# Patient Record
Sex: FEMALE | Race: WHITE | Employment: OTHER | URBAN - METROPOLITAN AREA
[De-identification: names, ages, dates, MRNs, and addresses within clinical notes are randomized per-mention and may not be internally consistent; named-entity substitution may affect disease eponyms.]

---

## 2019-05-03 LAB
AVERAGE GLUCOSE: 143
CHOLESTEROL, TOTAL: 201 MG/DL
CHOLESTEROL/HDL RATIO: 3.7
CREATININE: 0.9 MG/DL
HBA1C MFR BLD: 6.6 %
HDLC SERPL-MCNC: 54 MG/DL (ref 35–70)
LDL CHOLESTEROL CALCULATED: 98 MG/DL (ref 0–160)
POTASSIUM (K+): 4.5
TRIGL SERPL-MCNC: 243 MG/DL
VLDLC SERPL CALC-MCNC: 49 MG/DL

## 2020-04-10 VITALS — DIASTOLIC BLOOD PRESSURE: 80 MMHG | SYSTOLIC BLOOD PRESSURE: 130 MMHG | HEART RATE: 68 BPM | RESPIRATION RATE: 14 BRPM

## 2020-04-10 RX ORDER — AMLODIPINE BESYLATE AND BENAZEPRIL HYDROCHLORIDE 5; 20 MG/1; MG/1
1 CAPSULE ORAL DAILY
COMMUNITY
End: 2020-12-03 | Stop reason: SDUPTHER

## 2020-11-10 ENCOUNTER — TELEPHONE (OUTPATIENT)
Dept: FAMILY MEDICINE CLINIC | Age: 65
End: 2020-11-10

## 2020-12-03 ENCOUNTER — TELEPHONE (OUTPATIENT)
Dept: FAMILY MEDICINE CLINIC | Age: 65
End: 2020-12-03

## 2020-12-03 NOTE — TELEPHONE ENCOUNTER
Patient is requesting a refill of:  Medication: lotrel  Dose: 5/20  Frequency: 1 daily   Ph[facility]: [facility]    Patient is requesting a refill of:  Medication: tenormin  Dose: 50 mg  Frequency: 1 daily  [facility]    Patient is requesting a refill of:  Medication: nexium  Dose: 40mg  Frequency: 1 daily  Pharmacy: [facility]

## 2020-12-04 ENCOUNTER — TELEPHONE (OUTPATIENT)
Dept: FAMILY MEDICINE CLINIC | Age: 65
End: 2020-12-04

## 2020-12-04 RX ORDER — AMLODIPINE BESYLATE AND BENAZEPRIL HYDROCHLORIDE 5; 20 MG/1; MG/1
1 CAPSULE ORAL DAILY
Qty: 90 CAPSULE | Refills: 0 | Status: SHIPPED
Start: 2020-12-04 | End: 2020-12-22 | Stop reason: SDUPTHER

## 2020-12-04 RX ORDER — LISINOPRIL 10 MG/1
10 TABLET ORAL DAILY
Qty: 90 TABLET | Refills: 0 | Status: CANCELLED | OUTPATIENT
Start: 2020-12-04

## 2020-12-04 RX ORDER — ATENOLOL 50 MG/1
50 TABLET ORAL DAILY
Qty: 90 TABLET | Refills: 0 | Status: SHIPPED
Start: 2020-12-04 | End: 2020-12-22 | Stop reason: SDUPTHER

## 2020-12-04 RX ORDER — VENLAFAXINE 75 MG/1
75 TABLET ORAL DAILY
Qty: 90 TABLET | Refills: 0 | Status: CANCELLED | OUTPATIENT
Start: 2020-12-04

## 2020-12-04 RX ORDER — METFORMIN HYDROCHLORIDE 500 MG/1
500 TABLET, FILM COATED, EXTENDED RELEASE ORAL 2 TIMES DAILY WITH MEALS
Qty: 180 TABLET | Refills: 0 | Status: SHIPPED
Start: 2020-12-04 | End: 2021-02-22

## 2020-12-04 RX ORDER — POTASSIUM CHLORIDE 750 MG/1
10 CAPSULE, EXTENDED RELEASE ORAL DAILY
Qty: 180 CAPSULE | Refills: 0 | Status: SHIPPED
Start: 2020-12-04 | End: 2021-03-16 | Stop reason: SDUPTHER

## 2020-12-04 RX ORDER — ESOMEPRAZOLE MAGNESIUM 40 MG/1
40 FOR SUSPENSION ORAL DAILY
Qty: 90 PACKET | Refills: 0 | Status: SHIPPED
Start: 2020-12-04 | End: 2021-04-27

## 2020-12-04 NOTE — TELEPHONE ENCOUNTER
Patient is requesting a refill of:  Medication: lotrel  Dose: 5/20  Frequency: 1 daily  Ph[facility]: [facility]    Patient is requesting a refill of:  Medication: lisinopril  Dose: 10 mg  Frequency: 1 daily  Pharmacy: [facility] [address]    Patient is requesting a refill of:  Medication: metformin ER  Dose: 500mg  Frequency: 1 BID  Pharmacy: [facility] [address]    Patient is requesting a refill of:  Medication: micro K   Dose: 10 meq  Frequency: 1 daily  Pharmacy:  [facility]    Patient is requesting a refill of:  Medication: effexor   Dose: 75 mg  Frequency: 1 daily  Pharmacy: [facility] [address]      Last seen 11/16/2020  Next appt 1/12/2021

## 2020-12-15 RX ORDER — VENLAFAXINE HYDROCHLORIDE 150 MG/1
150 CAPSULE, EXTENDED RELEASE ORAL DAILY
Qty: 90 CAPSULE | Refills: 1
Start: 2020-12-15 | End: 2021-03-16 | Stop reason: SDUPTHER

## 2020-12-15 NOTE — TELEPHONE ENCOUNTER
Patient is requesting a refill of:  Medication: venlafaxine [facility])   Dose: 150 mg  Frequency: daily  Ph[facility] [address]            Last seen 11/16/2020  Next appt 1/12/2021

## 2020-12-17 NOTE — TELEPHONE ENCOUNTER
Patient called to inform pharmacy did not get RX. Noted RX was not sent electronically. I called [facility] and gave verbal order to Leyla Iqbal.

## 2020-12-22 RX ORDER — AMLODIPINE BESYLATE AND BENAZEPRIL HYDROCHLORIDE 5; 20 MG/1; MG/1
1 CAPSULE ORAL DAILY
Qty: 90 CAPSULE | Refills: 1 | Status: SHIPPED
Start: 2020-12-22 | End: 2021-03-16 | Stop reason: SDUPTHER

## 2020-12-22 RX ORDER — ATENOLOL 50 MG/1
50 TABLET ORAL DAILY
Qty: 90 TABLET | Refills: 1 | Status: SHIPPED
Start: 2020-12-22 | End: 2021-03-16 | Stop reason: SDUPTHER

## 2020-12-22 RX ORDER — ATORVASTATIN CALCIUM 10 MG/1
10 TABLET, FILM COATED ORAL DAILY
Qty: 90 TABLET | Refills: 1 | Status: SHIPPED
Start: 2020-12-22 | End: 2021-03-16 | Stop reason: SDUPTHER

## 2021-02-22 RX ORDER — METFORMIN HYDROCHLORIDE 500 MG/1
TABLET, EXTENDED RELEASE ORAL
Qty: 180 TABLET | Refills: 1 | Status: SHIPPED
Start: 2021-02-22 | End: 2021-03-16 | Stop reason: SDUPTHER

## 2021-03-16 ENCOUNTER — OFFICE VISIT (OUTPATIENT)
Dept: FAMILY MEDICINE CLINIC | Age: 66
End: 2021-03-16
Payer: COMMERCIAL

## 2021-03-16 VITALS
HEIGHT: 66 IN | HEART RATE: 65 BPM | BODY MASS INDEX: 31.5 KG/M2 | SYSTOLIC BLOOD PRESSURE: 136 MMHG | WEIGHT: 196 LBS | OXYGEN SATURATION: 98 % | TEMPERATURE: 96.3 F | DIASTOLIC BLOOD PRESSURE: 70 MMHG

## 2021-03-16 DIAGNOSIS — I10 HYPERTENSION, UNSPECIFIED TYPE: ICD-10-CM

## 2021-03-16 DIAGNOSIS — K21.9 GASTROESOPHAGEAL REFLUX DISEASE WITHOUT ESOPHAGITIS: ICD-10-CM

## 2021-03-16 DIAGNOSIS — E11.9 TYPE 2 DIABETES MELLITUS WITHOUT COMPLICATION, WITHOUT LONG-TERM CURRENT USE OF INSULIN (HCC): ICD-10-CM

## 2021-03-16 DIAGNOSIS — E03.9 HYPOTHYROIDISM, UNSPECIFIED TYPE: ICD-10-CM

## 2021-03-16 DIAGNOSIS — R47.1 ACQUIRED DYSARTHRIA: ICD-10-CM

## 2021-03-16 DIAGNOSIS — G45.9 TIA (TRANSIENT ISCHEMIC ATTACK): Primary | ICD-10-CM

## 2021-03-16 DIAGNOSIS — E78.5 HYPERLIPIDEMIA, UNSPECIFIED HYPERLIPIDEMIA TYPE: ICD-10-CM

## 2021-03-16 DIAGNOSIS — D51.8 OTHER VITAMIN B12 DEFICIENCY ANEMIA: ICD-10-CM

## 2021-03-16 PROCEDURE — 99396 PREV VISIT EST AGE 40-64: CPT | Performed by: INTERNAL MEDICINE

## 2021-03-16 PROCEDURE — 93000 ELECTROCARDIOGRAM COMPLETE: CPT | Performed by: INTERNAL MEDICINE

## 2021-03-16 RX ORDER — POTASSIUM CHLORIDE 750 MG/1
10 CAPSULE, EXTENDED RELEASE ORAL DAILY
Qty: 180 CAPSULE | Refills: 0 | Status: SHIPPED
Start: 2021-03-16 | End: 2021-08-23 | Stop reason: SDUPTHER

## 2021-03-16 RX ORDER — AMLODIPINE BESYLATE AND BENAZEPRIL HYDROCHLORIDE 5; 20 MG/1; MG/1
1 CAPSULE ORAL DAILY
Qty: 90 CAPSULE | Refills: 1 | Status: SHIPPED
Start: 2021-03-16 | End: 2021-04-20

## 2021-03-16 RX ORDER — VENLAFAXINE HYDROCHLORIDE 150 MG/1
150 CAPSULE, EXTENDED RELEASE ORAL DAILY
Qty: 90 CAPSULE | Refills: 1 | Status: SHIPPED
Start: 2021-03-16 | End: 2021-08-20 | Stop reason: SDUPTHER

## 2021-03-16 RX ORDER — ATENOLOL 50 MG/1
50 TABLET ORAL DAILY
Qty: 90 TABLET | Refills: 1 | Status: SHIPPED
Start: 2021-03-16 | End: 2021-08-20 | Stop reason: SDUPTHER

## 2021-03-16 RX ORDER — METFORMIN HYDROCHLORIDE 500 MG/1
TABLET, EXTENDED RELEASE ORAL
Qty: 180 TABLET | Refills: 1 | Status: SHIPPED
Start: 2021-03-16 | End: 2021-05-19

## 2021-03-16 RX ORDER — LISINOPRIL 10 MG/1
10 TABLET ORAL DAILY
Qty: 90 TABLET | Refills: 1 | Status: CANCELLED | OUTPATIENT
Start: 2021-03-16

## 2021-03-16 RX ORDER — ATORVASTATIN CALCIUM 10 MG/1
10 TABLET, FILM COATED ORAL DAILY
Qty: 90 TABLET | Refills: 1 | Status: SHIPPED
Start: 2021-03-16 | End: 2021-05-19

## 2021-03-16 SDOH — ECONOMIC STABILITY: INCOME INSECURITY: HOW HARD IS IT FOR YOU TO PAY FOR THE VERY BASICS LIKE FOOD, HOUSING, MEDICAL CARE, AND HEATING?: NOT ASKED

## 2021-03-16 SDOH — ECONOMIC STABILITY: FOOD INSECURITY: WITHIN THE PAST 12 MONTHS, YOU WORRIED THAT YOUR FOOD WOULD RUN OUT BEFORE YOU GOT MONEY TO BUY MORE.: NEVER TRUE

## 2021-03-16 SDOH — ECONOMIC STABILITY: TRANSPORTATION INSECURITY
IN THE PAST 12 MONTHS, HAS LACK OF TRANSPORTATION KEPT YOU FROM MEETINGS, WORK, OR FROM GETTING THINGS NEEDED FOR DAILY LIVING?: NO

## 2021-03-16 ASSESSMENT — PATIENT HEALTH QUESTIONNAIRE - PHQ9
1. LITTLE INTEREST OR PLEASURE IN DOING THINGS: 0
SUM OF ALL RESPONSES TO PHQ QUESTIONS 1-9: 0
SUM OF ALL RESPONSES TO PHQ QUESTIONS 1-9: 0

## 2021-03-16 NOTE — PROGRESS NOTES
Subjective:     Chief Complaint   Patient presents with    Speech Problem   She presents to the office today she has not been seen for 1 year and a half  The past 2 months having problem with the speech  Her family and friends have suggested that sometimes he cannot understand what she says  It is not getting worse  She denies any tingling numbness of the face  Denies any dysphagia  Denies any sore throat  Denies any heartburns  No weakness upper or lower extremities  No loss of balance  No history of TIA CVA      Past medical history hypertension, hyperlipidemia, diabetes type 2, GE reflux, hypokalemia, diabetes type 2, CT head and MRI brain - February 2010    She has not been a smoker does not drink alcohol    Father had CABG at age 61 heavy smoker  One brother has MS  Sister has rheumatoid arthritis      Past Medical History:   Diagnosis Date    Abnormal mammogram of left breast 05/2019    Anxiety     Depression     Diabetes mellitus ([facility]     Erosive esophagitis     Esophageal stricture     GERD (gastroesophageal reflux disease)     Gestational diabetes     Hyperlipidemia     Hypertension     Hypokalemia     Osteoarthritis     knees    Postmenopausal     Type 2 diabetes mellitus without complication ([facility] [address]         Social History     Socioeconomic History    Marital status:      Spouse name: Not on file    Number of children: Not on file    Years of education: Not on file    Highest education level: Not on file   Occupational History    Not on file   Social Needs    Financial resource strain: Not on file    Food insecurity     Worry: Never true     Inability: Never true    Transportation needs     Medical: No     Non-medical: No   Tobacco Use    Smoking status: Never Smoker    Smokeless tobacco: Never Used   Substance and Sexual Activity    Alcohol use: No    Drug use: No    Sexual activity: Not on file   Lifestyle    Physical activity     Days per week: Not on file     Minutes per session: Not on file    Stress: Not on file   Relationships    Social connections     Talks on phone: Not on file     Gets together: Not on file     Attends Religion service: Not on file     Active member of club or organization: Not on file     Attends meetings of clubs or organizations: Not on file     Relationship status: Not on file    Intimate partner violence     Fear of current or ex partner: Not on file     Emotionally abused: Not on file     Physically abused: Not on file     Forced sexual activity: Not on file   Other Topics Concern    Not on file   Social History Narrative    Not on file        Past Surgical History:   Procedure Laterality Date    BREAST BIOPSY Left 05/2019    Dr. Vin Reid      x4    CHOLECYSTECTOMY      COLONOSCOPY  11/2011    HYSTERECTOMY      JOINT REPLACEMENT Right     knee        Family History   Problem Relation Age of Onset    Diabetes Mother         complication     Rheum Arthritis Sister     Mult Sclerosis Brother     Diabetes Brother         No Known Allergies     ROS  No acute distress  Cardiac: Denies any chest pain or palpitation  Seen by cardiologist Dr. Yehuda Wright in 2018  Stress test was negative  Respiratory: Denies any cough or shortness of breath  GI: No abdominal pain.  Denies any nausea vomiting or diarrhea   Had colonoscopy by Dr. Ashley Saleh May 2019 repeat in 2024 unless any symptoms as per patient  : Denies any dysuria frequency or hematuria  Neuro: No headache or dizziness  Endocrine: No diabetes  Skin: normal  No recent weight gain or weight loss  Denies any change in vision    For mammograms supposed to follow-up with Dr. Myra Dunbar seen by him in 2019    Objective:    /70   Pulse 65   Temp 96.3 °F (35.7 °C)   Ht 5' 6\" (1.676 m)   Wt 196 lb (88.9 kg)   SpO2 98%   BMI 31.64 kg/m²     Constitutional: Alert awake and oriented  Eyes: Pupils equal bilaterally. Extraocular muscles intact  Neck: no JVD adenopathy no bruit  Face was normal no facial droop  Tongue in the midline  Oral cavity normal  Heart:  RRR, no murmurs, gallops, or rubs. Lungs:    no wheeze, rales or rhonchi  Abd: bowel sounds present, nontender, nondistended, no masses  Extrem:  No clubbing, cyanosis, or edema  Neuro: AAOx3,No Focal deficit  Gait normal  Reflexes normal  No cerebellar signs  Psychological: History of depression doing well no suicidal ideation      Current Outpatient Medications   Medication Sig Dispense Refill    venlafaxine (EFFEXOR XR) 150 MG extended release capsule Take 1 capsule by mouth daily 90 capsule 1    potassium chloride (MICRO-K) 10 MEQ extended release capsule Take 1 capsule by mouth daily 180 capsule 0    metFORMIN (GLUCOPHAGE-XR) 500 MG extended release tablet TAKE 1 TABLET BY MOUTH TWICE A DAY WITH MEALS 180 tablet 1    atorvastatin (LIPITOR) 10 MG tablet Take 1 tablet by mouth daily 90 tablet 1    atenolol (TENORMIN) 50 MG tablet Take 1 tablet by mouth daily 90 tablet 1    amLODIPine-benazepril (LOTREL) 5-20 MG per capsule Take 1 capsule by mouth daily 90 capsule 1    esomeprazole Magnesium (NEXIUM) 40 MG PACK Take 1 packet by mouth daily 90 packet 0     No current facility-administered medications for this visit.          Last 3 BMP  Lab Results   Component Value Date/Time     08/22/2016 11:00 AM     08/17/2016 10:40 AM     08/14/2016 11:20 AM    K 4.5 05/03/2019    K 4.3 08/22/2016 11:00 AM    K 4.2 08/17/2016 10:40 AM    K 4.6 08/14/2016 11:20 AM     08/22/2016 11:00 AM    CL 99 08/17/2016 10:40 AM     08/14/2016 11:20 AM    CO2 22 08/22/2016 11:00 AM    CO2 24 08/17/2016 10:40 AM    CO2 26 08/14/2016 11:20 AM    BUN 10 08/22/2016 11:00 AM    BUN 13 08/17/2016 10:40 AM    BUN 11 08/14/2016 11:20 AM    CREATININE 0.9 05/03/2019    CREATININE 0.8 08/22/2016 11:00 AM    CREATININE 0.8 08/17/2016 10:40 AM CREATININE 0.8 08/14/2016 11:20 AM    GLUCOSE 118 (H) 08/22/2016 11:00 AM    GLUCOSE 124 (H) 08/17/2016 10:40 AM    GLUCOSE 113 (H) 08/14/2016 11:20 AM    CALCIUM 9.7 08/22/2016 11:00 AM    CALCIUM 9.5 08/17/2016 10:40 AM    CALCIUM 9.5 08/14/2016 11:20 AM       Last 3 CMP:    Lab Results   Component Value Date/Time     08/22/2016 11:00 AM     08/17/2016 10:40 AM     08/14/2016 11:20 AM    K 4.5 05/03/2019    K 4.3 08/22/2016 11:00 AM    K 4.2 08/17/2016 10:40 AM    K 4.6 08/14/2016 11:20 AM     08/22/2016 11:00 AM    CL 99 08/17/2016 10:40 AM     08/14/2016 11:20 AM    CO2 22 08/22/2016 11:00 AM    CO2 24 08/17/2016 10:40 AM    CO2 26 08/14/2016 11:20 AM    BUN 10 08/22/2016 11:00 AM    BUN 13 08/17/2016 10:40 AM    BUN 11 08/14/2016 11:20 AM    CREATININE 0.9 05/03/2019    CREATININE 0.8 08/22/2016 11:00 AM    CREATININE 0.8 08/17/2016 10:40 AM    CREATININE 0.8 08/14/2016 11:20 AM    GLUCOSE 118 (H) 08/22/2016 11:00 AM    GLUCOSE 124 (H) 08/17/2016 10:40 AM    GLUCOSE 113 (H) 08/14/2016 11:20 AM    CALCIUM 9.7 08/22/2016 11:00 AM    CALCIUM 9.5 08/17/2016 10:40 AM    CALCIUM 9.5 08/14/2016 11:20 AM    PROT 6.8 08/22/2016 11:00 AM    PROT 6.6 08/17/2016 10:40 AM    PROT 6.4 08/14/2016 11:20 AM    LABALBU 4.1 08/22/2016 11:00 AM    LABALBU 4.0 08/17/2016 10:40 AM    LABALBU 4.0 08/14/2016 11:20 AM    BILITOT 0.4 08/22/2016 11:00 AM    BILITOT 0.3 08/17/2016 10:40 AM    BILITOT 0.6 08/14/2016 11:20 AM    ALKPHOS 95 08/22/2016 11:00 AM    ALKPHOS 107 (H) 08/17/2016 10:40 AM    ALKPHOS 115 (H) 08/14/2016 11:20 AM    AST 15 08/22/2016 11:00 AM    AST 20 08/17/2016 10:40 AM    AST 12 08/14/2016 11:20 AM    ALT 11 08/22/2016 11:00 AM    ALT 17 08/17/2016 10:40 AM    ALT 11 08/14/2016 11:20 AM        CBC:   Lab Results   Component Value Date/Time    WBC 6.9 08/22/2016 11:00 AM    RBC 3.85 08/22/2016 11:00 AM    HGB 11.4 (L) 08/22/2016 11:00 AM    HCT 35.0 08/22/2016 11:00 AM    MCV 90.8 08/22/2016 11:00 AM    MCH 29.5 08/22/2016 11:00 AM    MCHC 32.5 08/22/2016 11:00 AM    RDW 13.5 08/22/2016 11:00 AM     08/22/2016 11:00 AM    MPV 8.1 08/22/2016 11:00 AM       A1C:  Lab Results   Component Value Date/Time    LABA1C 6.6 05/03/2019       Lipid panel:  Lab Results   Component Value Date    CHOL 201 05/03/2019    CHOL 184 03/16/2016    TRIG 243 05/03/2019    TRIG 237 03/16/2016    HDL 54 05/03/2019    HDL 48 03/16/2016        Lab Results   Component Value Date/Time    PROT 6.8 08/22/2016 11:00 AM    PROT 6.6 08/17/2016 10:40 AM    PROT 6.4 08/14/2016 11:20 AM    INR 1.0 08/12/2016 05:39 PM       No results found for: MG      Assessment. Estrella Bennettsakshi was seen today for speech problem. Diagnoses and all orders for this visit:    TIA (transient ischemic attack)  -     MRI BRAIN W WO CONTRAST; Future  -     US CAROTID ARTERY BILATERAL; Future  -     CBC Auto Differential; Future  -     VITAMIN B12 & FOLATE; Future    Hypertension, unspecified type  -     EKG 12 lead; Future  -     EKG 12 lead    Type 2 diabetes mellitus without complication, without long-term current use of insulin (HCC)  -     Hemoglobin A1C; Future    Hyperlipidemia, unspecified hyperlipidemia type  -     Comprehensive Metabolic Panel; Future  -     Lipid Panel; Future  -     TSH without Reflex; Future    Hypothyroidism, unspecified type  -     T4, FREE; Future    Other vitamin B12 deficiency anemia  -     VITAMIN B12 & FOLATE; Future    Acquired dysarthria  -     Ambulatory referral to ENT    Gastroesophageal reflux disease without esophagitis    Other orders  -     venlafaxine (EFFEXOR XR) 150 MG extended release capsule; Take 1 capsule by mouth daily  -     potassium chloride (MICRO-K) 10 MEQ extended release capsule; Take 1 capsule by mouth daily  -     metFORMIN (GLUCOPHAGE-XR) 500 MG extended release tablet; TAKE 1 TABLET BY MOUTH TWICE A DAY WITH MEALS  -     atorvastatin (LIPITOR) 10 MG tablet;  Take 1 tablet by mouth daily -     atenolol (TENORMIN) 50 MG tablet; Take 1 tablet by mouth daily  -     amLODIPine-benazepril (LOTREL) 5-20 MG per capsule; Take 1 capsule by mouth daily       Patient Active Problem List   Diagnosis    Right foot infection    Diabetic ulcer of left foot associated with type 1 diabetes mellitus ([address]    Type 2 diabetes mellitus without complication, without long-term current use of insulin ([address]    Essential hypertension    Gastroesophageal reflux disease without esophagitis    Mixed hyperlipidemia    Subacute osteomyelitis of foot ([address]       Plan: Having problem with the speech it comes and goes sometimes it is clear sometimes people can understand need to rule out intracranial pathology especially TIA with risk factors hypertension hyperlipidemia diabetes  MRI brain with contrast  Carotid ultrasound    Refer to ENT for evaluation of the throat  I was able to understand her clearly today      Hypertension is controlled continue amlodipine and benazepril and atenolol    EKG shows normal sinus rhythm no acute changes    Hyperlipidemia check lipid profile      Check complete blood work CBC, CMP, lipid profile thyroid B12    Continue Nexium as needed for GE reflux she denies any dysphagia      History of DVT after PICC line resolved    No recurrence      Get mammogram and follow with the surgeon  Follow back here in 4 weeks  Neurology evaluation if all the work-up negative        Return in about 4 weeks (around 4/13/2021).        Marleen Calles MD  5:53 PM  3/16/2021     [place]

## 2021-04-20 ENCOUNTER — OFFICE VISIT (OUTPATIENT)
Dept: FAMILY MEDICINE CLINIC | Age: 66
End: 2021-04-20
Payer: COMMERCIAL

## 2021-04-20 ENCOUNTER — TELEPHONE (OUTPATIENT)
Dept: FAMILY MEDICINE CLINIC | Age: 66
End: 2021-04-20

## 2021-04-20 VITALS
BODY MASS INDEX: 31.31 KG/M2 | WEIGHT: 194 LBS | DIASTOLIC BLOOD PRESSURE: 80 MMHG | HEART RATE: 70 BPM | OXYGEN SATURATION: 98 % | SYSTOLIC BLOOD PRESSURE: 150 MMHG

## 2021-04-20 DIAGNOSIS — G45.9 TIA (TRANSIENT ISCHEMIC ATTACK): ICD-10-CM

## 2021-04-20 DIAGNOSIS — I61.8 OTHER RIGHT-SIDED NONTRAUMATIC INTRACEREBRAL HEMORRHAGE (HCC): ICD-10-CM

## 2021-04-20 DIAGNOSIS — R73.9 HYPERGLYCEMIA: ICD-10-CM

## 2021-04-20 DIAGNOSIS — E78.5 HYPERLIPIDEMIA, UNSPECIFIED HYPERLIPIDEMIA TYPE: ICD-10-CM

## 2021-04-20 DIAGNOSIS — I10 ESSENTIAL HYPERTENSION: Primary | ICD-10-CM

## 2021-04-20 PROCEDURE — 99214 OFFICE O/P EST MOD 30 MIN: CPT | Performed by: INTERNAL MEDICINE

## 2021-04-20 RX ORDER — AMLODIPINE BESYLATE 5 MG/1
5 TABLET ORAL DAILY
Qty: 30 TABLET | Refills: 3 | Status: SHIPPED
Start: 2021-04-20 | End: 2021-08-20 | Stop reason: SDUPTHER

## 2021-04-20 RX ORDER — LISINOPRIL 30 MG/1
30 TABLET ORAL DAILY
Qty: 30 TABLET | Refills: 3 | Status: SHIPPED
Start: 2021-04-20 | End: 2021-08-20 | Stop reason: SDUPTHER

## 2021-04-20 NOTE — PROGRESS NOTES
session: Not on file    Stress: Not on file   Relationships    Social connections     Talks on phone: Not on file     Gets together: Not on file     Attends Hinduism service: Not on file     Active member of club or organization: Not on file     Attends meetings of clubs or organizations: Not on file     Relationship status: Not on file    Intimate partner violence     Fear of current or ex partner: Not on file     Emotionally abused: Not on file     Physically abused: Not on file     Forced sexual activity: Not on file   Other Topics Concern    Not on file   Social History Narrative    Not on file        Past Surgical History:   Procedure Laterality Date    BREAST BIOPSY Left 05/2019    Dr. Virgilio Lozada Manifold      x4    CHOLECYSTECTOMY      COLONOSCOPY  11/2011    HYSTERECTOMY      JOINT REPLACEMENT Right     knee        Family History   Problem Relation Age of Onset    Diabetes Mother         complication     Rheum Arthritis Sister     Mult Sclerosis Brother     Diabetes Brother         No Known Allergies     ROS  No acute distress  Cardiac: Denies any chest pain or palpitation no arrhythmia no syncope no lightheadedness  Respiratory: Denies any cough or shortness of breath  No shortness of breath  GI: No abdominal pain. Denies any nausea vomiting or diarrhea  : Denies any dysuria frequency or hematuria  Neuro: No headache or dizziness  Denies any loss of balance gait was normal  Endocrine: No diabetes  Skin: normal  No recent weight gain or weight loss  Denies any change in vision    Objective:    BP (!) 150/80   Pulse 70   Wt 194 lb (88 kg)   SpO2 98%   BMI 31.31 kg/m²     Constitutional: Alert awake and oriented  Eyes: Pupils equal bilaterally. Extraocular muscles intact  Neck: no JVD adenopathy no bruit  Heart:  RRR, no murmurs, gallops, or rubs.   Lungs:    no wheeze, rales or rhonchi  Abd: bowel sounds present, nontender, nondistended, no masses  Extrem:  No clubbing, cyanosis, or edema  Neuro: AAOx3,No Focal deficit  Speech has trouble speaking intermittently  Psychological: no depression or anxiety       Current Outpatient Medications   Medication Sig Dispense Refill    amLODIPine (NORVASC) 5 MG tablet Take 1 tablet by mouth daily One in morning 30 tablet 3    lisinopril (PRINIVIL;ZESTRIL) 30 MG tablet Take 1 tablet by mouth daily One at night 30 tablet 3    venlafaxine (EFFEXOR XR) 150 MG extended release capsule Take 1 capsule by mouth daily 90 capsule 1    potassium chloride (MICRO-K) 10 MEQ extended release capsule Take 1 capsule by mouth daily 180 capsule 0    metFORMIN (GLUCOPHAGE-XR) 500 MG extended release tablet TAKE 1 TABLET BY MOUTH TWICE A DAY WITH MEALS 180 tablet 1    atorvastatin (LIPITOR) 10 MG tablet Take 1 tablet by mouth daily 90 tablet 1    atenolol (TENORMIN) 50 MG tablet Take 1 tablet by mouth daily 90 tablet 1    esomeprazole Magnesium (NEXIUM) 40 MG PACK Take 1 packet by mouth daily 90 packet 0     No current facility-administered medications for this visit.          Last 3 BMP  Lab Results   Component Value Date/Time     04/12/2021 01:25 PM     08/22/2016 11:00 AM     08/17/2016 10:40 AM    K 4.5 04/12/2021 01:25 PM    K 4.5 05/03/2019    K 4.3 08/22/2016 11:00 AM    K 4.2 08/17/2016 10:40 AM     04/12/2021 01:25 PM     08/22/2016 11:00 AM    CL 99 08/17/2016 10:40 AM    CO2 24 04/12/2021 01:25 PM    CO2 22 08/22/2016 11:00 AM    CO2 24 08/17/2016 10:40 AM    BUN 13 04/12/2021 01:25 PM    BUN 10 08/22/2016 11:00 AM    BUN 13 08/17/2016 10:40 AM    CREATININE 0.8 04/12/2021 01:25 PM    CREATININE 0.9 05/03/2019    CREATININE 0.8 08/22/2016 11:00 AM    CREATININE 0.8 08/17/2016 10:40 AM    GLUCOSE 183 (H) 04/12/2021 01:25 PM    GLUCOSE 118 (H) 08/22/2016 11:00 AM    GLUCOSE 124 (H) 08/17/2016 10:40 AM    CALCIUM 9.7 04/12/2021 01:25 PM CALCIUM 9.7 08/22/2016 11:00 AM    CALCIUM 9.5 08/17/2016 10:40 AM       Last 3 CMP:    Lab Results   Component Value Date/Time     04/12/2021 01:25 PM     08/22/2016 11:00 AM     08/17/2016 10:40 AM    K 4.5 04/12/2021 01:25 PM    K 4.5 05/03/2019    K 4.3 08/22/2016 11:00 AM    K 4.2 08/17/2016 10:40 AM     04/12/2021 01:25 PM     08/22/2016 11:00 AM    CL 99 08/17/2016 10:40 AM    CO2 24 04/12/2021 01:25 PM    CO2 22 08/22/2016 11:00 AM    CO2 24 08/17/2016 10:40 AM    BUN 13 04/12/2021 01:25 PM    BUN 10 08/22/2016 11:00 AM    BUN 13 08/17/2016 10:40 AM    CREATININE 0.8 04/12/2021 01:25 PM    CREATININE 0.9 05/03/2019    CREATININE 0.8 08/22/2016 11:00 AM    CREATININE 0.8 08/17/2016 10:40 AM    GLUCOSE 183 (H) 04/12/2021 01:25 PM    GLUCOSE 118 (H) 08/22/2016 11:00 AM    GLUCOSE 124 (H) 08/17/2016 10:40 AM    CALCIUM 9.7 04/12/2021 01:25 PM    CALCIUM 9.7 08/22/2016 11:00 AM    CALCIUM 9.5 08/17/2016 10:40 AM    PROT 6.8 04/12/2021 01:25 PM    PROT 6.8 08/22/2016 11:00 AM    PROT 6.6 08/17/2016 10:40 AM    LABALBU 4.2 04/12/2021 01:25 PM    LABALBU 4.1 08/22/2016 11:00 AM    LABALBU 4.0 08/17/2016 10:40 AM    BILITOT 0.2 04/12/2021 01:25 PM    BILITOT 0.4 08/22/2016 11:00 AM    BILITOT 0.3 08/17/2016 10:40 AM    ALKPHOS 97 04/12/2021 01:25 PM    ALKPHOS 95 08/22/2016 11:00 AM    ALKPHOS 107 (H) 08/17/2016 10:40 AM    AST 19 04/12/2021 01:25 PM    AST 15 08/22/2016 11:00 AM    AST 20 08/17/2016 10:40 AM    ALT 14 04/12/2021 01:25 PM    ALT 11 08/22/2016 11:00 AM    ALT 17 08/17/2016 10:40 AM        CBC:   Lab Results   Component Value Date/Time    WBC 6.9 08/22/2016 11:00 AM    RBC 3.85 08/22/2016 11:00 AM    HGB 11.4 (L) 08/22/2016 11:00 AM    HCT 35.0 08/22/2016 11:00 AM    MCV 90.8 08/22/2016 11:00 AM    MCH 29.5 08/22/2016 11:00 AM    MCHC 32.5 08/22/2016 11:00 AM    RDW 13.5 08/22/2016 11:00 AM     08/22/2016 11:00 AM    MPV 8.1 08/22/2016 11:00 AM       A1C:  Lab

## 2021-04-21 ENCOUNTER — TELEPHONE (OUTPATIENT)
Dept: FAMILY MEDICINE CLINIC | Age: 66
End: 2021-04-21

## 2021-04-21 NOTE — TELEPHONE ENCOUNTER
Left a message for the patient labs reviewed cholesterol 324, A1c elevated 8.5  Increase Lipitor to 40 mg, add Janumet 50/1000 daily, left a message for the patient to call back

## 2021-04-22 NOTE — TELEPHONE ENCOUNTER
Patient called back she said you can send the Rx for janumet to [facility] [address] she did understand to increase the lipitor to 40 mg daily.     Last seen 4/20/2021  Next appt 5/19/2021

## 2021-04-23 ENCOUNTER — TELEPHONE (OUTPATIENT)
Dept: FAMILY MEDICINE CLINIC | Age: 66
End: 2021-04-23

## 2021-04-23 RX ORDER — SITAGLIPTIN AND METFORMIN HYDROCHLORIDE 1000; 50 MG/1; MG/1
1 TABLET, FILM COATED ORAL DAILY
Qty: 30 TABLET | Refills: 3 | Status: SHIPPED
Start: 2021-04-23 | End: 2021-08-11 | Stop reason: SDUPTHER

## 2021-04-23 RX ORDER — ATORVASTATIN CALCIUM 40 MG/1
40 TABLET, FILM COATED ORAL DAILY
Qty: 30 TABLET | Refills: 3 | Status: SHIPPED
Start: 2021-04-23 | End: 2021-08-20 | Stop reason: SDUPTHER

## 2021-04-23 RX ORDER — ATORVASTATIN CALCIUM 40 MG/1
40 TABLET, FILM COATED ORAL DAILY
Qty: 30 TABLET | Refills: 3 | Status: CANCELLED | OUTPATIENT
Start: 2021-04-23

## 2021-04-23 RX ORDER — SITAGLIPTIN AND METFORMIN HYDROCHLORIDE 1000; 50 MG/1; MG/1
1 TABLET, FILM COATED, EXTENDED RELEASE ORAL DAILY
Qty: 30 TABLET | Refills: 2 | Status: CANCELLED | OUTPATIENT
Start: 2021-04-23

## 2021-04-23 NOTE — TELEPHONE ENCOUNTER
I spoke to patient her A1c is high  Her cholesterol 324  Patient did have a TIA  Start on Lipitor 40 mg  Discontinue Metformin  Start on Janumet 51,000 daily  Discontinue Metformin, discontinue Lipitor 10 mg Improving  A1c is down to 5 7

## 2021-04-27 RX ORDER — ESOMEPRAZOLE MAGNESIUM 40 MG/1
40 CAPSULE, DELAYED RELEASE ORAL
Qty: 90 CAPSULE | Refills: 1 | Status: SHIPPED
Start: 2021-04-27 | End: 2021-08-20 | Stop reason: SDUPTHER

## 2021-05-05 ENCOUNTER — TELEPHONE (OUTPATIENT)
Dept: SPEECH THERAPY | Age: 66
End: 2021-05-05

## 2021-05-05 NOTE — TELEPHONE ENCOUNTER
Spoke with patient;  Speech evaluation scheduled for Friday 05/14/2021 at 11 am.  Jono Colon.  Raul Rosen MA/CCC-SLP  [id number]

## 2021-05-12 ENCOUNTER — OFFICE VISIT (OUTPATIENT)
Dept: NEUROLOGY | Age: 66
End: 2021-05-12
Payer: COMMERCIAL

## 2021-05-12 VITALS
SYSTOLIC BLOOD PRESSURE: 150 MMHG | BODY MASS INDEX: 30.45 KG/M2 | HEIGHT: 67 IN | DIASTOLIC BLOOD PRESSURE: 70 MMHG | WEIGHT: 194 LBS

## 2021-05-12 DIAGNOSIS — R47.1 DYSARTHRIA: ICD-10-CM

## 2021-05-12 DIAGNOSIS — I61.9 HEMORRHAGIC STROKE (HCC): Primary | ICD-10-CM

## 2021-05-12 PROCEDURE — 99203 OFFICE O/P NEW LOW 30 MIN: CPT | Performed by: PSYCHIATRY & NEUROLOGY

## 2021-05-12 ASSESSMENT — ENCOUNTER SYMPTOMS
BACK PAIN: 1
GASTROINTESTINAL NEGATIVE: 1
RESPIRATORY NEGATIVE: 1
EYES NEGATIVE: 1

## 2021-05-14 ENCOUNTER — EVALUATION (OUTPATIENT)
Dept: SPEECH THERAPY | Age: 66
End: 2021-05-14
Payer: COMMERCIAL

## 2021-05-14 DIAGNOSIS — R47.1 DYSARTHRIA: Primary | ICD-10-CM

## 2021-05-14 PROCEDURE — 92523 SPEECH SOUND LANG COMPREHEN: CPT | Performed by: SPEECH-LANGUAGE PATHOLOGIST

## 2021-05-18 NOTE — PROGRESS NOTES
SPEECH/LANGUAGE PATHOLOGY  SPEECH/LANGUAGE/COGNITIVE EVALUATION      PATIENT NAME:  Pepe Levy        :  1955   63years old         EVALUATION DATE:  2021  REFERRED BY:  Dr. 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 She was told that since it does not interfere with speech or swallowing that she should not have a frenotomy. It does limit her lingual mobility. Parameters of Speech Production  Respiration:  Adequate for speech production  Articulation:  Distortion  Resonance:  Within functional limits  Quality:   Within functional limits  Pitch: Within functional limits  Intensity: Within functional limits  Fluency:  Intact  Prosody Intact    RECEPTIVE LANGUAGE    Comprehension of Yes/No Questions:    Within functional limits    Process  Simple Verbal Commands:   Within functional limits  Process Intermediate Verbal Commands:   Within functional limits  Process Complex Verbal Commands:     Within functional limits    Comprehension of Conversation:      Within functional limits      EXPRESSIVE LANGUAGE     Serials: Functional    Imitation:  Words   Functional   Sentences Functional    Naming:  (Modality used:  Verbal)  Confrontation Naming  Functional  Functional Description  Functional  Response Naming: Functional    Conversation:      Conversation was within functional limits    COGNITION     Attention/Orientation  Attention: Sustained attention Orientation:  Oriented to Person, Place, Date, Reason for hospitalization    Memory   Immediate Recall: Repeated 3/3    Delayed Recall:   Recalled 3/3    Long Term Recall:   Recalled Address, Birthdate, Age and Family    Organization/Problem Solving/Reasoning   Verbal Sequencing:   Functional        Verbal Problem solving:   Functional          CLINICAL OBSERVATIONS NOTED DURING THE EVALUATION  Within functional limits                  The Speech Language Pathologist (SLP) completed education with the patient regarding results of evaluation. Explained that Speech Pathology intervention is warranted  at this time   Prognosis for improvements is good     This plan will be re-evaluated and revised as warranted. Patient stated goals: Agreed with above,   Treatment goals discussed with Patient   The Patient understand(s) the diagnosis, prognosis and plan of care       CPT code:    55030  eval speech sound lang comprehension      The admitting diagnosis and active problem list, as listed below have been reviewed prior to initiation of this evaluation. ACTIVE PROBLEM LIST:   Patient Active Problem List   Diagnosis    Right foot infection    Diabetic ulcer of left foot associated with type 1 diabetes mellitus ([facility]    Type 2 diabetes mellitus without complication, without long-term current use of insulin ([facility]    Essential hypertension    Gastroesophageal reflux disease without esophagitis    Mixed hyperlipidemia    Subacute osteomyelitis of foot ([facility]    Dysarthria    Hemorrhagic stroke ([facility]         Manohar Sutherland. Jacqueline Villegas MA/[facility]-SLP  [id number]    I CERTIFY THAT THIS EVALUATION AND PLAN OF CARE FOR SPEECH THERAPY SERVICES ARE APPROPRIATE AND MEDICALLY NECESSARY.     DURATION:  FROM:___________05/14/2021____________THRU__________08/13/2021______________              ________________________________________________________________________  Zahra Hutchinson DATE

## 2021-05-19 ENCOUNTER — OFFICE VISIT (OUTPATIENT)
Dept: FAMILY MEDICINE CLINIC | Age: 66
End: 2021-05-19
Payer: COMMERCIAL

## 2021-05-19 VITALS
HEART RATE: 68 BPM | DIASTOLIC BLOOD PRESSURE: 80 MMHG | SYSTOLIC BLOOD PRESSURE: 126 MMHG | WEIGHT: 189 LBS | OXYGEN SATURATION: 98 % | BODY MASS INDEX: 29.6 KG/M2

## 2021-05-19 DIAGNOSIS — F32.A DEPRESSION, UNSPECIFIED DEPRESSION TYPE: ICD-10-CM

## 2021-05-19 DIAGNOSIS — E11.9 TYPE 2 DIABETES MELLITUS WITHOUT COMPLICATION, WITHOUT LONG-TERM CURRENT USE OF INSULIN (HCC): ICD-10-CM

## 2021-05-19 DIAGNOSIS — I61.9 HEMORRHAGIC STROKE (HCC): Primary | Chronic | ICD-10-CM

## 2021-05-19 DIAGNOSIS — I10 ESSENTIAL HYPERTENSION: ICD-10-CM

## 2021-05-19 DIAGNOSIS — Z12.31 ENCOUNTER FOR SCREENING MAMMOGRAM FOR MALIGNANT NEOPLASM OF BREAST: ICD-10-CM

## 2021-05-19 DIAGNOSIS — E78.5 HYPERLIPIDEMIA, UNSPECIFIED HYPERLIPIDEMIA TYPE: ICD-10-CM

## 2021-05-19 PROCEDURE — 99213 OFFICE O/P EST LOW 20 MIN: CPT | Performed by: INTERNAL MEDICINE

## 2021-05-19 PROCEDURE — 3052F HG A1C>EQUAL 8.0%<EQUAL 9.0%: CPT | Performed by: INTERNAL MEDICINE

## 2021-05-21 ENCOUNTER — TELEPHONE (OUTPATIENT)
Dept: SPEECH THERAPY | Age: 66
End: 2021-05-21

## 2021-05-21 NOTE — TELEPHONE ENCOUNTER
Patient was a no show for her speech therapy appointment this date. Jono Colon.  Raul Rosen MA/MARISEL-SLP  [id number]

## 2021-06-02 ENCOUNTER — TREATMENT (OUTPATIENT)
Dept: SPEECH THERAPY | Age: 66
End: 2021-06-02
Payer: COMMERCIAL

## 2021-06-02 DIAGNOSIS — R47.1 DYSARTHRIA: Primary | ICD-10-CM

## 2021-06-02 PROCEDURE — 92507 TX SP LANG VOICE COMM INDIV: CPT | Performed by: SPEECH-LANGUAGE PATHOLOGIST

## 2021-06-07 NOTE — PROGRESS NOTES
Patient seen for 30 minute in person session this date. Patient reports doing well. We reviewed all of the oral motor exercises and she is completing them with fair+/good performance with min cues. We worked on over articulation/slowed rate strategies with reading long sentences out loud. She completed the task with good intelligibility but needed min/mod cues for implementation of the 2 strategies. Homework activities encouraged. Will continue. Staci Dangelo.  Catracho Conte MA/CCC-SLP  [id number]    [facility] [id number]

## 2021-07-20 ENCOUNTER — TELEPHONE (OUTPATIENT)
Dept: FAMILY MEDICINE CLINIC | Age: 66
End: 2021-07-20

## 2021-07-20 NOTE — TELEPHONE ENCOUNTER
----- Message from Jenni Mccloud sent at 7/20/2021  1:29 PM EDT -----  Subject: Appointment Request    Reason for Call: Routine Existing Condition Follow Up    QUESTIONS  Type of Appointment? Established Patient  Reason for appointment request? Available appointments did not meet   patient need  Additional Information for Provider? pt need two mo fu she would also like   to schedule her  Herson Gonzalez at the same time for his cough if   possible please call them back  ---------------------------------------------------------------------------  --------------  [address]  What is the best way for the office to contact you? OK to leave message on   voicemail  Preferred Call Back Phone Number? [phone]  ---------------------------------------------------------------------------  --------------  SCRIPT ANSWERS  Relationship to Patient? Self  Have your symptoms changed? No  Appointment reason? Well Care/Follow Ups  Select a Well Care/Follow Ups appointment reason? Adult Existing Condition   Follow Up [Diabetes, CHF, COPD, Hypertension/Blood Pressure Check]  (Is the patient requesting to be seen urgently for their symptoms?)? No  Is this follow up request related to routine Diabetes Management? No  Are you having any new concerns about your existing condition? No  Have you been diagnosed with, awaiting test results for, or told that you   are suspected of having COVID-19 (Coronavirus)? (If patient has tested   negative or was tested as a requirement for work, school, or travel and   not based on symptoms, answer no)? No  Do you currently have flu-like symptoms including fever or chills, cough,   shortness of breath, difficulty breathing, or new loss of taste or smell? No  Have you had close contact with someone with COVID-19 in the last 14 days? No  (Service Expert - click yes below to proceed with [employer] As Usual   Scheduling)?  Yes

## 2021-08-11 NOTE — TELEPHONE ENCOUNTER
Patient called for refill, stating she has mail order pharmacy now.     Last seen 5/19/2021  Next appt 10/6/2021  [facility]

## 2021-08-12 RX ORDER — SITAGLIPTIN AND METFORMIN HYDROCHLORIDE 1000; 50 MG/1; MG/1
1 TABLET, FILM COATED ORAL DAILY
Qty: 90 TABLET | Refills: 1 | Status: SHIPPED
Start: 2021-08-12 | End: 2022-01-10 | Stop reason: SDUPTHER

## 2021-08-23 RX ORDER — LISINOPRIL 30 MG/1
30 TABLET ORAL DAILY
Qty: 90 TABLET | Refills: 0 | Status: SHIPPED
Start: 2021-08-23 | End: 2021-11-03

## 2021-08-23 RX ORDER — ATORVASTATIN CALCIUM 40 MG/1
40 TABLET, FILM COATED ORAL DAILY
Qty: 90 TABLET | Refills: 0 | Status: SHIPPED
Start: 2021-08-23 | End: 2021-11-03

## 2021-08-23 RX ORDER — POTASSIUM CHLORIDE 750 MG/1
10 CAPSULE, EXTENDED RELEASE ORAL DAILY
Qty: 180 CAPSULE | Refills: 0 | Status: SHIPPED
Start: 2021-08-23 | End: 2022-01-10 | Stop reason: SDUPTHER

## 2021-08-23 RX ORDER — AMLODIPINE BESYLATE 5 MG/1
5 TABLET ORAL DAILY
Qty: 90 TABLET | Refills: 0 | Status: SHIPPED
Start: 2021-08-23 | End: 2021-11-03

## 2021-08-23 RX ORDER — ESOMEPRAZOLE MAGNESIUM 40 MG/1
40 CAPSULE, DELAYED RELEASE ORAL
Qty: 90 CAPSULE | Refills: 0 | Status: SHIPPED
Start: 2021-08-23 | End: 2021-11-23

## 2021-08-23 RX ORDER — VENLAFAXINE HYDROCHLORIDE 150 MG/1
150 CAPSULE, EXTENDED RELEASE ORAL DAILY
Qty: 90 CAPSULE | Refills: 0 | Status: SHIPPED
Start: 2021-08-23 | End: 2021-11-23

## 2021-08-23 RX ORDER — ATENOLOL 50 MG/1
50 TABLET ORAL DAILY
Qty: 90 TABLET | Refills: 0 | Status: SHIPPED
Start: 2021-08-23 | End: 2021-11-23

## 2021-10-06 ENCOUNTER — OFFICE VISIT (OUTPATIENT)
Dept: FAMILY MEDICINE CLINIC | Age: 66
End: 2021-10-06
Payer: COMMERCIAL

## 2021-10-06 VITALS
HEART RATE: 62 BPM | WEIGHT: 182 LBS | TEMPERATURE: 95.5 F | SYSTOLIC BLOOD PRESSURE: 128 MMHG | OXYGEN SATURATION: 99 % | BODY MASS INDEX: 28.51 KG/M2 | DIASTOLIC BLOOD PRESSURE: 72 MMHG

## 2021-10-06 DIAGNOSIS — R05.9 COUGH: ICD-10-CM

## 2021-10-06 DIAGNOSIS — Z12.31 ENCOUNTER FOR SCREENING MAMMOGRAM FOR MALIGNANT NEOPLASM OF BREAST: ICD-10-CM

## 2021-10-06 DIAGNOSIS — I10 ESSENTIAL HYPERTENSION: ICD-10-CM

## 2021-10-06 DIAGNOSIS — I63.9 CEREBROVASCULAR ACCIDENT (CVA), UNSPECIFIED MECHANISM (HCC): ICD-10-CM

## 2021-10-06 DIAGNOSIS — F32.A DEPRESSION, UNSPECIFIED DEPRESSION TYPE: ICD-10-CM

## 2021-10-06 DIAGNOSIS — E11.9 TYPE 2 DIABETES MELLITUS WITHOUT COMPLICATION, WITHOUT LONG-TERM CURRENT USE OF INSULIN (HCC): ICD-10-CM

## 2021-10-06 DIAGNOSIS — E78.5 HYPERLIPIDEMIA, UNSPECIFIED HYPERLIPIDEMIA TYPE: ICD-10-CM

## 2021-10-06 DIAGNOSIS — I61.9 HEMORRHAGIC STROKE (HCC): Primary | ICD-10-CM

## 2021-10-06 PROCEDURE — 99214 OFFICE O/P EST MOD 30 MIN: CPT | Performed by: INTERNAL MEDICINE

## 2021-10-06 NOTE — PROGRESS NOTES
Subjective:     Chief Complaint   Patient presents with    3 Month Follow-Up   Patient here for follow-up of diabetes,  Hypertension, and hyperlipidemia    Follow-up on hemorrhagic stroke,    She did not do the mammogram,    She did finish the speech therapy and doing better, she is talking better,    She was seen by cardiology and neurology,    Denies any weakness upper or lower extremity  She started taking her cluster medication,    Past Medical History:   Diagnosis Date    Abnormal mammogram of left breast 05/2019    Anxiety     Depression     Diabetes mellitus ([facility]     Dysarthria 5/12/2021    Erosive esophagitis     Esophageal stricture     GERD (gastroesophageal reflux disease)     Gestational diabetes     Hemorrhagic stroke ([facility] 5/12/2021    Hyperlipidemia     Hypertension     Hypokalemia     Osteoarthritis     knees    Postmenopausal     Type 2 diabetes mellitus without complication ([facility]         Social History     Socioeconomic History    Marital status:      Spouse name: Not on file    Number of children: Not on file    Years of education: Not on file    Highest education level: Not on file   Occupational History    Not on file   Tobacco Use    Smoking status: Never Smoker    Smokeless tobacco: Never Used   Substance and Sexual Activity    Alcohol use: No    Drug use: No    Sexual activity: Not on file   Other Topics Concern    Not on file   Social History Narrative    Not on file     Social Determinants of Health     Financial Resource Strain:     Difficulty of Paying Living Expenses:    Food Insecurity: No Food Insecurity    Worried About Running Out of Food in the Last Year: Never true    Willow of Food in the Last Year: Never true   Transportation Needs: No Transportation Needs    Lack of Transportation (Medical): No    Lack of Transportation (Non-Medical):  No   Physical Activity:     Days of Exercise per Week:     Minutes of Exercise per Session: Stress:     Feeling of Stress :    Social Connections:     Frequency of Communication with Friends and Family:     Frequency of Social Gatherings with Friends and Family:     Attends Shinto Services:     Active Member of Clubs or Organizations:     Attends Club or Organization Meetings:     Marital Status:    Intimate Partner Violence:     Fear of Current or Ex-Partner:     Emotionally Abused:     Physically Abused:     Sexually Abused:         Past Surgical History:   Procedure Laterality Date    BREAST BIOPSY Left 05/2019    Dr. Jorge Carty      x4    CHOLECYSTECTOMY      COLONOSCOPY  11/2011    HYSTERECTOMY      JOINT REPLACEMENT Right     knee        Family History   Problem Relation Age of Onset    Diabetes Mother         complication     Coronary Art Dis Father     Rheum Arthritis Sister     Mult Sclerosis Brother     Diabetes Brother         No Known Allergies     ROS  No acute distress  Cardiac: Denies any chest pain or palpitation  Seen by Dr. Karson Yoo in April 2021 had echocardiogram which was negative  Respiratory: Denies any cough or shortness of breath  GI: No abdominal pain. Denies any nausea vomiting or diarrhea  Colonoscopy May 2019 with Dr. Dilan Sierra repeat in 24 as per patient  : Denies any dysuria frequency or hematuria  Neuro: No headache or dizziness  Small hemorrhagic stroke on the right external capsule region improving with the speech therapy, better blood pressure control and cholesterol controlled  Endocrine: History of diabetes  Skin: normal  No recent weight gain or weight loss  Denies any change in vision    Objective:    /72   Pulse 62   Temp 95.5 °F (35.3 °C)   Wt 182 lb (82.6 kg)   SpO2 99%   BMI 28.51 kg/m²     Constitutional: Alert awake and oriented  Eyes: Pupils equal bilaterally.  Extraocular muscles intact  Neck: no JVD adenopathy no bruit  Heart:  RRR, no murmurs, gallops, or rubs. Lungs:    no wheeze, rales or rhonchi  Abd: bowel sounds present, nontender, nondistended, no masses  Extrem:  No clubbing, cyanosis, or edema  Neuro: AAOx3,No Focal deficit  Speech is much improved  No focal weakness  Psychological: Depression controlled with Effexor denies any suicidal ideation      Current Outpatient Medications   Medication Sig Dispense Refill    amLODIPine (NORVASC) 5 MG tablet Take 1 tablet by mouth daily One in morning 90 tablet 0    atenolol (TENORMIN) 50 MG tablet Take 1 tablet by mouth daily 90 tablet 0    atorvastatin (LIPITOR) 40 MG tablet Take 1 tablet by mouth daily 90 tablet 0    esomeprazole (NEXIUM) 40 MG delayed release capsule Take 1 capsule by mouth every morning (before breakfast) 90 capsule 0    lisinopril (PRINIVIL;ZESTRIL) 30 MG tablet Take 1 tablet by mouth daily One at night 90 tablet 0    potassium chloride (MICRO-K) 10 MEQ extended release capsule Take 1 capsule by mouth daily 180 capsule 0    venlafaxine (EFFEXOR XR) 150 MG extended release capsule Take 1 capsule by mouth daily 90 capsule 0    sitaGLIPtan-metFORMIN (JANUMET)  MG per tablet Take 1 tablet by mouth daily 90 tablet 1     No current facility-administered medications for this visit.         Last 3 BMP  Lab Results   Component Value Date/Time     07/20/2021 02:43 PM     04/12/2021 01:25 PM     08/22/2016 11:00 AM    K 4.3 07/20/2021 02:43 PM    K 4.5 04/12/2021 01:25 PM    K 4.5 05/03/2019 12:00 AM    K 4.3 08/22/2016 11:00 AM     07/20/2021 02:43 PM     04/12/2021 01:25 PM     08/22/2016 11:00 AM    CO2 26 07/20/2021 02:43 PM    CO2 24 04/12/2021 01:25 PM    CO2 22 08/22/2016 11:00 AM    BUN 17 07/20/2021 02:43 PM    BUN 13 04/12/2021 01:25 PM    BUN 10 08/22/2016 11:00 AM    CREATININE 0.8 07/20/2021 02:43 PM    CREATININE 0.8 04/12/2021 01:25 PM    CREATININE 0.9 05/03/2019 12:00 AM    CREATININE 0.8 08/22/2016 11:00 AM    GLUCOSE 165 (H) 07/20/2021 02:43 PM    GLUCOSE 183 (H) 04/12/2021 01:25 PM    GLUCOSE 118 (H) 08/22/2016 11:00 AM    CALCIUM 9.5 07/20/2021 02:43 PM    CALCIUM 9.7 04/12/2021 01:25 PM    CALCIUM 9.7 08/22/2016 11:00 AM       Last 3 CMP:    Lab Results   Component Value Date/Time     07/20/2021 02:43 PM     04/12/2021 01:25 PM     08/22/2016 11:00 AM    K 4.3 07/20/2021 02:43 PM    K 4.5 04/12/2021 01:25 PM    K 4.5 05/03/2019 12:00 AM    K 4.3 08/22/2016 11:00 AM     07/20/2021 02:43 PM     04/12/2021 01:25 PM     08/22/2016 11:00 AM    CO2 26 07/20/2021 02:43 PM    CO2 24 04/12/2021 01:25 PM    CO2 22 08/22/2016 11:00 AM    BUN 17 07/20/2021 02:43 PM    BUN 13 04/12/2021 01:25 PM    BUN 10 08/22/2016 11:00 AM    CREATININE 0.8 07/20/2021 02:43 PM    CREATININE 0.8 04/12/2021 01:25 PM    CREATININE 0.9 05/03/2019 12:00 AM    CREATININE 0.8 08/22/2016 11:00 AM    GLUCOSE 165 (H) 07/20/2021 02:43 PM    GLUCOSE 183 (H) 04/12/2021 01:25 PM    GLUCOSE 118 (H) 08/22/2016 11:00 AM    CALCIUM 9.5 07/20/2021 02:43 PM    CALCIUM 9.7 04/12/2021 01:25 PM    CALCIUM 9.7 08/22/2016 11:00 AM    PROT 7.0 07/20/2021 02:43 PM    PROT 6.8 04/12/2021 01:25 PM    PROT 6.8 08/22/2016 11:00 AM    LABALBU 4.3 07/20/2021 02:43 PM    LABALBU 4.2 04/12/2021 01:25 PM    LABALBU 4.1 08/22/2016 11:00 AM    BILITOT 0.6 07/20/2021 02:43 PM    BILITOT 0.2 04/12/2021 01:25 PM    BILITOT 0.4 08/22/2016 11:00 AM    ALKPHOS 99 07/20/2021 02:43 PM    ALKPHOS 97 04/12/2021 01:25 PM    ALKPHOS 95 08/22/2016 11:00 AM    AST 17 07/20/2021 02:43 PM    AST 19 04/12/2021 01:25 PM    AST 15 08/22/2016 11:00 AM    ALT 13 07/20/2021 02:43 PM    ALT 14 04/12/2021 01:25 PM    ALT 11 08/22/2016 11:00 AM        CBC:   Lab Results   Component Value Date/Time    WBC 8.2 04/20/2021 02:24 PM    RBC 4.51 04/20/2021 02:24 PM    HGB 13.6 04/20/2021 02:24 PM    HCT 42.2 04/20/2021 02:24 PM    MCV 93.6 04/20/2021 02:24 PM    MCH 30.2 04/20/2021 02:24 PM    MCHC 32.2 04/20/2021 02:24 PM    RDW 12.4 04/20/2021 02:24 PM     (H) 04/20/2021 02:24 PM    MPV 10.5 04/20/2021 02:24 PM       A1C:  Lab Results   Component Value Date/Time    LABA1C 6.9 (H) 07/20/2021 02:43 PM       Lipid panel:  Lab Results   Component Value Date    CHOL 185 07/20/2021    CHOL 324 04/20/2021    CHOL 201 05/03/2019    TRIG 342 07/20/2021    TRIG 529 04/20/2021    TRIG 243 05/03/2019    HDL 39 07/20/2021    HDL 41 04/20/2021    HDL 54 05/03/2019        Lab Results   Component Value Date/Time    PROT 7.0 07/20/2021 02:43 PM    PROT 6.8 04/12/2021 01:25 PM    PROT 6.8 08/22/2016 11:00 AM    INR 1.0 08/12/2016 05:39 PM       No results found for: MG      Assessment. Adrian Talbert was seen today for 3 month follow-up. Diagnoses and all orders for this visit:    Hemorrhagic stroke ([address]  -     CBC WITH AUTO DIFFERENTIAL; Future    Encounter for screening mammogram for malignant neoplasm of breast  -     GIA DIGITAL SCREEN RIGHT PER PROTOCOL; Future    Cough  -     XR CHEST (2 VW); Future    Cerebrovascular accident (CVA), unspecified mechanism ([address]  -     XR CHEST (2 VW); Future    Essential hypertension    Hyperlipidemia, unspecified hyperlipidemia type  -     COMPREHENSIVE METABOLIC PANEL; Future  -     LIPID PANEL;  Future    Type 2 diabetes mellitus without complication, without long-term current use of insulin (HCC)  -     HEMOGLOBIN A1C; Future    Depression, unspecified depression type       Patient Active Problem List   Diagnosis    Right foot infection    Diabetic ulcer of left foot associated with type 1 diabetes mellitus ([address]    Type 2 diabetes mellitus without complication, without long-term current use of insulin ([address]    Essential hypertension    Gastroesophageal reflux disease without esophagitis    Mixed hyperlipidemia    Subacute osteomyelitis of foot ([address]    Dysarthria    Hemorrhagic stroke ([address]       Plan: Hemorrhagic stroke right internal capsule doing much better she finished the speech therapy she is speaking better, she can talk in a long sentence    Hypertension much better controlled continue lisinopril 30 mg, amlodipine 5 mg, atenolol 50 mg keep systolic less than 887    Hyperlipidemia continue Lipitor 40 mg  Cholesterol 185, LDL 78    Diabetes type 2 Janumet 50/1000 hemoglobin A1c 6.9 improved from 8.1    Depression much better controlled  Continue Effexor    Has occasional cough do a chest x-ray prescription given    Menopausal did not do the mammogram another prescription for mammogram given    CBC, CMP, lipid, A1c before next visit    Advised to follow-up with Dr. Welsh Pen      Return in about 3 months (around 1/6/2022).        Christi Gunn MD  3:56 PM  10/6/2021     [place]

## 2021-10-26 ENCOUNTER — HOSPITAL ENCOUNTER (OUTPATIENT)
Dept: MAMMOGRAPHY | Age: 66
Discharge: HOME | End: 2021-10-28
Payer: COMMERCIAL

## 2021-10-26 DIAGNOSIS — Z12.31 ENCOUNTER FOR SCREENING MAMMOGRAM FOR MALIGNANT NEOPLASM OF BREAST: ICD-10-CM

## 2021-10-26 PROCEDURE — 77063 BREAST TOMOSYNTHESIS BI: CPT

## 2021-11-03 RX ORDER — ATORVASTATIN CALCIUM 40 MG/1
TABLET, FILM COATED ORAL
Qty: 90 TABLET | Refills: 1 | Status: SHIPPED
Start: 2021-11-03 | End: 2022-01-10 | Stop reason: SDUPTHER

## 2021-11-03 RX ORDER — LISINOPRIL 30 MG/1
TABLET ORAL
Qty: 90 TABLET | Refills: 1 | Status: SHIPPED
Start: 2021-11-03 | End: 2022-01-10 | Stop reason: SDUPTHER

## 2021-11-03 RX ORDER — AMLODIPINE BESYLATE 5 MG/1
TABLET ORAL
Qty: 90 TABLET | Refills: 1 | Status: SHIPPED
Start: 2021-11-03 | End: 2022-01-10 | Stop reason: SDUPTHER

## 2021-11-23 RX ORDER — VENLAFAXINE HYDROCHLORIDE 150 MG/1
CAPSULE, EXTENDED RELEASE ORAL
Qty: 90 CAPSULE | Refills: 1 | Status: SHIPPED
Start: 2021-11-23 | End: 2022-01-10 | Stop reason: SDUPTHER

## 2021-11-23 RX ORDER — ATENOLOL 50 MG/1
TABLET ORAL
Qty: 90 TABLET | Refills: 1 | Status: SHIPPED
Start: 2021-11-23 | End: 2022-01-10 | Stop reason: SDUPTHER

## 2021-11-23 RX ORDER — ESOMEPRAZOLE MAGNESIUM 40 MG/1
CAPSULE, DELAYED RELEASE ORAL
Qty: 90 CAPSULE | Refills: 1 | Status: SHIPPED
Start: 2021-11-23 | End: 2022-01-10 | Stop reason: SDUPTHER

## 2022-01-09 LAB — HBA1C MFR BLD: 7.1 %

## 2022-01-10 ENCOUNTER — OFFICE VISIT (OUTPATIENT)
Dept: FAMILY MEDICINE CLINIC | Age: 67
End: 2022-01-10
Payer: COMMERCIAL

## 2022-01-10 VITALS
DIASTOLIC BLOOD PRESSURE: 78 MMHG | SYSTOLIC BLOOD PRESSURE: 120 MMHG | HEART RATE: 69 BPM | OXYGEN SATURATION: 93 % | TEMPERATURE: 97.3 F | BODY MASS INDEX: 27.41 KG/M2 | WEIGHT: 175 LBS

## 2022-01-10 DIAGNOSIS — E78.5 HYPERLIPIDEMIA, UNSPECIFIED HYPERLIPIDEMIA TYPE: ICD-10-CM

## 2022-01-10 DIAGNOSIS — I61.9 HEMORRHAGIC STROKE (HCC): Chronic | ICD-10-CM

## 2022-01-10 DIAGNOSIS — K21.9 GASTROESOPHAGEAL REFLUX DISEASE WITHOUT ESOPHAGITIS: ICD-10-CM

## 2022-01-10 DIAGNOSIS — E11.9 TYPE 2 DIABETES MELLITUS WITHOUT COMPLICATION, WITHOUT LONG-TERM CURRENT USE OF INSULIN (HCC): Primary | ICD-10-CM

## 2022-01-10 DIAGNOSIS — I10 ESSENTIAL HYPERTENSION: ICD-10-CM

## 2022-01-10 DIAGNOSIS — R47.1 DYSARTHRIA: ICD-10-CM

## 2022-01-10 PROCEDURE — 83036 HEMOGLOBIN GLYCOSYLATED A1C: CPT | Performed by: INTERNAL MEDICINE

## 2022-01-10 PROCEDURE — 99213 OFFICE O/P EST LOW 20 MIN: CPT | Performed by: INTERNAL MEDICINE

## 2022-01-10 RX ORDER — ATORVASTATIN CALCIUM 40 MG/1
TABLET, FILM COATED ORAL
Qty: 90 TABLET | Refills: 1 | Status: SHIPPED
Start: 2022-01-10 | End: 2022-01-10

## 2022-01-10 RX ORDER — ATORVASTATIN CALCIUM 40 MG/1
TABLET, FILM COATED ORAL
Qty: 90 TABLET | Refills: 1 | Status: SHIPPED | OUTPATIENT
Start: 2022-01-10 | End: 2022-06-13 | Stop reason: SDUPTHER

## 2022-01-10 RX ORDER — ESOMEPRAZOLE MAGNESIUM 40 MG/1
CAPSULE, DELAYED RELEASE ORAL
Qty: 90 CAPSULE | Refills: 1 | Status: SHIPPED | OUTPATIENT
Start: 2022-01-10 | End: 2022-07-06 | Stop reason: SDUPTHER

## 2022-01-10 RX ORDER — ATENOLOL 50 MG/1
TABLET ORAL
Qty: 90 TABLET | Refills: 1 | Status: SHIPPED | OUTPATIENT
Start: 2022-01-10 | End: 2022-07-06 | Stop reason: SDUPTHER

## 2022-01-10 RX ORDER — LISINOPRIL 30 MG/1
TABLET ORAL
Qty: 90 TABLET | Refills: 1 | Status: SHIPPED | OUTPATIENT
Start: 2022-01-10 | End: 2022-07-06 | Stop reason: SDUPTHER

## 2022-01-10 RX ORDER — POTASSIUM CHLORIDE 750 MG/1
10 CAPSULE, EXTENDED RELEASE ORAL DAILY
Qty: 90 CAPSULE | Refills: 1 | Status: SHIPPED
Start: 2022-01-10 | End: 2022-01-10

## 2022-01-10 RX ORDER — LISINOPRIL 30 MG/1
TABLET ORAL
Qty: 90 TABLET | Refills: 1 | Status: SHIPPED
Start: 2022-01-10 | End: 2022-01-10

## 2022-01-10 RX ORDER — AMLODIPINE BESYLATE 5 MG/1
TABLET ORAL
Qty: 90 TABLET | Refills: 1 | Status: SHIPPED | OUTPATIENT
Start: 2022-01-10 | End: 2022-06-13 | Stop reason: SDUPTHER

## 2022-01-10 RX ORDER — SITAGLIPTIN AND METFORMIN HYDROCHLORIDE 1000; 50 MG/1; MG/1
1 TABLET, FILM COATED ORAL DAILY
Qty: 90 TABLET | Refills: 1 | Status: SHIPPED
Start: 2022-01-10 | End: 2022-01-10

## 2022-01-10 RX ORDER — POTASSIUM CHLORIDE 750 MG/1
10 CAPSULE, EXTENDED RELEASE ORAL DAILY
Qty: 90 CAPSULE | Refills: 1 | Status: ON HOLD | OUTPATIENT
Start: 2022-01-10 | End: 2022-05-30 | Stop reason: SDUPTHER

## 2022-01-10 RX ORDER — ATENOLOL 50 MG/1
TABLET ORAL
Qty: 90 TABLET | Refills: 1 | Status: SHIPPED
Start: 2022-01-10 | End: 2022-01-10

## 2022-01-10 RX ORDER — SITAGLIPTIN AND METFORMIN HYDROCHLORIDE 1000; 50 MG/1; MG/1
1 TABLET, FILM COATED ORAL DAILY
Qty: 90 TABLET | Refills: 1 | Status: SHIPPED | OUTPATIENT
Start: 2022-01-10 | End: 2022-07-28 | Stop reason: SDUPTHER

## 2022-01-10 RX ORDER — ESOMEPRAZOLE MAGNESIUM 40 MG/1
CAPSULE, DELAYED RELEASE ORAL
Qty: 90 CAPSULE | Refills: 1 | Status: SHIPPED
Start: 2022-01-10 | End: 2022-01-10

## 2022-01-10 RX ORDER — AMLODIPINE BESYLATE 5 MG/1
TABLET ORAL
Qty: 90 TABLET | Refills: 1 | Status: SHIPPED
Start: 2022-01-10 | End: 2022-01-10

## 2022-01-10 RX ORDER — VENLAFAXINE HYDROCHLORIDE 150 MG/1
CAPSULE, EXTENDED RELEASE ORAL
Qty: 90 CAPSULE | Refills: 1 | Status: SHIPPED | OUTPATIENT
Start: 2022-01-10 | End: 2022-07-06

## 2022-01-10 RX ORDER — VENLAFAXINE HYDROCHLORIDE 150 MG/1
CAPSULE, EXTENDED RELEASE ORAL
Qty: 90 CAPSULE | Refills: 1 | Status: SHIPPED
Start: 2022-01-10 | End: 2022-01-10

## 2022-01-10 NOTE — PROGRESS NOTES
Subjective:     Chief Complaint   Patient presents with    3 Month Follow-Up   Patient here follow-up on the diabetes, hypertension and a stroke    Overall doing very well, she did not do the blood work,    She finished speech therapy, still has some dysarthria but much improved    Denies any chest palpitation    Past Medical History:   Diagnosis Date    Abnormal mammogram of left breast 05/2019    Anxiety     Depression     Diabetes mellitus ([facility] [address]     Dysarthria 5/12/2021    Erosive esophagitis     Esophageal stricture     GERD (gastroesophageal reflux disease)     Gestational diabetes     Hemorrhagic stroke ([facility] 5/12/2021    Hyperlipidemia     Hypertension     Hypokalemia     Osteoarthritis     knees    Postmenopausal     Type 2 diabetes mellitus without complication ([facility]         Social History     Socioeconomic History    Marital status:      Spouse name: Not on file    Number of children: Not on file    Years of education: Not on file    Highest education level: Not on file   Occupational History    Not on file   Tobacco Use    Smoking status: Never Smoker    Smokeless tobacco: Never Used   Substance and Sexual Activity    Alcohol use: No    Drug use: No    Sexual activity: Not on file   Other Topics Concern    Not on file   Social History Narrative    Not on file     Social Determinants of Health     Financial Resource Strain:     Difficulty of Paying Living Expenses: Not on file   Food Insecurity: No Food Insecurity    Worried About Running Out of Food in the Last Year: Never true    Willow of Food in the Last Year: Never true   Transportation Needs: No Transportation Needs    Lack of Transportation (Medical): No    Lack of Transportation (Non-Medical):  No   Physical Activity:     Days of Exercise per Week: Not on file    Minutes of Exercise per Session: Not on file   Stress:     Feeling of Stress : Not on file   Social Connections:     Frequency of Communication with Friends and Family: Not on file    Frequency of Social Gatherings with Friends and Family: Not on file    Attends Jew Services: Not on file    Active Member of Clubs or Organizations: Not on file    Attends Club or Organization Meetings: Not on file    Marital Status: Not on file   Intimate Partner Violence:     Fear of Current or Ex-Partner: Not on file    Emotionally Abused: Not on file    Physically Abused: Not on file    Sexually Abused: Not on file   Housing Stability:     Unable to Pay for Housing in the Last Year: Not on file    Number of [place] in the Last Year: Not on file    Unstable Housing in the Last Year: Not on file        Past Surgical History:   Procedure Laterality Date    BREAST BIOPSY Left 05/2019    Dr. Virgilio Lozada Manifold      x4    CHOLECYSTECTOMY      COLONOSCOPY  11/2011    HYSTERECTOMY      JOINT REPLACEMENT Right     knee        Family History   Problem Relation Age of Onset    Diabetes Mother         complication     Coronary Art Dis Father     Rheum Arthritis Sister     Mult Sclerosis Brother     Diabetes Brother         No Known Allergies     ROS  No acute distress  Cardiac: Denies any chest pain or palpitation  Seen by Dr. Russell Walker in April 2021 had echocardiogram which was negative  Respiratory: Denies any cough or shortness of breath  GI: No abdominal pain.  Denies any nausea vomiting or diarrhea  Colonoscopy May 2019 with Dr. Gibson Streeter repeat in 24 as per patient  : Denies any dysuria frequency or hematuria  Neuro: No headache or dizziness  Small hemorrhagic stroke on the right external capsule region improving with the speech therapy, better blood pressure control and cholesterol controlled  Seen by neurologist  Endocrine: History of diabetes  Skin: normal  No recent weight gain or weight loss  Denies any change in vision    Objective:    /78 Pulse 69   Temp 97.3 °F (36.3 °C)   Wt 175 lb (79.4 kg)   SpO2 93%   BMI 27.41 kg/m²     Constitutional: Alert awake and oriented  Eyes: Pupils equal bilaterally. Extraocular muscles intact  Neck: no JVD adenopathy no bruit  Heart:  RRR, no murmurs, gallops, or rubs. Lungs:    no wheeze, rales or rhonchi  Abd: bowel sounds present, nontender, nondistended, no masses  Extrem:  No clubbing, cyanosis, or edema  Neuro: AAOx3,No Focal deficit  Dysarthria persist  Psychological: no depression or anxiety       Current Outpatient Medications   Medication Sig Dispense Refill    amLODIPine (NORVASC) 5 MG tablet TAKE 1 TABLET DAILY IN THE MORNING 90 tablet 1    atenolol (TENORMIN) 50 MG tablet TAKE 1 TABLET DAILY 90 tablet 1    atorvastatin (LIPITOR) 40 MG tablet TAKE 1 TABLET DAILY 90 tablet 1    esomeprazole (NEXIUM) 40 MG delayed release capsule TAKE 1 CAPSULE EVERY MORNING BEFORE BREAKFAST 90 capsule 1    lisinopril (PRINIVIL;ZESTRIL) 30 MG tablet TAKE 1 TABLET DAILY AT NIGHT 90 tablet 1    potassium chloride (MICRO-K) 10 MEQ extended release capsule Take 1 capsule by mouth daily 90 capsule 1    sitaGLIPtan-metFORMIN (JANUMET)  MG per tablet Take 1 tablet by mouth daily 90 tablet 1    venlafaxine (EFFEXOR XR) 150 MG extended release capsule TAKE 1 CAPSULE DAILY 90 capsule 1     No current facility-administered medications for this visit.         Last 3 BMP  Lab Results   Component Value Date/Time     07/20/2021 02:43 PM     04/12/2021 01:25 PM     08/22/2016 11:00 AM    K 4.3 07/20/2021 02:43 PM    K 4.5 04/12/2021 01:25 PM    K 4.5 05/03/2019 12:00 AM    K 4.3 08/22/2016 11:00 AM     07/20/2021 02:43 PM     04/12/2021 01:25 PM     08/22/2016 11:00 AM    CO2 26 07/20/2021 02:43 PM    CO2 24 04/12/2021 01:25 PM    CO2 22 08/22/2016 11:00 AM    BUN 17 07/20/2021 02:43 PM    BUN 13 04/12/2021 01:25 PM    BUN 10 08/22/2016 11:00 AM    CREATININE 0.8 07/20/2021 02:43 PM CREATININE 0.8 04/12/2021 01:25 PM    CREATININE 0.9 05/03/2019 12:00 AM    CREATININE 0.8 08/22/2016 11:00 AM    GLUCOSE 165 (H) 07/20/2021 02:43 PM    GLUCOSE 183 (H) 04/12/2021 01:25 PM    GLUCOSE 118 (H) 08/22/2016 11:00 AM    CALCIUM 9.5 07/20/2021 02:43 PM    CALCIUM 9.7 04/12/2021 01:25 PM    CALCIUM 9.7 08/22/2016 11:00 AM       Last 3 CMP:    Lab Results   Component Value Date/Time     07/20/2021 02:43 PM     04/12/2021 01:25 PM     08/22/2016 11:00 AM    K 4.3 07/20/2021 02:43 PM    K 4.5 04/12/2021 01:25 PM    K 4.5 05/03/2019 12:00 AM    K 4.3 08/22/2016 11:00 AM     07/20/2021 02:43 PM     04/12/2021 01:25 PM     08/22/2016 11:00 AM    CO2 26 07/20/2021 02:43 PM    CO2 24 04/12/2021 01:25 PM    CO2 22 08/22/2016 11:00 AM    BUN 17 07/20/2021 02:43 PM    BUN 13 04/12/2021 01:25 PM    BUN 10 08/22/2016 11:00 AM    CREATININE 0.8 07/20/2021 02:43 PM    CREATININE 0.8 04/12/2021 01:25 PM    CREATININE 0.9 05/03/2019 12:00 AM    CREATININE 0.8 08/22/2016 11:00 AM    GLUCOSE 165 (H) 07/20/2021 02:43 PM    GLUCOSE 183 (H) 04/12/2021 01:25 PM    GLUCOSE 118 (H) 08/22/2016 11:00 AM    CALCIUM 9.5 07/20/2021 02:43 PM    CALCIUM 9.7 04/12/2021 01:25 PM    CALCIUM 9.7 08/22/2016 11:00 AM    PROT 7.0 07/20/2021 02:43 PM    PROT 6.8 04/12/2021 01:25 PM    PROT 6.8 08/22/2016 11:00 AM    LABALBU 4.3 07/20/2021 02:43 PM    LABALBU 4.2 04/12/2021 01:25 PM    LABALBU 4.1 08/22/2016 11:00 AM    BILITOT 0.6 07/20/2021 02:43 PM    BILITOT 0.2 04/12/2021 01:25 PM    BILITOT 0.4 08/22/2016 11:00 AM    ALKPHOS 99 07/20/2021 02:43 PM    ALKPHOS 97 04/12/2021 01:25 PM    ALKPHOS 95 08/22/2016 11:00 AM    AST 17 07/20/2021 02:43 PM    AST 19 04/12/2021 01:25 PM    AST 15 08/22/2016 11:00 AM    ALT 13 07/20/2021 02:43 PM    ALT 14 04/12/2021 01:25 PM    ALT 11 08/22/2016 11:00 AM        CBC:   Lab Results   Component Value Date/Time    WBC 8.2 04/20/2021 02:24 PM    RBC 4.51 04/20/2021 02:24 PM HGB 13.6 04/20/2021 02:24 PM    HCT 42.2 04/20/2021 02:24 PM    MCV 93.6 04/20/2021 02:24 PM    MCH 30.2 04/20/2021 02:24 PM    MCHC 32.2 04/20/2021 02:24 PM    RDW 12.4 04/20/2021 02:24 PM     (H) 04/20/2021 02:24 PM    MPV 10.5 04/20/2021 02:24 PM       A1C:  Lab Results   Component Value Date/Time    LABA1C 6.9 (H) 07/20/2021 02:43 PM       Lipid panel:  Lab Results   Component Value Date    CHOL 185 07/20/2021    CHOL 324 04/20/2021    CHOL 201 05/03/2019    TRIG 342 07/20/2021    TRIG 529 04/20/2021    TRIG 243 05/03/2019    HDL 39 07/20/2021    HDL 41 04/20/2021    HDL 54 05/03/2019        Lab Results   Component Value Date/Time    PROT 7.0 07/20/2021 02:43 PM    PROT 6.8 04/12/2021 01:25 PM    PROT 6.8 08/22/2016 11:00 AM    INR 1.0 08/12/2016 05:39 PM       No results found for: MG      Assessment. Zurdodanielle Meraz was seen today for 3 month follow-up. Diagnoses and all orders for this visit:    Type 2 diabetes mellitus without complication, without long-term current use of insulin ([facility])  -     POCT glycosylated hemoglobin (Hb A1C)    Essential hypertension    Gastroesophageal reflux disease without esophagitis    Hemorrhagic stroke ([facility])    Dysarthria    Hyperlipidemia, unspecified hyperlipidemia type    Other orders  -     Discontinue: amLODIPine (NORVASC) 5 MG tablet; TAKE 1 TABLET DAILY IN THE MORNING  -     Discontinue: atenolol (TENORMIN) 50 MG tablet; TAKE 1 TABLET DAILY  -     Discontinue: atorvastatin (LIPITOR) 40 MG tablet; TAKE 1 TABLET DAILY  -     Discontinue: esomeprazole (NEXIUM) 40 MG delayed release capsule; TAKE 1 CAPSULE EVERY MORNING BEFORE BREAKFAST  -     Discontinue: lisinopril (PRINIVIL;ZESTRIL) 30 MG tablet; TAKE 1 TABLET DAILY AT NIGHT  -     Discontinue: potassium chloride (MICRO-K) 10 MEQ extended release capsule; Take 1 capsule by mouth daily  -     Discontinue: sitaGLIPtan-metFORMIN (JANUMET)  MG per tablet;  Take 1 tablet by mouth daily  -     Discontinue: venlafaxine (EFFEXOR XR) 150 MG extended release capsule; TAKE 1 CAPSULE DAILY  -     amLODIPine (NORVASC) 5 MG tablet; TAKE 1 TABLET DAILY IN THE MORNING  -     atenolol (TENORMIN) 50 MG tablet; TAKE 1 TABLET DAILY  -     atorvastatin (LIPITOR) 40 MG tablet; TAKE 1 TABLET DAILY  -     esomeprazole (NEXIUM) 40 MG delayed release capsule; TAKE 1 CAPSULE EVERY MORNING BEFORE BREAKFAST  -     lisinopril (PRINIVIL;ZESTRIL) 30 MG tablet; TAKE 1 TABLET DAILY AT NIGHT  -     potassium chloride (MICRO-K) 10 MEQ extended release capsule; Take 1 capsule by mouth daily  -     sitaGLIPtan-metFORMIN (JANUMET)  MG per tablet; Take 1 tablet by mouth daily  -     venlafaxine (EFFEXOR XR) 150 MG extended release capsule; TAKE 1 CAPSULE DAILY       Patient Active Problem List   Diagnosis    Right foot infection    Diabetic ulcer of left foot associated with type 1 diabetes mellitus ([facility] [address]    Type 2 diabetes mellitus without complication, without long-term current use of insulin ([facility] [address]    Essential hypertension    Gastroesophageal reflux disease without esophagitis    Mixed hyperlipidemia    Subacute osteomyelitis of foot ([facility] [address] [facility]    Dysarthria    [facility]       Plan: A1c checked today for diabetes type 2     it was 7.1 per  She is watching diet, trying to lose weight, continue Janumet    Hypertension under good control when she is taking medication lisinopril 30 mg, atenolol 50 mg, amlodipine 5 mg    GE reflux disease Nexium 40 mg daily as needed      Hemorrhagic stroke stable      Dysarthria continue speech exercises      Hyperlipidemia continue Lipitor 40 mg keep LDL less than 70    Check CBC, CMP, lipid, A1c before  Next visit      I will be retiring she will see another physician next visit    No follow-ups on file.        Sid Christine MD  3:51 PM  1/10/2022     [place]

## 2022-03-03 ENCOUNTER — HOSPITAL ENCOUNTER (EMERGENCY)
Age: 67
Discharge: HOME | End: 2022-03-03
Payer: COMMERCIAL

## 2022-03-03 ENCOUNTER — APPOINTMENT (OUTPATIENT)
Dept: CT IMAGING | Age: 67
End: 2022-03-03
Payer: COMMERCIAL

## 2022-03-03 VITALS
SYSTOLIC BLOOD PRESSURE: 142 MMHG | RESPIRATION RATE: 18 BRPM | BODY MASS INDEX: 26.68 KG/M2 | OXYGEN SATURATION: 97 % | DIASTOLIC BLOOD PRESSURE: 83 MMHG | WEIGHT: 170 LBS | HEART RATE: 71 BPM | TEMPERATURE: 98.6 F | HEIGHT: 67 IN

## 2022-03-03 DIAGNOSIS — S09.90XA INJURY OF HEAD, INITIAL ENCOUNTER: Primary | ICD-10-CM

## 2022-03-03 DIAGNOSIS — S01.01XA LACERATION OF SCALP, INITIAL ENCOUNTER: ICD-10-CM

## 2022-03-03 PROCEDURE — 6360000002 HC RX W HCPCS: Performed by: PHYSICIAN ASSISTANT

## 2022-03-03 PROCEDURE — 90471 IMMUNIZATION ADMIN: CPT | Performed by: PHYSICIAN ASSISTANT

## 2022-03-03 PROCEDURE — 99211 OFF/OP EST MAY X REQ PHY/QHP: CPT

## 2022-03-03 PROCEDURE — 90715 TDAP VACCINE 7 YRS/> IM: CPT | Performed by: PHYSICIAN ASSISTANT

## 2022-03-03 PROCEDURE — 70450 CT HEAD/BRAIN W/O DYE: CPT

## 2022-03-03 PROCEDURE — G0463 HOSPITAL OUTPT CLINIC VISIT: HCPCS

## 2022-03-03 PROCEDURE — 72125 CT NECK SPINE W/O DYE: CPT

## 2022-03-03 RX ORDER — CEPHALEXIN 500 MG/1
500 CAPSULE ORAL 2 TIMES DAILY
Qty: 14 CAPSULE | Refills: 0 | Status: SHIPPED | OUTPATIENT
Start: 2022-03-03 | End: 2022-03-10

## 2022-03-03 RX ADMIN — TETANUS TOXOID, REDUCED DIPHTHERIA TOXOID AND ACELLULAR PERTUSSIS VACCINE, ADSORBED 0.5 ML: 5; 2.5; 8; 8; 2.5 SUSPENSION INTRAMUSCULAR at 16:02

## 2022-03-03 ASSESSMENT — PAIN DESCRIPTION - ORIENTATION: ORIENTATION: POSTERIOR

## 2022-03-03 ASSESSMENT — PAIN DESCRIPTION - FREQUENCY: FREQUENCY: CONTINUOUS

## 2022-03-03 ASSESSMENT — PAIN DESCRIPTION - PROGRESSION: CLINICAL_PROGRESSION: GRADUALLY IMPROVING

## 2022-03-03 ASSESSMENT — PAIN DESCRIPTION - LOCATION: LOCATION: HEAD

## 2022-03-03 ASSESSMENT — PAIN DESCRIPTION - PAIN TYPE: TYPE: ACUTE PAIN

## 2022-03-03 ASSESSMENT — PAIN DESCRIPTION - ONSET: ONSET: SUDDEN

## 2022-03-03 ASSESSMENT — PAIN - FUNCTIONAL ASSESSMENT: PAIN_FUNCTIONAL_ASSESSMENT: 0-10

## 2022-03-03 ASSESSMENT — PAIN DESCRIPTION - DESCRIPTORS: DESCRIPTORS: SORE

## 2022-03-03 ASSESSMENT — PAIN SCALES - GENERAL: PAINLEVEL_OUTOF10: 5

## 2022-03-03 NOTE — ED PROVIDER NOTES
[id number] [facility]  Department of Emergency Medicine  UC Encounter Note  3/3/22   4:17 PM EST      NAME: Ana Maria Farris  :  1955  MRN:  [id number]    Chief Complaint: Fall (States she went to step over a gate and she fell back and hit the back of her head on an end table. Has scalp laceration. Denies LOC.)      This is a 81-year-old female that presents to urgent care with her . The patient states around 3:00 AM on 2022 she lost her balance and hit her head on an end table. She has a laceration. She states no loss of consciousness. No lightheadedness or dizziness. She does state some pain radiates to the neck. She denies any nausea or vomiting. No chest pain or shortness of breath. No abdominal pain. She denies any limb injury weakness that is new. She does state a history of stroke about a year and a half ago which did leave some of her right side week. She does state it affected her speech. Her  does state that the patient does try to walk over a dog gate and he thinks she lost her balance walking over a dog gate. He does not see any new neurological deficits at this time. Patient unsure of her last tetanus shot. I first contact patient she appears to be in no acute distress. Review of Systems  Pertinent positives and negatives are stated within HPI, all other systems reviewed and are negative. Physical Exam  Vitals and nursing note reviewed. Constitutional:       Appearance: She is well-developed. HENT:      Head: Normocephalic. Laceration present. No raccoon eyes, right periorbital erythema or left periorbital erythema. Jaw: There is normal jaw occlusion. Comments: Approximate 3 cm linear laceration to the left occipital scalp. This wound does not appear to be gaping at all at this time. It appears to be healing and there is no active bleeding. No redness.   There is mild bruising at the site and swelling and tenderness. Right Ear: Hearing, tympanic membrane, ear canal and external ear normal. No mastoid tenderness. No hemotympanum. Left Ear: Hearing, tympanic membrane, ear canal and external ear normal. No mastoid tenderness. No hemotympanum. Nose: Nose normal.      Right Nostril: No epistaxis. Left Nostril: No epistaxis. Mouth/Throat:      Lips: Pink. Mouth: Mucous membranes are moist. No angioedema. Pharynx: Uvula midline. Eyes:      General: Lids are normal.      Conjunctiva/sclera: Conjunctivae normal.      Pupils: Pupils are equal, round, and reactive to light. Cardiovascular:      Rate and Rhythm: Normal rate and regular rhythm. Heart sounds: Normal heart sounds. No murmur heard. Pulmonary:      Effort: Pulmonary effort is normal.      Breath sounds: Normal breath sounds. Abdominal:      General: Bowel sounds are normal.      Palpations: Abdomen is soft. Abdomen is not rigid. Tenderness: There is no abdominal tenderness. There is no guarding or rebound. Musculoskeletal:      Cervical back: Normal range of motion and neck supple. No edema or rigidity. Muscular tenderness (bilaterally) present. No pain with movement or spinous process tenderness. Normal range of motion. Skin:     General: Skin is warm and dry. Findings: No abrasion or rash. Neurological:      Mental Status: She is alert and oriented to person, place, and time. GCS: GCS eye subscore is 4. GCS verbal subscore is 5. GCS motor subscore is 6. Cranial Nerves: No cranial nerve deficit. Sensory: No sensory deficit. Coordination: Coordination normal.      Gait: Gait normal.      Comments: Patient does have some slow speech but this is not new. I do not see any new focal neurological deficits. She does have some chronic right-sided weakness.          Procedures    MDM  Number of Diagnoses or Management Options  Injury of head, initial encounter  Laceration of scalp, initial ------------------------- NURSING NOTES AND VITALS REVIEWED ---------------------------   The nursing notes within the ED encounter and vital signs as below have been reviewed. BP (!) 142/83   Pulse 71   Temp 98.6 °F (37 °C) (Infrared)   Resp 18   Ht 5' 7\" (1.702 m)   Wt 170 lb (77.1 kg)   SpO2 97%   BMI 26.63 kg/m²   Oxygen Saturation Interpretation: Normal      ------------------------------------------ PROGRESS NOTES ------------------------------------------   I have spoken with the patient and  and discussed todays results, in addition to providing specific details for the plan of care and counseling regarding the diagnosis and prognosis. Their questions are answered at this time and they are agreeable with the plan.      --------------------------------- ADDITIONAL PROVIDER NOTES ---------------------------------     This patient is stable for discharge. I have shared the specific conditions for return, as well as the importance of follow-up. * NOTE: This report was transcribed using voice recognition software. Every effort was made to ensure accuracy; however, inadvertent computerized transcription errors may be present.    --------------------------------- IMPRESSION AND DISPOSITION ---------------------------------    IMPRESSION  1. Injury of head, initial encounter    2.  Laceration of scalp, initial encounter        DISPOSITION  Disposition: Discharge to home  Patient condition is good       Lindsey Cárdenas PA-C  03/03/22 0946

## 2022-03-03 NOTE — ED NOTES
CT Scans completed. Ana CARPENTER spoke with patient via telephone. Discharge instructions given and copy faxed to patient. Rx1 sent to [facility]. Patient discharged to home from hospital x-ray dept.      Jorge Wiggins LPN  14/29/73 0078

## 2022-03-03 NOTE — ED NOTES
Sent to [facility] for CT Scans via private car with family member. Instruction sheet given. Expressed understanding.      RIRI Frias  35/28/53 [id number]

## 2022-03-09 DIAGNOSIS — E78.5 HYPERLIPIDEMIA, UNSPECIFIED HYPERLIPIDEMIA TYPE: ICD-10-CM

## 2022-03-09 DIAGNOSIS — I61.9 HEMORRHAGIC STROKE (HCC): ICD-10-CM

## 2022-03-09 LAB
BASOPHILS ABSOLUTE: 0.04 E9/L (ref 0–0.2)
BASOPHILS RELATIVE PERCENT: 0.4 % (ref 0–2)
EOSINOPHILS ABSOLUTE: 1.55 E9/L (ref 0.05–0.5)
EOSINOPHILS RELATIVE PERCENT: 16.4 % (ref 0–6)
HCT VFR BLD CALC: 38.1 % (ref 34–48)
HEMOGLOBIN: 12.3 G/DL (ref 11.5–15.5)
IMMATURE GRANULOCYTES #: 0.02 E9/L
IMMATURE GRANULOCYTES %: 0.2 % (ref 0–5)
LYMPHOCYTES ABSOLUTE: 2.22 E9/L (ref 1.5–4)
LYMPHOCYTES RELATIVE PERCENT: 23.5 % (ref 20–42)
MCH RBC QN AUTO: 30.5 PG (ref 26–35)
MCHC RBC AUTO-ENTMCNC: 32.3 % (ref 32–34.5)
MCV RBC AUTO: 94.5 FL (ref 80–99.9)
MONOCYTES ABSOLUTE: 0.45 E9/L (ref 0.1–0.95)
MONOCYTES RELATIVE PERCENT: 4.8 % (ref 2–12)
NEUTROPHILS ABSOLUTE: 5.17 E9/L (ref 1.8–7.3)
NEUTROPHILS RELATIVE PERCENT: 54.7 % (ref 43–80)
PDW BLD-RTO: 12.2 FL (ref 11.5–15)
PLATELET # BLD: 456 E9/L (ref 130–450)
PMV BLD AUTO: 10.6 FL (ref 7–12)
RBC # BLD: 4.03 E12/L (ref 3.5–5.5)
WBC # BLD: 9.5 E9/L (ref 4.5–11.5)

## 2022-03-10 LAB
ALBUMIN SERPL-MCNC: 3.8 G/DL (ref 3.5–5.2)
ALP BLD-CCNC: 115 U/L (ref 35–104)
ALT SERPL-CCNC: 18 U/L (ref 0–32)
ANION GAP SERPL CALCULATED.3IONS-SCNC: 15 MMOL/L (ref 7–16)
AST SERPL-CCNC: 17 U/L (ref 0–31)
BILIRUB SERPL-MCNC: 0.5 MG/DL (ref 0–1.2)
BUN BLDV-MCNC: 19 MG/DL (ref 6–23)
CALCIUM SERPL-MCNC: 9.4 MG/DL (ref 8.6–10.2)
CHLORIDE BLD-SCNC: 100 MMOL/L (ref 98–107)
CHOLESTEROL, TOTAL: 120 MG/DL (ref 0–199)
CO2: 23 MMOL/L (ref 22–29)
CREAT SERPL-MCNC: 0.8 MG/DL (ref 0.5–1)
GFR AFRICAN AMERICAN: >60
GFR NON-AFRICAN AMERICAN: >60 ML/MIN/1.73
GLUCOSE BLD-MCNC: 201 MG/DL (ref 74–99)
HBA1C MFR BLD: 7.2 % (ref 4–5.6)
HDLC SERPL-MCNC: 38 MG/DL
LDL CHOLESTEROL CALCULATED: 50 MG/DL (ref 0–99)
POTASSIUM SERPL-SCNC: 4.3 MMOL/L (ref 3.5–5)
SODIUM BLD-SCNC: 138 MMOL/L (ref 132–146)
TOTAL PROTEIN: 6.6 G/DL (ref 6.4–8.3)
TRIGL SERPL-MCNC: 160 MG/DL (ref 0–149)
VLDLC SERPL CALC-MCNC: 32 MG/DL

## 2022-05-26 ENCOUNTER — APPOINTMENT (OUTPATIENT)
Dept: CT IMAGING | Age: 67
DRG: 100 | End: 2022-05-26
Payer: COMMERCIAL

## 2022-05-26 ENCOUNTER — HOSPITAL ENCOUNTER (INPATIENT)
Age: 67
LOS: 4 days | Discharge: HOME | DRG: 100 | End: 2022-05-30
Attending: STUDENT IN AN ORGANIZED HEALTH CARE EDUCATION/TRAINING PROGRAM | Admitting: FAMILY MEDICINE
Payer: COMMERCIAL

## 2022-05-26 ENCOUNTER — APPOINTMENT (OUTPATIENT)
Dept: GENERAL RADIOLOGY | Age: 67
DRG: 100 | End: 2022-05-26
Payer: COMMERCIAL

## 2022-05-26 DIAGNOSIS — E87.20 LACTIC ACIDOSIS: ICD-10-CM

## 2022-05-26 DIAGNOSIS — R19.7 DIARRHEA, UNSPECIFIED TYPE: ICD-10-CM

## 2022-05-26 DIAGNOSIS — R41.82 ALTERED MENTAL STATUS, UNSPECIFIED ALTERED MENTAL STATUS TYPE: Primary | ICD-10-CM

## 2022-05-26 LAB
ACETAMINOPHEN LEVEL: <5 MCG/ML (ref 10–30)
ALBUMIN SERPL-MCNC: 4.9 G/DL (ref 3.5–5.2)
ALP BLD-CCNC: 280 U/L (ref 35–104)
ALT SERPL-CCNC: 30 U/L (ref 0–32)
AMMONIA: 105 UMOL/L (ref 11–51)
AMMONIA: 49 UMOL/L (ref 11–51)
AMPHETAMINE SCREEN, URINE: NOT DETECTED
ANION GAP SERPL CALCULATED.3IONS-SCNC: 19 MMOL/L (ref 7–16)
ANION GAP SERPL CALCULATED.3IONS-SCNC: 22 MMOL/L (ref 7–16)
APTT: 27.4 SEC (ref 24.5–35.1)
AST SERPL-CCNC: 33 U/L (ref 0–31)
BACTERIA: NORMAL /HPF
BARBITURATE SCREEN URINE: NOT DETECTED
BASOPHILS ABSOLUTE: 0.07 E9/L (ref 0–0.2)
BASOPHILS RELATIVE PERCENT: 0.6 % (ref 0–2)
BENZODIAZEPINE SCREEN, URINE: NOT DETECTED
BETA-HYDROXYBUTYRATE: 0.31 MMOL/L (ref 0.02–0.27)
BILIRUB SERPL-MCNC: 0.6 MG/DL (ref 0–1.2)
BILIRUBIN URINE: NEGATIVE
BLOOD, URINE: NEGATIVE
BUN BLDV-MCNC: 20 MG/DL (ref 6–23)
BUN BLDV-MCNC: 22 MG/DL (ref 6–23)
CALCIUM SERPL-MCNC: 10.4 MG/DL (ref 8.6–10.2)
CALCIUM SERPL-MCNC: 9.3 MG/DL (ref 8.6–10.2)
CANNABINOID SCREEN URINE: NOT DETECTED
CHLORIDE BLD-SCNC: 103 MMOL/L (ref 98–107)
CHLORIDE BLD-SCNC: 106 MMOL/L (ref 98–107)
CLARITY: CLEAR
CO2: 16 MMOL/L (ref 22–29)
CO2: 16 MMOL/L (ref 22–29)
COCAINE METABOLITE SCREEN URINE: NOT DETECTED
COLOR: YELLOW
CREAT SERPL-MCNC: 0.9 MG/DL (ref 0.5–1)
CREAT SERPL-MCNC: 1 MG/DL (ref 0.5–1)
EOSINOPHILS ABSOLUTE: 0.17 E9/L (ref 0.05–0.5)
EOSINOPHILS RELATIVE PERCENT: 1.5 % (ref 0–6)
ETHANOL: <10 MG/DL (ref 0–0.08)
FENTANYL SCREEN, URINE: NOT DETECTED
GFR AFRICAN AMERICAN: >60
GFR AFRICAN AMERICAN: >60
GFR NON-AFRICAN AMERICAN: 56 ML/MIN/1.73
GFR NON-AFRICAN AMERICAN: >60 ML/MIN/1.73
GLUCOSE BLD-MCNC: 199 MG/DL (ref 74–99)
GLUCOSE BLD-MCNC: 289 MG/DL (ref 74–99)
GLUCOSE URINE: 100 MG/DL
HCT VFR BLD CALC: 49.3 % (ref 34–48)
HEMOGLOBIN: 16.6 G/DL (ref 11.5–15.5)
IMMATURE GRANULOCYTES #: 0.03 E9/L
IMMATURE GRANULOCYTES %: 0.3 % (ref 0–5)
INR BLD: 1
KETONES, URINE: NEGATIVE MG/DL
LACTIC ACID, SEPSIS: 5 MMOL/L (ref 0.5–1.9)
LACTIC ACID: 4.6 MMOL/L (ref 0.5–2.2)
LACTIC ACID: 5.3 MMOL/L (ref 0.5–2.2)
LEUKOCYTE ESTERASE, URINE: NEGATIVE
LIPASE: 55 U/L (ref 13–60)
LYMPHOCYTES ABSOLUTE: 3.36 E9/L (ref 1.5–4)
LYMPHOCYTES RELATIVE PERCENT: 30.4 % (ref 20–42)
Lab: NORMAL
MCH RBC QN AUTO: 30.4 PG (ref 26–35)
MCHC RBC AUTO-ENTMCNC: 33.7 % (ref 32–34.5)
MCV RBC AUTO: 90.3 FL (ref 80–99.9)
METER GLUCOSE: 251 MG/DL (ref 74–99)
METHADONE SCREEN, URINE: NOT DETECTED
MONOCYTES ABSOLUTE: 0.58 E9/L (ref 0.1–0.95)
MONOCYTES RELATIVE PERCENT: 5.2 % (ref 2–12)
NEUTROPHILS ABSOLUTE: 6.85 E9/L (ref 1.8–7.3)
NEUTROPHILS RELATIVE PERCENT: 62 % (ref 43–80)
NITRITE, URINE: NEGATIVE
OPIATE SCREEN URINE: NOT DETECTED
OXYCODONE URINE: NOT DETECTED
PDW BLD-RTO: 12.5 FL (ref 11.5–15)
PH UA: 6 (ref 5–9)
PH VENOUS: 7.26 (ref 7.35–7.45)
PHENCYCLIDINE SCREEN URINE: NOT DETECTED
PLATELET # BLD: 544 E9/L (ref 130–450)
PMV BLD AUTO: 10.6 FL (ref 7–12)
POTASSIUM REFLEX MAGNESIUM: 4.2 MMOL/L (ref 3.5–5)
POTASSIUM SERPL-SCNC: 4.4 MMOL/L (ref 3.5–5)
PROLACTIN: 58.63 NG/ML
PROTEIN UA: ABNORMAL MG/DL
PROTHROMBIN TIME: 10.9 SEC (ref 9.3–12.4)
RBC # BLD: 5.46 E12/L (ref 3.5–5.5)
RBC UA: NORMAL /HPF (ref 0–2)
SALICYLATE, SERUM: <0.3 MG/DL (ref 0–30)
SARS-COV-2, NAAT: NOT DETECTED
SODIUM BLD-SCNC: 141 MMOL/L (ref 132–146)
SODIUM BLD-SCNC: 141 MMOL/L (ref 132–146)
SPECIFIC GRAVITY UA: <=1.005 (ref 1–1.03)
T4 FREE: 1.19 NG/DL (ref 0.93–1.7)
TOTAL PROTEIN: 8.1 G/DL (ref 6.4–8.3)
TRICYCLIC ANTIDEPRESSANTS SCREEN SERUM: NEGATIVE NG/ML
TROPONIN, HIGH SENSITIVITY: 14 NG/L (ref 0–9)
TROPONIN, HIGH SENSITIVITY: 16 NG/L (ref 0–9)
TROPONIN, HIGH SENSITIVITY: 28 NG/L (ref 0–9)
TSH SERPL DL<=0.05 MIU/L-ACNC: 6.75 UIU/ML (ref 0.27–4.2)
UROBILINOGEN, URINE: 1 E.U./DL
WBC # BLD: 11.1 E9/L (ref 4.5–11.5)
WBC UA: NORMAL /HPF (ref 0–5)

## 2022-05-26 PROCEDURE — 6360000004 HC RX CONTRAST MEDICATION: Performed by: RADIOLOGY

## 2022-05-26 PROCEDURE — 71045 X-RAY EXAM CHEST 1 VIEW: CPT

## 2022-05-26 PROCEDURE — 87088 URINE BACTERIA CULTURE: CPT

## 2022-05-26 PROCEDURE — 70450 CT HEAD/BRAIN W/O DYE: CPT

## 2022-05-26 PROCEDURE — 80053 COMPREHEN METABOLIC PANEL: CPT

## 2022-05-26 PROCEDURE — 0042T CT BRAIN PERFUSION: CPT | Performed by: RADIOLOGY

## 2022-05-26 PROCEDURE — 96366 THER/PROPH/DIAG IV INF ADDON: CPT

## 2022-05-26 PROCEDURE — 80179 DRUG ASSAY SALICYLATE: CPT

## 2022-05-26 PROCEDURE — 93005 ELECTROCARDIOGRAM TRACING: CPT | Performed by: STUDENT IN AN ORGANIZED HEALTH CARE EDUCATION/TRAINING PROGRAM

## 2022-05-26 PROCEDURE — 85610 PROTHROMBIN TIME: CPT

## 2022-05-26 PROCEDURE — 87040 BLOOD CULTURE FOR BACTERIA: CPT

## 2022-05-26 PROCEDURE — 36415 COLL VENOUS BLD VENIPUNCTURE: CPT

## 2022-05-26 PROCEDURE — 0042T CT BRAIN PERFUSION: CPT

## 2022-05-26 PROCEDURE — 99449 NTRPROF PH1/NTRNET/EHR 31/>: CPT | Performed by: PSYCHIATRY & NEUROLOGY

## 2022-05-26 PROCEDURE — 80307 DRUG TEST PRSMV CHEM ANLYZR: CPT

## 2022-05-26 PROCEDURE — 83690 ASSAY OF LIPASE: CPT

## 2022-05-26 PROCEDURE — 70496 CT ANGIOGRAPHY HEAD: CPT | Performed by: RADIOLOGY

## 2022-05-26 PROCEDURE — 84146 ASSAY OF PROLACTIN: CPT

## 2022-05-26 PROCEDURE — 6360000002 HC RX W HCPCS: Performed by: STUDENT IN AN ORGANIZED HEALTH CARE EDUCATION/TRAINING PROGRAM

## 2022-05-26 PROCEDURE — 82077 ASSAY SPEC XCP UR&BREATH IA: CPT

## 2022-05-26 PROCEDURE — 81001 URINALYSIS AUTO W/SCOPE: CPT

## 2022-05-26 PROCEDURE — 96365 THER/PROPH/DIAG IV INF INIT: CPT

## 2022-05-26 PROCEDURE — 82010 KETONE BODYS QUAN: CPT

## 2022-05-26 PROCEDURE — 84443 ASSAY THYROID STIM HORMONE: CPT

## 2022-05-26 PROCEDURE — 2580000003 HC RX 258: Performed by: STUDENT IN AN ORGANIZED HEALTH CARE EDUCATION/TRAINING PROGRAM

## 2022-05-26 PROCEDURE — 70496 CT ANGIOGRAPHY HEAD: CPT

## 2022-05-26 PROCEDURE — 70498 CT ANGIOGRAPHY NECK: CPT | Performed by: RADIOLOGY

## 2022-05-26 PROCEDURE — 70498 CT ANGIOGRAPHY NECK: CPT

## 2022-05-26 PROCEDURE — 84484 ASSAY OF TROPONIN QUANT: CPT

## 2022-05-26 PROCEDURE — 80143 DRUG ASSAY ACETAMINOPHEN: CPT

## 2022-05-26 PROCEDURE — 2060000000 HC ICU INTERMEDIATE R&B

## 2022-05-26 PROCEDURE — 82140 ASSAY OF AMMONIA: CPT

## 2022-05-26 PROCEDURE — 84439 ASSAY OF FREE THYROXINE: CPT

## 2022-05-26 PROCEDURE — 85730 THROMBOPLASTIN TIME PARTIAL: CPT

## 2022-05-26 PROCEDURE — 99285 EMERGENCY DEPT VISIT HI MDM: CPT

## 2022-05-26 PROCEDURE — 83605 ASSAY OF LACTIC ACID: CPT

## 2022-05-26 PROCEDURE — 82800 BLOOD PH: CPT

## 2022-05-26 PROCEDURE — 96361 HYDRATE IV INFUSION ADD-ON: CPT

## 2022-05-26 PROCEDURE — 85025 COMPLETE CBC W/AUTO DIFF WBC: CPT

## 2022-05-26 PROCEDURE — 80048 BASIC METABOLIC PNL TOTAL CA: CPT

## 2022-05-26 PROCEDURE — 87635 SARS-COV-2 COVID-19 AMP PRB: CPT

## 2022-05-26 PROCEDURE — 74176 CT ABD & PELVIS W/O CONTRAST: CPT

## 2022-05-26 RX ORDER — 0.9 % SODIUM CHLORIDE 0.9 %
1000 INTRAVENOUS SOLUTION INTRAVENOUS ONCE
Status: COMPLETED | OUTPATIENT
Start: 2022-05-26 | End: 2022-05-26

## 2022-05-26 RX ORDER — SODIUM CHLORIDE 9 MG/ML
INJECTION, SOLUTION INTRAVENOUS PRN
Status: DISCONTINUED | OUTPATIENT
Start: 2022-05-26 | End: 2022-05-27

## 2022-05-26 RX ORDER — SODIUM CHLORIDE 0.9 % (FLUSH) 0.9 %
5-40 SYRINGE (ML) INJECTION EVERY 12 HOURS SCHEDULED
Status: DISCONTINUED | OUTPATIENT
Start: 2022-05-26 | End: 2022-05-27

## 2022-05-26 RX ORDER — SODIUM CHLORIDE 0.9 % (FLUSH) 0.9 %
5-40 SYRINGE (ML) INJECTION PRN
Status: DISCONTINUED | OUTPATIENT
Start: 2022-05-26 | End: 2022-05-27

## 2022-05-26 RX ORDER — LACTULOSE 10 G/15ML
20 SOLUTION ORAL ONCE
Status: DISCONTINUED | OUTPATIENT
Start: 2022-05-26 | End: 2022-05-30 | Stop reason: HOSPADM

## 2022-05-26 RX ORDER — LEVETIRACETAM 10 MG/ML
1000 INJECTION INTRAVASCULAR ONCE
Status: COMPLETED | OUTPATIENT
Start: 2022-05-26 | End: 2022-05-26

## 2022-05-26 RX ADMIN — PIPERACILLIN AND TAZOBACTAM 4500 MG: 4; .5 INJECTION, POWDER, LYOPHILIZED, FOR SOLUTION INTRAVENOUS at 20:55

## 2022-05-26 RX ADMIN — SODIUM CHLORIDE 1000 ML: 9 INJECTION, SOLUTION INTRAVENOUS at 19:01

## 2022-05-26 RX ADMIN — LEVETIRACETAM 1000 MG: 10 INJECTION INTRAVASCULAR at 15:48

## 2022-05-26 RX ADMIN — SODIUM CHLORIDE 1000 ML: 9 INJECTION, SOLUTION INTRAVENOUS at 14:15

## 2022-05-26 RX ADMIN — SODIUM CHLORIDE 1000 ML: 9 INJECTION, SOLUTION INTRAVENOUS at 15:48

## 2022-05-26 RX ADMIN — IOPAMIDOL 100 ML: 755 INJECTION, SOLUTION INTRAVENOUS at 14:17

## 2022-05-26 NOTE — ED NOTES
Time Neurologist page:Dr Devyn Solis @ [phone]      Time Stroke Alert called:1326  :    Time Neurologist called back:1328    X-Ray/CT notified:     Fiona Kendrick  05/26/22 1132

## 2022-05-26 NOTE — ED NOTES
Patient having multiple episodes of diarrhea/incontinence of diarrhea. Patient cleaned x 4. Linen changed.       Trino Stubbs RN  05/26/22 [id number]

## 2022-05-26 NOTE — ED NOTES
Patient alert and oriented x 3 at this time.  Family at bedside     [facility] MorrellExcela [facility]  05/26/22 [id number]

## 2022-05-26 NOTE — ED PROVIDER NOTES
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 hx of intracranial hemorrhage .      - Chief complaint    Ms. Annamarie Eckert is a 60 yo female who presents to the ED due to altered mental status. Patient was presenting to work when she became unresponsive and was transferred to the emergency department. She is extremely diaphoretic upon arrival and minimally responsive to questioning or commands. She does have a history of intracranial hemorrhage. Her blood glucose on arrival was 251. She has a history of dysarthria which is severe upon presentation and unsure if it is worse than her baseline. A stroke alert was called due to patient's somnolence and severe dysarthria with minimal responsiveness. Patient symptoms have been constant since onset. Her last known well was roughly 30 minutes prior to presentation. She appeared to have lost control of her bowels on route to the emergency department. Full review of systems was unable to be obtained given patient's change in mental status. Review of Systems   Unable to perform ROS: Mental status change        Physical Exam  Constitutional:       General: She is in acute distress. Appearance: Normal appearance. She is normal weight. She is ill-appearing and diaphoretic. Comments: Somnolent and minimally following commands and answering questions   HENT:      Head: Normocephalic and atraumatic. Right Ear: External ear normal.      Left Ear: External ear normal.      Mouth/Throat:      Mouth: Mucous membranes are moist.      Pharynx: Oropharynx is clear.    Eyes:      Extraocular Movements: Extraocular movements intact. Conjunctiva/sclera: Conjunctivae normal.   Cardiovascular:      Rate and Rhythm: Normal rate and regular rhythm. Pulses: Normal pulses. Heart sounds: Normal heart sounds. Pulmonary:      Effort: Pulmonary effort is normal. No respiratory distress. Breath sounds: Normal breath sounds. No wheezing. Abdominal:      General: Abdomen is flat. Bowel sounds are normal.      Palpations: Abdomen is soft. Tenderness: There is no abdominal tenderness. There is no guarding or rebound. Musculoskeletal:         General: No swelling, tenderness or deformity. Cervical back: Normal range of motion and neck supple. No rigidity or tenderness. Skin:     General: Skin is warm. Neurological:      General: No focal deficit present. Mental Status: She is oriented to person, place, and time. Mental status is at baseline. Cranial Nerves: No cranial nerve deficit. Sensory: No sensory deficit. Motor: No weakness. NIH Stroke Scale/Score at time of initial evaluation:  1A: Level of Consciousness 2 - not alert, requires repeated stimulation to attend, or is obtunded and requires strong or painful stimulation to make movements (not stereotyped)    1B: Ask Month and Age 0 - answers both questions correctly   1C:  Tell Patient To Open and Close Eyes, then Hand  Squeeze 1 - performs one task correctly   2: Test Horizontal Extraocular Movements 0 - normal   3: Test Visual Fields 0 - no visual loss   4: Test Facial Palsy 0 - normal symmetric movement   5A: Test Left Arm Motor Drift 0 - no drift, limb holds 90 (or 45) degrees for full 10 seconds   5B: Test Right Arm Motor Drift 0 - no drift, limb holds 90 (or 45) degrees for full 10 seconds   6A: Test Left Leg Motor Drift 0 - no drift; leg holds 30 degree position for full 5 seconds   6B: Test Right Leg Motor Drift 0 - no drift; leg holds 30 degree position for full 5 seconds   7: Test Limb Ataxia (FNF/Heel-Shin) 0 - absent   8: Test Sensation 0 - normal; no sensory loss   9: Test Language/Aphasia 0 - no aphasia, normal   10: Test Dysarthria 2 - severe; patient speech is so slurred as to be unintelligible in the absence of or our of proportion to any dysphagia, or is mute/anarthric    11: Test Extinction/Inattention 0 - no abnormality   Total 5       Procedures       MDM  Number of Diagnoses or Management Options  Altered mental status, unspecified altered mental status type  Lactic acidosis  Diagnosis management comments: This is a 60 yo female who presents to the ED due to altered mental status. Patient initially presented unresponsive and was given 1 L normal saline bolus initially. Stroke alert was called Dr. Fabian Never reviewed her images which did not reveal any signs of significant ischemic penumbra or large vessel occlusion. He did state that there is a significant percentage of post hemorrhagic strokes who was going to have seizures and suggested the patient be loaded with Keppra and admitted for MRI and further testing. Patient was given 1 g of Keppra at this time. Additional lab work was obtained which revealed a hemoglobin of 16.6 and her CBC. CMP revealed anion gap of 22 with a decreased bicarb of 16 and elevated alk phos of 280 and AST of 33. Urinalysis negative for any urinary tract infection. Patient's coags were normal.  Urine and serum drug screen were negative. TSH was mildly elevated to 6.75 with a normal T4. Prolactin was also elevated at 58.63 which could be indicative of seizure-like activity. Initial lactic was 5.0. She was given another liter of normal saline and repeat lactate was found to increase of 5.3. Beta hydroxybutyrate was mildly elevated at 0.31. Venous pH was mildly decreased at 7.26. Initial ammonia was 105 with a repeat of 49. Patient was ordered a dose of lactulose but has been having diarrhea in the department.   Troponin was 28 and repeat was 16 with a delta of 12. Third lactic acid was decreasing and is now 4.6. She tested negative for COVID. CT abdomen pelvis revealed diffusely dilated fluid-filled small bowel loops and colon with inflammatory changes concerning for enterocolitis as well as possible malignancy or colon cancer in the long segment of the descending and sigmoid colon due to mural thickening. She also has a questionable high density fluid/hemorrhagic ascites in the pelvis with distended fluid-filled stomach possibly due to ileus. Chest x-ray revealed no acute process. General surgery was consulted and stated there were no acute interventions at this time. She was started on Zosyn to cover for possible intraabdominal infection. Patient will be admitted for further work-up of her possible seizure-like activity by Dr. Lizzie Gallardo at this time. Amount and/or Complexity of Data Reviewed  Clinical lab tests: reviewed  Tests in the radiology section of CPT®: reviewed  Tests in the medicine section of CPT®: reviewed         ED Course as of 05/26/22 2218   Thu May 26, 2022   0799 Discussed case with Dr. Ely Lemus who states her stroke protocol imaging was negative and patient may have possibly had a seizure given her history of hemorrhagic stroke. She will be loaded with keppra and admitted for MRI.  [JS]      ED Course User Index  [JS] Jay Brooke, DO       --------------------------------------------- PAST HISTORY ---------------------------------------------  Past Medical History:  has a past medical history of Abnormal mammogram of left breast, Anxiety, Cerebral artery occlusion with cerebral infarction ([facility] [address] Depression, Diabetes mellitus ([address] [address] Dysarthria, Erosive esophagitis, Esophageal stricture, GERD (gastroesophageal reflux disease), Gestational diabetes, Hemorrhagic stroke ([facility] [address] Hyperlipidemia, Hypertension, Hypokalemia, Osteoarthritis, Postmenopausal, and Type 2 diabetes mellitus without complication ([facility] [address]     Past Surgical History:  has a past surgical history that includes Cholecystectomy;  section; Hysterectomy; Breast biopsy (Left, 2019); joint replacement (Right); bronchoscopy (); Colonoscopy (2011); and Carpal tunnel release (Right). Social History:  reports that she has never smoked. She has never used smokeless tobacco. She reports that she does not drink alcohol and does not use drugs. Family History: family history includes Coronary Art Dis in her father; Diabetes in her brother and mother; Mult Sclerosis in her brother; Rheum Arthritis in her sister. The patients home medications have been reviewed. Allergies: Patient has no known allergies.     -------------------------------------------------- RESULTS -------------------------------------------------    LABS:  Results for orders placed or performed during the hospital encounter of 22   COVID-19, Rapid    Specimen: Nasopharyngeal Swab   Result Value Ref Range    SARS-CoV-2, NAAT Not Detected Not Detected   CBC auto differential   Result Value Ref Range    WBC 11.1 4.5 - 11.5 E9/L    RBC 5.46 3.50 - 5.50 E12/L    Hemoglobin 16.6 (H) 11.5 - 15.5 g/dL    Hematocrit 49.3 (H) 34.0 - 48.0 %    MCV 90.3 80.0 - 99.9 fL    MCH 30.4 26.0 - 35.0 pg    MCHC 33.7 32.0 - 34.5 %    RDW 12.5 11.5 - 15.0 fL    Platelets 858 (H) 435 - 450 E9/L    MPV 10.6 7.0 - 12.0 fL    Neutrophils % 62.0 43.0 - 80.0 %    Immature Granulocytes % 0.3 0.0 - 5.0 %    Lymphocytes % 30.4 20.0 - 42.0 %    Monocytes % 5.2 2.0 - 12.0 %    Eosinophils % 1.5 0.0 - 6.0 %    Basophils % 0.6 0.0 - 2.0 %    Neutrophils Absolute 6.85 1.80 - 7.30 E9/L    Immature Granulocytes # 0.03 E9/L    Lymphocytes Absolute 3.36 1.50 - 4.00 E9/L    Monocytes Absolute 0.58 0.10 - 0.95 E9/L    Eosinophils Absolute 0.17 0.05 - 0.50 E9/L    Basophils Absolute 0.07 0.00 - 0.20 E9/L   Comprehensive Metabolic Panel w/ Reflex to MG   Result Value Ref Range    Sodium 141 132 - 146 mmol/L    Potassium reflex Magnesium 4.2 3.5 - 5.0 mmol/L    Chloride 103 98 - 107 mmol/L    CO2 16 (L) 22 - 29 mmol/L    Anion Gap 22 (H) 7 - 16 mmol/L    Glucose 289 (H) 74 - 99 mg/dL    BUN 20 6 - 23 mg/dL    CREATININE 1.0 0.5 - 1.0 mg/dL    GFR Non-African American 56 >=60 mL/min/1.73    GFR African American >60     Calcium 10.4 (H) 8.6 - 10.2 mg/dL    Total Protein 8.1 6.4 - 8.3 g/dL    Albumin 4.9 3.5 - 5.2 g/dL    Total Bilirubin 0.6 0.0 - 1.2 mg/dL    Alkaline Phosphatase 280 (H) 35 - 104 U/L    ALT 30 0 - 32 U/L    AST 33 (H) 0 - 31 U/L   Urinalysis   Result Value Ref Range    Color, UA Yellow Straw/Yellow    Clarity, UA Clear Clear    Glucose, Ur 100 (A) Negative mg/dL    Bilirubin Urine Negative Negative    Ketones, Urine Negative Negative mg/dL    Specific Gravity, UA <=1.005 1.005 - 1.030    Blood, Urine Negative Negative    pH, UA 6.0 5.0 - 9.0    Protein, UA TRACE Negative mg/dL    Urobilinogen, Urine 1.0 <2.0 E.U./dL    Nitrite, Urine Negative Negative    Leukocyte Esterase, Urine Negative Negative   Lactate, Sepsis   Result Value Ref Range    Lactic Acid, Sepsis 5.0 (HH) 0.5 - 1.9 mmol/L   APTT   Result Value Ref Range    aPTT 27.4 24.5 - 35.1 sec   Protime-INR   Result Value Ref Range    Protime 10.9 9.3 - 12.4 sec    INR 1.0    Lipase   Result Value Ref Range    Lipase 55 13 - 60 U/L   Troponin   Result Value Ref Range    Troponin, High Sensitivity 28 (H) 0 - 9 ng/L   URINE DRUG SCREEN   Result Value Ref Range    Amphetamine Screen, Urine NOT DETECTED Negative <1000 ng/mL    Barbiturate Screen, Ur NOT DETECTED Negative < 200 ng/mL    Benzodiazepine Screen, Urine NOT DETECTED Negative < 200 ng/mL    Cannabinoid Scrn, Ur NOT DETECTED Negative < 50ng/mL    Cocaine Metabolite Screen, Urine NOT DETECTED Negative < 300 ng/mL    Opiate Scrn, Ur NOT DETECTED Negative < 300ng/mL    PCP Screen, Urine NOT DETECTED Negative < 25 ng/mL    Methadone Screen, Urine NOT DETECTED Negative <300 ng/mL    Oxycodone Urine NOT DETECTED Negative <100 ng/mL FENTANYL SCREEN, URINE NOT DETECTED Negative <1 ng/mL    Drug Screen Comment: see below    Serum Drug Screen   Result Value Ref Range    Ethanol Lvl <10 mg/dL    Acetaminophen Level <5.0 (L) 10.0 - 69.0 mcg/mL    Salicylate, Serum <4.6 0.0 - 30.0 mg/dL    TCA Scrn NEGATIVE Cutoff:300 ng/mL   Ammonia   Result Value Ref Range    Ammonia 105.0 (H) 11.0 - 51.0 umol/L   TSH   Result Value Ref Range    TSH 6.750 (H) 0.270 - 4.200 uIU/mL   Prolactin   Result Value Ref Range    Prolactin 58.63 ng/mL   Microscopic Urinalysis   Result Value Ref Range    WBC, UA NONE 0 - 5 /HPF    RBC, UA NONE 0 - 2 /HPF    Bacteria, UA NONE SEEN None Seen /HPF   Troponin   Result Value Ref Range    Troponin, High Sensitivity 14 (H) 0 - 9 ng/L   Ammonia   Result Value Ref Range    Ammonia 49.0 11.0 - 51.0 umol/L   Troponin   Result Value Ref Range    Troponin, High Sensitivity 16 (H) 0 - 9 ng/L   T4, Free   Result Value Ref Range    T4 Free 1.19 0.93 - 1.70 ng/dL   Lactic Acid   Result Value Ref Range    Lactic Acid 5.3 (HH) 0.5 - 2.2 mmol/L   Beta-Hydroxybutyrate   Result Value Ref Range    Beta-Hydroxybutyrate 0.31 (H) 0.02 - 0.27 mmol/L   PH, VENOUS   Result Value Ref Range    pH, Jose Alberto 7.26 (L) 7.35 - 8.70   Basic Metabolic Panel   Result Value Ref Range    Sodium 141 132 - 146 mmol/L    Potassium 4.4 3.5 - 5.0 mmol/L    Chloride 106 98 - 107 mmol/L    CO2 16 (L) 22 - 29 mmol/L    Anion Gap 19 (H) 7 - 16 mmol/L    Glucose 199 (H) 74 - 99 mg/dL    BUN 22 6 - 23 mg/dL    CREATININE 0.9 0.5 - 1.0 mg/dL    GFR Non-African American >60 >=60 mL/min/1.73    GFR African American >60     Calcium 9.3 8.6 - 10.2 mg/dL   Lactic Acid   Result Value Ref Range    Lactic Acid 4.6 (HH) 0.5 - 2.2 mmol/L   POCT Glucose   Result Value Ref Range    Meter Glucose 251 (H) 74 - 99 mg/dL   EKG 12 lead   Result Value Ref Range    Ventricular Rate 53 BPM    Atrial Rate 53 BPM    P-R Interval 164 ms    QRS Duration 94 ms    Q-T Interval 494 ms    QTc Calculation (Marie) 463 ms    P Axis 40 degrees    R Axis 26 degrees    T Axis 44 degrees       RADIOLOGY:  CT ABDOMEN PELVIS WO CONTRAST Additional Contrast? None   Final Result   Diffusely dilated fluid-filled small bowel loops and colon with inflammatory   changes between the jejunal loops in the left upper quadrant and surrounding   the sigmoid colon concerning for inflammatory process like enterocolitis. Considering the long segment of mural thickening of the descending and   sigmoid colon, underlying malignancy/colon cancer is considered. Consider   colonoscopy when feasible. Distended fluid-filled stomach which may be due to ileus or low-grade bowel   obstruction due to edema. High density fluid/hemorrhagic ascites in the pelvis. Atelectasis/ground-glass densities in lung bases. Clinical assessment is   recommended to evaluate for pneumonia. Nonspecific cystic lesion in the tail of the pancreas which is indeterminate. Surveillance preferably by MRI is recommended      RECOMMENDATIONS:   Unavailable         CTA HEAD W CONTRAST   Final Result   1. Estimated stenosis of the proximal right and left internal carotid   artery by NASCET criteria is not hemodynamically significant   2. Mild atherosclerotic disease . 3. No large vessel occlusion identified            This study was analyzed by the [employer]. ai algorithm. CTA NECK W CONTRAST   Final Result   1. Estimated stenosis of the proximal right and left internal carotid   artery by NASCET criteria is not hemodynamically significant   2. Mild atherosclerotic disease . 3. No large vessel occlusion identified            This study was analyzed by the [employer]. ai algorithm. CT BRAIN PERFUSION   Final Result      No significant ischemic penumbra identified      This study was analyzed by the [employer]. ai algorithm.             XR CHEST PORTABLE   Final Result   No acute process         CT Head WO Contrast   Final Result   No acute intracranial abnormality. EKG:  This EKG is signed and interpreted by me. Rate: 53  Rhythm: Sinus  Interpretation: sinus bradycardia  Comparison: was normal and stable as compared to patient's most recent EKG      ------------------------- NURSING NOTES AND VITALS REVIEWED ---------------------------  Date / Time Roomed:  5/26/2022  1:17 PM  ED Bed Assignment:  09/09    The nursing notes within the ED encounter and vital signs as below have been reviewed. Patient Vitals for the past 24 hrs:   BP Temp Pulse Resp SpO2   05/26/22 2130 (!) 153/90 -- 64 21 93 %   05/26/22 1434 -- 98 °F (36.7 °C) 52 -- --   05/26/22 1430 (!) 109/52 -- 53 20 94 %   05/26/22 1400 83/60 -- 57 18 --   05/26/22 1335 -- -- 54 15 95 %       Oxygen Saturation Interpretation: Normal    ------------------------------------------ PROGRESS NOTES ------------------------------------------  Re-evaluation(s):  Time: 2100  Patients symptoms show no change  Repeat physical examination is not changed    Counseling:  I have spoken with the patient and discussed todays results, in addition to providing specific details for the plan of care and counseling regarding the diagnosis and prognosis. Their questions are answered at this time and they are agreeable with the plan of admission.    --------------------------------- ADDITIONAL PROVIDER NOTES ---------------------------------  Consultations:  Time: 2115. Spoke with Dr. Zenaida Mullins. Discussed case. They will admit the patient. This patient's ED course included: a personal history and physicial examination, re-evaluation prior to disposition, multiple bedside re-evaluations, IV medications, cardiac monitoring, continuous pulse oximetry and complex medical decision making and emergency management    This patient has remained hemodynamically stable during their ED course. Diagnosis:  1. Altered mental status, unspecified altered mental status type    2. Lactic acidosis    3.  Diarrhea, unspecified type        Disposition:  Patient's disposition: Admit to telemetry  Patient's condition is stable. Patient was seen and evaluated by myself and my attending Rao Kelly MD. Assessment and Plan discussed with attending provider, please see attestation for final plan of care.      DO Ilan Ross DO  Resident  05/26/22 [id number]

## 2022-05-26 NOTE — VIRTUAL HEALTH
Consults  Patient Location:  [address] [place] [address] Emergency Department    Provider Location ([facility]/State): [facility]    This virtual visit was ED telephone- telestroke consult, for inpatient care please consult Neurology on call            I was contacted by the ED team  to review an acute code stroke  and review of cerebral vasculature imaging study to investigate if there is an large vessel occlusion. The patient has a NIHSS of unknown at this time patient is completely unresponsive however is moving all 4 extremities there is concern of a posterior circulation stroke. Has a history of intracranial hemorrhage last year per chart review Head CT is negative for ICH . The last known well time was reported as within an hour prior to presentation    The cerebral vascular imaging demonstrates no LVO. The cerebral perfusion imaging demonstrates no significant mismatch     VIZ LVO was utilized to assist with triage      Assessment:  1. Suspected stroke mimic with history of intracranial hemorrhage and global unresponsiveness postictal state and seizure should be on the differential.  Other metabolic etiologies should also be on the differential    Plan:  1. Recommend aspirin and Keppra. Patient is not a tPA candidate due to history of intracranial hemorrhage  2. No large vessel occlusion noted. No endovascular stroke therapy needed. 3. Neurology and Hospitalist to follow closely.     Discussed with ED team     TIME SPENT IN MEDICAL DECISION MAKING / REVIEW OF CASE AND IMAGING / DISCUSSION OF CASE WITH PHYSICIANS INVOLVED IN  THE ACUTE CARE= 40 min

## 2022-05-27 ENCOUNTER — APPOINTMENT (OUTPATIENT)
Dept: CT IMAGING | Age: 67
DRG: 100 | End: 2022-05-27
Payer: COMMERCIAL

## 2022-05-27 LAB
ALBUMIN SERPL-MCNC: 3.3 G/DL (ref 3.5–5.2)
ALP BLD-CCNC: 166 U/L (ref 35–104)
ALT SERPL-CCNC: 63 U/L (ref 0–32)
AMMONIA: 25 UMOL/L (ref 11–51)
ANION GAP SERPL CALCULATED.3IONS-SCNC: 12 MMOL/L (ref 7–16)
AST SERPL-CCNC: 64 U/L (ref 0–31)
BASOPHILS ABSOLUTE: 0.05 E9/L (ref 0–0.2)
BASOPHILS RELATIVE PERCENT: 0.3 % (ref 0–2)
BILIRUB SERPL-MCNC: 0.4 MG/DL (ref 0–1.2)
BUN BLDV-MCNC: 21 MG/DL (ref 6–23)
CALCIUM SERPL-MCNC: 8.4 MG/DL (ref 8.6–10.2)
CHLORIDE BLD-SCNC: 112 MMOL/L (ref 98–107)
CO2: 20 MMOL/L (ref 22–29)
CREAT SERPL-MCNC: 0.8 MG/DL (ref 0.5–1)
EKG ATRIAL RATE: 53 BPM
EKG ATRIAL RATE: 53 BPM
EKG P AXIS: 16 DEGREES
EKG P AXIS: 40 DEGREES
EKG P-R INTERVAL: 136 MS
EKG P-R INTERVAL: 164 MS
EKG Q-T INTERVAL: 464 MS
EKG Q-T INTERVAL: 494 MS
EKG QRS DURATION: 108 MS
EKG QRS DURATION: 94 MS
EKG QTC CALCULATION (BAZETT): 435 MS
EKG QTC CALCULATION (BAZETT): 463 MS
EKG R AXIS: 18 DEGREES
EKG R AXIS: 26 DEGREES
EKG T AXIS: 29 DEGREES
EKG T AXIS: 44 DEGREES
EKG VENTRICULAR RATE: 53 BPM
EKG VENTRICULAR RATE: 53 BPM
EOSINOPHILS ABSOLUTE: 0.03 E9/L (ref 0.05–0.5)
EOSINOPHILS RELATIVE PERCENT: 0.2 % (ref 0–6)
GFR AFRICAN AMERICAN: >60
GFR NON-AFRICAN AMERICAN: >60 ML/MIN/1.73
GLUCOSE BLD-MCNC: 112 MG/DL (ref 74–99)
HBA1C MFR BLD: 6.3 % (ref 4–5.6)
HCT VFR BLD CALC: 37.6 % (ref 34–48)
HEMOGLOBIN: 12.5 G/DL (ref 11.5–15.5)
IMMATURE GRANULOCYTES #: 0.13 E9/L
IMMATURE GRANULOCYTES %: 0.7 % (ref 0–5)
LACTIC ACID: 2.5 MMOL/L (ref 0.5–2.2)
LACTIC ACID: 4 MMOL/L (ref 0.5–2.2)
LYMPHOCYTES ABSOLUTE: 2.83 E9/L (ref 1.5–4)
LYMPHOCYTES RELATIVE PERCENT: 15.7 % (ref 20–42)
MCH RBC QN AUTO: 30.9 PG (ref 26–35)
MCHC RBC AUTO-ENTMCNC: 33.2 % (ref 32–34.5)
MCV RBC AUTO: 92.8 FL (ref 80–99.9)
METER GLUCOSE: 108 MG/DL (ref 74–99)
METER GLUCOSE: 109 MG/DL (ref 74–99)
METER GLUCOSE: 111 MG/DL (ref 74–99)
METER GLUCOSE: 150 MG/DL (ref 74–99)
METER GLUCOSE: 234 MG/DL (ref 74–99)
MONOCYTES ABSOLUTE: 1.1 E9/L (ref 0.1–0.95)
MONOCYTES RELATIVE PERCENT: 6.1 % (ref 2–12)
NEUTROPHILS ABSOLUTE: 13.84 E9/L (ref 1.8–7.3)
NEUTROPHILS RELATIVE PERCENT: 77 % (ref 43–80)
PDW BLD-RTO: 12.7 FL (ref 11.5–15)
PLATELET # BLD: 379 E9/L (ref 130–450)
PMV BLD AUTO: 10.5 FL (ref 7–12)
POTASSIUM REFLEX MAGNESIUM: 3.9 MMOL/L (ref 3.5–5)
RBC # BLD: 4.05 E12/L (ref 3.5–5.5)
SODIUM BLD-SCNC: 144 MMOL/L (ref 132–146)
TOTAL PROTEIN: 5.4 G/DL (ref 6.4–8.3)
WBC # BLD: 18 E9/L (ref 4.5–11.5)

## 2022-05-27 PROCEDURE — 2060000000 HC ICU INTERMEDIATE R&B

## 2022-05-27 PROCEDURE — 6360000002 HC RX W HCPCS: Performed by: STUDENT IN AN ORGANIZED HEALTH CARE EDUCATION/TRAINING PROGRAM

## 2022-05-27 PROCEDURE — 82962 GLUCOSE BLOOD TEST: CPT

## 2022-05-27 PROCEDURE — 6370000000 HC RX 637 (ALT 250 FOR IP): Performed by: FAMILY MEDICINE

## 2022-05-27 PROCEDURE — 6360000002 HC RX W HCPCS: Performed by: INTERNAL MEDICINE

## 2022-05-27 PROCEDURE — 6360000004 HC RX CONTRAST MEDICATION: Performed by: RADIOLOGY

## 2022-05-27 PROCEDURE — 85025 COMPLETE CBC W/AUTO DIFF WBC: CPT

## 2022-05-27 PROCEDURE — 6370000000 HC RX 637 (ALT 250 FOR IP): Performed by: INTERNAL MEDICINE

## 2022-05-27 PROCEDURE — 99223 1ST HOSP IP/OBS HIGH 75: CPT | Performed by: SURGERY

## 2022-05-27 PROCEDURE — 74177 CT ABD & PELVIS W/CONTRAST: CPT

## 2022-05-27 PROCEDURE — 2580000003 HC RX 258: Performed by: FAMILY MEDICINE

## 2022-05-27 PROCEDURE — 6360000002 HC RX W HCPCS: Performed by: FAMILY MEDICINE

## 2022-05-27 PROCEDURE — 83036 HEMOGLOBIN GLYCOSYLATED A1C: CPT

## 2022-05-27 PROCEDURE — 83605 ASSAY OF LACTIC ACID: CPT

## 2022-05-27 PROCEDURE — 2580000003 HC RX 258: Performed by: STUDENT IN AN ORGANIZED HEALTH CARE EDUCATION/TRAINING PROGRAM

## 2022-05-27 PROCEDURE — 82140 ASSAY OF AMMONIA: CPT

## 2022-05-27 PROCEDURE — 2580000003 HC RX 258: Performed by: INTERNAL MEDICINE

## 2022-05-27 PROCEDURE — 36415 COLL VENOUS BLD VENIPUNCTURE: CPT

## 2022-05-27 PROCEDURE — 80053 COMPREHEN METABOLIC PANEL: CPT

## 2022-05-27 RX ORDER — AMLODIPINE BESYLATE 5 MG/1
5 TABLET ORAL DAILY
Status: DISCONTINUED | OUTPATIENT
Start: 2022-05-27 | End: 2022-05-30 | Stop reason: HOSPADM

## 2022-05-27 RX ORDER — ALOGLIPTIN 25 MG/1
25 TABLET, FILM COATED ORAL DAILY
Status: DISCONTINUED | OUTPATIENT
Start: 2022-05-27 | End: 2022-05-27 | Stop reason: CLARIF

## 2022-05-27 RX ORDER — ACETAMINOPHEN 325 MG/1
650 TABLET ORAL EVERY 6 HOURS PRN
Status: DISCONTINUED | OUTPATIENT
Start: 2022-05-27 | End: 2022-05-30 | Stop reason: HOSPADM

## 2022-05-27 RX ORDER — DEXTROSE MONOHYDRATE 50 MG/ML
100 INJECTION, SOLUTION INTRAVENOUS PRN
Status: DISCONTINUED | OUTPATIENT
Start: 2022-05-27 | End: 2022-05-30 | Stop reason: HOSPADM

## 2022-05-27 RX ORDER — SODIUM CHLORIDE 9 MG/ML
INJECTION, SOLUTION INTRAVENOUS CONTINUOUS
Status: DISCONTINUED | OUTPATIENT
Start: 2022-05-27 | End: 2022-05-29

## 2022-05-27 RX ORDER — SODIUM CHLORIDE 0.9 % (FLUSH) 0.9 %
10 SYRINGE (ML) INJECTION EVERY 12 HOURS SCHEDULED
Status: DISCONTINUED | OUTPATIENT
Start: 2022-05-27 | End: 2022-05-30 | Stop reason: HOSPADM

## 2022-05-27 RX ORDER — INSULIN LISPRO 100 [IU]/ML
0-12 INJECTION, SOLUTION INTRAVENOUS; SUBCUTANEOUS
Status: DISCONTINUED | OUTPATIENT
Start: 2022-05-27 | End: 2022-05-30 | Stop reason: HOSPADM

## 2022-05-27 RX ORDER — VENLAFAXINE HYDROCHLORIDE 150 MG/1
150 CAPSULE, EXTENDED RELEASE ORAL DAILY
Status: DISCONTINUED | OUTPATIENT
Start: 2022-05-27 | End: 2022-05-30 | Stop reason: HOSPADM

## 2022-05-27 RX ORDER — LEVETIRACETAM 5 MG/ML
500 INJECTION INTRAVASCULAR EVERY 12 HOURS
Status: DISCONTINUED | OUTPATIENT
Start: 2022-05-27 | End: 2022-05-30

## 2022-05-27 RX ORDER — DEXTROSE MONOHYDRATE 100 MG/ML
250 INJECTION, SOLUTION INTRAVENOUS PRN
Status: DISCONTINUED | OUTPATIENT
Start: 2022-05-27 | End: 2022-05-30 | Stop reason: HOSPADM

## 2022-05-27 RX ORDER — INSULIN LISPRO 100 [IU]/ML
0-6 INJECTION, SOLUTION INTRAVENOUS; SUBCUTANEOUS NIGHTLY
Status: DISCONTINUED | OUTPATIENT
Start: 2022-05-27 | End: 2022-05-30 | Stop reason: HOSPADM

## 2022-05-27 RX ORDER — ONDANSETRON 2 MG/ML
4 INJECTION INTRAMUSCULAR; INTRAVENOUS EVERY 6 HOURS PRN
Status: DISCONTINUED | OUTPATIENT
Start: 2022-05-27 | End: 2022-05-30 | Stop reason: HOSPADM

## 2022-05-27 RX ORDER — ALOGLIPTIN 25 MG/1
25 TABLET, FILM COATED ORAL DAILY
Status: DISCONTINUED | OUTPATIENT
Start: 2022-05-27 | End: 2022-05-30 | Stop reason: HOSPADM

## 2022-05-27 RX ORDER — ATORVASTATIN CALCIUM 40 MG/1
40 TABLET, FILM COATED ORAL DAILY
Status: DISCONTINUED | OUTPATIENT
Start: 2022-05-27 | End: 2022-05-30 | Stop reason: HOSPADM

## 2022-05-27 RX ORDER — METRONIDAZOLE 500 MG/1
500 TABLET ORAL EVERY 8 HOURS SCHEDULED
Status: DISCONTINUED | OUTPATIENT
Start: 2022-05-27 | End: 2022-05-30 | Stop reason: HOSPADM

## 2022-05-27 RX ORDER — ATENOLOL 50 MG/1
50 TABLET ORAL DAILY
Status: DISCONTINUED | OUTPATIENT
Start: 2022-05-27 | End: 2022-05-30 | Stop reason: HOSPADM

## 2022-05-27 RX ORDER — ACETAMINOPHEN 650 MG/1
650 SUPPOSITORY RECTAL EVERY 6 HOURS PRN
Status: DISCONTINUED | OUTPATIENT
Start: 2022-05-27 | End: 2022-05-30 | Stop reason: HOSPADM

## 2022-05-27 RX ORDER — POLYETHYLENE GLYCOL 3350 17 G/17G
17 POWDER, FOR SOLUTION ORAL DAILY PRN
Status: DISCONTINUED | OUTPATIENT
Start: 2022-05-27 | End: 2022-05-30 | Stop reason: HOSPADM

## 2022-05-27 RX ORDER — PROMETHAZINE HYDROCHLORIDE 12.5 MG/1
12.5 TABLET ORAL EVERY 6 HOURS PRN
Status: DISCONTINUED | OUTPATIENT
Start: 2022-05-27 | End: 2022-05-30 | Stop reason: HOSPADM

## 2022-05-27 RX ORDER — DEXTROSE MONOHYDRATE 100 MG/ML
125 INJECTION, SOLUTION INTRAVENOUS PRN
Status: DISCONTINUED | OUTPATIENT
Start: 2022-05-27 | End: 2022-05-30 | Stop reason: HOSPADM

## 2022-05-27 RX ORDER — SODIUM CHLORIDE 0.9 % (FLUSH) 0.9 %
10 SYRINGE (ML) INJECTION
Status: ACTIVE | OUTPATIENT
Start: 2022-05-27 | End: 2022-05-27

## 2022-05-27 RX ORDER — SODIUM CHLORIDE 9 MG/ML
INJECTION, SOLUTION INTRAVENOUS PRN
Status: DISCONTINUED | OUTPATIENT
Start: 2022-05-27 | End: 2022-05-30 | Stop reason: HOSPADM

## 2022-05-27 RX ORDER — LISINOPRIL 10 MG/1
30 TABLET ORAL DAILY
Status: DISCONTINUED | OUTPATIENT
Start: 2022-05-27 | End: 2022-05-30 | Stop reason: HOSPADM

## 2022-05-27 RX ORDER — PANTOPRAZOLE SODIUM 40 MG/1
40 TABLET, DELAYED RELEASE ORAL
Status: DISCONTINUED | OUTPATIENT
Start: 2022-05-27 | End: 2022-05-30 | Stop reason: HOSPADM

## 2022-05-27 RX ORDER — SODIUM CHLORIDE 0.9 % (FLUSH) 0.9 %
10 SYRINGE (ML) INJECTION PRN
Status: DISCONTINUED | OUTPATIENT
Start: 2022-05-27 | End: 2022-05-30 | Stop reason: HOSPADM

## 2022-05-27 RX ADMIN — SODIUM CHLORIDE: 9 INJECTION, SOLUTION INTRAVENOUS at 02:25

## 2022-05-27 RX ADMIN — PIPERACILLIN AND TAZOBACTAM 3375 MG: 3; .375 INJECTION, POWDER, LYOPHILIZED, FOR SOLUTION INTRAVENOUS at 13:01

## 2022-05-27 RX ADMIN — ATENOLOL 50 MG: 50 TABLET ORAL at 11:50

## 2022-05-27 RX ADMIN — ATORVASTATIN CALCIUM 40 MG: 40 TABLET, FILM COATED ORAL at 10:47

## 2022-05-27 RX ADMIN — LISINOPRIL 30 MG: 10 TABLET ORAL at 10:50

## 2022-05-27 RX ADMIN — PIPERACILLIN AND TAZOBACTAM 3375 MG: 3; .375 INJECTION, POWDER, LYOPHILIZED, FOR SOLUTION INTRAVENOUS at 05:34

## 2022-05-27 RX ADMIN — METRONIDAZOLE 500 MG: 500 TABLET ORAL at 14:03

## 2022-05-27 RX ADMIN — INSULIN LISPRO 2 UNITS: 100 INJECTION, SOLUTION INTRAVENOUS; SUBCUTANEOUS at 16:49

## 2022-05-27 RX ADMIN — AMLODIPINE BESYLATE 5 MG: 5 TABLET ORAL at 10:48

## 2022-05-27 RX ADMIN — Medication 10 ML: at 08:05

## 2022-05-27 RX ADMIN — METRONIDAZOLE 500 MG: 500 TABLET ORAL at 21:54

## 2022-05-27 RX ADMIN — INSULIN LISPRO 2 UNITS: 100 INJECTION, SOLUTION INTRAVENOUS; SUBCUTANEOUS at 02:27

## 2022-05-27 RX ADMIN — SODIUM CHLORIDE, PRESERVATIVE FREE 10 ML: 5 INJECTION INTRAVENOUS at 11:52

## 2022-05-27 RX ADMIN — WATER 1000 MG: 1 INJECTION INTRAMUSCULAR; INTRAVENOUS; SUBCUTANEOUS at 14:03

## 2022-05-27 RX ADMIN — VENLAFAXINE HYDROCHLORIDE 150 MG: 150 CAPSULE, EXTENDED RELEASE ORAL at 11:51

## 2022-05-27 RX ADMIN — IOPAMIDOL 90 ML: 755 INJECTION, SOLUTION INTRAVENOUS at 08:05

## 2022-05-27 RX ADMIN — LEVETIRACETAM 500 MG: 5 INJECTION INTRAVENOUS at 15:21

## 2022-05-27 RX ADMIN — LEVETIRACETAM 500 MG: 5 INJECTION INTRAVENOUS at 04:21

## 2022-05-27 RX ADMIN — ALOGLIPTIN 25 MG: 25 TABLET, FILM COATED ORAL at 13:00

## 2022-05-27 RX ADMIN — SODIUM CHLORIDE, PRESERVATIVE FREE 10 ML: 5 INJECTION INTRAVENOUS at 21:54

## 2022-05-27 ASSESSMENT — PAIN DESCRIPTION - LOCATION: LOCATION: ABDOMEN

## 2022-05-27 ASSESSMENT — PAIN DESCRIPTION - ORIENTATION: ORIENTATION: LOWER

## 2022-05-27 ASSESSMENT — PAIN SCALES - GENERAL
PAINLEVEL_OUTOF10: 4
PAINLEVEL_OUTOF10: 0

## 2022-05-27 NOTE — CARE COORDINATION
Met with pt and spouse Ed at bedside to discuss discharge / transition of care plan. Pt reports from home with Ed; independent of all ADL; denies DME or needs; verified pharmacy; PCP retired and Dr Js Hernandez is taking over practice however, couldn't get in until July; discharge plan is to return home with no needs; Ed to provide transportation once medically stable.

## 2022-05-27 NOTE — CONSULTS
Department of Internal Medicine  Infectious Diseases   Consult Note      Reason for Consult: Leukocytosis     Requesting Physician:  Dr Geeta Enciso:                The patient is a 61 y.o. female with hx of stroke, depression, DM presented to the  ER lightheadedness and dizziness at work (  ) -she was unresponsive at arrival . She denied nausea,vomiting, chest pain , bowel or bladder incontinence. She denied fever or chills . Being evaluated for seizure ( prolactin level ) 58.63)   She reported diarrhea ( stopped now ) and lower abdomen pain   WBC elevated to 18 K ,lactic acid 4.6  CT scan of abdomen and pelvis - right middle lobe opacity and diffuse wall thickening of sigmoid colon and rectum .   Started on Zosyn     Past Medical History:      Past Medical History:   Diagnosis Date    Abnormal mammogram of left breast 2019    Anxiety     Cerebral artery occlusion with cerebral infarction ([address]     Depression     Diabetes mellitus ([address]     Dysarthria 2021    Erosive esophagitis     Esophageal stricture     GERD (gastroesophageal reflux disease)     Gestational diabetes     Hemorrhagic stroke ([address] 2021    Hyperlipidemia     Hypertension     Hypokalemia     Osteoarthritis     knees    Postmenopausal     Type 2 diabetes mellitus without complication ([address]          Past Surgical History:      Past Surgical History:   Procedure Laterality Date    BREAST BIOPSY Left 2019    Dr. Janell Worthy Right      SECTION      x4    CHOLECYSTECTOMY      COLONOSCOPY  2011    HYSTERECTOMY      JOINT REPLACEMENT Right     knee     '  Current Medications:      Current Facility-Administered Medications   Medication Dose Route Frequency Provider Last Rate Last Admin    amLODIPine (NORVASC) tablet 5 mg  5 mg Oral Daily Berdie Class, DO   5 mg at 22 1048    atenolol (TENORMIN) tablet 50 mg  50 mg Oral Daily Ponce Mings, DO   50 mg at 05/27/22 1150    atorvastatin (LIPITOR) tablet 40 mg  40 mg Oral Daily Ponce Mings, DO   40 mg at 05/27/22 1047    pantoprazole (PROTONIX) tablet 40 mg  40 mg Oral QAM AC Ponce Mings, DO        lisinopril (PRINIVIL;ZESTRIL) tablet 30 mg  30 mg Oral Daily Ponce Mings, DO   30 mg at 05/27/22 1050    venlafaxine (EFFEXOR XR) extended release capsule 150 mg  150 mg Oral Daily Ponce Mings, DO   150 mg at 05/27/22 1151    sodium chloride flush 0.9 % injection 10 mL  10 mL IntraVENous 2 times per day Ponce Mings, DO   10 mL at 05/27/22 1152    sodium chloride flush 0.9 % injection 10 mL  10 mL IntraVENous PRN Ponce Mings, DO   10 mL at 05/27/22 0805    0.9 % sodium chloride infusion   IntraVENous PRN Ponce Mings, DO        promethazine (PHENERGAN) tablet 12.5 mg  12.5 mg Oral Q6H PRN Ponce Mings, DO        Or    ondansetron [facility]) injection 4 mg  4 mg IntraVENous Q6H PRN Ponce Mings, DO        polyethylene glycol (GLYCOLAX) packet 17 g  17 g Oral Daily PRN Ponce Mings, DO        acetaminophen (TYLENOL) tablet 650 mg  650 mg Oral Q6H PRN Ponce Mings, DO   650 mg at 05/27/22 1300    Or    acetaminophen (TYLENOL) suppository 650 mg  650 mg Rectal Q6H PRN Ponce Mings, DO        levETIRAcetam (KEPPRA) 500 mg/100 mL IVPB  500 mg IntraVENous Q12H Ponce Mings, DO   Stopped at 05/27/22 0446    0.9 % sodium chloride infusion   IntraVENous Continuous Ponce Mings, DO 75 mL/hr at 05/27/22 0225 New Bag at 05/27/22 0225    glucose chewable tablet 16 g  4 tablet Oral PRN Ponce Mings, DO        dextrose 10 % infusion  125 mL IntraVENous PRN Ponce Mings, DO        Or    dextrose 10 % infusion  250 mL IntraVENous PRN Ponce Mings, DO        glucagon (rDNA) injection 1 mg  1 mg IntraMUSCular PRN Ponce Mings, DO        dextrose 5 % solution  100 mL/hr IntraVENous PRN Ponce Mings, DO        insulin lispro (HUMALOG) injection vial 0-12 Units  0-12 Units SubCUTAneous TID WC Tyra Sharp DO        insulin lispro (HUMALOG) injection vial 0-6 Units  0-6 Units SubCUTAneous Nightly Tyra Sharp DO   2 Units at 05/27/22 2039    alogliptin (NESINA) tablet 25 mg  25 mg Oral Daily Tyra Sharp DO   25 mg at 05/27/22 1300    And    metFORMIN (GLUCOPHAGE) tablet 1,000 mg  1,000 mg Oral Daily with breakfast Tyra Sharp DO        piperacillin-tazobactam (ZOSYN) 3,375 mg in dextrose 5 % 100 mL IVPB extended infusion (mini-bag)  3,375 mg IntraVENous Q8H Adair Kuo MD 25 mL/hr at 05/27/22 1301 3,375 mg at 05/27/22 1301    sodium chloride flush 0.9 % injection 10 mL  10 mL IntraVENous Once PRN Roger Verdugo DO        lactulose (CHRONULAC) 10 GM/15ML solution 20 g  20 g Oral Once Tyra Sharp DO           Allergies:  Patient has no known allergies. Social History:      Social History     Socioeconomic History    Marital status:      Spouse name: Not on file    Number of children: Not on file    Years of education: Not on file    Highest education level: Not on file   Occupational History    Not on file   Tobacco Use    Smoking status: Never Smoker    Smokeless tobacco: Never Used   Substance and Sexual Activity    Alcohol use: No    Drug use: No    Sexual activity: Not on file   Other Topics Concern    Not on file   Social History Narrative    Not on file     Social Determinants of Health     Financial Resource Strain:     Difficulty of Paying Living Expenses: Not on file   Food Insecurity:     Worried About Running Out of Food in the Last Year: Not on file    Willow of Food in the Last Year: Not on file   Transportation Needs:     Lack of Transportation (Medical): Not on file    Lack of Transportation (Non-Medical):  Not on file   Physical Activity:     Days of Exercise per Week: Not on file    Minutes of Exercise per Session: Not on file   Stress:     Feeling of Stress : Not on file   Social Connections:     Frequency of Communication with Friends and Family: Not on file    Frequency of Social Gatherings with Friends and Family: Not on file    Attends Zoroastrianism Services: Not on file    Active Member of Clubs or Organizations: Not on file    Attends Club or Organization Meetings: Not on file    Marital Status: Not on file   Intimate Partner Violence:     Fear of Current or Ex-Partner: Not on file    Emotionally Abused: Not on file    Physically Abused: Not on file    Sexually Abused: Not on file   Housing Stability:     Unable to Pay for Housing in the Last Year: Not on file    Number of [place] in the Last Year: Not on file    Unstable Housing in the Last Year: Not on file         Family History:     Family History   Problem Relation Age of Onset    Diabetes Mother         complication     Coronary Art Dis Father     Rheum Arthritis Sister     Mult Sclerosis Brother     Diabetes Brother        REVIEW OF SYSTEMS:    CONSTITUTIONAL:  Denies fever, chill or rigors, nausea or vomiting. HEENT: denies blurring of vision or double vision, denies hearing problem  RESPIRATORY: denies cough, shortness of breath, sputum expectoration, chest pain. CARDIOVASCULAR:  Denies palpitation  GASTROINTESTINAL: abdomen pain, diarrhea . GENITOURINARY:  Denies burning urination or frequency of urination  INTEGUMENT: denies wound , rash  HEMATOLOGIC/LYMPHATIC:  Denies lymph node swelling, gum bleeding or easy bruising. MUSCULOSKELETAL:  Denies leg pain , joint pain , joint swelling  NEUROLOGICAL:  Light headed, dizziness,        PHYSICAL EXAM:      Vitals:     BP (!) 144/76   Pulse 72   Temp 97.9 °F (36.6 °C)   Resp 18   Ht 5' 7\" (1.702 m)   Wt 164 lb 7 oz (74.6 kg)   SpO2 94%   BMI 25.75 kg/m²     General Appearance:    Awake, alert , no acute distress.   Slurred speech ( residual from previous stroke )    Head:    Normocephalic, atraumatic   Eyes:    No pallor, no icterus,   Ears:    No obvious deformity or drainage.    Nose:   No nasal drainage   Throat:   Mucosa moist, no oral thrush   Neck:   Supple, no lymphadenopathy   Lungs:     Clear to auscultation bilaterally, no wheeze    Heart:    Regular rate and rhythm, no murmur, rub or gallop   Abdomen:     Soft,tenderness left lower quadrant, bowel sounds present    Extremities:   No edema, no cyanosis    Pulses:   Dorsalis pedis palpable    Skin:   no rashes        CBC with Differential:      Lab Results   Component Value Date    WBC 18.0 05/27/2022    RBC 4.05 05/27/2022    HGB 12.5 05/27/2022    HCT 37.6 05/27/2022     05/27/2022    MCV 92.8 05/27/2022    MCH 30.9 05/27/2022    MCHC 33.2 05/27/2022    RDW 12.7 05/27/2022    LYMPHOPCT 15.7 05/27/2022    MONOPCT 6.1 05/27/2022    BASOPCT 0.3 05/27/2022    MONOSABS 1.10 05/27/2022    LYMPHSABS 2.83 05/27/2022    EOSABS 0.03 05/27/2022    BASOSABS 0.05 05/27/2022       CMP     Lab Results   Component Value Date     05/27/2022    K 3.9 05/27/2022     05/27/2022    CO2 20 05/27/2022    BUN 21 05/27/2022    CREATININE 0.8 05/27/2022    CREATININE 0.9 05/03/2019    GFRAA >60 05/27/2022    LABGLOM >60 05/27/2022    GLUCOSE 112 05/27/2022    PROT 5.4 05/27/2022    LABALBU 3.3 05/27/2022    CALCIUM 8.4 05/27/2022    BILITOT 0.4 05/27/2022    ALKPHOS 166 05/27/2022    AST 64 05/27/2022    ALT 63 05/27/2022         Hepatic Function Panel:    Lab Results   Component Value Date    ALKPHOS 166 05/27/2022    ALT 63 05/27/2022    AST 64 05/27/2022    PROT 5.4 05/27/2022    BILITOT 0.4 05/27/2022    LABALBU 3.3 05/27/2022       PT/INR:    Lab Results   Component Value Date    PROTIME 10.9 05/26/2022    INR 1.0 05/26/2022       TSH:    Lab Results   Component Value Date    TSH 6.750 05/26/2022       U/A:    Lab Results   Component Value Date    COLORU Yellow 05/26/2022    PHUR 6.0 05/26/2022    WBCUA NONE 05/26/2022    RBCUA NONE 05/26/2022    BACTERIA NONE SEEN 05/26/2022    CLARITYU Clear 05/26/2022    SPECGRAV <=1.005 05/26/2022    LEUKOCYTESUR Negative 05/26/2022    UROBILINOGEN 1.0 05/26/2022    BILIRUBINUR Negative 05/26/2022    BLOODU Negative 05/26/2022    GLUCOSEU 100 05/26/2022       ABG:  No results found for: PSR3URZ, BEART, R8JWGSPR, PHART, THGBART, FLY4VNZ, PO2ART, CCO4DHR    MICROBIOLOGY:    Blood culture - neg to date     Radiology :    CT scan of abdomen and pelvis -  Impression:     There is ground-glass opacity identified in the periphery of the right middle   lobe and right lower lobe in which infiltrate cannot be excluded.  Findings   may suggest atelectatic change. Abnormal diffuse wall thickening of the descending colon sigmoid colon and   rectum to suggest diffuse colitis. Bhavani Roberto is no perforation with abnormal   wall thickening and pericolonic stranding and fluid in the pericolic gutter. Fluid as well seen within the pelvis and surrounding the liver. IMPRESSION:     1. Leukocytosis   2. Colitis - ischemic vs infectious   3. Leukocytosis, lactic acidosis   4. Right mid lobe infiltrates ? Aspiration     RECOMMENDATIONS:      1. Zosyn to rocephin 1 gram IV q 24 hrs and flagyl 500 mg IV q 8 hrs   2. Surgery consult   3.  Stool for C diff toxin ( if loose stool ), occult blood ( Hb down to 12 from 16)     Thank you Dr Aristeo Thurman for the consult

## 2022-05-27 NOTE — H&P
Hospitalist History & Physical      PCP: No primary care provider on file. 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 hx of intracranial hemorrhage .  ). History Of Present Illness:    Ms. Sinda Aase, a 61y.o. year old female  who  has a past medical history of Abnormal mammogram of left breast, Anxiety, Cerebral artery occlusion with cerebral infarction ([address] Depression, Diabetes mellitus ([address] Dysarthria, Erosive esophagitis, Esophageal stricture, GERD (gastroesophageal reflux disease), Gestational diabetes, Hemorrhagic stroke ([address] Hyperlipidemia, Hypertension, Hypokalemia, Osteoarthritis, Postmenopausal, and Type 2 diabetes mellitus without complication ([address] Patient presented to the emergency department after losing consciousness. 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 Vital signs show the patient is bradycardic with a rate in the 50s. Otherwise vital signs are within normal limits and stable. Laboratory studies demonstrate anion gap 19, lactic acid 5.3, glucose 199, troponin 16, ammonia 105. Chest x-ray unremarkable. CT head, CTA head/neck/brain perfusion unremarkable. CT abdomen pelvis shows diffusely dilated fluid-filled small bowel loops and colon with inflammatory changes between the jejunal loops in the left upper quadrant and surrounding the sigmoid colon concerning for inflammatory process like enterocolitis. Distended fluid-filled stomach which may be due to ileus or low-grade bowel obstruction or due to edema. High density fluid/hemorrhagic ascites in the pelvis. Atelectasis/groundglass densities in the lung bases. Nonspecific cystic lesion in the tail the pancreas with indeterminate. Neurology was consulted. Advised starting on Keppra. General surgery consulted recommendations pending.       Past Medical History:   Diagnosis Date    Abnormal mammogram of left breast 05/2019    Anxiety     [facility]     Depression     Diabetes mellitus ([facility]     Dysarthria 5/12/2021    Erosive esophagitis     Esophageal stricture     GERD (gastroesophageal reflux disease)     Gestational diabetes     Hemorrhagic stroke ([facility] 5/12/2021    Hyperlipidemia     Hypertension     Hypokalemia     Osteoarthritis     knees    Postmenopausal     Type 2 diabetes mellitus without complication [facility])        Past Surgical History:   Procedure Laterality Date    BREAST BIOPSY Left 05/2019    Dr. Naveed Deras      x4    CHOLECYSTECTOMY      COLONOSCOPY  11/2011    HYSTERECTOMY      JOINT REPLACEMENT Right     knee       Prior to Admission medications    Medication Sig Start Date End Date Taking? Authorizing Provider   amLODIPine (NORVASC) 5 MG tablet TAKE 1 TABLET DAILY IN THE MORNING 1/10/22   Leana Ganser, MD   atenolol (TENORMIN) 50 MG tablet TAKE 1 TABLET DAILY 1/10/22   Leana Ganser, MD   atorvastatin (LIPITOR) 40 MG tablet TAKE 1 TABLET DAILY 1/10/22   Leana Ganser, MD   esomeprazole (NEXIUM) 40 MG delayed release capsule TAKE 1 CAPSULE EVERY MORNING BEFORE BREAKFAST 1/10/22   Leana Ganser, MD   lisinopril (PRINIVIL;ZESTRIL) 30 MG tablet TAKE 1 TABLET DAILY AT NIGHT 1/10/22   Leana Ganser, MD   potassium chloride (MICRO-K) 10 MEQ extended release capsule Take 1 capsule by mouth daily 1/10/22   Leana Ganser, MD   sitaGLIPtan-metFORMIN (JANUMET)  MG per tablet Take 1 tablet by mouth daily 1/10/22   Leana Ganser, MD   venlafaxine (EFFEXOR XR) 150 MG extended release capsule TAKE 1 CAPSULE DAILY 1/10/22   Leana Ganser, MD         Allergies:  Patient has no known allergies.     Social History:    TOBACCO:   reports that she has never smoked. She has never used smokeless tobacco.  ETOH:   reports no history of alcohol use. Family History:    Reviewed in detail and negative for DM, CAD, Cancer, CVA. Positive as follows\"      Problem Relation Age of Onset    Diabetes Mother         complication     Coronary Art Dis Father     Rheum Arthritis Sister     Mult Sclerosis Brother     Diabetes Brother        REVIEW OF SYSTEMS:   Pertinent positives as noted in the HPI. All other systems reviewed and negative. PHYSICAL EXAM:  BP (!) 109/52   Pulse 52   Temp 98 °F (36.7 °C)   Resp 20   SpO2 94%   General appearance: No apparent distress, appears stated age and cooperative. HEENT: Normal cephalic, atraumatic without obvious deformity. Pupils equal, round, and reactive to light. Extra ocular muscles intact. Conjunctivae/corneas clear. Neck: Supple, with full range of motion. No jugular venous distention. Trachea midline. Respiratory: Clear to auscultation bilaterally  Cardiovascular: Regular rate and rhythm  Abdomen: Soft, nontender, nondistended  Musculoskeletal: No clubbing, cyanosis, edema of bilateral lower extremities. Brisk capillary refill. Skin: Normal skin color. No rashes or lesions. Neurologic:  Neurovascularly intact without any focal sensory/motor deficits. Cranial nerves: II-XII intact, grossly non-focal.  Psychiatric: Alert and oriented, thought content appropriate, normal insight    Reviewed EKG and CXR personally      CBC:   Recent Labs     05/26/22  1334   WBC 11.1   RBC 5.46   HGB 16.6*   HCT 49.3*   MCV 90.3   RDW 12.5   *     BMP:   Recent Labs     05/26/22  1334 05/26/22  1736    141   K 4.2 4.4    106   CO2 16* 16*   BUN 20 22   CREATININE 1.0 0.9     LFT:  Recent Labs     05/26/22  1334   PROT 8.1   ALKPHOS 280*   ALT 30   AST 33*   BILITOT 0.6   LIPASE 55     CE:  No results for input(s): Kaushik Oiler in the last 72 hours.   PT/INR:   Recent Labs     05/26/22  1334   INR 1.0   APTT 27.4     BNP: No results for input(s): BNP in the last 72 hours. ESR:   Lab Results   Component Value Date    SEDRATE 9 08/22/2016     CRP:   Lab Results   Component Value Date    CRP 0.4 08/22/2016     D Dimer: No results found for: DDIMER   Folate and B12:   Lab Results   Component Value Date    OCCORZYK17 >2000 (H) 04/12/2021   ,   Lab Results   Component Value Date    FOLATE >20.0 04/12/2021     Lactic Acid:   Lab Results   Component Value Date    LACTA 5.3 ([facility]) 05/26/2022     Thyroid Studies:   Lab Results   Component Value Date    TSH 6.750 (H) 05/26/2022    X4MQPAD 99.51 03/16/2016       Oupatient labs:  Lab Results   Component Value Date    CHOL 120 03/09/2022    TRIG 160 (H) 03/09/2022    HDL 38 03/09/2022    LDLCALC 50 03/09/2022    TSH 6.750 (H) 05/26/2022    INR 1.0 05/26/2022    LABA1C 7.2 (H) 03/09/2022       Urinalysis:    Lab Results   Component Value Date    NITRU Negative 05/26/2022    WBCUA NONE 05/26/2022    BACTERIA NONE SEEN 05/26/2022    RBCUA NONE 05/26/2022    BLOODU Negative 05/26/2022    SPECGRAV <=1.005 05/26/2022    GLUCOSEU 100 05/26/2022       Imaging:  CT ABDOMEN PELVIS WO CONTRAST Additional Contrast? None    Result Date: 5/26/2022  EXAMINATION: CT OF THE ABDOMEN AND PELVIS WITHOUT CONTRAST 5/26/2022 6:21 pm TECHNIQUE: CT of the abdomen and pelvis was performed without the administration of intravenous contrast. Multiplanar reformatted images are provided for review. Automated exposure control, iterative reconstruction, and/or weight based adjustment of the mA/kV was utilized to reduce the radiation dose to as low as reasonably achievable. COMPARISON: None. HISTORY: ORDERING SYSTEM PROVIDED HISTORY: elevated ammonia, alk phos TECHNOLOGIST PROVIDED HISTORY: Reason for exam:->elevated ammonia, alk phos Additional Contrast?->None What reading provider will be dictating this exam?->CRC FINDINGS: The lung bases demonstrate atelectasis/ground-glass densities concerning for pneumonia. Liver is fatty infiltrated. Dilated fluid-filled stomach is identified. Gallbladder is absent. Spleen appears normal.   there is a 1.4 x 1.1 cm nonspecific indeterminate cystic lesion in the tail of the pancreas. The adrenals and the kidneys are normal.  There is dilated fluid-filled small bowel loops with jejunum measuring up to 2.2 cm with mural thickening and inflammatory changes surrounding the bowel loops in the left upper quadrant. Degenerative changes are identified in the lumbar spine. Pelvis. Bladder is collapsed. There is distended colon with the liquid stool. There is diffuse mural thickening of a long segment of descending and sigmoid colon with inflammatory changes especially on the sigmoid colon. There is a small amount of high-density fluid in the pelvis which could be hemorrhagic. The appendix is normal.     Diffusely dilated fluid-filled small bowel loops and colon with inflammatory changes between the jejunal loops in the left upper quadrant and surrounding the sigmoid colon concerning for inflammatory process like enterocolitis. Considering the long segment of mural thickening of the descending and sigmoid colon, underlying malignancy/colon cancer is considered. Consider colonoscopy when feasible. Distended fluid-filled stomach which may be due to ileus or low-grade bowel obstruction due to edema. High density fluid/hemorrhagic ascites in the pelvis. Atelectasis/ground-glass densities in lung bases. Clinical assessment is recommended to evaluate for pneumonia. Nonspecific cystic lesion in the tail of the pancreas which is indeterminate.  Surveillance preferably by MRI is recommended RECOMMENDATIONS: Unavailable     CT Head WO Contrast    Result Date: 5/26/2022  EXAMINATION: CT OF THE HEAD WITHOUT CONTRAST  5/26/2022 1:55 pm TECHNIQUE: CT of the head was performed without the administration of intravenous contrast. Automated exposure control, iterative reconstruction, and/or weight based adjustment of the mA/kV was utilized to reduce the radiation dose to as low as reasonably achievable. COMPARISON: None. HISTORY: ORDERING SYSTEM PROVIDED HISTORY: stroke alert TECHNOLOGIST PROVIDED HISTORY: Reason for exam:->stroke alert Has a \"code stroke\" or \"stroke alert\" been called? ->Yes Decision Support Exception - unselect if not a suspected or confirmed emergency medical condition->Emergency Medical Condition (MA) What reading provider will be dictating this exam?->CRC FINDINGS: BRAIN/VENTRICLES: There is no acute intracranial hemorrhage, mass effect or midline shift. No abnormal extra-axial fluid collection. The gray-white differentiation is maintained without evidence of an acute infarct. There is no evidence of hydrocephalus. ORBITS: The visualized portion of the orbits demonstrate no acute abnormality. SINUSES: The visualized paranasal sinuses and mastoid air cells demonstrate no acute abnormality. SOFT TISSUES/SKULL:  No acute abnormality of the visualized skull or soft tissues. No acute intracranial abnormality. XR CHEST PORTABLE    Result Date: 2022  EXAMINATION: ONE XRAY VIEW OF THE CHEST 2022 2:13 pm COMPARISON: 2021 HISTORY: ORDERING SYSTEM PROVIDED HISTORY: sepsis, r/o pneumonia TECHNOLOGIST PROVIDED HISTORY: Reason for exam:->sepsis, r/o pneumonia What reading provider will be dictating this exam?->CRC FINDINGS: Depth of inspiration is not ideal.  There are no obvious infiltrates or effusions.   Heart size is normal.     No acute process     CTA NECK W CONTRAST    Result Date: 2022  Patient MRN:  [id number] : 1955 Age: 61 years Gender: Female Order Date:  2022 EXAM: CTA NECK W CONTRAST, CTA HEAD W CONTRAST NUMBER OF IMAGES:  6:15 INDICATION:  stroke alert stroke alert Decision Support Exception - unselect if not a suspected or confirmed emergency medical condition->Emergency Medical Condition (MA) What reading provider will be dictating this exam?->[facility] COMPARISON: None Technique: Low-dose CT  acquisition technique included one of following options; 1 . Automated exposure control, 2. Adjustment of MA and or KV according to patient's size or 3. Use of iterative reconstruction. Contiguous spiral images were obtained in the axial plane, following the administration of intravenous contrast using CT angiographic protocol. Sagittal and coronal images were reconstructed from the axial plane acquisition. Additional MIP reconstructions were presented to aid in the interpretation of this study. Images were obtained from the skull base cranially. There is mild calcified plaque identified in the vessels compatible with atherosclerotic disease. The right carotid is unremarkable. The left carotid is unremarkable. The right vertebral artery is unremarkable The left vertebral artery is unremarkable The basilar artery is unremarkable The middle cerebral arteries are unremarkable The anterior cerebral arteries are unremarkable The posterior cerebral arteries are unremarkable     1. Estimated stenosis of the proximal right and left internal carotid artery by NASCET criteria is not hemodynamically significant 2. Mild atherosclerotic disease . 3. No large vessel occlusion identified This study was analyzed by the [employer] algorithm. CT BRAIN PERFUSION    Result Date: 2022  Patient MRN: [id number] : 1955 Age:  61 years Gender: Female Order Date:   2022 Exam: CT BRAIN PERFUSION Number of Images: 333 views Indication:   stroke alert stroke alert Comparison: None. Findings: Perfusion images demonstrate symmetric blood volume Blood flow images demonstrate symmetric blood flow There is no significant ischemic penumbra identified. There is no significant core infarct identified. No significant ischemic penumbra identified This study was analyzed by the [employer] algorithm.      CTA HEAD W CONTRAST    Result Date: 2022  Patient MRN:  [id number] : 1955 Age: 61 years Gender: Female Order Date:  5/26/2022 EXAM: CTA NECK W CONTRAST, CTA HEAD W CONTRAST NUMBER OF IMAGES:  6:15 INDICATION:  stroke alert stroke alert Decision Support Exception - unselect if not a suspected or confirmed emergency medical condition->Emergency Medical Condition (MA) What reading provider will be dictating this exam?->[facility] COMPARISON: None Technique: Low-dose CT  acquisition technique included one of following options; 1 . Automated exposure control, 2. Adjustment of MA and or KV according to patient's size or 3. Use of iterative reconstruction. Contiguous spiral images were obtained in the axial plane, following the administration of intravenous contrast using CT angiographic protocol. Sagittal and coronal images were reconstructed from the axial plane acquisition. Additional MIP reconstructions were presented to aid in the interpretation of this study. Images were obtained from the skull base cranially. There is mild calcified plaque identified in the vessels compatible with atherosclerotic disease. The right carotid is unremarkable. The left carotid is unremarkable. The right vertebral artery is unremarkable The left vertebral artery is unremarkable The basilar artery is unremarkable The middle cerebral arteries are unremarkable The anterior cerebral arteries are unremarkable The posterior cerebral arteries are unremarkable     1. Estimated stenosis of the proximal right and left internal carotid artery by NASCET criteria is not hemodynamically significant 2. Mild atherosclerotic disease . 3. No large vessel occlusion identified This study was analyzed by the [employer] algorithm.        ASSESSMENT:  -Altered mental status  -Seizure?  -Lactic acidosis  -Hyperammonemia  -Enterocolitis  -Diabetes mellitus  -Hyperlipidemia  -Hypertension      PLAN:  -Admit to medicine  -General surgery consulted  -Consult neurology  -Seizure precautions  -Keppra 500 mg IV twice daily  -Monitor ammonia levels  -Repeat lactic acid  -Renal dose correction insulin  -Check A1c        Diet: No diet orders on file  Code Status: Prior  Surrogate decision maker confirmed with patient:   Extended Emergency Contact Information  Primary Emergency Contact: Juan Sanchez  Address: [address] [place] [address] Phone: [phone]  Work Phone: [phone]  Mobile Phone: [phone]  Relation: Spouse  Secondary Emergency Contact: [address] [place] [address] Phone: [phone]  Relation: Brother/Sister    DVT Prophylaxis: []Lovenox []Heparin []PCD [] [address] []Encouraged ambulation  Disposition: []Med/Surg [] Intermediate [] ICU/CCU  Admit status: [] Observation [] Inpatient     +++++++++++++++++++++++++++++++++++++++++++++++++  Trent Clause, DO  +++++++++++++++++++++++++++++++++++++++++++++++++  NOTE: This report was transcribed using voice recognition software. Every effort was made to ensure accuracy; however, inadvertent computerized transcription errors may be present.

## 2022-05-27 NOTE — PROGRESS NOTES
[facility] messaged via perfect serve to notify that Dr Destiny Muñoz needs them to follow for colitis and anemia also.   Dr Alisha Montero taking messages

## 2022-05-27 NOTE — PROGRESS NOTES
General surgery consult for dx hemorrhagic ascites consulted  Dr. Carter Bowser. , consult was sent to covering  Dr. Pernell Crain.

## 2022-05-27 NOTE — PROGRESS NOTES
Hospitalist Progress Note      Synopsis: Patient presented to the emergency department after losing consciousness. 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 Vital signs show the patient is bradycardic with a rate in the 50s. Otherwise vital signs are within normal limits and stable. Laboratory studies demonstrate anion gap 19, lactic acid 5.3, glucose 199, troponin 16, ammonia 105. Chest x-ray unremarkable. CT head, CTA head/neck/brain perfusion unremarkable. CT abdomen pelvis shows diffusely dilated fluid-filled small bowel loops and colon with inflammatory changes between the jejunal loops in the left upper quadrant and surrounding the sigmoid colon concerning for inflammatory process like enterocolitis. Distended fluid-filled stomach which may be due to ileus or low-grade bowel obstruction or due to edema. High density fluid/hemorrhagic ascites in the pelvis. Atelectasis/groundglass densities in the lung bases. Nonspecific cystic lesion in the tail the pancreas with indeterminate. Neurology was consulted. Advised starting on Keppra. General surgery consulted recommendations pending. Hospital day 1     Subjective:  Stable overnight. No issues reported. Dysarthria noted, chronic in nature from previous CVA  C/O lower abdominal pain  Infectious disease following  General Surgery following  Patient seen and examined.  at bedside  Records reviewed. Temp (24hrs), Av.7 °F (36.5 °C), Min:97.4 °F (36.3 °C), Max:97.9 °F (36.6 °C)    DIET: ADULT DIET; Regular  CODE: Full Code    Intake/Output Summary (Last 24 hours) at 2022 1600  Last data filed at 2022 1447  Gross per 24 hour   Intake 120 ml   Output 750 ml   Net -630 ml       Review of Systems: All bolded are positive; please see HPI  General:  Fever, chills, diaphoresis, fatigue, malaise, night sweats, weight loss  Psychological:  Anxiety, disorientation, hallucinations.   ENT:  Epistaxis, headaches, vertigo, visual changes. Cardiovascular:  Chest pain, irregular heartbeats, palpitations, paroxysmal nocturnal dyspnea. Respiratory:  Shortness of breath, coughing, sputum production, hemoptysis, wheezing, orthopnea. Gastrointestinal:  Nausea, vomiting, diarrhea, heartburn, constipation, abdominal pain, hematemesis, hematochezia, melena, acholic stools  Genito-Urinary:  Dysuria, urgency, frequency, hematuria  Musculoskeletal:  Joint pain, joint stiffness, joint swelling, muscle pain  Neurology:  Headache, focal neurological deficits, weakness, numbness, paresthesia  Derm:  Rashes, ulcers, excoriations, bruising  Extremities:  Decreased ROM, peripheral edema, mottling    Objective:    BP (!) 144/76   Pulse 72   Temp 97.9 °F (36.6 °C)   Resp 18   Ht 5' 7\" (1.702 m)   Wt 164 lb 7 oz (74.6 kg)   SpO2 94%   BMI 25.75 kg/m²     General appearance: No apparent distress, appears stated age and cooperative. HEENT: Conjunctivae/corneas clear. Mucous membranes moist.  Neck: Supple. No JVD. Respiratory:  Clear to auscultation bilaterally. Normal respiratory effort. Cardiovascular:  RRR. S1, S2 without MRG. PV: Pulses palpable. No edema. Abdomen: Soft, non-tender, non-distended. +BS  Musculoskeletal: No obvious deformities. Skin: Normal skin color. No rashes or lesions. Good turgor. Neurologic:  Grossly non-focal. Awake, alert, following commands.  Dysarthria  Psychiatric: Alert and oriented, thought content appropriate, normal insight and judgement    Medications:  REVIEWED DAILY    Infusion Medications    sodium chloride      sodium chloride 75 mL/hr at 05/27/22 0225    dextrose      Or    dextrose      dextrose       Scheduled Medications    amLODIPine  5 mg Oral Daily    atenolol  50 mg Oral Daily    atorvastatin  40 mg Oral Daily    pantoprazole  40 mg Oral QAM AC    lisinopril  30 mg Oral Daily    venlafaxine  150 mg Oral Daily    sodium chloride flush  10 mL IntraVENous 2 times per day    levETIRAcetam 500 mg IntraVENous Q12H    insulin lispro  0-12 Units SubCUTAneous TID     insulin lispro  0-6 Units SubCUTAneous Nightly    alogliptin  25 mg Oral Daily    And    metFORMIN  1,000 mg Oral Daily with breakfast    cefTRIAXone (ROCEPHIN) IV  1,000 mg IntraVENous Q24H    metroNIDAZOLE  500 mg Oral 3 times per day    lactulose  20 g Oral Once     PRN Meds: sodium chloride flush, sodium chloride, promethazine **OR** ondansetron, polyethylene glycol, acetaminophen **OR** acetaminophen, glucose, dextrose **OR** dextrose, glucagon (rDNA), dextrose, sodium chloride flush    Labs:     Recent Labs     05/26/22  1334 05/27/22  0713   WBC 11.1 18.0*   HGB 16.6* 12.5   HCT 49.3* 37.6   * 379       Recent Labs     05/26/22  1334 05/26/22  1736 05/27/22  0713    141 144   K 4.2 4.4 3.9    106 112*   CO2 16* 16* 20*   BUN 20 22 21   CREATININE 1.0 0.9 0.8   CALCIUM 10.4* 9.3 8.4*       Recent Labs     05/26/22  1334 05/27/22  0713   PROT 8.1 5.4*   ALKPHOS 280* 166*   ALT 30 63*   AST 33* 64*   BILITOT 0.6 0.4   LIPASE 55  --        Recent Labs     05/26/22 1334   INR 1.0       No results for input(s): CKTOTAL, TROPONINI in the last 72 hours.     Chronic labs:    Lab Results   Component Value Date    CHOL 120 03/09/2022    TRIG 160 (H) 03/09/2022    HDL 38 03/09/2022    LDLCALC 50 03/09/2022    TSH 6.750 (H) 05/26/2022    INR 1.0 05/26/2022    LABA1C 6.3 (H) 05/27/2022       Radiology: REVIEWED DAILY    ASSESSMENT:  -Altered mental status  -Seizure?  -Lactic acidosis  -Hyperammonemia  -Enterocolitis  -Diabetes mellitus  -Hyperlipidemia  -Hypertension  -Colitis     PLAN:  -Admit to medicine  -General surgery consulted  -Consult neurology  -Consult infectious disease  -Consult General Surgery  -Seizure precautions  -Keppra 500 mg IV twice daily  -Monitor ammonia levels  -Repeat lactic acid  -Renal dose correction insulin  - Zosyn to rocephin 1g IV q24h flagyl 500mg iV q8h  - Stool for C diff toxin, Occult blood      DVT Prophylaxis [] Lovenox  []  Heparin [] DOAC [] PCDs [] Ambulation    GI Prophylaxis [] PPI  [] H2 Blocker   [] Carafate  [] Diet/Tube Feeds   Level of care [] Med/Surg  [] Intermediate  []  ICU   Diet ADULT DIET; Regular    Family contact []  N/A    [] At bedside  [] Phone call     Discharge Plan: Patient will be reevaluated and chart reviewed to determine appropriate time and date for discharge    +++++++++++++++++++++++++++++++++++++++++++++++++  VIPUL Ramey/ [place] [address]  +++++++++++++++++++++++++++++++++++++++++++++++++  NOTE: This report was transcribed using voice recognition software. Every effort was made to ensure accuracy; however, inadvertent computerized transcription errors may be present.

## 2022-05-27 NOTE — ED NOTES
Dr. Wilfrido Perkins notified of lactic acid of 4.6 via perfect serve.       Linwood Bragg RN  05/26/22 9525

## 2022-05-27 NOTE — PROGRESS NOTES
Attending Attestation   Patient seen and examined, agree with resident note except for changes made by me, for remaining HP/Consult details please see resident HP/Consult note. General Surgery Progress Note:    CC: abnormal CT    S: admitted with ? Stroke, PARKINSON neg thought possibly to be seizure activity, had CT abd pel with diffuse L colon thickening. She has some mild abdominal pain to palpation but denies n/v fevers chills or blood per rectum ,  Pt daughter has crohn's and she has a cousine with crohn's. No personal hx of IBD. Objective:  @BP (!) 144/76   Pulse 72   Temp 97.9 °F (36.6 °C)   Resp 18   Ht 5' 7\" (1.702 m)   Wt 164 lb 7 oz (74.6 kg)   SpO2 94%   BMI 25.75 kg/m² @    Physical -     Gen: NAD  HEENT PERRL conj pink   Resp: Breathing Comfortably   CV: Reg Rate   Abd: soft mild L sided tenderness   EXT nvi no edema,     Assessment/Plan: here with syncope ? Seizures/stoke, incidentally noted L colitis     - ok for diet   - agree with abx, can transition to Augmentin for 7 days total.    - possibility of IBD certainly there. Could be ischemic. .. lactate down trending. .. She had c scope a couple years ago with GI in [address] She should FU with them after d/c for continued evaluation and work up for IBD.    - d/c ok with me when medically stable. Please call as needed.            Keyshawn Beck MD FACS   See d/c summary     1:34 PM

## 2022-05-27 NOTE — ED NOTES
Report called to floor spoke with [facility].       Forest Guajardo RN  05/26/22 0092

## 2022-05-27 NOTE — CONSULTS
GENERAL SURGERY  CONSULT NOTE  5/27/2022    Physician Consulted: Dr. Martita Leach  Reason for Consult: Hemorrhagic ascites  Referring Physician: Dr. Parker Heart    HPI  Cas Kwok is a 61 y.o. female with PMH GERD, DM, stroke w/ residual dysarthria who presents for evaluation of loss of consciousness. She says earlier today she suddenly felt clammy and hot and then only remembers coming to the hospital. Initially, thought to have another stroke vs seizures. Brain imaging unremarkable for new stroke. Since arrival, she has had multiple episodes of diarrhea. Patient denies any recent sickness or antibiotic usage. She states she was feeling completely fine until today. Denies any significant abdominal pain, nausea, vomiting, or recent changes in bowel habits. No sick contacts or recent travel. She says there is no blood in her stools. Does not remember if she has had colonoscopy, does not appear so per chart review. CT A/P showing inflamed sigmoid with associated pelvic fluid. WBC within normal limits, elevated lactic but trending down.        Past Medical History:   Diagnosis Date    Abnormal mammogram of left breast 05/2019    Anxiety     Cerebral artery occlusion with cerebral infarction ([address]     Depression     Diabetes mellitus [facility] [address] [facility]     Dysarthria 5/12/2021    Erosive esophagitis     Esophageal stricture     GERD (gastroesophageal reflux disease)     Gestational diabetes     Hemorrhagic stroke ([facility] [address] 5/12/2021    Hyperlipidemia     Hypertension     Hypokalemia     Osteoarthritis     knees    Postmenopausal     Type 2 diabetes mellitus without complication [facility])        Past Surgical History:   Procedure Laterality Date    BREAST BIOPSY Left 05/2019    Dr. Ava Marti      x4    CHOLECYSTECTOMY      COLONOSCOPY  11/2011    HYSTERECTOMY      JOINT REPLACEMENT Right     knee       Medications Prior to Admission:    Prior to Admission medications    Medication Sig Start Date End Date Taking? Authorizing Provider   amLODIPine (NORVASC) 5 MG tablet TAKE 1 TABLET DAILY IN THE MORNING 1/10/22   Misael Meza MD   atenolol (TENORMIN) 50 MG tablet TAKE 1 TABLET DAILY 1/10/22   Misael Meza MD   atorvastatin (LIPITOR) 40 MG tablet TAKE 1 TABLET DAILY 1/10/22   Misael Meza MD   esomeprazole ([address]) 40 MG delayed release capsule TAKE 1 CAPSULE EVERY MORNING BEFORE BREAKFAST 1/10/22   Misael Meza MD   lisinopril (PRINIVIL;ZESTRIL) 30 MG tablet TAKE 1 TABLET DAILY AT NIGHT 1/10/22   Misael Meza MD   potassium chloride (MICRO-K) 10 MEQ extended release capsule Take 1 capsule by mouth daily 1/10/22   Misael Meza MD   sitaGLIPtan-metFORMIN (Curlene Lama)  MG per tablet Take 1 tablet by mouth daily 1/10/22   Misael Meza MD   venlafaxine (EFFEXOR XR) 150 MG extended release capsule TAKE 1 CAPSULE DAILY 1/10/22   Misael Meza MD       No Known Allergies    Family History   Problem Relation Age of Onset    Diabetes Mother         complication     Coronary Art Dis Father    Bui Rheum Arthritis Sister     Mult Sclerosis Brother     Diabetes Brother        Social History     Tobacco Use    Smoking status: Never Smoker    Smokeless tobacco: Never Used   Substance Use Topics    Alcohol use: No    Drug use: No       Review of Systems   General ROS: negative  Hematological and Lymphatic ROS: negative  Respiratory ROS: negative  Cardiovascular ROS: negative  Gastrointestinal ROS: positive for - diarrhea  Genito-Urinary ROS: negative  Musculoskeletal ROS: negative      PHYSICAL EXAM:    Vitals:    05/27/22 0042   BP: (!) 156/69   Pulse: 65   Resp: 18   Temp: 97.4 °F (36.3 °C)   SpO2:        General Appearance:  awake, alert, oriented, in no acute distress, residual dysarthria (baseline)  Skin:  Skin color, texture, turgor normal. No rashes or lesions.   Head/face: NCAT  Eyes:  No gross abnormalities. Lungs:  Normal respiratory effort on room air  Heart:  Regular rate and rhythm  Abdomen:  Soft, non-distended, minimally tender to palpation LLQ, no rebound or guarding  Extremities: Extremities warm to touch, pink, with no edema. Female Rectal: Rectal exam deferred    LABS:  CBC  Recent Labs     05/26/22  1334   WBC 11.1   HGB 16.6*   HCT 49.3*   *     BMP  Recent Labs     05/26/22  1736      K 4.4      CO2 16*   BUN 22   CREATININE 0.9   CALCIUM 9.3     Liver Function  Recent Labs     05/26/22  1334   LIPASE 55   BILITOT 0.6   AST 33*   ALT 30   ALKPHOS 280*   PROT 8.1   LABALBU 4.9     No results for input(s): LACTATE in the last 72 hours. Recent Labs     05/26/22  1334   INR 1.0       RADIOLOGY  CT ABDOMEN PELVIS WO CONTRAST Additional Contrast? None    Result Date: 5/26/2022  EXAMINATION: CT OF THE ABDOMEN AND PELVIS WITHOUT CONTRAST 5/26/2022 6:21 pm TECHNIQUE: CT of the abdomen and pelvis was performed without the administration of intravenous contrast. Multiplanar reformatted images are provided for review. Automated exposure control, iterative reconstruction, and/or weight based adjustment of the mA/kV was utilized to reduce the radiation dose to as low as reasonably achievable. COMPARISON: None. HISTORY: ORDERING SYSTEM PROVIDED HISTORY: elevated ammonia, alk phos TECHNOLOGIST PROVIDED HISTORY: Reason for exam:->elevated ammonia, alk phos Additional Contrast?->None What reading provider will be dictating this exam?->CRC FINDINGS: The lung bases demonstrate atelectasis/ground-glass densities concerning for pneumonia. Liver is fatty infiltrated. Dilated fluid-filled stomach is identified. Gallbladder is absent. Spleen appears normal.   there is a 1.4 x 1.1 cm nonspecific indeterminate cystic lesion in the tail of the pancreas.  The adrenals and the kidneys are normal.  There is dilated fluid-filled small bowel loops with jejunum measuring up to 2.2 cm with mural thickening and inflammatory changes surrounding the bowel loops in the left upper quadrant. Degenerative changes are identified in the lumbar spine. Pelvis. Bladder is collapsed. There is distended colon with the liquid stool. There is diffuse mural thickening of a long segment of descending and sigmoid colon with inflammatory changes especially on the sigmoid colon. There is a small amount of high-density fluid in the pelvis which could be hemorrhagic. The appendix is normal.     Diffusely dilated fluid-filled small bowel loops and colon with inflammatory changes between the jejunal loops in the left upper quadrant and surrounding the sigmoid colon concerning for inflammatory process like enterocolitis. Considering the long segment of mural thickening of the descending and sigmoid colon, underlying malignancy/colon cancer is considered. Consider colonoscopy when feasible. Distended fluid-filled stomach which may be due to ileus or low-grade bowel obstruction due to edema. High density fluid/hemorrhagic ascites in the pelvis. Atelectasis/ground-glass densities in lung bases. Clinical assessment is recommended to evaluate for pneumonia. Nonspecific cystic lesion in the tail of the pancreas which is indeterminate. Surveillance preferably by MRI is recommended RECOMMENDATIONS: Unavailable     CT Head WO Contrast    Result Date: 5/26/2022  EXAMINATION: CT OF THE HEAD WITHOUT CONTRAST  5/26/2022 1:55 pm TECHNIQUE: CT of the head was performed without the administration of intravenous contrast. Automated exposure control, iterative reconstruction, and/or weight based adjustment of the mA/kV was utilized to reduce the radiation dose to as low as reasonably achievable. COMPARISON: None. HISTORY: ORDERING SYSTEM PROVIDED HISTORY: stroke alert TECHNOLOGIST PROVIDED HISTORY: Reason for exam:->stroke alert Has a \"code stroke\" or \"stroke alert\" been called? ->Yes Decision Support Exception - unselect if not a suspected or confirmed emergency medical condition->Emergency Medical Condition (MA) What reading provider will be dictating this exam?->CRC FINDINGS: BRAIN/VENTRICLES: There is no acute intracranial hemorrhage, mass effect or midline shift. No abnormal extra-axial fluid collection. The gray-white differentiation is maintained without evidence of an acute infarct. There is no evidence of hydrocephalus. ORBITS: The visualized portion of the orbits demonstrate no acute abnormality. SINUSES: The visualized paranasal sinuses and mastoid air cells demonstrate no acute abnormality. SOFT TISSUES/SKULL:  No acute abnormality of the visualized skull or soft tissues. No acute intracranial abnormality. XR CHEST PORTABLE    Result Date: 2022  EXAMINATION: ONE XRAY VIEW OF THE CHEST 2022 2:13 pm COMPARISON: 2021 HISTORY: ORDERING SYSTEM PROVIDED HISTORY: sepsis, r/o pneumonia TECHNOLOGIST PROVIDED HISTORY: Reason for exam:->sepsis, r/o pneumonia What reading provider will be dictating this exam?->CRC FINDINGS: Depth of inspiration is not ideal.  There are no obvious infiltrates or effusions. Heart size is normal.     No acute process     CTA NECK W CONTRAST    Result Date: 2022  Patient MRN:  [id number] : 1955 Age: 61 years Gender: Female Order Date:  2022 EXAM: CTA NECK W CONTRAST, CTA HEAD W CONTRAST NUMBER OF IMAGES:  6:15 INDICATION:  stroke alert stroke alert Decision Support Exception - unselect if not a suspected or confirmed emergency medical condition->Emergency Medical Condition (MA) What reading provider will be dictating this exam?->[facility] COMPARISON: None Technique: Low-dose CT  acquisition technique included one of following options; 1 . Automated exposure control, 2. Adjustment of MA and or KV according to patient's size or 3. Use of iterative reconstruction.  Contiguous spiral images were obtained in the axial plane, following the administration of intravenous contrast using CT angiographic protocol. Sagittal and coronal images were reconstructed from the axial plane acquisition. Additional MIP reconstructions were presented to aid in the interpretation of this study. Images were obtained from the skull base cranially. There is mild calcified plaque identified in the vessels compatible with atherosclerotic disease. The right carotid is unremarkable. The left carotid is unremarkable. The right vertebral artery is unremarkable The left vertebral artery is unremarkable The basilar artery is unremarkable The middle cerebral arteries are unremarkable The anterior cerebral arteries are unremarkable The posterior cerebral arteries are unremarkable     1. Estimated stenosis of the proximal right and left internal carotid artery by NASCET criteria is not hemodynamically significant 2. Mild atherosclerotic disease . 3. No large vessel occlusion identified This study was analyzed by the [employer]. ai algorithm. CT BRAIN PERFUSION    Result Date: 2022  Patient MRN: [id number] : 1955 Age:  61 years Gender: Female Order Date:   2022 Exam: CT BRAIN PERFUSION Number of Images: 333 views Indication:   stroke alert stroke alert Comparison: None. Findings: Perfusion images demonstrate symmetric blood volume Blood flow images demonstrate symmetric blood flow There is no significant ischemic penumbra identified. There is no significant core infarct identified. No significant ischemic penumbra identified This study was analyzed by the [employer] ai algorithm.      CTA HEAD W CONTRAST    Result Date: 2022  Patient MRN:  [id number] : 1955 Age: 61 years Gender: Female Order Date:  2022 EXAM: CTA NECK W CONTRAST, CTA HEAD W CONTRAST NUMBER OF IMAGES:  6:15 INDICATION:  stroke alert stroke alert Decision Support Exception - unselect if not a suspected or confirmed emergency medical condition->Emergency Medical Condition (MA) What reading provider will be dictating this exam?->[facility] COMPARISON: None Technique: Low-dose CT  acquisition technique included one of following options; 1 . Automated exposure control, 2. Adjustment of MA and or KV according to patient's size or 3. Use of iterative reconstruction. Contiguous spiral images were obtained in the axial plane, following the administration of intravenous contrast using CT angiographic protocol. Sagittal and coronal images were reconstructed from the axial plane acquisition. Additional MIP reconstructions were presented to aid in the interpretation of this study. Images were obtained from the skull base cranially. There is mild calcified plaque identified in the vessels compatible with atherosclerotic disease. The right carotid is unremarkable. The left carotid is unremarkable. The right vertebral artery is unremarkable The left vertebral artery is unremarkable The basilar artery is unremarkable The middle cerebral arteries are unremarkable The anterior cerebral arteries are unremarkable The posterior cerebral arteries are unremarkable     1. Estimated stenosis of the proximal right and left internal carotid artery by NASCET criteria is not hemodynamically significant 2. Mild atherosclerotic disease . 3. No large vessel occlusion identified This study was analyzed by the [employer] ai algorithm. ASSESSMENT:  61 y.o. female who presents for loss of consciousness thought to have had seizure. CT A/P obtained showing significant inflammatory changes in the sigmoid colon with associated minimal free fluid. Considering patient has never had colonoscopy, may represent diverticulitis vs enterocolitis.      PLAN:  - diet as tolerated  - continue IV antibiotics  - prn pain control, prn anti-emetics   - monitor abdominal exam  - CT A/P w/ IV     Discussed with Dr. Sam Mg    Electronically signed by Miles Lagunas MD on 5/27/22 at 2:17 AM EDT

## 2022-05-27 NOTE — PROGRESS NOTES
Lactic acid level has lowered from 5.3 to 4.0 results reported to [address] [place] [address] Du Clifford Arabdaisy.

## 2022-05-28 LAB
ALBUMIN SERPL-MCNC: 3.1 G/DL (ref 3.5–5.2)
ALP BLD-CCNC: 138 U/L (ref 35–104)
ALT SERPL-CCNC: 38 U/L (ref 0–32)
AMMONIA: 51 UMOL/L (ref 11–51)
ANION GAP SERPL CALCULATED.3IONS-SCNC: 15 MMOL/L (ref 7–16)
AST SERPL-CCNC: 25 U/L (ref 0–31)
BASOPHILS ABSOLUTE: 0.05 E9/L (ref 0–0.2)
BASOPHILS RELATIVE PERCENT: 0.4 % (ref 0–2)
BILIRUB SERPL-MCNC: 0.6 MG/DL (ref 0–1.2)
BUN BLDV-MCNC: 10 MG/DL (ref 6–23)
CALCIUM SERPL-MCNC: 8.9 MG/DL (ref 8.6–10.2)
CHLORIDE BLD-SCNC: 106 MMOL/L (ref 98–107)
CO2: 21 MMOL/L (ref 22–29)
CREAT SERPL-MCNC: 0.7 MG/DL (ref 0.5–1)
EOSINOPHILS ABSOLUTE: 0.16 E9/L (ref 0.05–0.5)
EOSINOPHILS RELATIVE PERCENT: 1.3 % (ref 0–6)
GFR AFRICAN AMERICAN: >60
GFR NON-AFRICAN AMERICAN: >60 ML/MIN/1.73
GLUCOSE BLD-MCNC: 100 MG/DL (ref 74–99)
HCT VFR BLD CALC: 33.6 % (ref 34–48)
HEMOCCULT STL QL: ABNORMAL
HEMOGLOBIN: 11.1 G/DL (ref 11.5–15.5)
IMMATURE GRANULOCYTES #: 0.06 E9/L
IMMATURE GRANULOCYTES %: 0.5 % (ref 0–5)
LYMPHOCYTES ABSOLUTE: 2.33 E9/L (ref 1.5–4)
LYMPHOCYTES RELATIVE PERCENT: 18.3 % (ref 20–42)
MAGNESIUM: 1.5 MG/DL (ref 1.6–2.6)
MCH RBC QN AUTO: 29.9 PG (ref 26–35)
MCHC RBC AUTO-ENTMCNC: 33 % (ref 32–34.5)
MCV RBC AUTO: 90.6 FL (ref 80–99.9)
METER GLUCOSE: 102 MG/DL (ref 74–99)
METER GLUCOSE: 106 MG/DL (ref 74–99)
METER GLUCOSE: 150 MG/DL (ref 74–99)
METER GLUCOSE: 185 MG/DL (ref 74–99)
MONOCYTES ABSOLUTE: 0.76 E9/L (ref 0.1–0.95)
MONOCYTES RELATIVE PERCENT: 6 % (ref 2–12)
NEUTROPHILS ABSOLUTE: 9.39 E9/L (ref 1.8–7.3)
NEUTROPHILS RELATIVE PERCENT: 73.5 % (ref 43–80)
PDW BLD-RTO: 12.7 FL (ref 11.5–15)
PLATELET # BLD: 303 E9/L (ref 130–450)
PMV BLD AUTO: 10.3 FL (ref 7–12)
POTASSIUM REFLEX MAGNESIUM: 3.5 MMOL/L (ref 3.5–5)
RBC # BLD: 3.71 E12/L (ref 3.5–5.5)
SODIUM BLD-SCNC: 142 MMOL/L (ref 132–146)
TOTAL PROTEIN: 5.6 G/DL (ref 6.4–8.3)
URINE CULTURE, ROUTINE: NORMAL
WBC # BLD: 12.8 E9/L (ref 4.5–11.5)

## 2022-05-28 PROCEDURE — 2580000003 HC RX 258: Performed by: INTERNAL MEDICINE

## 2022-05-28 PROCEDURE — 6360000002 HC RX W HCPCS: Performed by: FAMILY MEDICINE

## 2022-05-28 PROCEDURE — 82962 GLUCOSE BLOOD TEST: CPT

## 2022-05-28 PROCEDURE — 6370000000 HC RX 637 (ALT 250 FOR IP): Performed by: FAMILY MEDICINE

## 2022-05-28 PROCEDURE — 6370000000 HC RX 637 (ALT 250 FOR IP): Performed by: INTERNAL MEDICINE

## 2022-05-28 PROCEDURE — 80053 COMPREHEN METABOLIC PANEL: CPT

## 2022-05-28 PROCEDURE — 2580000003 HC RX 258: Performed by: FAMILY MEDICINE

## 2022-05-28 PROCEDURE — 83735 ASSAY OF MAGNESIUM: CPT

## 2022-05-28 PROCEDURE — 36415 COLL VENOUS BLD VENIPUNCTURE: CPT

## 2022-05-28 PROCEDURE — 2060000000 HC ICU INTERMEDIATE R&B

## 2022-05-28 PROCEDURE — 99233 SBSQ HOSP IP/OBS HIGH 50: CPT | Performed by: PSYCHIATRY & NEUROLOGY

## 2022-05-28 PROCEDURE — 6360000002 HC RX W HCPCS: Performed by: INTERNAL MEDICINE

## 2022-05-28 PROCEDURE — 85025 COMPLETE CBC W/AUTO DIFF WBC: CPT

## 2022-05-28 PROCEDURE — 82140 ASSAY OF AMMONIA: CPT

## 2022-05-28 RX ORDER — MAGNESIUM SULFATE IN WATER 40 MG/ML
2000 INJECTION, SOLUTION INTRAVENOUS ONCE
Status: COMPLETED | OUTPATIENT
Start: 2022-05-28 | End: 2022-05-28

## 2022-05-28 RX ADMIN — ATENOLOL 50 MG: 50 TABLET ORAL at 08:17

## 2022-05-28 RX ADMIN — LISINOPRIL 30 MG: 10 TABLET ORAL at 08:17

## 2022-05-28 RX ADMIN — PANTOPRAZOLE SODIUM 40 MG: 40 TABLET, DELAYED RELEASE ORAL at 05:58

## 2022-05-28 RX ADMIN — INSULIN LISPRO 2 UNITS: 100 INJECTION, SOLUTION INTRAVENOUS; SUBCUTANEOUS at 11:45

## 2022-05-28 RX ADMIN — MAGNESIUM SULFATE HEPTAHYDRATE 2000 MG: 2 INJECTION, SOLUTION INTRAVENOUS at 10:35

## 2022-05-28 RX ADMIN — METRONIDAZOLE 500 MG: 500 TABLET ORAL at 13:17

## 2022-05-28 RX ADMIN — METRONIDAZOLE 500 MG: 500 TABLET ORAL at 21:57

## 2022-05-28 RX ADMIN — VENLAFAXINE HYDROCHLORIDE 150 MG: 150 CAPSULE, EXTENDED RELEASE ORAL at 08:17

## 2022-05-28 RX ADMIN — WATER 1000 MG: 1 INJECTION INTRAMUSCULAR; INTRAVENOUS; SUBCUTANEOUS at 13:17

## 2022-05-28 RX ADMIN — LEVETIRACETAM 500 MG: 5 INJECTION INTRAVENOUS at 04:04

## 2022-05-28 RX ADMIN — LEVETIRACETAM 500 MG: 5 INJECTION INTRAVENOUS at 16:09

## 2022-05-28 RX ADMIN — METRONIDAZOLE 500 MG: 500 TABLET ORAL at 05:58

## 2022-05-28 RX ADMIN — ALOGLIPTIN 25 MG: 25 TABLET, FILM COATED ORAL at 08:17

## 2022-05-28 RX ADMIN — AMLODIPINE BESYLATE 5 MG: 5 TABLET ORAL at 08:17

## 2022-05-28 RX ADMIN — ATORVASTATIN CALCIUM 40 MG: 40 TABLET, FILM COATED ORAL at 08:17

## 2022-05-28 RX ADMIN — INSULIN LISPRO 1 UNITS: 100 INJECTION, SOLUTION INTRAVENOUS; SUBCUTANEOUS at 21:57

## 2022-05-28 RX ADMIN — SODIUM CHLORIDE: 9 INJECTION, SOLUTION INTRAVENOUS at 08:20

## 2022-05-28 RX ADMIN — SODIUM CHLORIDE: 9 INJECTION, SOLUTION INTRAVENOUS at 22:03

## 2022-05-28 ASSESSMENT — PAIN SCALES - GENERAL: PAINLEVEL_OUTOF10: 0

## 2022-05-28 NOTE — PLAN OF CARE
Problem: Skin/Tissue Integrity  Goal: Absence of new skin breakdown  Description: 1. Monitor for areas of redness and/or skin breakdown  2. Assess vascular access sites hourly  3. Every 4-6 hours minimum:  Change oxygen saturation probe site  4. Every 4-6 hours:  If on nasal continuous positive airway pressure, respiratory therapy assess nares and determine need for appliance change or resting period. 5/28/2022 1001 by Margo Matias RN  Outcome: Progressing  5/27/2022 2333 by Christian Marquez RN  Outcome: Progressing     Problem: Safety - Adult  Goal: Free from fall injury  5/28/2022 1001 by Margo Matias RN  Outcome: Progressing  5/27/2022 2333 by Christian Marquez RN  Outcome: Progressing     Problem: Anxiety  Goal: Will report anxiety at manageable levels  Description: INTERVENTIONS:  1. Administer medication as ordered  2. Teach and rehearse alternative coping skills  3.  Provide emotional support with 1:1 interaction with staff  5/28/2022 1001 by Margo Matias RN  Outcome: Progressing  5/27/2022 2333 by Christian Marquez RN  Outcome: Progressing     Problem: Chronic Conditions and Co-morbidities  Goal: Patient's chronic conditions and co-morbidity symptoms are monitored and maintained or improved  5/28/2022 1001 by Margo Matias RN  Outcome: Progressing  5/27/2022 2333 by Christian Marquez RN  Outcome: Progressing     Problem: Pain  Goal: Verbalizes/displays adequate comfort level or baseline comfort level  5/28/2022 1001 by Margo Matias RN  Outcome: Progressing  5/27/2022 2333 by Christian Marquez RN  Outcome: Progressing

## 2022-05-28 NOTE — PLAN OF CARE
Problem: Discharge Planning  Goal: Discharge to home or other facility with appropriate resources  Outcome: Progressing     Problem: Skin/Tissue Integrity  Goal: Absence of new skin breakdown  Description: 1. Monitor for areas of redness and/or skin breakdown  2. Assess vascular access sites hourly  3. Every 4-6 hours minimum:  Change oxygen saturation probe site  4. Every 4-6 hours:  If on nasal continuous positive airway pressure, respiratory therapy assess nares and determine need for appliance change or resting period. Outcome: Progressing     Problem: Safety - Adult  Goal: Free from fall injury  Outcome: Progressing     Problem: Anxiety  Goal: Will report anxiety at manageable levels  Description: INTERVENTIONS:  1. Administer medication as ordered  2. Teach and rehearse alternative coping skills  3. Provide emotional support with 1:1 interaction with staff  Outcome: Progressing     Problem: Coping  Goal: Pt/Family able to verbalize concerns and demonstrate effective coping strategies  Description: INTERVENTIONS:  1. Assist patient/family to identify coping skills, available support systems and cultural and spiritual values  2. Provide emotional support, including active listening and acknowledgement of concerns of patient and caregivers  3. Reduce environmental stimuli, as able  4. Instruct patient/family in relaxation techniques, as appropriate  5.  Assess for spiritual pain/suffering and initiate Spiritual Care, Psychosocial Clinical Specialist consults as needed  Outcome: Progressing

## 2022-05-28 NOTE — PROGRESS NOTES
Requesting Physician: Carmine Mora MD     History of Present Illness:  Geri Covington is a 61 y.o. female admitted on 5/26/2022 with altered mental status. Alert  And responsive with no improved dysarthria. Significant improvement from initial assessment yesterday. She denies any new neurologic deficits.      Past Medical History:        Diagnosis Date    Abnormal mammogram of left breast 05/2019    Anxiety     Cerebral artery occlusion with cerebral infarction ([facility]     Depression     Diabetes mellitus ([facility]     Dysarthria 5/12/2021    Erosive esophagitis     Esophageal stricture     GERD (gastroesophageal reflux disease)     Gestational diabetes     Hemorrhagic stroke ([facility] 5/12/2021    Hyperlipidemia     Hypertension     Hypokalemia     Osteoarthritis     knees    Postmenopausal     Type 2 diabetes mellitus without complication (HCC)            Procedure Laterality Date    BREAST BIOPSY Left 05/2019    Dr. Rivka [facility]      x4    CHOLECYSTECTOMY      COLONOSCOPY  11/2011    HYSTERECTOMY      JOINT REPLACEMENT Right     knee       Allergies:    No Known Allergies     Current Medications:   amLODIPine (NORVASC) tablet 5 mg, Daily  atenolol (TENORMIN) tablet 50 mg, Daily  atorvastatin (LIPITOR) tablet 40 mg, Daily  pantoprazole (PROTONIX) tablet 40 mg, QAM AC  lisinopril (PRINIVIL;ZESTRIL) tablet 30 mg, Daily  venlafaxine (EFFEXOR XR) extended release capsule 150 mg, Daily  sodium chloride flush 0.9 % injection 10 mL, 2 times per day  sodium chloride flush 0.9 % injection 10 mL, PRN  0.9 % sodium chloride infusion, PRN  promethazine (PHENERGAN) tablet 12.5 mg, Q6H PRN   Or  ondansetron (ZOFRAN) injection 4 mg, Q6H PRN  polyethylene glycol (GLYCOLAX) packet 17 g, Daily PRN  acetaminophen (TYLENOL) tablet 650 mg, Q6H PRN   Or  acetaminophen (TYLENOL) suppository 650 mg, Q6H PRN  levETIRAcetam (KEPPRA) 500 mg/100 mL IVPB, Q12H  0.9 % sodium chloride infusion, Continuous  glucose chewable tablet 16 g, PRN  dextrose 10 % infusion, PRN   Or  dextrose 10 % infusion, PRN  glucagon (rDNA) injection 1 mg, PRN  dextrose 5 % solution, PRN  insulin lispro (HUMALOG) injection vial 0-12 Units, TID WC  insulin lispro (HUMALOG) injection vial 0-6 Units, Nightly  alogliptin (NESINA) tablet 25 mg, Daily   And  metFORMIN (GLUCOPHAGE) tablet 1,000 mg, Daily with breakfast  cefTRIAXone (ROCEPHIN) 1,000 mg in sterile water 10 mL IV syringe, Q24H  metroNIDAZOLE (FLAGYL) tablet 500 mg, 3 times per day  lactulose (CHRONULAC) 10 GM/15ML solution 20 g, Once       Physical Exam:  /63   Pulse 72   Temp 98 °F (36.7 °C)   Resp 18   Ht 5' 7\" (1.702 m)   Wt 164 lb 7 oz (74.6 kg)   SpO2 96%   BMI 25.75 kg/m²  I Body mass index is 25.75 kg/m². I   Wt Readings from Last 1 Encounters:   05/27/22 164 lb 7 oz (74.6 kg)          Physical Exam    HEENT: Normocephalic, atraumatic, no lesions or abnormalities noted. Neck:  supple with full ROM; no masses, nodes or bruits; no cervical tenderness on palpation. Lungs:  clear to auscultation  bilaterally     CV: RRR without gallops or murmurs     Extremities: no c/c/e; mild venous stasis changes in distal LE's           Neurologic Exam   Mental Status:  patient was alert, responsive, oriented, appropriate, answering questions, and following commands. Speech was dysarthric, but fluent with normal sensorium and cognition. Cranial Nerves:  Pupils were equal round and reactive to light;    Visual fields were full on confrontation; Extraocular movements were intact; no nystagmus; Intact facial sensation with symmetric facial movements; Hearing was intact; Normal palatal elevation with midline uvula  Trapezius strength of 5/5.      Tongue was midline with no atrophy or fasciculations  Motor Exam: Subtle weakness in right UE with 2/5 strength in left anterior tibialis. ; normal strength on right side. Sensory Exam:  normal sensation to light touch, pin-prick, and temperature; No sensory extinction. Cerebella Exam:  Intact finger-nose-finger, rapidly alternating movements, fine motor   movements, and heel-down-shin maneuvers; No cerebellar rebound or drift; No tremors   Gait: Gait was not tested. Reflexes: normal and symmetric bilaterally; Babinski is negative    Labs:  CBC:   Recent Labs     05/26/22  1334 05/27/22  0713 05/28/22  0554   WBC 11.1 18.0* 12.8*   HGB 16.6* 12.5 11.1*   * 379 303   MCV 90.3 92.8 90.6   MCH 30.4 30.9 29.9   MCHC 33.7 33.2 33.0   RDW 12.5 12.7 12.7     CMP:  Recent Labs     05/26/22  1736 05/27/22  0713 05/28/22  0554    144 142   K 4.4 3.9 3.5    112* 106   CO2 16* 20* 21*   BUN 22 21 10   CREATININE 0.9 0.8 0.7   GFRAA >60 >60 >60   LABGLOM >60 >60 >60   GLUCOSE 199* 112* 100*   CALCIUM 9.3 8.4* 8.9     Liver:   Recent Labs     05/28/22  0554   AST 25   ALT 38*   ALKPHOS 138*   PROT 5.6*   LABALBU 3.1*   BILITOT 0.6     INR:   Recent Labs     05/26/22  1334   PROTIME 10.9   INR 1.0       ToxicologyNo results for input(s): PHENYTOIN, CARBTOT, PHENOBARB, VALPROATE in the last 72 hours. Invalid input(s): LAMOTRIG,  KEPPRA  No results for input(s): AMPMETHURSCR, BARBTQTU, BDZQTU, CANNABQUANT, COCMETQTU, OPIAU, PCPQUANT in the last 72 hours. Radiology:  CT ABDOMEN PELVIS WO CONTRAST Additional Contrast? None    Result Date: 5/26/2022  EXAMINATION: CT OF THE ABDOMEN AND PELVIS WITHOUT CONTRAST 5/26/2022 6:21 pm TECHNIQUE: CT of the abdomen and pelvis was performed without the administration of intravenous contrast. Multiplanar reformatted images are provided for review. Automated exposure control, iterative reconstruction, and/or weight based adjustment of the mA/kV was utilized to reduce the radiation dose to as low as reasonably achievable. COMPARISON: None.  HISTORY: ORDERING SYSTEM PROVIDED HISTORY: elevated ammonia, alk phos TECHNOLOGIST PROVIDED HISTORY: Reason for exam:->elevated ammonia, alk phos Additional Contrast?->None What reading provider will be dictating this exam?->CRC FINDINGS: The lung bases demonstrate atelectasis/ground-glass densities concerning for pneumonia. Liver is fatty infiltrated. Dilated fluid-filled stomach is identified. Gallbladder is absent. Spleen appears normal.   there is a 1.4 x 1.1 cm nonspecific indeterminate cystic lesion in the tail of the pancreas. The adrenals and the kidneys are normal.  There is dilated fluid-filled small bowel loops with jejunum measuring up to 2.2 cm with mural thickening and inflammatory changes surrounding the bowel loops in the left upper quadrant. Degenerative changes are identified in the lumbar spine. Pelvis. Bladder is collapsed. There is distended colon with the liquid stool. There is diffuse mural thickening of a long segment of descending and sigmoid colon with inflammatory changes especially on the sigmoid colon. There is a small amount of high-density fluid in the pelvis which could be hemorrhagic. The appendix is normal.     Diffusely dilated fluid-filled small bowel loops and colon with inflammatory changes between the jejunal loops in the left upper quadrant and surrounding the sigmoid colon concerning for inflammatory process like enterocolitis. Considering the long segment of mural thickening of the descending and sigmoid colon, underlying malignancy/colon cancer is considered. Consider colonoscopy when feasible. Distended fluid-filled stomach which may be due to ileus or low-grade bowel obstruction due to edema. High density fluid/hemorrhagic ascites in the pelvis. Atelectasis/ground-glass densities in lung bases. Clinical assessment is recommended to evaluate for pneumonia. Nonspecific cystic lesion in the tail of the pancreas which is indeterminate.  Surveillance preferably by MRI is recommended RECOMMENDATIONS: Unavailable     CT Head WO Contrast    Result Date: 5/26/2022  EXAMINATION: CT OF THE HEAD WITHOUT CONTRAST  5/26/2022 1:55 pm TECHNIQUE: CT of the head was performed without the administration of intravenous contrast. Automated exposure control, iterative reconstruction, and/or weight based adjustment of the mA/kV was utilized to reduce the radiation dose to as low as reasonably achievable. COMPARISON: None. HISTORY: ORDERING SYSTEM PROVIDED HISTORY: stroke alert TECHNOLOGIST PROVIDED HISTORY: Reason for exam:->stroke alert Has a \"code stroke\" or \"stroke alert\" been called? ->Yes Decision Support Exception - unselect if not a suspected or confirmed emergency medical condition->Emergency Medical Condition (MA) What reading provider will be dictating this exam?->CRC FINDINGS: BRAIN/VENTRICLES: There is no acute intracranial hemorrhage, mass effect or midline shift. No abnormal extra-axial fluid collection. The gray-white differentiation is maintained without evidence of an acute infarct. There is no evidence of hydrocephalus. ORBITS: The visualized portion of the orbits demonstrate no acute abnormality. SINUSES: The visualized paranasal sinuses and mastoid air cells demonstrate no acute abnormality. SOFT TISSUES/SKULL:  No acute abnormality of the visualized skull or soft tissues. No acute intracranial abnormality. CT ABDOMEN PELVIS W IV CONTRAST Additional Contrast? Oral    Result Date: 5/27/2022  EXAMINATION: CT OF THE ABDOMEN AND PELVIS WITH CONTRAST 5/27/2022 7:51 am TECHNIQUE: CT of the abdomen and pelvis was performed with the administration of intravenous contrast. Multiplanar reformatted images are provided for review. Automated exposure control, iterative reconstruction, and/or weight based adjustment of the mA/kV was utilized to reduce the radiation dose to as low as reasonably achievable. COMPARISON: None.  HISTORY: ORDERING SYSTEM PROVIDED HISTORY: Abdominal pain TECHNOLOGIST PROVIDED HISTORY: Additional Contrast?->Oral Reason for exam:->Abdominal pain What reading provider will be dictating this exam?->CRC FINDINGS: Lower Chest: Patchy ground-glass opacity identified in the periphery of the right middle lobe and right lower lobe. Atelectatic change or infiltrate cannot be excluded. The left lung base is clear. Organs: The liver is homogeneous in appearance. The gallbladder is been surgically removed. Fluid seen surrounding the liver. The spleen is unremarkable. Pancreas reveals dilatation identified of the distal pancreatic duct. There is mild atrophy identified of the distal body and tail the pancreas. There is symmetry identified in the enhancement pattern of the renal parenchyma. Parapelvic cyst identified in the left kidney. No distension of the renal collecting system. No evidence of stones. Both adrenal glands are within normal limits. GI/Bowel: The stomach is unremarkable no evidence of wall thickening. Small bowel is within normal limits. No evidence of mucosal thickening. Stool seen scattered diffusely throughout the colon. Abnormal wall thickening identified descending colon and sigmoid colon as well as the rectum to suggest a colitis. There is abnormal pericolonic stranding and fluid in the left pericolic gutter. Minimal fluid in the pelvis. Pelvis: The bladder reveals Correia catheter balloon with decompression. The uterus has been surgically removed. Peritoneum/Retroperitoneum: There is no abdominal or retroperitoneal lymphadenopathy with moderate atherosclerotic disease seen within the abdominal aorta and iliac vessels. No pelvic adenopathy. Bones/Soft Tissues: Bony structures reveal minimal degenerative changes identified within the spine and pelvis. No ventral abdominal wall mass or defect. There is ground-glass opacity identified in the periphery of the right middle lobe and right lower lobe in which infiltrate cannot be excluded.   Findings may suggest atelectatic change. Abnormal diffuse wall thickening of the descending colon sigmoid colon and rectum to suggest diffuse colitis. There is no perforation with abnormal wall thickening and pericolonic stranding and fluid in the pericolic gutter. Fluid as well seen within the pelvis and surrounding the liver. XR CHEST PORTABLE    Result Date: 2022  EXAMINATION: ONE XRAY VIEW OF THE CHEST 2022 2:13 pm COMPARISON: 2021 HISTORY: ORDERING SYSTEM PROVIDED HISTORY: sepsis, r/o pneumonia TECHNOLOGIST PROVIDED HISTORY: Reason for exam:->sepsis, r/o pneumonia What reading provider will be dictating this exam?->CRC FINDINGS: Depth of inspiration is not ideal.  There are no obvious infiltrates or effusions. Heart size is normal.     No acute process     CTA NECK W CONTRAST    Result Date: 2022  Patient MRN:  [id number] : 1955 Age: 61 years Gender: Female Order Date:  2022 EXAM: CTA NECK W CONTRAST, CTA HEAD W CONTRAST NUMBER OF IMAGES:  6:15 INDICATION:  stroke alert stroke alert Decision Support Exception - unselect if not a suspected or confirmed emergency medical condition->Emergency Medical Condition (MA) What reading provider will be dictating this exam?->[facility] COMPARISON: None Technique: Low-dose CT  acquisition technique included one of following options; 1 . Automated exposure control, 2. Adjustment of MA and or KV according to patient's size or 3. Use of iterative reconstruction. Contiguous spiral images were obtained in the axial plane, following the administration of intravenous contrast using CT angiographic protocol. Sagittal and coronal images were reconstructed from the axial plane acquisition. Additional MIP reconstructions were presented to aid in the interpretation of this study. Images were obtained from the skull base cranially. There is mild calcified plaque identified in the vessels compatible with atherosclerotic disease. The right carotid is unremarkable.  The left carotid is unremarkable. The right vertebral artery is unremarkable The left vertebral artery is unremarkable The basilar artery is unremarkable The middle cerebral arteries are unremarkable The anterior cerebral arteries are unremarkable The posterior cerebral arteries are unremarkable     1. Estimated stenosis of the proximal right and left internal carotid artery by NASCET criteria is not hemodynamically significant 2. Mild atherosclerotic disease . 3. No large vessel occlusion identified This study was analyzed by the [email or web address] algorithm. CT BRAIN PERFUSION    Result Date: 2022  Patient MRN: [id number] : 1955 Age:  61 years Gender: Female Order Date:   2022 Exam: CT BRAIN PERFUSION Number of Images: 333 views Indication:   stroke alert stroke alert Comparison: None. Findings: Perfusion images demonstrate symmetric blood volume Blood flow images demonstrate symmetric blood flow There is no significant ischemic penumbra identified. There is no significant core infarct identified. No significant ischemic penumbra identified This study was analyzed by the [email or web address] algorithm. CTA HEAD W CONTRAST    Result Date: 2022  Patient MRN:  [id number] : 1955 Age: 61 years Gender: Female Order Date:  2022 EXAM: CTA NECK W CONTRAST, CTA HEAD W CONTRAST NUMBER OF IMAGES:  6:15 INDICATION:  stroke alert stroke alert Decision Support Exception - unselect if not a suspected or confirmed emergency medical condition->Emergency Medical Condition (MA) What reading provider will be dictating this exam?->[facility] COMPARISON: None Technique: Low-dose CT  acquisition technique included one of following options; 1 . Automated exposure control, 2. Adjustment of MA and or KV according to patient's size or 3. Use of iterative reconstruction. Contiguous spiral images were obtained in the axial plane, following the administration of intravenous contrast using CT angiographic protocol.  Sagittal and coronal images were reconstructed from the axial plane acquisition. Additional MIP reconstructions were presented to aid in the interpretation of this study. Images were obtained from the skull base cranially. There is mild calcified plaque identified in the vessels compatible with atherosclerotic disease. The right carotid is unremarkable. The left carotid is unremarkable. The right vertebral artery is unremarkable The left vertebral artery is unremarkable The basilar artery is unremarkable The middle cerebral arteries are unremarkable The anterior cerebral arteries are unremarkable The posterior cerebral arteries are unremarkable     1. Estimated stenosis of the proximal right and left internal carotid artery by NASCET criteria is not hemodynamically significant 2. Mild atherosclerotic disease . 3. No large vessel occlusion identified This study was analyzed by the [employer] ai algorithm. Impression:  1. Acute alteration in mental status:   2. History of cerebrovascular disease with prior hemorrhagic stroke  3. Abnormal CT of abdomen/pelvis with findings suggestive of diverticulitis versus enterocolitis  4. Left foot drop. Resolving deficits with normalized BP. Left foot drop on exam of unclear significance with ? stroke residual or new deficit. No new complaints with  MRI brain pending. Patient Active Problem List:     Right foot infection     Diabetic ulcer of left foot associated with type 1 diabetes mellitus ([address]     Type 2 diabetes mellitus without complication, without long-term current use of insulin (HCC)     Essential hypertension     Gastroesophageal reflux disease without esophagitis     Mixed hyperlipidemia     Subacute osteomyelitis of foot (HCC)     Dysarthria     Hemorrhagic stroke ([address]     Altered mental status     Lactic acidosis      Recommendations:  1. MRI brain pending. 2. Continue current medication management. 3. Further pending results. It was my pleasure to evaluate Lindie Gaucher today. Please call with questions.     Electronically signed by Zack Calderon MD on 5/28/2022 at 6:18 PM

## 2022-05-28 NOTE — PROGRESS NOTES
Department of Internal Medicine  Infectious Diseases  Progress Note      C/C : Colitis , leukocytosis     Pt is awake and alert  Denies fever   Abdomen pain   Afebrile      Current Facility-Administered Medications   Medication Dose Route Frequency Provider Last Rate Last Admin    magnesium sulfate 2000 mg in 50 mL IVPB premix  2,000 mg IntraVENous Once Javad Wynn MD 25 mL/hr at 05/28/22 1035 2,000 mg at 05/28/22 1035    amLODIPine (NORVASC) tablet 5 mg  5 mg Oral Daily Gerre Mitch, DO   5 mg at 05/28/22 0817    atenolol (TENORMIN) tablet 50 mg  50 mg Oral Daily Gerre Mitch, DO   50 mg at 05/28/22 0817    atorvastatin (LIPITOR) tablet 40 mg  40 mg Oral Daily Gerre Mitch, DO   40 mg at 05/28/22 0817    pantoprazole (PROTONIX) tablet 40 mg  40 mg Oral QAM AC Gerre Mitch, DO   40 mg at 05/28/22 0558    lisinopril (PRINIVIL;ZESTRIL) tablet 30 mg  30 mg Oral Daily Gerre Mitch, DO   30 mg at 05/28/22 0817    venlafaxine (EFFEXOR XR) extended release capsule 150 mg  150 mg Oral Daily Gerre Mitch, DO   150 mg at 05/28/22 7761    sodium chloride flush 0.9 % injection 10 mL  10 mL IntraVENous 2 times per day Gerre Mitch, DO   10 mL at 05/27/22 2154    sodium chloride flush 0.9 % injection 10 mL  10 mL IntraVENous PRN Gerre Mitch, DO   10 mL at 05/27/22 0805    0.9 % sodium chloride infusion   IntraVENous PRN Gerre Mitch, DO        promethazine (PHENERGAN) tablet 12.5 mg  12.5 mg Oral Q6H PRN Gerre Mitch, DO        Or    ondansetron [facility]) injection 4 mg  4 mg IntraVENous Q6H PRN Gerre Mitch, DO        polyethylene glycol (GLYCOLAX) packet 17 g  17 g Oral Daily PRN Gerre Mitch, DO        acetaminophen (TYLENOL) tablet 650 mg  650 mg Oral Q6H PRN Gerre Mitch, DO        Or    acetaminophen (TYLENOL) suppository 650 mg  650 mg Rectal Q6H PRN Gerre Mitch, DO        levETIRAcetam (KEPPRA) 500 mg/100 mL IVPB  500 mg IntraVENous Q12H Princess Meyer DO   Stopped at 05/28/22 0421    0.9 % sodium chloride infusion   IntraVENous Continuous Garrett Friar, DO 75 mL/hr at 05/28/22 0820 New Bag at 05/28/22 0820    glucose chewable tablet 16 g  4 tablet Oral PRN Garrett Friar, DO        dextrose 10 % infusion  125 mL IntraVENous PRN Garrett Friar, DO        Or    dextrose 10 % infusion  250 mL IntraVENous PRN Garrett Friar, DO        glucagon (rDNA) injection 1 mg  1 mg IntraMUSCular PRN Garrett Friar, DO        dextrose 5 % solution  100 mL/hr IntraVENous PRN Garrett Friar, DO        insulin lispro (HUMALOG) injection vial 0-12 Units  0-12 Units SubCUTAneous TID WC Garrett Friar, DO   2 Units at 05/28/22 1145    insulin lispro (HUMALOG) injection vial 0-6 Units  0-6 Units SubCUTAneous Nightly Garrett Friar, DO   2 Units at 05/27/22 0227    alogliptin (NESINA) tablet 25 mg  25 mg Oral Daily Garrett Friar, DO   25 mg at 05/28/22 2960    And    metFORMIN (GLUCOPHAGE) tablet 1,000 mg  1,000 mg Oral Daily with breakfast Garrett Friar, DO        cefTRIAXone (ROCEPHIN) 1,000 mg in sterile water 10 mL IV syringe  1,000 mg IntraVENous Q24H Adrian Cortez MD   1,000 mg at 05/27/22 1403    metroNIDAZOLE (FLAGYL) tablet 500 mg  500 mg Oral 3 times per day Mila Parra MD   500 mg at 05/28/22 0558    lactulose (CHRONULAC) 10 GM/15ML solution 20 g  20 g Oral Once Garrett Friar, DO           REVIEW OF SYSTEMS:    CONSTITUTIONAL:  Denies fever, chill or rigors, nausea or vomiting. HEENT: denies blurring of vision or double vision, denies hearing problem  RESPIRATORY: denies cough, shortness of breath, sputum expectoration, chest pain. CARDIOVASCULAR:  Denies palpitation  GASTROINTESTINAL: abdomen pain, no diarrhea . GENITOURINARY:  Denies burning urination or frequency of urination  INTEGUMENT: denies wound , rash  HEMATOLOGIC/LYMPHATIC:  Denies lymph node swelling, gum bleeding or easy bruising.   MUSCULOSKELETAL:  Denies leg pain , joint pain , joint swelling  NEUROLOGICAL:  Light headed, dizziness,        PHYSICAL EXAM:      Vitals:     /63   Pulse 72   Temp 98 °F (36.7 °C)   Resp 18   Ht 5' 7\" (1.702 m)   Wt 164 lb 7 oz (74.6 kg)   SpO2 96%   BMI 25.75 kg/m²     General Appearance:    Awake, alert , no acute distress. Slurred speech ( residual from previous stroke )    Head:    Normocephalic, atraumatic   Eyes:    No pallor, no icterus,   Ears:    No obvious deformity or drainage.    Nose:   No nasal drainage   Throat:   Mucosa moist, no oral thrush   Neck:   Supple, no lymphadenopathy   Lungs:     Clear to auscultation bilaterally, no wheeze    Heart:    Regular rate and rhythm, no murmur, rub or gallop   Abdomen:     Soft,tenderness left lower quadrant, bowel sounds present    Extremities:   No edema, no cyanosis    Pulses:   Dorsalis pedis palpable    Skin:   no rashes        CBC with Differential:      Lab Results   Component Value Date    WBC 12.8 05/28/2022    RBC 3.71 05/28/2022    HGB 11.1 05/28/2022    HCT 33.6 05/28/2022     05/28/2022    MCV 90.6 05/28/2022    MCH 29.9 05/28/2022    MCHC 33.0 05/28/2022    RDW 12.7 05/28/2022    LYMPHOPCT 18.3 05/28/2022    MONOPCT 6.0 05/28/2022    BASOPCT 0.4 05/28/2022    MONOSABS 0.76 05/28/2022    LYMPHSABS 2.33 05/28/2022    EOSABS 0.16 05/28/2022    BASOSABS 0.05 05/28/2022       CMP     Lab Results   Component Value Date     05/28/2022    K 3.5 05/28/2022     05/28/2022    CO2 21 05/28/2022    BUN 10 05/28/2022    CREATININE 0.7 05/28/2022    CREATININE 0.9 05/03/2019    GFRAA >60 05/28/2022    LABGLOM >60 05/28/2022    GLUCOSE 100 05/28/2022    PROT 5.6 05/28/2022    LABALBU 3.1 05/28/2022    CALCIUM 8.9 05/28/2022    BILITOT 0.6 05/28/2022    ALKPHOS 138 05/28/2022    AST 25 05/28/2022    ALT 38 05/28/2022         Hepatic Function Panel:    Lab Results   Component Value Date    ALKPHOS 138 05/28/2022    ALT 38 05/28/2022    AST 25 05/28/2022    PROT 5.6 05/28/2022    BILITOT 0.6 05/28/2022    LABALBU 3.1 05/28/2022       PT/INR:    Lab Results   Component Value Date    PROTIME 10.9 05/26/2022    INR 1.0 05/26/2022       TSH:    Lab Results   Component Value Date    TSH 6.750 05/26/2022       U/A:    Lab Results   Component Value Date    COLORU Yellow 05/26/2022    PHUR 6.0 05/26/2022    WBCUA NONE 05/26/2022    RBCUA NONE 05/26/2022    BACTERIA NONE SEEN 05/26/2022    CLARITYU Clear 05/26/2022    SPECGRAV <=1.005 05/26/2022    LEUKOCYTESUR Negative 05/26/2022    UROBILINOGEN 1.0 05/26/2022    BILIRUBINUR Negative 05/26/2022    BLOODU Negative 05/26/2022    GLUCOSEU 100 05/26/2022       ABG:  No results found for: LBU0EWJ, BEART, J9OFPJNX, PHART, THGBART, VCM0IEW, PO2ART, BKW1CAX    MICROBIOLOGY:    Blood culture - neg to date     Radiology :    CT scan of abdomen and pelvis -  Impression:     There is ground-glass opacity identified in the periphery of the right middle   lobe and right lower lobe in which infiltrate cannot be excluded.  Findings   may suggest atelectatic change. Abnormal diffuse wall thickening of the descending colon sigmoid colon and   rectum to suggest diffuse colitis. Dewitte Peto is no perforation with abnormal   wall thickening and pericolonic stranding and fluid in the pericolic gutter. Fluid as well seen within the pelvis and surrounding the liver. IMPRESSION:     1. Colitis - appreciated surgery consult    2. Leukocytosis ( trending down ) lactic acidosis   3. Right mid lobe infiltrates ? Aspiration     RECOMMENDATIONS:      1.  Rocephin 1 gram IV q 24 hrs and flagyl 500 mg IV q 8 hrs

## 2022-05-28 NOTE — CONSULTS
NEUROLOGY CONSULT NOTE      Requesting Physician: Kelley Feng DO    Reason for Consult:  Evaluate for seizure vs stroke    History of Present Illness:  Kalpana Koenig is a 61 y.o. female  with h/o DM, HTN, HLD, hemorrhagic stroke, dysarthria, and GERD who was admitted to [facility]  on 5/26/2022 with presentation of altered mental status. On day of presentation, patient had been work when she became acutely unresponsive prompting EMS transport to the ED. On ED arrival about 30 minutes after onset, she was diaphoretic and minimally responsive with blood glucose of 251. Her initial blood pressure was 83/60 with pulse of 54 and 57. Blood pressure and heart rate gradually improved over time in the ED. Stroke alert was called withCT head, CTA head/neck/brain perfusion labs were all unremarkable. Telestroke service was consulted with suspicion of stroke mimic despite history of intracranial hemorrhage. Patient was not considered tPA or endovascular procedure candidate. There was suspicion of possible seizure activity as underlying cause so patient was started on Keppra 500 mg twice daily after a 1000 mg loading dose. Patient did have a CT scan of the abdomen that showed diffusely dilated fluid-filled small bowel loops and colon with inflammatory changes between the jejunal loops in the left upper quadrant and surrounding the sigmoid colon concerning for inflammatory process like enterocolitis. Distended fluid-filled stomach which may be due to ileus or low-grade bowel obstruction or due to edema. High density fluid/hemorrhagic ascites in the pelvis. Atelectasis/groundglass densities in the lung bases. Nonspecific cystic lesion in the tail the pancreas with indeterminate. General surgery was consulted with suspicion of diverticulitis versus enterocolitis.        Past Medical History:        Diagnosis Date    Abnormal mammogram of left breast 05/2019    Anxiety     [facility]     Depression     Diabetes mellitus [facility]     Dysarthria 5/12/2021    Erosive esophagitis     Esophageal stricture     GERD (gastroesophageal reflux disease)     Gestational diabetes     Hemorrhagic stroke [facility] 5/12/2021    Hyperlipidemia     Hypertension     Hypokalemia     Osteoarthritis     knees    Postmenopausal     Type 2 diabetes mellitus without complication ([facility]            Procedure Laterality Date    BREAST BIOPSY Left 05/2019    Dr. Patricia Hu      x4    CHOLECYSTECTOMY      COLONOSCOPY  11/2011    HYSTERECTOMY      JOINT REPLACEMENT Right     knee       Social History:  Social History     Tobacco Use   Smoking Status Never Smoker   Smokeless Tobacco Never Used     Social History     Substance and Sexual Activity   Alcohol Use No     Social History     Substance and Sexual Activity   Drug Use No         Family History:       Problem Relation Age of Onset    Diabetes Mother         complication     Coronary Art Dis Father     Rheum Arthritis Sister     Mult Sclerosis Brother     Diabetes Brother        Review of Systems:  Not available.     Allergies:    No Known Allergies     Current Medications:   amLODIPine (NORVASC) tablet 5 mg, Daily  atenolol (TENORMIN) tablet 50 mg, Daily  atorvastatin (LIPITOR) tablet 40 mg, Daily  pantoprazole (PROTONIX) tablet 40 mg, QAM AC  lisinopril (PRINIVIL;ZESTRIL) tablet 30 mg, Daily  venlafaxine (EFFEXOR XR) extended release capsule 150 mg, Daily  sodium chloride flush 0.9 % injection 10 mL, 2 times per day  sodium chloride flush 0.9 % injection 10 mL, PRN  0.9 % sodium chloride infusion, PRN  promethazine (PHENERGAN) tablet 12.5 mg, Q6H PRN   Or  ondansetron (ZOFRAN) injection 4 mg, Q6H PRN  polyethylene glycol (GLYCOLAX) packet 17 g, Daily PRN  acetaminophen (TYLENOL) tablet 650 mg, Q6H PRN Or  acetaminophen (TYLENOL) suppository 650 mg, Q6H PRN  levETIRAcetam (KEPPRA) 500 mg/100 mL IVPB, Q12H  0.9 % sodium chloride infusion, Continuous  glucose chewable tablet 16 g, PRN  dextrose 10 % infusion, PRN   Or  dextrose 10 % infusion, PRN  glucagon (rDNA) injection 1 mg, PRN  dextrose 5 % solution, PRN  insulin lispro (HUMALOG) injection vial 0-12 Units, TID WC  insulin lispro (HUMALOG) injection vial 0-6 Units, Nightly  alogliptin (NESINA) tablet 25 mg, Daily   And  metFORMIN (GLUCOPHAGE) tablet 1,000 mg, Daily with breakfast  sodium chloride flush 0.9 % injection 10 mL, Once PRN  cefTRIAXone (ROCEPHIN) 1,000 mg in sterile water 10 mL IV syringe, Q24H  metroNIDAZOLE (FLAGYL) tablet 500 mg, 3 times per day  lactulose (CHRONULAC) 10 GM/15ML solution 20 g, Once       Physical Exam:  BP (!) 144/76   Pulse 72   Temp 97.9 °F (36.6 °C)   Resp 18   Ht 5' 7\" (1.702 m)   Wt 164 lb 7 oz (74.6 kg)   SpO2 94%   BMI 25.75 kg/m²  I Body mass index is 25.75 kg/m². I   Wt Readings from Last 1 Encounters:   05/27/22 164 lb 7 oz (74.6 kg)          HEENT: Normocephalic, atraumatic, no lesions or abnormalities noted. Neck:  supple with full ROM; no masses, nodes or bruits; no cervical tenderness on palpation. Lungs:  clear to auscultation  bilaterally     CV: RRR without gallops or murmurs     Extremities: no c/c/e          Neurologic Exam     Mental Status:  Patient was alert, responsive, oriented, appropriate, answering questions, and following commands. Speech was fluent with normal sensorium and cognition. Cranial Nerves: Pupils were equal round and reactive to light and accommodation;    Visual fields were full on confrontation;  Funduscopic exam was normal; no pappilledema   Extraocular movements were intact; no nystagmus; Intact facial sensation to temp, pinprick, and light touch; Symmetric facial movements with good lip and eye closure bilaterally; Hearing was intact;   Bain was midline; Normal palatal elevation with midline uvula  Sternocleidomastoid  / Trapezius strength of 5/5. Tongue was midline with no atrophy or fasciculations  (CN -II-XII was grossly intact.)  Motor Exam:  Strength was 5/5 throughout; normal tone and bulk; no atrophy or fasiculations noted. (Normal strength bilaterally in all muscle groups tested)    Sensory Exam:  Normal sensation to light touch, pin-prick, and temperature; No sensory extinction. (Intact sensation to all modalities. Left / Right sided sensory deficits   Focal sensory deficits: __ )      Cerebella Exam:  Intact finger-nose-finger, rapidly alternating movements, fine motor movements, and heel-down-shin maneuvers; No cerebellar rebound or drift; No tremors   Normal tandem gait. Negative Romberg   (Normal cerebellum function on gross exam)    Gait:  Gait was not tested. Reflexes: normal and symmetric bilaterally; Babinski is negative     Labs:    CBC:   Recent Labs     05/26/22  1334 05/27/22  0713   WBC 11.1 18.0*   HGB 16.6* 12.5   * 379   MCV 90.3 92.8   MCH 30.4 30.9   MCHC 33.7 33.2   RDW 12.5 12.7     CMP:  Recent Labs     05/26/22  1334 05/26/22  1736 05/27/22  0713    141 144   K 4.2 4.4 3.9    106 112*   CO2 16* 16* 20*   BUN 20 22 21   CREATININE 1.0 0.9 0.8   GFRAA >60 >60 >60   LABGLOM 56 >60 >60   GLUCOSE 289* 199* 112*   CALCIUM 10.4* 9.3 8.4*     Liver:   Recent Labs     05/27/22  0713   AST 64*   ALT 63*   ALKPHOS 166*   PROT 5.4*   LABALBU 3.3*   BILITOT 0.4     INR:   Recent Labs     05/26/22  1334   PROTIME 10.9   INR 1.0       ToxicologyNo results for input(s): PHENYTOIN, CARBTOT, PHENOBARB, VALPROATE in the last 72 hours. Invalid input(s): LAMOTRIG,  KEPPRA  No results for input(s): AMPMETHURSCR, BARBTQTU, BDZQTU, CANNABQUANT, COCMETQTU, OPIAU, PCPQUANT in the last 72 hours.     Radiology:  CT ABDOMEN PELVIS WO CONTRAST Additional Contrast? None    Result Date: 5/26/2022  EXAMINATION: CT OF THE ABDOMEN AND PELVIS WITHOUT CONTRAST 5/26/2022 6:21 pm TECHNIQUE: CT of the abdomen and pelvis was performed without the administration of intravenous contrast. Multiplanar reformatted images are provided for review. Automated exposure control, iterative reconstruction, and/or weight based adjustment of the mA/kV was utilized to reduce the radiation dose to as low as reasonably achievable. COMPARISON: None. HISTORY: ORDERING SYSTEM PROVIDED HISTORY: elevated ammonia, alk phos TECHNOLOGIST PROVIDED HISTORY: Reason for exam:->elevated ammonia, alk phos Additional Contrast?->None What reading provider will be dictating this exam?->CRC FINDINGS: The lung bases demonstrate atelectasis/ground-glass densities concerning for pneumonia. Liver is fatty infiltrated. Dilated fluid-filled stomach is identified. Gallbladder is absent. Spleen appears normal.   there is a 1.4 x 1.1 cm nonspecific indeterminate cystic lesion in the tail of the pancreas. The adrenals and the kidneys are normal.  There is dilated fluid-filled small bowel loops with jejunum measuring up to 2.2 cm with mural thickening and inflammatory changes surrounding the bowel loops in the left upper quadrant. Degenerative changes are identified in the lumbar spine. Pelvis. Bladder is collapsed. There is distended colon with the liquid stool. There is diffuse mural thickening of a long segment of descending and sigmoid colon with inflammatory changes especially on the sigmoid colon. There is a small amount of high-density fluid in the pelvis which could be hemorrhagic. The appendix is normal.     Diffusely dilated fluid-filled small bowel loops and colon with inflammatory changes between the jejunal loops in the left upper quadrant and surrounding the sigmoid colon concerning for inflammatory process like enterocolitis. Considering the long segment of mural thickening of the descending and sigmoid colon, underlying malignancy/colon cancer is considered.   Consider colonoscopy when feasible. Distended fluid-filled stomach which may be due to ileus or low-grade bowel obstruction due to edema. High density fluid/hemorrhagic ascites in the pelvis. Atelectasis/ground-glass densities in lung bases. Clinical assessment is recommended to evaluate for pneumonia. Nonspecific cystic lesion in the tail of the pancreas which is indeterminate. Surveillance preferably by MRI is recommended RECOMMENDATIONS: Unavailable     CT Head WO Contrast    Result Date: 5/26/2022  EXAMINATION: CT OF THE HEAD WITHOUT CONTRAST  5/26/2022 1:55 pm TECHNIQUE: CT of the head was performed without the administration of intravenous contrast. Automated exposure control, iterative reconstruction, and/or weight based adjustment of the mA/kV was utilized to reduce the radiation dose to as low as reasonably achievable. COMPARISON: None. HISTORY: ORDERING SYSTEM PROVIDED HISTORY: stroke alert TECHNOLOGIST PROVIDED HISTORY: Reason for exam:->stroke alert Has a \"code stroke\" or \"stroke alert\" been called? ->Yes Decision Support Exception - unselect if not a suspected or confirmed emergency medical condition->Emergency Medical Condition (MA) What reading provider will be dictating this exam?->CRC FINDINGS: BRAIN/VENTRICLES: There is no acute intracranial hemorrhage, mass effect or midline shift. No abnormal extra-axial fluid collection. The gray-white differentiation is maintained without evidence of an acute infarct. There is no evidence of hydrocephalus. ORBITS: The visualized portion of the orbits demonstrate no acute abnormality. SINUSES: The visualized paranasal sinuses and mastoid air cells demonstrate no acute abnormality. SOFT TISSUES/SKULL:  No acute abnormality of the visualized skull or soft tissues. No acute intracranial abnormality.      CT ABDOMEN PELVIS W IV CONTRAST Additional Contrast? Oral    Result Date: 5/27/2022  EXAMINATION: CT OF THE ABDOMEN AND PELVIS WITH CONTRAST 5/27/2022 7:51 am TECHNIQUE: CT of the abdomen and pelvis was performed with the administration of intravenous contrast. Multiplanar reformatted images are provided for review. Automated exposure control, iterative reconstruction, and/or weight based adjustment of the mA/kV was utilized to reduce the radiation dose to as low as reasonably achievable. COMPARISON: None. HISTORY: ORDERING SYSTEM PROVIDED HISTORY: Abdominal pain TECHNOLOGIST PROVIDED HISTORY: Additional Contrast?->Oral Reason for exam:->Abdominal pain What reading provider will be dictating this exam?->CRC FINDINGS: Lower Chest: Patchy ground-glass opacity identified in the periphery of the right middle lobe and right lower lobe. Atelectatic change or infiltrate cannot be excluded. The left lung base is clear. Organs: The liver is homogeneous in appearance. The gallbladder is been surgically removed. Fluid seen surrounding the liver. The spleen is unremarkable. Pancreas reveals dilatation identified of the distal pancreatic duct. There is mild atrophy identified of the distal body and tail the pancreas. There is symmetry identified in the enhancement pattern of the renal parenchyma. Parapelvic cyst identified in the left kidney. No distension of the renal collecting system. No evidence of stones. Both adrenal glands are within normal limits. GI/Bowel: The stomach is unremarkable no evidence of wall thickening. Small bowel is within normal limits. No evidence of mucosal thickening. Stool seen scattered diffusely throughout the colon. Abnormal wall thickening identified descending colon and sigmoid colon as well as the rectum to suggest a colitis. There is abnormal pericolonic stranding and fluid in the left pericolic gutter. Minimal fluid in the pelvis. Pelvis: The bladder reveals Correia catheter balloon with decompression. The uterus has been surgically removed.  Peritoneum/Retroperitoneum: There is no abdominal or retroperitoneal lymphadenopathy with moderate atherosclerotic disease seen within the abdominal aorta and iliac vessels. No pelvic adenopathy. Bones/Soft Tissues: Bony structures reveal minimal degenerative changes identified within the spine and pelvis. No ventral abdominal wall mass or defect. There is ground-glass opacity identified in the periphery of the right middle lobe and right lower lobe in which infiltrate cannot be excluded. Findings may suggest atelectatic change. Abnormal diffuse wall thickening of the descending colon sigmoid colon and rectum to suggest diffuse colitis. There is no perforation with abnormal wall thickening and pericolonic stranding and fluid in the pericolic gutter. Fluid as well seen within the pelvis and surrounding the liver. XR CHEST PORTABLE    Result Date: 2022  EXAMINATION: ONE XRAY VIEW OF THE CHEST 2022 2:13 pm COMPARISON: 2021 HISTORY: ORDERING SYSTEM PROVIDED HISTORY: sepsis, r/o pneumonia TECHNOLOGIST PROVIDED HISTORY: Reason for exam:->sepsis, r/o pneumonia What reading provider will be dictating this exam?->CRC FINDINGS: Depth of inspiration is not ideal.  There are no obvious infiltrates or effusions. Heart size is normal.     No acute process     CTA NECK W CONTRAST    Result Date: 2022  Patient MRN:  [id number] : 1955 Age: 61 years Gender: Female Order Date:  2022 EXAM: CTA NECK W CONTRAST, CTA HEAD W CONTRAST NUMBER OF IMAGES:  6:15 INDICATION:  stroke alert stroke alert Decision Support Exception - unselect if not a suspected or confirmed emergency medical condition->Emergency Medical Condition (MA) What reading provider will be dictating this exam?->[facility] COMPARISON: None Technique: Low-dose CT  acquisition technique included one of following options; 1 . Automated exposure control, 2. Adjustment of MA and or KV according to patient's size or 3. Use of iterative reconstruction.  Contiguous spiral images were obtained in the axial plane, following the administration of intravenous contrast using CT angiographic protocol. Sagittal and coronal images were reconstructed from the axial plane acquisition. Additional MIP reconstructions were presented to aid in the interpretation of this study. Images were obtained from the skull base cranially. There is mild calcified plaque identified in the vessels compatible with atherosclerotic disease. The right carotid is unremarkable. The left carotid is unremarkable. The right vertebral artery is unremarkable The left vertebral artery is unremarkable The basilar artery is unremarkable The middle cerebral arteries are unremarkable The anterior cerebral arteries are unremarkable The posterior cerebral arteries are unremarkable     1. Estimated stenosis of the proximal right and left internal carotid artery by NASCET criteria is not hemodynamically significant 2. Mild atherosclerotic disease . 3. No large vessel occlusion identified This study was analyzed by the [employer]. ai algorithm. CT BRAIN PERFUSION    Result Date: 2022  Patient MRN: [id number] : 1955 Age:  61 years Gender: Female Order Date:   2022 Exam: CT BRAIN PERFUSION Number of Images: 333 views Indication:   stroke alert stroke alert Comparison: None. Findings: Perfusion images demonstrate symmetric blood volume Blood flow images demonstrate symmetric blood flow There is no significant ischemic penumbra identified. There is no significant core infarct identified. No significant ischemic penumbra identified This study was analyzed by the [employer]. ai algorithm.      CTA HEAD W CONTRAST    Result Date: 2022  Patient MRN:  [id number] : 1955 Age: 61 years Gender: Female Order Date:  2022 EXAM: CTA NECK W CONTRAST, CTA HEAD W CONTRAST NUMBER OF IMAGES:  6:15 INDICATION:  stroke alert stroke alert Decision Support Exception - unselect if not a suspected or confirmed emergency medical condition->Emergency Medical Condition (MA) What reading provider will be dictating this exam?->[facility] COMPARISON: None Technique: Low-dose CT  acquisition technique included one of following options; 1 . Automated exposure control, 2. Adjustment of MA and or KV according to patient's size or 3. Use of iterative reconstruction. Contiguous spiral images were obtained in the axial plane, following the administration of intravenous contrast using CT angiographic protocol. Sagittal and coronal images were reconstructed from the axial plane acquisition. Additional MIP reconstructions were presented to aid in the interpretation of this study. Images were obtained from the skull base cranially. There is mild calcified plaque identified in the vessels compatible with atherosclerotic disease. The right carotid is unremarkable. The left carotid is unremarkable. The right vertebral artery is unremarkable The left vertebral artery is unremarkable The basilar artery is unremarkable The middle cerebral arteries are unremarkable The anterior cerebral arteries are unremarkable The posterior cerebral arteries are unremarkable     1. Estimated stenosis of the proximal right and left internal carotid artery by NASCET criteria is not hemodynamically significant 2. Mild atherosclerotic disease . 3. No large vessel occlusion identified This study was analyzed by the [employer] ai algorithm. The patient's records from referring provider and available information in the EHR was reviewed. Impression:  1. Acute alteration in mental status: Patient with bradycardia and hypotension on presentation with significant inflammatory changes in her bowel suggestive of metabolic etiology for her altered mentation. Seizure is a possibility, but would not expect bradycardia and hypotension as presenting vitals with seizure activity. 2. History of cerebrovascular disease with prior hemorrhagic stroke: No evidence of an stroke with work-up so far.   3. Abnormal CT of abdomen/pelvis with findings suggestive of diverticulitis versus enterocolitis  4. Principal Problem:    Altered mental status  Active Problems:    Lactic acidosis  Resolved Problems:    * No resolved hospital problems. *      Recommendations:                                            1. MRI brain for r/o new stroke. 2. Maintain systolic BP in the 411-309 range for hypotensive crisis. 3. Medical management of enterocolitis. 4. Continue current medications as before. 5. Further pending results of studies. It was my pleasure to evaluate Moon Galan today. Please call with questions.       Electronically signed by Jorge Gallardo MD on 5/27/2022 at 9:10 PM

## 2022-05-28 NOTE — PROGRESS NOTES
Hospitalist Progress Note      Synopsis: Patient admitted on 5/26/2022 Patient presented to the emergency department after losing consciousness. 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 Vital signs show the patient is bradycardic with a rate in the 50s. Otherwise vital signs are within normal limits and stable. Laboratory studies demonstrate anion gap 19, lactic acid 5.3, glucose 199, troponin 16, ammonia 105. Chest x-ray unremarkable. CT head, CTA head/neck/brain perfusion unremarkable. CT abdomen pelvis shows diffusely dilated fluid-filled small bowel loops and colon with inflammatory changes between the jejunal loops in the left upper quadrant and surrounding the sigmoid colon concerning for inflammatory process like enterocolitis. Distended fluid-filled stomach which may be due to ileus or low-grade bowel obstruction or due to edema. High density fluid/hemorrhagic ascites in the pelvis. Atelectasis/groundglass densities in the lung bases. Nonspecific cystic lesion in the tail the pancreas with indeterminate. Neurology was consulted. Advised starting on Keppra. Subjective    Clinically improving. Feeling better. Stable overnight. No other overnight issues reported. States she is okay. Speech is slow    Exam:  /63   Pulse 72   Temp 98 °F (36.7 °C)   Resp 18   Ht 5' 7\" (1.702 m)   Wt 164 lb 7 oz (74.6 kg)   SpO2 96%   BMI 25.75 kg/m²   General appearance: No apparent distress, appears stated age and cooperative. HEENT: Pupils equal, round, and reactive to light. Conjunctivae/corneas clear. Neck: Trachea midline. Respiratory:  Normal respiratory effort. Clear to auscultation, bilaterally without Rales/Wheezes/Rhonchi. Cardiovascular: Regular rate and rhythm with normal S1/S2 without murmurs, rubs or gallops. Abdomen: Soft, non-tender, non-distended with normal bowel sounds. Musculoskeletal: No clubbing, cyanosis or edema bilaterally. Brisk capillary refill.  2+ lower extremity pulses (dorsalis pedis).    Skin:  No rashes    Neurologic: awake, alert and following commands     Medications:  Reviewed    Infusion Medications    sodium chloride      sodium chloride 75 mL/hr at 05/28/22 0820    dextrose      Or    dextrose      dextrose       Scheduled Medications    magnesium sulfate  2,000 mg IntraVENous Once    amLODIPine  5 mg Oral Daily    atenolol  50 mg Oral Daily    atorvastatin  40 mg Oral Daily    pantoprazole  40 mg Oral QAM AC    lisinopril  30 mg Oral Daily    venlafaxine  150 mg Oral Daily    sodium chloride flush  10 mL IntraVENous 2 times per day    levETIRAcetam  500 mg IntraVENous Q12H    insulin lispro  0-12 Units SubCUTAneous TID WC    insulin lispro  0-6 Units SubCUTAneous Nightly    alogliptin  25 mg Oral Daily    And    metFORMIN  1,000 mg Oral Daily with breakfast    cefTRIAXone (ROCEPHIN) IV  1,000 mg IntraVENous Q24H    metroNIDAZOLE  500 mg Oral 3 times per day    lactulose  20 g Oral Once     PRN Meds: sodium chloride flush, sodium chloride, promethazine **OR** ondansetron, polyethylene glycol, acetaminophen **OR** acetaminophen, glucose, dextrose **OR** dextrose, glucagon (rDNA), dextrose    I/O    Intake/Output Summary (Last 24 hours) at 5/28/2022 1233  Last data filed at 5/28/2022 1100  Gross per 24 hour   Intake 770 ml   Output 1775 ml   Net -1005 ml       Labs:   Recent Labs     05/26/22  1334 05/27/22  0713 05/28/22  0554   WBC 11.1 18.0* 12.8*   HGB 16.6* 12.5 11.1*   HCT 49.3* 37.6 33.6*   * 379 303       Recent Labs     05/26/22  1736 05/27/22  0713 05/28/22  0554    144 142   K 4.4 3.9 3.5    112* 106   CO2 16* 20* 21*   BUN 22 21 10   CREATININE 0.9 0.8 0.7   CALCIUM 9.3 8.4* 8.9       Recent Labs     05/26/22  1334 05/27/22  0713 05/28/22  0554   PROT 8.1 5.4* 5.6*   ALKPHOS 280* 166* 138*   ALT 30 63* 38*   AST 33* 64* 25   BILITOT 0.6 0.4 0.6   LIPASE 55  --   --        Recent Labs     05/26/22  1334   INR 1.0 No results for input(s): Maribel Oro in the last 72 hours. Chronic labs:  Lab Results   Component Value Date    CHOL 120 03/09/2022    TRIG 160 (H) 03/09/2022    HDL 38 03/09/2022    LDLCALC 50 03/09/2022    TSH 6.750 (H) 05/26/2022    INR 1.0 05/26/2022    LABA1C 6.3 (H) 05/27/2022       Radiology:  CT ABDOMEN PELVIS WO CONTRAST Additional Contrast? None    Result Date: 5/26/2022  Diffusely dilated fluid-filled small bowel loops and colon with inflammatory changes between the jejunal loops in the left upper quadrant and surrounding the sigmoid colon concerning for inflammatory process like enterocolitis. Considering the long segment of mural thickening of the descending and sigmoid colon, underlying malignancy/colon cancer is considered. Consider colonoscopy when feasible. Distended fluid-filled stomach which may be due to ileus or low-grade bowel obstruction due to edema. High density fluid/hemorrhagic ascites in the pelvis. Atelectasis/ground-glass densities in lung bases. Clinical assessment is recommended to evaluate for pneumonia. Nonspecific cystic lesion in the tail of the pancreas which is indeterminate. Surveillance preferably by MRI is recommended RECOMMENDATIONS: Unavailable     CT Head WO Contrast    Result Date: 5/26/2022  No acute intracranial abnormality. CT ABDOMEN PELVIS W IV CONTRAST Additional Contrast? Oral    Result Date: 5/27/2022  There is ground-glass opacity identified in the periphery of the right middle lobe and right lower lobe in which infiltrate cannot be excluded. Findings may suggest atelectatic change. Abnormal diffuse wall thickening of the descending colon sigmoid colon and rectum to suggest diffuse colitis. There is no perforation with abnormal wall thickening and pericolonic stranding and fluid in the pericolic gutter. Fluid as well seen within the pelvis and surrounding the liver.      XR CHEST PORTABLE    Result Date: 5/26/2022  No acute process     CTA NECK W CONTRAST    Result Date: 5/26/2022  1. Estimated stenosis of the proximal right and left internal carotid artery by NASCET criteria is not hemodynamically significant 2. Mild atherosclerotic disease . 3. No large vessel occlusion identified This study was analyzed by the Ctrip. ai algorithm. CT BRAIN PERFUSION    Result Date: 5/26/2022  No significant ischemic penumbra identified This study was analyzed by the [employer] ai algorithm. CTA HEAD W CONTRAST    Result Date: 5/26/2022  1. Estimated stenosis of the proximal right and left internal carotid artery by NASCET criteria is not hemodynamically significant 2. Mild atherosclerotic disease . 3. No large vessel occlusion identified This study was analyzed by the [employer] ai algorithm. ASSESSMENT:    Principal Problem:    Altered mental status  Active Problems:    Lactic acidosis  Resolved Problems:    * No resolved hospital problems. *    Aspiration pneumonia  Slow speech  Premorbid history of hypertension  History of hyperlipidemia  PLAN:  1. 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 Aspiration pneumonia treated by infectious disease  3. Continue management of hyperglycemia  4. Blood pressure seems okay for now. Slightly lower today  5. Lactic acidosis required hydration  6. Regimen is also covering colitis likely infectious and surgery has recommended maintaining antibiotics and diet  7. No invasive treatment of her intracranial disease as needed acutely  8. Lactic acid normalizing  9. Continue metformin      Diet: ADULT DIET;  Regular  Code Status: Full Code  PT/OT Eval Status:   Can order  DVT Prophylaxis:   order  Recommended disposition at discharge:  tbd    +++++++++++++++++++++++++++++++++++++++++++++++++  Rainer Mg MD   [facility].  +++++++++++++++++++++++++++++++++++++++++++++++++  NOTE: This report was transcribed using voice recognition software. Every effort was made to ensure accuracy; however, inadvertent computerized transcription errors may be present.

## 2022-05-29 ENCOUNTER — APPOINTMENT (OUTPATIENT)
Dept: MRI IMAGING | Age: 67
DRG: 100 | End: 2022-05-29
Payer: COMMERCIAL

## 2022-05-29 LAB
ALBUMIN SERPL-MCNC: 2.3 G/DL (ref 3.5–5.2)
ALP BLD-CCNC: 95 U/L (ref 35–104)
ALT SERPL-CCNC: 21 U/L (ref 0–32)
AMMONIA: 13 UMOL/L (ref 11–51)
ANION GAP SERPL CALCULATED.3IONS-SCNC: 12 MMOL/L (ref 7–16)
AST SERPL-CCNC: 16 U/L (ref 0–31)
BASOPHILS ABSOLUTE: 0.03 E9/L (ref 0–0.2)
BASOPHILS RELATIVE PERCENT: 0.4 % (ref 0–2)
BILIRUB SERPL-MCNC: 0.4 MG/DL (ref 0–1.2)
BUN BLDV-MCNC: 6 MG/DL (ref 6–23)
CALCIUM SERPL-MCNC: 6.7 MG/DL (ref 8.6–10.2)
CHLORIDE BLD-SCNC: 118 MMOL/L (ref 98–107)
CO2: 17 MMOL/L (ref 22–29)
CREAT SERPL-MCNC: 0.6 MG/DL (ref 0.5–1)
EOSINOPHILS ABSOLUTE: 0.22 E9/L (ref 0.05–0.5)
EOSINOPHILS RELATIVE PERCENT: 2.7 % (ref 0–6)
GFR AFRICAN AMERICAN: >60
GFR NON-AFRICAN AMERICAN: >60 ML/MIN/1.73
GLUCOSE BLD-MCNC: 81 MG/DL (ref 74–99)
HCT VFR BLD CALC: 31.3 % (ref 34–48)
HEMOGLOBIN: 10.3 G/DL (ref 11.5–15.5)
IMMATURE GRANULOCYTES #: 0.03 E9/L
IMMATURE GRANULOCYTES %: 0.4 % (ref 0–5)
LYMPHOCYTES ABSOLUTE: 1.8 E9/L (ref 1.5–4)
LYMPHOCYTES RELATIVE PERCENT: 21.7 % (ref 20–42)
MAGNESIUM: 1.3 MG/DL (ref 1.6–2.6)
MCH RBC QN AUTO: 30.4 PG (ref 26–35)
MCHC RBC AUTO-ENTMCNC: 32.9 % (ref 32–34.5)
MCV RBC AUTO: 92.3 FL (ref 80–99.9)
METER GLUCOSE: 106 MG/DL (ref 74–99)
METER GLUCOSE: 107 MG/DL (ref 74–99)
METER GLUCOSE: 147 MG/DL (ref 74–99)
METER GLUCOSE: 152 MG/DL (ref 74–99)
MONOCYTES ABSOLUTE: 0.54 E9/L (ref 0.1–0.95)
MONOCYTES RELATIVE PERCENT: 6.5 % (ref 2–12)
NEUTROPHILS ABSOLUTE: 5.67 E9/L (ref 1.8–7.3)
NEUTROPHILS RELATIVE PERCENT: 68.3 % (ref 43–80)
PDW BLD-RTO: 12.2 FL (ref 11.5–15)
PLATELET # BLD: 272 E9/L (ref 130–450)
PMV BLD AUTO: 10.3 FL (ref 7–12)
POTASSIUM REFLEX MAGNESIUM: 2.7 MMOL/L (ref 3.5–5)
POTASSIUM SERPL-SCNC: 4.4 MMOL/L (ref 3.5–5)
RBC # BLD: 3.39 E12/L (ref 3.5–5.5)
SODIUM BLD-SCNC: 147 MMOL/L (ref 132–146)
TOTAL PROTEIN: 4.2 G/DL (ref 6.4–8.3)
WBC # BLD: 8.3 E9/L (ref 4.5–11.5)

## 2022-05-29 PROCEDURE — 6370000000 HC RX 637 (ALT 250 FOR IP): Performed by: INTERNAL MEDICINE

## 2022-05-29 PROCEDURE — 6370000000 HC RX 637 (ALT 250 FOR IP): Performed by: FAMILY MEDICINE

## 2022-05-29 PROCEDURE — 36415 COLL VENOUS BLD VENIPUNCTURE: CPT

## 2022-05-29 PROCEDURE — 6360000002 HC RX W HCPCS: Performed by: INTERNAL MEDICINE

## 2022-05-29 PROCEDURE — 2060000000 HC ICU INTERMEDIATE R&B

## 2022-05-29 PROCEDURE — 6360000002 HC RX W HCPCS: Performed by: FAMILY MEDICINE

## 2022-05-29 PROCEDURE — 84132 ASSAY OF SERUM POTASSIUM: CPT

## 2022-05-29 PROCEDURE — 82140 ASSAY OF AMMONIA: CPT

## 2022-05-29 PROCEDURE — 70551 MRI BRAIN STEM W/O DYE: CPT

## 2022-05-29 PROCEDURE — 82962 GLUCOSE BLOOD TEST: CPT

## 2022-05-29 PROCEDURE — 2580000003 HC RX 258: Performed by: FAMILY MEDICINE

## 2022-05-29 PROCEDURE — 80053 COMPREHEN METABOLIC PANEL: CPT

## 2022-05-29 PROCEDURE — 85025 COMPLETE CBC W/AUTO DIFF WBC: CPT

## 2022-05-29 PROCEDURE — 83735 ASSAY OF MAGNESIUM: CPT

## 2022-05-29 PROCEDURE — 2500000003 HC RX 250 WO HCPCS: Performed by: INTERNAL MEDICINE

## 2022-05-29 PROCEDURE — 2580000003 HC RX 258: Performed by: INTERNAL MEDICINE

## 2022-05-29 RX ORDER — POTASSIUM CHLORIDE 20 MEQ/1
40 TABLET, EXTENDED RELEASE ORAL PRN
Status: DISCONTINUED | OUTPATIENT
Start: 2022-05-29 | End: 2022-05-30 | Stop reason: HOSPADM

## 2022-05-29 RX ORDER — POTASSIUM CHLORIDE 7.45 MG/ML
10 INJECTION INTRAVENOUS PRN
Status: DISCONTINUED | OUTPATIENT
Start: 2022-05-29 | End: 2022-05-30 | Stop reason: HOSPADM

## 2022-05-29 RX ORDER — DEXTROSE, SODIUM CHLORIDE, AND POTASSIUM CHLORIDE 5; .45; .15 G/100ML; G/100ML; G/100ML
INJECTION INTRAVENOUS CONTINUOUS
Status: DISCONTINUED | OUTPATIENT
Start: 2022-05-29 | End: 2022-05-30 | Stop reason: HOSPADM

## 2022-05-29 RX ADMIN — VENLAFAXINE HYDROCHLORIDE 150 MG: 150 CAPSULE, EXTENDED RELEASE ORAL at 08:45

## 2022-05-29 RX ADMIN — POTASSIUM CHLORIDE 10 MEQ: 7.46 INJECTION, SOLUTION INTRAVENOUS at 10:00

## 2022-05-29 RX ADMIN — ALOGLIPTIN 25 MG: 25 TABLET, FILM COATED ORAL at 08:45

## 2022-05-29 RX ADMIN — METRONIDAZOLE 500 MG: 500 TABLET ORAL at 13:20

## 2022-05-29 RX ADMIN — POTASSIUM CHLORIDE 10 MEQ: 7.46 INJECTION, SOLUTION INTRAVENOUS at 11:02

## 2022-05-29 RX ADMIN — METRONIDAZOLE 500 MG: 500 TABLET ORAL at 20:48

## 2022-05-29 RX ADMIN — SODIUM CHLORIDE, PRESERVATIVE FREE 10 ML: 5 INJECTION INTRAVENOUS at 20:51

## 2022-05-29 RX ADMIN — ATORVASTATIN CALCIUM 40 MG: 40 TABLET, FILM COATED ORAL at 08:45

## 2022-05-29 RX ADMIN — AMLODIPINE BESYLATE 5 MG: 5 TABLET ORAL at 08:44

## 2022-05-29 RX ADMIN — POTASSIUM CHLORIDE 10 MEQ: 7.46 INJECTION, SOLUTION INTRAVENOUS at 12:17

## 2022-05-29 RX ADMIN — POTASSIUM CHLORIDE 10 MEQ: 7.46 INJECTION, SOLUTION INTRAVENOUS at 16:07

## 2022-05-29 RX ADMIN — POTASSIUM CHLORIDE 10 MEQ: 7.46 INJECTION, SOLUTION INTRAVENOUS at 08:58

## 2022-05-29 RX ADMIN — LISINOPRIL 30 MG: 10 TABLET ORAL at 11:30

## 2022-05-29 RX ADMIN — WATER 1000 MG: 1 INJECTION INTRAMUSCULAR; INTRAVENOUS; SUBCUTANEOUS at 13:20

## 2022-05-29 RX ADMIN — PANTOPRAZOLE SODIUM 40 MG: 40 TABLET, DELAYED RELEASE ORAL at 06:25

## 2022-05-29 RX ADMIN — POTASSIUM CHLORIDE 10 MEQ: 7.46 INJECTION, SOLUTION INTRAVENOUS at 13:20

## 2022-05-29 RX ADMIN — POTASSIUM CHLORIDE, DEXTROSE MONOHYDRATE AND SODIUM CHLORIDE: 150; 5; 450 INJECTION, SOLUTION INTRAVENOUS at 08:55

## 2022-05-29 RX ADMIN — LEVETIRACETAM 500 MG: 5 INJECTION INTRAVENOUS at 04:27

## 2022-05-29 RX ADMIN — METFORMIN HYDROCHLORIDE 1000 MG: 1000 TABLET ORAL at 08:50

## 2022-05-29 RX ADMIN — LEVETIRACETAM 500 MG: 5 INJECTION INTRAVENOUS at 15:12

## 2022-05-29 RX ADMIN — METRONIDAZOLE 500 MG: 500 TABLET ORAL at 06:26

## 2022-05-29 RX ADMIN — ATENOLOL 50 MG: 50 TABLET ORAL at 08:45

## 2022-05-29 RX ADMIN — INSULIN LISPRO 1 UNITS: 100 INJECTION, SOLUTION INTRAVENOUS; SUBCUTANEOUS at 20:54

## 2022-05-29 RX ADMIN — INSULIN LISPRO 2 UNITS: 100 INJECTION, SOLUTION INTRAVENOUS; SUBCUTANEOUS at 11:30

## 2022-05-29 ASSESSMENT — PAIN SCALES - GENERAL: PAINLEVEL_OUTOF10: 0

## 2022-05-29 NOTE — PROGRESS NOTES
Department of Internal Medicine  Infectious Diseases  Progress Note      C/C : Colitis , leukocytosis     Pt is awake and alert  Denies fever   Abdomen pain   Afebrile      Current Facility-Administered Medications   Medication Dose Route Frequency Provider Last Rate Last Admin    potassium chloride (KLOR-CON M) extended release tablet 40 mEq  40 mEq Oral PRN Rachel Sawyer MD        Or    potassium bicarb-citric acid (EFFER-K) effervescent tablet 40 mEq  40 mEq Oral PRN Rachel Sawyer MD        Or    potassium chloride 10 mEq/100 mL IVPB (Peripheral Line)  10 mEq IntraVENous PRN Rachel Sawyer  mL/hr at 05/29/22 1000 10 mEq at 05/29/22 1000    dextrose 5 % and 0.45 % NaCl with KCl 20 mEq infusion   IntraVENous Continuous Rachel Sawyer MD 50 mL/hr at 05/29/22 0855 New Bag at 05/29/22 0855    amLODIPine (NORVASC) tablet 5 mg  5 mg Oral Daily Michelet Morn, DO   5 mg at 05/29/22 0844    atenolol (TENORMIN) tablet 50 mg  50 mg Oral Daily Michelet Morn, DO   50 mg at 05/29/22 0845    atorvastatin (LIPITOR) tablet 40 mg  40 mg Oral Daily Michelet Morn, DO   40 mg at 05/29/22 0845    pantoprazole (PROTONIX) tablet 40 mg  40 mg Oral QAM AC Michelet Morn, DO   40 mg at 05/29/22 9005    lisinopril (PRINIVIL;ZESTRIL) tablet 30 mg  30 mg Oral Daily Michelet Morn, DO   30 mg at 05/28/22 0817    venlafaxine (EFFEXOR XR) extended release capsule 150 mg  150 mg Oral Daily Michelet Morn, DO   150 mg at 05/29/22 0845    sodium chloride flush 0.9 % injection 10 mL  10 mL IntraVENous 2 times per day Michelet Morn, DO   10 mL at 05/27/22 2154    sodium chloride flush 0.9 % injection 10 mL  10 mL IntraVENous PRN Michelet Morn, DO   10 mL at 05/27/22 0805    0.9 % sodium chloride infusion   IntraVENous PRN Michelet Morn, DO        promethazine (PHENERGAN) tablet 12.5 mg  12.5 mg Oral Q6H PRN Michelet Morn, DO        Or    ondansetron [facility] injection 4 mg  4 mg IntraVENous Q6H PRN Michelet Rahman DO       [facility] polyethylene glycol (GLYCOLAX) packet 17 g  17 g Oral Daily PRN Adonica Sowmya, DO        acetaminophen (TYLENOL) tablet 650 mg  650 mg Oral Q6H PRN Adonica Sowmya, DO        Or    acetaminophen (TYLENOL) suppository 650 mg  650 mg Rectal Q6H PRN Adonica Sowmya, DO        levETIRAcetam (KEPPRA) 500 mg/100 mL IVPB  500 mg IntraVENous Q12H Adonica Sowmya, DO   Stopped at 05/29/22 0444    glucose chewable tablet 16 g  4 tablet Oral PRN Adonica Sowmya, DO        dextrose 10 % infusion  125 mL IntraVENous PRN Adonica Sowmya, DO        Or    dextrose 10 % infusion  250 mL IntraVENous PRN Adonica Sowmya, DO        glucagon (rDNA) injection 1 mg  1 mg IntraMUSCular PRN Adonica Sowmya, DO        dextrose 5 % solution  100 mL/hr IntraVENous PRN Adonica Sowmya, DO        insulin lispro (HUMALOG) injection vial 0-12 Units  0-12 Units SubCUTAneous TID WC Adonica Sowmya, DO   2 Units at 05/28/22 1145    insulin lispro (HUMALOG) injection vial 0-6 Units  0-6 Units SubCUTAneous Nightly Adonica Sowmya, DO   1 Units at 05/28/22 2157    alogliptin (NESINA) tablet 25 mg  25 mg Oral Daily Adonica Sowmya, DO   25 mg at 05/29/22 0845    And    metFORMIN (GLUCOPHAGE) tablet 1,000 mg  1,000 mg Oral Daily with breakfast Adonica Sowmya, DO   1,000 mg at 05/29/22 0850    cefTRIAXone (ROCEPHIN) 1,000 mg in sterile water 10 mL IV syringe  1,000 mg IntraVENous Q24H Adrian Cortez MD   1,000 mg at 05/28/22 1317    metroNIDAZOLE (FLAGYL) tablet 500 mg  500 mg Oral 3 times per day Foreign Ward MD   500 mg at 05/29/22 0626    lactulose (CHRONULAC) 10 GM/15ML solution 20 g  20 g Oral Once Adonica Sowmya, DO           REVIEW OF SYSTEMS:    CONSTITUTIONAL:  Denies fever, chill or rigors, nausea or vomiting. HEENT: denies blurring of vision or double vision, denies hearing problem  RESPIRATORY: denies cough, shortness of breath, sputum expectoration, chest pain.   CARDIOVASCULAR:  Denies palpitation  GASTROINTESTINAL: abdomen pain, no diarrhea Itz Gooden GENITOURINARY:  Denies burning urination or frequency of urination  INTEGUMENT: denies wound , rash  HEMATOLOGIC/LYMPHATIC:  Denies lymph node swelling, gum bleeding or easy bruising. MUSCULOSKELETAL:  Denies leg pain , joint pain , joint swelling  NEUROLOGICAL:  Light headed, dizziness,        PHYSICAL EXAM:      Vitals:     /62   Pulse 69   Temp 98.1 °F (36.7 °C)   Resp 16   Ht 5' 7\" (1.702 m)   Wt 164 lb 7 oz (74.6 kg)   SpO2 95%   BMI 25.75 kg/m²     General Appearance:    Awake, alert , no acute distress. Slurred speech ( residual from previous stroke )    Head:    Normocephalic, atraumatic   Eyes:    No pallor, no icterus,   Ears:    No obvious deformity or drainage.    Nose:   No nasal drainage   Throat:   Mucosa moist, no oral thrush   Neck:   Supple, no lymphadenopathy   Lungs:     Clear to auscultation bilaterally, no wheeze    Heart:    Regular rate and rhythm, no murmur, rub or gallop   Abdomen:     Soft,non tender , bowel sounds present    Extremities:   No edema, no cyanosis    Pulses:   Dorsalis pedis palpable    Skin:   no rashes        CBC with Differential:      Lab Results   Component Value Date    WBC 8.3 05/29/2022    RBC 3.39 05/29/2022    HGB 10.3 05/29/2022    HCT 31.3 05/29/2022     05/29/2022    MCV 92.3 05/29/2022    MCH 30.4 05/29/2022    MCHC 32.9 05/29/2022    RDW 12.2 05/29/2022    LYMPHOPCT 21.7 05/29/2022    MONOPCT 6.5 05/29/2022    BASOPCT 0.4 05/29/2022    MONOSABS 0.54 05/29/2022    LYMPHSABS 1.80 05/29/2022    EOSABS 0.22 05/29/2022    BASOSABS 0.03 05/29/2022       CMP     Lab Results   Component Value Date     05/29/2022    K 2.7 05/29/2022     05/29/2022    CO2 17 05/29/2022    BUN 6 05/29/2022    CREATININE 0.6 05/29/2022    CREATININE 0.9 05/03/2019    GFRAA >60 05/29/2022    LABGLOM >60 05/29/2022    GLUCOSE 81 05/29/2022    PROT 4.2 05/29/2022    LABALBU 2.3 05/29/2022    CALCIUM 6.7 05/29/2022    BILITOT 0.4 05/29/2022    ALKPHOS 95 05/29/2022    AST 16 05/29/2022    ALT 21 05/29/2022         Hepatic Function Panel:    Lab Results   Component Value Date    ALKPHOS 95 05/29/2022    ALT 21 05/29/2022    AST 16 05/29/2022    PROT 4.2 05/29/2022    BILITOT 0.4 05/29/2022    LABALBU 2.3 05/29/2022       PT/INR:    Lab Results   Component Value Date    PROTIME 10.9 05/26/2022    INR 1.0 05/26/2022       TSH:    Lab Results   Component Value Date    TSH 6.750 05/26/2022       U/A:    Lab Results   Component Value Date    COLORU Yellow 05/26/2022    PHUR 6.0 05/26/2022    WBCUA NONE 05/26/2022    RBCUA NONE 05/26/2022    BACTERIA NONE SEEN 05/26/2022    CLARITYU Clear 05/26/2022    SPECGRAV <=1.005 05/26/2022    LEUKOCYTESUR Negative 05/26/2022    UROBILINOGEN 1.0 05/26/2022    BILIRUBINUR Negative 05/26/2022    BLOODU Negative 05/26/2022    GLUCOSEU 100 05/26/2022       ABG:  No results found for: XVA6OHG, BEART, X5SATOII, PHART, THGBART, QRO8IEW, PO2ART, COV2XUZ    MICROBIOLOGY:    Blood culture - neg to date     Radiology :    CT scan of abdomen and pelvis -  Impression:     There is ground-glass opacity identified in the periphery of the right middle   lobe and right lower lobe in which infiltrate cannot be excluded.  Findings   may suggest atelectatic change. Abnormal diffuse wall thickening of the descending colon sigmoid colon and   rectum to suggest diffuse colitis. Ray Wyola is no perforation with abnormal   wall thickening and pericolonic stranding and fluid in the pericolic gutter. Fluid as well seen within the pelvis and surrounding the liver. IMPRESSION:     1. Colitis   2. Leukocytosis ( improved  ) lactic acidosis   3. Right mid lobe infiltrates ? Aspiration     RECOMMENDATIONS:      1. Rocephin 1 gram IV q 24 hrs and flagyl 500 mg IV q 8 hrs   2. D/C next 24 hrs on oral abx   3.  Check lactic acid

## 2022-05-29 NOTE — PROGRESS NOTES
Hospitalist Progress Note      Synopsis: Patient admitted on 5/26/2022 Patient presented to the emergency department after losing consciousness. 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 Vital signs show the patient is bradycardic with a rate in the 50s. Otherwise vital signs are within normal limits and stable. Laboratory studies demonstrate anion gap 19, lactic acid 5.3, glucose 199, troponin 16, ammonia 105. Chest x-ray unremarkable. CT head, CTA head/neck/brain perfusion unremarkable. CT abdomen pelvis shows diffusely dilated fluid-filled small bowel loops and colon with inflammatory changes between the jejunal loops in the left upper quadrant and surrounding the sigmoid colon concerning for inflammatory process like enterocolitis. Distended fluid-filled stomach which may be due to ileus or low-grade bowel obstruction or due to edema. High density fluid/hemorrhagic ascites in the pelvis. Atelectasis/groundglass densities in the lung bases. Nonspecific cystic lesion in the tail the pancreas with indeterminate. Neurology was consulted. Advised starting on Keppra. Subjective    Clinically improving. Feeling better. Stable overnight. No other overnight issues reported. States she is okay. Speech is slow  May 29 th   Significant electrolyte abnormalities otherwise she is anxious to get up and start moving and wants to go home    Exam:  /62   Pulse 69   Temp 98.1 °F (36.7 °C)   Resp 16   Ht 5' 7\" (1.702 m)   Wt 164 lb 7 oz (74.6 kg)   SpO2 95%   BMI 25.75 kg/m²   General appearance: No apparent distress, appears stated age and cooperative. HEENT: Pupils equal, round, and reactive to light. Conjunctivae/corneas clear. Neck: Trachea midline. Respiratory:  Normal respiratory effort. Clear to auscultation, bilaterally without Rales/Wheezes/Rhonchi. Cardiovascular: Regular rate and rhythm with normal S1/S2 without murmurs, rubs or gallops.   Abdomen: Soft, non-tender, non-distended with normal bowel sounds. Musculoskeletal: No clubbing, cyanosis or edema bilaterally. Brisk capillary refill. 2+ lower extremity pulses (dorsalis pedis).    Skin:  No rashes    Neurologic: awake, alert and following commands     Medications:  Reviewed    Infusion Medications    dextrose 5% and 0.45% NaCl with KCl 20 mEq 50 mL/hr at 05/29/22 0855    sodium chloride      dextrose      Or    dextrose      dextrose       Scheduled Medications    amLODIPine  5 mg Oral Daily    atenolol  50 mg Oral Daily    atorvastatin  40 mg Oral Daily    pantoprazole  40 mg Oral QAM AC    lisinopril  30 mg Oral Daily    venlafaxine  150 mg Oral Daily    sodium chloride flush  10 mL IntraVENous 2 times per day    levETIRAcetam  500 mg IntraVENous Q12H    insulin lispro  0-12 Units SubCUTAneous TID WC    insulin lispro  0-6 Units SubCUTAneous Nightly    alogliptin  25 mg Oral Daily    And    metFORMIN  1,000 mg Oral Daily with breakfast    cefTRIAXone (ROCEPHIN) IV  1,000 mg IntraVENous Q24H    metroNIDAZOLE  500 mg Oral 3 times per day    lactulose  20 g Oral Once     PRN Meds: potassium chloride **OR** potassium alternative oral replacement **OR** potassium chloride, sodium chloride flush, sodium chloride, promethazine **OR** ondansetron, polyethylene glycol, acetaminophen **OR** acetaminophen, glucose, dextrose **OR** dextrose, glucagon (rDNA), dextrose    I/O    Intake/Output Summary (Last 24 hours) at 5/29/2022 1041  Last data filed at 5/29/2022 9191  Gross per 24 hour   Intake 1291 ml   Output 1800 ml   Net -509 ml       Labs:   Recent Labs     05/27/22  0713 05/28/22  0554 05/29/22  0539   WBC 18.0* 12.8* 8.3   HGB 12.5 11.1* 10.3*   HCT 37.6 33.6* 31.3*    303 272       Recent Labs     05/27/22  0713 05/28/22  0554 05/29/22  0539    142 147*   K 3.9 3.5 2.7*   * 106 118*   CO2 20* 21* 17*   BUN 21 10 6   CREATININE 0.8 0.7 0.6   CALCIUM 8.4* 8.9 6.7*       Recent Labs     05/26/22  1334 05/26/22  1334 05/27/22  0713 05/28/22  0554 05/29/22  0539   PROT 8.1   < > 5.4* 5.6* 4.2*   ALKPHOS 280*   < > 166* 138* 95   ALT 30   < > 63* 38* 21   AST 33*   < > 64* 25 16   BILITOT 0.6   < > 0.4 0.6 0.4   LIPASE 55  --   --   --   --     < > = values in this interval not displayed. Recent Labs     05/26/22  1334   INR 1.0       No results for input(s): Michelle Comment in the last 72 hours. Chronic labs:  Lab Results   Component Value Date    CHOL 120 03/09/2022    TRIG 160 (H) 03/09/2022    HDL 38 03/09/2022    LDLCALC 50 03/09/2022    TSH 6.750 (H) 05/26/2022    INR 1.0 05/26/2022    LABA1C 6.3 (H) 05/27/2022       Radiology:  CT ABDOMEN PELVIS WO CONTRAST Additional Contrast? None    Result Date: 5/26/2022  Diffusely dilated fluid-filled small bowel loops and colon with inflammatory changes between the jejunal loops in the left upper quadrant and surrounding the sigmoid colon concerning for inflammatory process like enterocolitis. Considering the long segment of mural thickening of the descending and sigmoid colon, underlying malignancy/colon cancer is considered. Consider colonoscopy when feasible. Distended fluid-filled stomach which may be due to ileus or low-grade bowel obstruction due to edema. High density fluid/hemorrhagic ascites in the pelvis. Atelectasis/ground-glass densities in lung bases. Clinical assessment is recommended to evaluate for pneumonia. Nonspecific cystic lesion in the tail of the pancreas which is indeterminate. Surveillance preferably by MRI is recommended RECOMMENDATIONS: Unavailable     CT Head WO Contrast    Result Date: 5/26/2022  No acute intracranial abnormality. CT ABDOMEN PELVIS W IV CONTRAST Additional Contrast? Oral    Result Date: 5/27/2022  There is ground-glass opacity identified in the periphery of the right middle lobe and right lower lobe in which infiltrate cannot be excluded. Findings may suggest atelectatic change.  Abnormal diffuse wall thickening of the descending colon sigmoid colon and rectum to suggest diffuse colitis. There is no perforation with abnormal wall thickening and pericolonic stranding and fluid in the pericolic gutter. Fluid as well seen within the pelvis and surrounding the liver. XR CHEST PORTABLE    Result Date: 5/26/2022  No acute process     CTA NECK W CONTRAST    Result Date: 5/26/2022  1. Estimated stenosis of the proximal right and left internal carotid artery by NASCET criteria is not hemodynamically significant 2. Mild atherosclerotic disease . 3. No large vessel occlusion identified This study was analyzed by the Reaction. ai algorithm. CT BRAIN PERFUSION    Result Date: 5/26/2022  No significant ischemic penumbra identified This study was analyzed by the Reaction. ai algorithm. CTA HEAD W CONTRAST    Result Date: 5/26/2022  1. Estimated stenosis of the proximal right and left internal carotid artery by NASCET criteria is not hemodynamically significant 2. Mild atherosclerotic disease . 3. No large vessel occlusion identified This study was analyzed by the Reaction. ai algorithm. ASSESSMENT:    Principal Problem:    Altered mental status  Active Problems:    Lactic acidosis  Resolved Problems:    * No resolved hospital problems. *    Aspiration pneumonia  Slow speech  Premorbid history of hypertension  History of hyperlipidemia  PLAN:  1. 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 Aspiration pneumonia treated by infectious disease  3. Continue management of hyperglycemia  4. Blood pressure seems okay for now. Slightly lower today  5. Lactic acidosis required hydration  6. Regimen is also covering colitis likely infectious and surgery has recommended maintaining antibiotics and diet  7. No invasive treatment of her intracranial disease as needed acutely  8. Lactic acid normalizing  9. Continue metformin    May 29  Replace her potassium and also change her IV fluids because of the hyprnatremia. At this time the major issue is to try to get her maintain her treatment for aspiration pneumonia   MRI not done   Rocephin and Flagyl continue  Leukocytosis has resolved  Blood sugars look excellent  Lactic acid trended downwards  Await therapy to see her for mobilization  Speech is still slow  Await mri    Diet: ADULT DIET; Regular  Code Status: Full Code  PT/OT Eval Status:   Can order  DVT Prophylaxis:   order  Recommended disposition at discharge:  tbd    +++++++++++++++++++++++++++++++++++++++++++++++++  Heriberto Valdovinos MD   [facility].  +++++++++++++++++++++++++++++++++++++++++++++++++  NOTE: This report was transcribed using voice recognition software. Every effort was made to ensure accuracy; however, inadvertent computerized transcription errors may be present.

## 2022-05-30 VITALS
HEIGHT: 67 IN | DIASTOLIC BLOOD PRESSURE: 78 MMHG | BODY MASS INDEX: 25.81 KG/M2 | RESPIRATION RATE: 16 BRPM | TEMPERATURE: 98.1 F | OXYGEN SATURATION: 96 % | WEIGHT: 164.44 LBS | HEART RATE: 60 BPM | SYSTOLIC BLOOD PRESSURE: 159 MMHG

## 2022-05-30 LAB
ALBUMIN SERPL-MCNC: 3.3 G/DL (ref 3.5–5.2)
ALP BLD-CCNC: 114 U/L (ref 35–104)
ALT SERPL-CCNC: 24 U/L (ref 0–32)
AMMONIA: 18 UMOL/L (ref 11–51)
ANION GAP SERPL CALCULATED.3IONS-SCNC: 15 MMOL/L (ref 7–16)
AST SERPL-CCNC: 17 U/L (ref 0–31)
BASOPHILS ABSOLUTE: 0.04 E9/L (ref 0–0.2)
BASOPHILS RELATIVE PERCENT: 0.5 % (ref 0–2)
BILIRUB SERPL-MCNC: 0.4 MG/DL (ref 0–1.2)
BUN BLDV-MCNC: 5 MG/DL (ref 6–23)
CALCIUM SERPL-MCNC: 8.7 MG/DL (ref 8.6–10.2)
CHLORIDE BLD-SCNC: 108 MMOL/L (ref 98–107)
CO2: 20 MMOL/L (ref 22–29)
CREAT SERPL-MCNC: 0.7 MG/DL (ref 0.5–1)
EOSINOPHILS ABSOLUTE: 0.34 E9/L (ref 0.05–0.5)
EOSINOPHILS RELATIVE PERCENT: 4.2 % (ref 0–6)
GFR AFRICAN AMERICAN: >60
GFR NON-AFRICAN AMERICAN: >60 ML/MIN/1.73
GLUCOSE BLD-MCNC: 132 MG/DL (ref 74–99)
HCT VFR BLD CALC: 33.1 % (ref 34–48)
HEMOGLOBIN: 11 G/DL (ref 11.5–15.5)
IMMATURE GRANULOCYTES #: 0.04 E9/L
IMMATURE GRANULOCYTES %: 0.5 % (ref 0–5)
LACTIC ACID: 0.8 MMOL/L (ref 0.5–2.2)
LYMPHOCYTES ABSOLUTE: 2.07 E9/L (ref 1.5–4)
LYMPHOCYTES RELATIVE PERCENT: 25.3 % (ref 20–42)
MCH RBC QN AUTO: 30.4 PG (ref 26–35)
MCHC RBC AUTO-ENTMCNC: 33.2 % (ref 32–34.5)
MCV RBC AUTO: 91.4 FL (ref 80–99.9)
METER GLUCOSE: 148 MG/DL (ref 74–99)
METER GLUCOSE: 185 MG/DL (ref 74–99)
MONOCYTES ABSOLUTE: 0.48 E9/L (ref 0.1–0.95)
MONOCYTES RELATIVE PERCENT: 5.9 % (ref 2–12)
NEUTROPHILS ABSOLUTE: 5.2 E9/L (ref 1.8–7.3)
NEUTROPHILS RELATIVE PERCENT: 63.6 % (ref 43–80)
PDW BLD-RTO: 12.3 FL (ref 11.5–15)
PLATELET # BLD: 343 E9/L (ref 130–450)
PMV BLD AUTO: 10.6 FL (ref 7–12)
POTASSIUM REFLEX MAGNESIUM: 3.9 MMOL/L (ref 3.5–5)
RBC # BLD: 3.62 E12/L (ref 3.5–5.5)
SODIUM BLD-SCNC: 143 MMOL/L (ref 132–146)
TOTAL PROTEIN: 5.8 G/DL (ref 6.4–8.3)
WBC # BLD: 8.2 E9/L (ref 4.5–11.5)

## 2022-05-30 PROCEDURE — 6370000000 HC RX 637 (ALT 250 FOR IP): Performed by: PHYSICIAN ASSISTANT

## 2022-05-30 PROCEDURE — 82140 ASSAY OF AMMONIA: CPT

## 2022-05-30 PROCEDURE — 80053 COMPREHEN METABOLIC PANEL: CPT

## 2022-05-30 PROCEDURE — 99232 SBSQ HOSP IP/OBS MODERATE 35: CPT | Performed by: PHYSICIAN ASSISTANT

## 2022-05-30 PROCEDURE — 36415 COLL VENOUS BLD VENIPUNCTURE: CPT

## 2022-05-30 PROCEDURE — 83605 ASSAY OF LACTIC ACID: CPT

## 2022-05-30 PROCEDURE — 6370000000 HC RX 637 (ALT 250 FOR IP): Performed by: INTERNAL MEDICINE

## 2022-05-30 PROCEDURE — 6370000000 HC RX 637 (ALT 250 FOR IP): Performed by: FAMILY MEDICINE

## 2022-05-30 PROCEDURE — 82962 GLUCOSE BLOOD TEST: CPT

## 2022-05-30 PROCEDURE — 6360000002 HC RX W HCPCS: Performed by: FAMILY MEDICINE

## 2022-05-30 PROCEDURE — 85025 COMPLETE CBC W/AUTO DIFF WBC: CPT

## 2022-05-30 RX ORDER — CEFDINIR 300 MG/1
300 CAPSULE ORAL EVERY 12 HOURS SCHEDULED
Qty: 10 CAPSULE | Refills: 0 | Status: SHIPPED | OUTPATIENT
Start: 2022-05-30 | End: 2022-05-30

## 2022-05-30 RX ORDER — POLYETHYLENE GLYCOL 3350 17 G/17G
17 POWDER, FOR SOLUTION ORAL DAILY PRN
Qty: 527 G | Refills: 0 | Status: SHIPPED | OUTPATIENT
Start: 2022-05-30 | End: 2022-06-13

## 2022-05-30 RX ORDER — LEVETIRACETAM 500 MG/1
500 TABLET ORAL 2 TIMES DAILY
Qty: 60 TABLET | Refills: 0 | Status: SHIPPED | OUTPATIENT
Start: 2022-05-30 | End: 2022-07-06 | Stop reason: SDUPTHER

## 2022-05-30 RX ORDER — CEFDINIR 300 MG/1
300 CAPSULE ORAL EVERY 12 HOURS SCHEDULED
Qty: 10 CAPSULE | Refills: 0 | Status: SHIPPED | OUTPATIENT
Start: 2022-05-30 | End: 2022-06-04

## 2022-05-30 RX ORDER — CEFDINIR 300 MG/1
300 CAPSULE ORAL EVERY 12 HOURS SCHEDULED
Status: DISCONTINUED | OUTPATIENT
Start: 2022-05-30 | End: 2022-05-30 | Stop reason: HOSPADM

## 2022-05-30 RX ORDER — POTASSIUM CHLORIDE 750 MG/1
10 CAPSULE, EXTENDED RELEASE ORAL DAILY
Qty: 90 CAPSULE | Refills: 0 | Status: SHIPPED | OUTPATIENT
Start: 2022-05-30 | End: 2022-07-06

## 2022-05-30 RX ORDER — METRONIDAZOLE 500 MG/1
500 TABLET ORAL EVERY 8 HOURS SCHEDULED
Qty: 15 TABLET | Refills: 0 | Status: SHIPPED | OUTPATIENT
Start: 2022-05-30 | End: 2022-06-04

## 2022-05-30 RX ORDER — METRONIDAZOLE 500 MG/1
500 TABLET ORAL EVERY 8 HOURS SCHEDULED
Qty: 15 TABLET | Refills: 0 | Status: SHIPPED | OUTPATIENT
Start: 2022-05-30 | End: 2022-05-30

## 2022-05-30 RX ORDER — LEVETIRACETAM 500 MG/1
500 TABLET ORAL 2 TIMES DAILY
Status: DISCONTINUED | OUTPATIENT
Start: 2022-05-30 | End: 2022-05-30 | Stop reason: HOSPADM

## 2022-05-30 RX ADMIN — AMLODIPINE BESYLATE 5 MG: 5 TABLET ORAL at 08:37

## 2022-05-30 RX ADMIN — PANTOPRAZOLE SODIUM 40 MG: 40 TABLET, DELAYED RELEASE ORAL at 06:15

## 2022-05-30 RX ADMIN — METRONIDAZOLE 500 MG: 500 TABLET ORAL at 06:15

## 2022-05-30 RX ADMIN — METRONIDAZOLE 500 MG: 500 TABLET ORAL at 12:59

## 2022-05-30 RX ADMIN — INSULIN LISPRO 2 UNITS: 100 INJECTION, SOLUTION INTRAVENOUS; SUBCUTANEOUS at 11:02

## 2022-05-30 RX ADMIN — ALOGLIPTIN 25 MG: 25 TABLET, FILM COATED ORAL at 08:37

## 2022-05-30 RX ADMIN — INSULIN LISPRO 2 UNITS: 100 INJECTION, SOLUTION INTRAVENOUS; SUBCUTANEOUS at 06:16

## 2022-05-30 RX ADMIN — METFORMIN HYDROCHLORIDE 1000 MG: 1000 TABLET ORAL at 08:37

## 2022-05-30 RX ADMIN — VENLAFAXINE HYDROCHLORIDE 150 MG: 150 CAPSULE, EXTENDED RELEASE ORAL at 08:36

## 2022-05-30 RX ADMIN — ATENOLOL 50 MG: 50 TABLET ORAL at 08:37

## 2022-05-30 RX ADMIN — ATORVASTATIN CALCIUM 40 MG: 40 TABLET, FILM COATED ORAL at 08:37

## 2022-05-30 RX ADMIN — LEVETIRACETAM 500 MG: 5 INJECTION INTRAVENOUS at 03:44

## 2022-05-30 RX ADMIN — LEVETIRACETAM 500 MG: 500 TABLET, FILM COATED ORAL at 14:15

## 2022-05-30 RX ADMIN — LISINOPRIL 30 MG: 10 TABLET ORAL at 08:37

## 2022-05-30 ASSESSMENT — PAIN SCALES - GENERAL: PAINLEVEL_OUTOF10: 0

## 2022-05-30 NOTE — PROGRESS NOTES
Department of Internal Medicine  Infectious Diseases  Progress Note      C/C : Colitis , leukocytosis     Pt is awake and alert  Denies fever   Denies abdomen pain   Afebrile      Current Facility-Administered Medications   Medication Dose Route Frequency Provider Last Rate Last Admin    potassium chloride (KLOR-CON M) extended release tablet 40 mEq  40 mEq Oral PRN Betty Gutierrez MD        Or    potassium bicarb-citric acid (EFFER-K) effervescent tablet 40 mEq  40 mEq Oral PRN Betty Gutierrez MD        Or    potassium chloride 10 mEq/100 mL IVPB (Peripheral Line)  10 mEq IntraVENous PRN Betty Gutierrez  mL/hr at 05/29/22 1607 10 mEq at 05/29/22 1607    dextrose 5 % and 0.45 % NaCl with KCl 20 mEq infusion   IntraVENous Continuous Betty Gutierrez MD 50 mL/hr at 05/29/22 0855 New Bag at 05/29/22 0855    amLODIPine (NORVASC) tablet 5 mg  5 mg Oral Daily Adonica Sowmya, DO   5 mg at 05/30/22 6753    atenolol (TENORMIN) tablet 50 mg  50 mg Oral Daily Adonica Sowmya, DO   50 mg at 05/30/22 9388    atorvastatin (LIPITOR) tablet 40 mg  40 mg Oral Daily Adonica Sowmya, DO   40 mg at 05/30/22 0450    pantoprazole (PROTONIX) tablet 40 mg  40 mg Oral QAM AC Adonica Sowmya, DO   40 mg at 05/30/22 0615    lisinopril (PRINIVIL;ZESTRIL) tablet 30 mg  30 mg Oral Daily Adonica Sowmya, DO   30 mg at 05/30/22 6817    venlafaxine (EFFEXOR XR) extended release capsule 150 mg  150 mg Oral Daily Adonica Sowmya, DO   150 mg at 05/30/22 5621    sodium chloride flush 0.9 % injection 10 mL  10 mL IntraVENous 2 times per day Adonica Sowmya, DO   10 mL at 05/29/22 2051    sodium chloride flush 0.9 % injection 10 mL  10 mL IntraVENous PRN Adonica Sowmya, DO   10 mL at 05/27/22 0805    0.9 % sodium chloride infusion   IntraVENous PRN Adonica Sowmya, DO        promethazine (PHENERGAN) tablet 12.5 mg  12.5 mg Oral Q6H PRN Adonica Sowmya, DO        Or    ondansetron [facility]) injection 4 mg  4 mg IntraVENous Q6H PRN Adonica Sowmya, DO       Geeta Sicard polyethylene glycol (GLYCOLAX) packet 17 g  17 g Oral Daily PRN Maple Lat, DO        acetaminophen (TYLENOL) tablet 650 mg  650 mg Oral Q6H PRN Maple Lat, DO        Or    acetaminophen (TYLENOL) suppository 650 mg  650 mg Rectal Q6H PRN Maple Lat, DO        levETIRAcetam (KEPPRA) 500 mg/100 mL IVPB  500 mg IntraVENous Q12H Maple Lat, DO   Stopped at 05/30/22 7896    glucose chewable tablet 16 g  4 tablet Oral PRN Maple Lat, DO        dextrose 10 % infusion  125 mL IntraVENous PRN Maple Lat, DO        Or    dextrose 10 % infusion  250 mL IntraVENous PRN Maple Lat, DO        glucagon (rDNA) injection 1 mg  1 mg IntraMUSCular PRN Maple Lat, DO        dextrose 5 % solution  100 mL/hr IntraVENous PRN Maple Lat, DO        insulin lispro (HUMALOG) injection vial 0-12 Units  0-12 Units SubCUTAneous TID WC Maple Lat, DO   2 Units at 05/30/22 1102    insulin lispro (HUMALOG) injection vial 0-6 Units  0-6 Units SubCUTAneous Nightly Maple Lat, DO   1 Units at 05/29/22 2054    alogliptin (NESINA) tablet 25 mg  25 mg Oral Daily Maple Lat, DO   25 mg at 05/30/22 4098    And    metFORMIN (GLUCOPHAGE) tablet 1,000 mg  1,000 mg Oral Daily with breakfast Maple Lat, DO   1,000 mg at 05/30/22 3644    cefTRIAXone (ROCEPHIN) 1,000 mg in sterile water 10 mL IV syringe  1,000 mg IntraVENous Q24H Adrian Cortez MD   1,000 mg at 05/29/22 1320    metroNIDAZOLE (FLAGYL) tablet 500 mg  500 mg Oral 3 times per day Chai Blanco MD   500 mg at 05/30/22 0615    lactulose (CHRONULAC) 10 GM/15ML solution 20 g  20 g Oral Once Maple Lat, DO           REVIEW OF SYSTEMS:    CONSTITUTIONAL:  Denies fever, chill or rigors, nausea or vomiting. HEENT: denies blurring of vision or double vision, denies hearing problem  RESPIRATORY: denies cough, shortness of breath, sputum expectoration, chest pain.   CARDIOVASCULAR:  Denies palpitation  GASTROINTESTINAL: Denies abdomen pain Bernardino Mcfadden GENITOURINARY:  Denies burning urination or frequency of urination  INTEGUMENT: denies wound , rash  HEMATOLOGIC/LYMPHATIC:  Denies lymph node swelling, gum bleeding or easy bruising. MUSCULOSKELETAL:  Denies leg pain , joint pain , joint swelling  NEUROLOGICAL:  Light headed, dizziness,        PHYSICAL EXAM:      Vitals:     BP (!) 143/89   Pulse 69   Temp 98.1 °F (36.7 °C) (Temporal)   Resp 16   Ht 5' 7\" (1.702 m)   Wt 164 lb 7 oz (74.6 kg)   SpO2 99%   BMI 25.75 kg/m²     General Appearance:    Awake, alert , no acute distress. Slurred speech ( residual from previous stroke )    Head:    Normocephalic, atraumatic   Eyes:    No pallor, no icterus,   Ears:    No obvious deformity or drainage.    Nose:   No nasal drainage   Throat:   Mucosa moist, no oral thrush   Neck:   Supple, no lymphadenopathy   Lungs:     Clear to auscultation bilaterally, no wheeze    Heart:    Regular rate and rhythm, no murmur, rub or gallop   Abdomen:     Soft,non tender , bowel sounds present    Extremities:   No edema, no cyanosis    Pulses:   Dorsalis pedis palpable    Skin:   no rashes        CBC with Differential:      Lab Results   Component Value Date    WBC 8.2 05/30/2022    RBC 3.62 05/30/2022    HGB 11.0 05/30/2022    HCT 33.1 05/30/2022     05/30/2022    MCV 91.4 05/30/2022    MCH 30.4 05/30/2022    MCHC 33.2 05/30/2022    RDW 12.3 05/30/2022    LYMPHOPCT 25.3 05/30/2022    MONOPCT 5.9 05/30/2022    BASOPCT 0.5 05/30/2022    MONOSABS 0.48 05/30/2022    LYMPHSABS 2.07 05/30/2022    EOSABS 0.34 05/30/2022    BASOSABS 0.04 05/30/2022       CMP     Lab Results   Component Value Date     05/30/2022    K 3.9 05/30/2022     05/30/2022    CO2 20 05/30/2022    BUN 5 05/30/2022    CREATININE 0.7 05/30/2022    CREATININE 0.9 05/03/2019    GFRAA >60 05/30/2022    LABGLOM >60 05/30/2022    GLUCOSE 132 05/30/2022    PROT 5.8 05/30/2022    LABALBU 3.3 05/30/2022    CALCIUM 8.7 05/30/2022    BILITOT 0.4 05/30/2022 ALKPHOS 114 05/30/2022    AST 17 05/30/2022    ALT 24 05/30/2022         Hepatic Function Panel:    Lab Results   Component Value Date    ALKPHOS 114 05/30/2022    ALT 24 05/30/2022    AST 17 05/30/2022    PROT 5.8 05/30/2022    BILITOT 0.4 05/30/2022    LABALBU 3.3 05/30/2022       PT/INR:    Lab Results   Component Value Date    PROTIME 10.9 05/26/2022    INR 1.0 05/26/2022       TSH:    Lab Results   Component Value Date    TSH 6.750 05/26/2022       U/A:    Lab Results   Component Value Date    COLORU Yellow 05/26/2022    PHUR 6.0 05/26/2022    WBCUA NONE 05/26/2022    RBCUA NONE 05/26/2022    BACTERIA NONE SEEN 05/26/2022    CLARITYU Clear 05/26/2022    SPECGRAV <=1.005 05/26/2022    LEUKOCYTESUR Negative 05/26/2022    UROBILINOGEN 1.0 05/26/2022    BILIRUBINUR Negative 05/26/2022    BLOODU Negative 05/26/2022    GLUCOSEU 100 05/26/2022       ABG:  No results found for: CYM0XHO, BEART, T3SHPMVC, PHART, THGBART, URQ5KCL, PO2ART, POJ5DZN    MICROBIOLOGY:    Blood culture - neg to date     Radiology :    CT scan of abdomen and pelvis -  Impression:     There is ground-glass opacity identified in the periphery of the right middle   lobe and right lower lobe in which infiltrate cannot be excluded.  Findings   may suggest atelectatic change. Abnormal diffuse wall thickening of the descending colon sigmoid colon and   rectum to suggest diffuse colitis. Stephanie Scrivener is no perforation with abnormal   wall thickening and pericolonic stranding and fluid in the pericolic gutter. Fluid as well seen within the pelvis and surrounding the liver. IMPRESSION:     1. Colitis   2. Leukocytosis ( improved  ) lactic acidosis ( improved )   3. Right mid lobe infiltrates ? Aspiration     RECOMMENDATIONS:      1. Stop IV rocephin and flagyl   2.  Oral omnicef 300 mg po q 12 hrs and Flagyl po 500 mg q 8 hrs ~ 5 days

## 2022-05-30 NOTE — PLAN OF CARE
Problem: Discharge Planning  Goal: Discharge to home or other facility with appropriate resources  5/30/2022 1622 by Lior Newell RN  Outcome: Adequate for Discharge  5/30/2022 1148 by Lior Newell RN  Outcome: Progressing     Problem: Skin/Tissue Integrity  Goal: Absence of new skin breakdown  Description: 1. Monitor for areas of redness and/or skin breakdown  2. Assess vascular access sites hourly  3. Every 4-6 hours minimum:  Change oxygen saturation probe site  4. Every 4-6 hours:  If on nasal continuous positive airway pressure, respiratory therapy assess nares and determine need for appliance change or resting period. 5/30/2022 1622 by Lior Newell RN  Outcome: Adequate for Discharge  5/30/2022 1148 by Lior Newell RN  Outcome: Progressing     Problem: Safety - Adult  Goal: Free from fall injury  5/30/2022 1622 by Lior Newell RN  Outcome: Adequate for Discharge  5/30/2022 1148 by Lior Newell RN  Outcome: Progressing     Problem: Anxiety  Goal: Will report anxiety at manageable levels  Description: INTERVENTIONS:  1. Administer medication as ordered  2. Teach and rehearse alternative coping skills  3. Provide emotional support with 1:1 interaction with staff  5/30/2022 1622 by Lior Newell RN  Outcome: Adequate for Discharge  5/30/2022 1148 by Lior Newell RN  Outcome: Progressing     Problem: Coping  Goal: Pt/Family able to verbalize concerns and demonstrate effective coping strategies  Description: INTERVENTIONS:  1. Assist patient/family to identify coping skills, available support systems and cultural and spiritual values  2. Provide emotional support, including active listening and acknowledgement of concerns of patient and caregivers  3. Reduce environmental stimuli, as able  4. Instruct patient/family in relaxation techniques, as appropriate  5.  Assess for spiritual pain/suffering and initiate Spiritual Care, Psychosocial Clinical Specialist consults as needed  5/30/2022 1622 by Jamar Rizzo RN  Outcome: Adequate for Discharge  5/30/2022 1148 by Jamar Rizzo RN  Outcome: Progressing     Problem: Confusion  Goal: Confusion, delirium, dementia, or psychosis is improved or at baseline  Description: INTERVENTIONS:  1. Assess for possible contributors to thought disturbance, including medications, impaired vision or hearing, underlying metabolic abnormalities, dehydration, psychiatric diagnoses, and notify attending LIP  2. [place] high risk fall precautions, as indicated  3. Provide frequent short contacts to provide reality reorientation, refocusing and direction  4. Decrease environmental stimuli, including noise as appropriate  5. Monitor and intervene to maintain adequate nutrition, hydration, elimination, sleep and activity  6. If unable to ensure safety without constant attention obtain sitter and review sitter guidelines with assigned personnel  7.  Initiate Psychosocial CNS and Spiritual Care consult, as indicated  5/30/2022 1622 by Jamar Rizzo RN  Outcome: Adequate for Discharge  5/30/2022 1148 by Jamar Rizzo RN  Outcome: Progressing     Problem: Chronic Conditions and Co-morbidities  Goal: Patient's chronic conditions and co-morbidity symptoms are monitored and maintained or improved  5/30/2022 1622 by Jamar Rizzo RN  Outcome: Adequate for Discharge  5/30/2022 1148 by Jamar Rizzo RN  Outcome: Progressing     Problem: Pain  Goal: Verbalizes/displays adequate comfort level or baseline comfort level  5/30/2022 1622 by Jamar Rizzo RN  Outcome: Adequate for Discharge  5/30/2022 1148 by Jamar iRzzo RN  Outcome: Progressing

## 2022-05-30 NOTE — PLAN OF CARE
Problem: Discharge Planning  Goal: Discharge to home or other facility with appropriate resources  Outcome: Progressing     Problem: Skin/Tissue Integrity  Goal: Absence of new skin breakdown  Description: 1. Monitor for areas of redness and/or skin breakdown  2. Assess vascular access sites hourly  3. Every 4-6 hours minimum:  Change oxygen saturation probe site  4. Every 4-6 hours:  If on nasal continuous positive airway pressure, respiratory therapy assess nares and determine need for appliance change or resting period. Outcome: Progressing     Problem: Safety - Adult  Goal: Free from fall injury  Outcome: Progressing     Problem: Anxiety  Goal: Will report anxiety at manageable levels  Description: INTERVENTIONS:  1. Administer medication as ordered  2. Teach and rehearse alternative coping skills  3. Provide emotional support with 1:1 interaction with staff  Outcome: Progressing     Problem: Coping  Goal: Pt/Family able to verbalize concerns and demonstrate effective coping strategies  Description: INTERVENTIONS:  1. Assist patient/family to identify coping skills, available support systems and cultural and spiritual values  2. Provide emotional support, including active listening and acknowledgement of concerns of patient and caregivers  3. Reduce environmental stimuli, as able  4. Instruct patient/family in relaxation techniques, as appropriate  5. Assess for spiritual pain/suffering and initiate Spiritual Care, Psychosocial Clinical Specialist consults as needed  Outcome: Progressing     Problem: Confusion  Goal: Confusion, delirium, dementia, or psychosis is improved or at baseline  Description: INTERVENTIONS:  1. Assess for possible contributors to thought disturbance, including medications, impaired vision or hearing, underlying metabolic abnormalities, dehydration, psychiatric diagnoses, and notify attending LIP  2. [place] high risk fall precautions, as indicated  3.  Provide frequent short contacts to provide reality reorientation, refocusing and direction  4. Decrease environmental stimuli, including noise as appropriate  5. Monitor and intervene to maintain adequate nutrition, hydration, elimination, sleep and activity  6. If unable to ensure safety without constant attention obtain sitter and review sitter guidelines with assigned personnel  7.  Initiate Psychosocial CNS and Spiritual Care consult, as indicated  Outcome: Progressing     Problem: Chronic Conditions and Co-morbidities  Goal: Patient's chronic conditions and co-morbidity symptoms are monitored and maintained or improved  Outcome: Progressing     Problem: Pain  Goal: Verbalizes/displays adequate comfort level or baseline comfort level  Outcome: Progressing

## 2022-05-30 NOTE — PROGRESS NOTES
Hospitalist Progress Note      SYNOPSIS: Patient admitted on 2022     Patient presented to the emergency department after losing consciousness. 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 Vital signs show the patient is bradycardic with a rate in the 50s. Otherwise vital signs are within normal limits and stable. Laboratory studies demonstrate anion gap 19, lactic acid 5.3, glucose 199, troponin 16, ammonia 105. Chest x-ray unremarkable. CT head, CTA head/neck/brain perfusion unremarkable. CT abdomen pelvis shows diffusely dilated fluid-filled small bowel loops and colon with inflammatory changes between the jejunal loops in the left upper quadrant and surrounding the sigmoid colon concerning for inflammatory process like enterocolitis. Distended fluid-filled stomach which may be due to ileus or low-grade bowel obstruction or due to edema. High density fluid/hemorrhagic ascites in the pelvis. Atelectasis/groundglass densities in the lung bases. Nonspecific cystic lesion in the tail the pancreas with indeterminate. Neurology was consulted. Advised starting on Keppra.     SUBJECTIVE:    Patient is feeling \"well\" and wants to go home  Denies any complaints   Records reviewed. Stable overnight. No other overnight issues reported. Temp (24hrs), Av.1 °F (36.7 °C), Min:97.4 °F (36.3 °C), Max:98.7 °F (37.1 °C)    DIET: ADULT DIET; Regular  CODE: Full Code    Intake/Output Summary (Last 24 hours) at 2022 0759  Last data filed at 2022 0622  Gross per 24 hour   Intake 1800 ml   Output 2850 ml   Net -1050 ml       OBJECTIVE:    BP (!) 162/79   Pulse 65   Temp 98.7 °F (37.1 °C) (Temporal)   Resp 17   Ht 5' 7\" (1.702 m)   Wt 164 lb 7 oz (74.6 kg)   SpO2 94%   BMI 25.75 kg/m²     General appearance: No apparent distress, appears stated age and cooperative. HEENT:  Conjunctivae/corneas clear. Neck: Supple. No jugular venous distention.    Respiratory: Clear to auscultation bilaterally, normal respiratory effort  Cardiovascular: Regular rate rhythm, normal S1-S2  Abdomen: Soft, nontender, nondistended  Musculoskeletal: No clubbing, cyanosis, no bilateral lower extremity edema. Brisk capillary refill. Skin:  No rashes  on visible skin  Neurologic: awake, alert and following commands. Dysarthria     ASSESSMENT:    Altered mental status  Hx of CVA with prior hemorrhagic stroke   Lactic acidosis   Aspiration pneumonia  Dysarthria from prior stroke   Hypertension  Hyperlipidemia  DM type 2  Diabetic ulcer of left foot associated with type 1 diabetes mellitus   Right foot infection     PLAN:    - Assessed by stroke team at admission and felt to have a stroke mimic with a history of intracranial hemorrhage and global unresponsiveness and she is now on Keppra  - Aspiration pneumonia treated by infectious disease  - Colitis. Seen by surgery. Recommended abx and OP GI follow up to continue evaluation and work up for her IBD  - By ID stop Rocephin and Flagyl and begin Oral omnicef 300 mg po q 12 hrs and Flagyl po 500 mg q 8 hrs ~ 5 days   - C/w DM meds.  Guelier improving   - Await Neuro re consult and [place] of [place] with them            DISPOSITION:     Medications:  REVIEWED DAILY    Infusion Medications    dextrose 5% and 0.45% NaCl with KCl 20 mEq 50 mL/hr at 05/29/22 0855    sodium chloride      dextrose      Or    dextrose      dextrose       Scheduled Medications    amLODIPine  5 mg Oral Daily    atenolol  50 mg Oral Daily    atorvastatin  40 mg Oral Daily    pantoprazole  40 mg Oral QAM AC    lisinopril  30 mg Oral Daily    venlafaxine  150 mg Oral Daily    sodium chloride flush  10 mL IntraVENous 2 times per day    levETIRAcetam  500 mg IntraVENous Q12H    insulin lispro  0-12 Units SubCUTAneous TID WC    insulin lispro  0-6 Units SubCUTAneous Nightly    alogliptin  25 mg Oral Daily    And    metFORMIN  1,000 mg Oral Daily with breakfast    cefTRIAXone (ROCEPHIN) IV  1,000 mg IntraVENous Q24H    metroNIDAZOLE  500 mg Oral 3 times per day    lactulose  20 g Oral Once     PRN Meds: potassium chloride **OR** potassium alternative oral replacement **OR** potassium chloride, sodium chloride flush, sodium chloride, promethazine **OR** ondansetron, polyethylene glycol, acetaminophen **OR** acetaminophen, glucose, dextrose **OR** dextrose, glucagon (rDNA), dextrose    Labs:     Recent Labs     05/28/22  0554 05/29/22  0539 05/30/22  0624   WBC 12.8* 8.3 8.2   HGB 11.1* 10.3* 11.0*   HCT 33.6* 31.3* 33.1*    272 343       Recent Labs     05/28/22  0554 05/29/22  0539 05/29/22  1943    147*  --    K 3.5 2.7* 4.4    118*  --    CO2 21* 17*  --    BUN 10 6  --    CREATININE 0.7 0.6  --    CALCIUM 8.9 6.7*  --        Recent Labs     05/28/22  0554 05/29/22  0539   PROT 5.6* 4.2*   ALKPHOS 138* 95   ALT 38* 21   AST 25 16   BILITOT 0.6 0.4       No results for input(s): INR in the last 72 hours. No results for input(s): Shellia Bugler in the last 72 hours. Chronic labs:    Lab Results   Component Value Date    CHOL 120 03/09/2022    TRIG 160 (H) 03/09/2022    HDL 38 03/09/2022    LDLCALC 50 03/09/2022    TSH 6.750 (H) 05/26/2022    INR 1.0 05/26/2022    LABA1C 6.3 (H) 05/27/2022       Radiology: REVIEWED DAILY    +++++++++++++++++++++++++++++++++++++++++++++++++  Maxwell Perales MD  [employer] - [address] [facility], [place]  +++++++++++++++++++++++++++++++++++++++++++++++++  NOTE: This report was transcribed using voice recognition software. Every effort was made to ensure accuracy; however, inadvertent computerized transcription errors may be present.

## 2022-05-30 NOTE — PROGRESS NOTES
[facility]  Neurology follow up     Date:  5/30/2022  Patient Name:  Sandra Moreno  YOB: 1955  MRN: [id number]       Assessment  Possible first time seizure    Due to somnolence on arrival and lactic acid of 5.3, this is a possibility   Reasonable to continue Keppra for now until OP follow up     Plan  · Continue Keppra for now  · EEG as OP as patient is otherwise stable for d/c and EEG would not be completed until tomorrow   · No driving until f/u with OP neurology   · Okay to d/c - will sign off, please call with questions or new issues        History of Present Illness:  Sandra Moreno is a 61 y.o. right handed female presenting for evaluation of LOC. 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 She describes feeling diaphoretic and \"clammy\" prior to LOC. No tonic clonic movements were reported. No tongue biting or incontinence. Upon arrival to the ED BG noted to be 251. Lactic acid 5.3. She was bradycardic with HR in the 50s. Has hx of prior stroke in the R internal capsule with residual dysarthria. She was somnolent on arrival. Patient tells me she does remember being in the ambulance. Stroke alert called. [place] possibly either stroke mimic vs sizure. Loaded with keppra and started on 500 mg BID. MRI brain this admission with no acute findings. Wants to go home. Denies seizure hx.        No chest pain or palpitations  No SOB  No vertigo, lightheadedness or loss of consciousness  No falls, tripping or stumbling  No incontinence of bowels or bladder  No itching or bruising appreciated  No numbness, tingling or focal arm/leg weakness    ROS otherwise negative     Allergies:      No Known Allergies     Physical Examination  Vitals   Vitals:    05/29/22 2045 05/29/22 2047 05/30/22 0030 05/30/22 0800   BP: (!) 159/73 (!) 146/117 (!) 162/79 (!) 143/89   Pulse:  56 65 69   Resp: 18  17 16   Temp: 97.4 °F (36.3 °C)  98.7 °F (37.1 °C) 98.1 °F (36.7 °C) TempSrc: Temporal  Temporal Temporal   SpO2: 94%  94% 99%   Weight:       Height:            General: Patient appears in no acute distress. Awake and oriented x 4. HEENT: Normocephalic, atraumatic  Chest: Clear to auscultation bilaterally  Heart: No murmurs appreciated  Extremities/Peripheral vascular: No edema/swelling noted. No cold limbs noted. Neurologic Examination    Mental Status  Alert, and oriented to person, place and time. Speech is fluent with intact comprehension. No evidence of memory impairment. Attention and concentration appeared normal.     Cranial Nerves  II. Visual fields full to confrontation bilaterally. III, IV, VI: Pupils equally round and reactive to light, 3 to 2 mm bilaterally. EOMs: full, no nystagmus. V. Facial sensation intact to light touch bilaterally  VII: Facial movements symmetric and strong  VIII: Hearing intact to voice  IX,X: Palate elevates symmetrically.  Mild dysarthria  XI: Sternocleidomastoid and trapezius 5/5 bilaterally   XII: Tongue is midline    Motor    Mild R hemiparesis     Tone: Normal in all four limbs    Bulk: Normal in all four limbs with no evidence of atrophy    Pronator drift: absent bilaterally    Sensation  · Light Touch: Intact distally in all four limbs  ·   Reflexes     Right Left   Biceps 2 2   Brachioradialis 2 2   Triceps 2 2   Patellar 2 2   Achilles 2 2   ankle clonus none none   Babinski Present  absent     Coordination  Rapid alternating movements normal in bilateral upper extremities  Finger to nose testing normal bilaterally  Heel to shin testing normal bilaterally    Gait    Deferred for safety/fall consideration      Labs  Recent Labs     05/30/22  0624      K 3.9   *   CO2 20*   BUN 5*   CREATININE 0.7   GLUCOSE 132*   CALCIUM 8.7   PROT 5.8*   LABALBU 3.3*   BILITOT 0.4   ALKPHOS 114*   AST 17   ALT 24   WBC 8.2   RBC 3.62   HGB 11.0*   HCT 33.1*   MCV 91.4   MCH 30.4   MCHC 33.2   RDW 12.3      MPV 10.6   LACTA 0.8     Imaging  MRI BRAIN WO CONTRAST   Final Result   1. No acute intracranial abnormality. 2. Small chronic infarction in the right external capsule. RECOMMENDATIONS:   Unavailable         CT ABDOMEN PELVIS W IV CONTRAST Additional Contrast? Oral   Final Result   There is ground-glass opacity identified in the periphery of the right middle   lobe and right lower lobe in which infiltrate cannot be excluded. Findings   may suggest atelectatic change. Abnormal diffuse wall thickening of the descending colon sigmoid colon and   rectum to suggest diffuse colitis. There is no perforation with abnormal   wall thickening and pericolonic stranding and fluid in the pericolic gutter. Fluid as well seen within the pelvis and surrounding the liver. CT ABDOMEN PELVIS WO CONTRAST Additional Contrast? None   Final Result   Diffusely dilated fluid-filled small bowel loops and colon with inflammatory   changes between the jejunal loops in the left upper quadrant and surrounding   the sigmoid colon concerning for inflammatory process like enterocolitis. Considering the long segment of mural thickening of the descending and   sigmoid colon, underlying malignancy/colon cancer is considered. Consider   colonoscopy when feasible. Distended fluid-filled stomach which may be due to ileus or low-grade bowel   obstruction due to edema. High density fluid/hemorrhagic ascites in the pelvis. Atelectasis/ground-glass densities in lung bases. Clinical assessment is   recommended to evaluate for pneumonia. Nonspecific cystic lesion in the tail of the pancreas which is indeterminate. Surveillance preferably by MRI is recommended      RECOMMENDATIONS:   Unavailable         CTA HEAD W CONTRAST   Final Result   1. Estimated stenosis of the proximal right and left internal carotid   artery by NASCET criteria is not hemodynamically significant   2. Mild atherosclerotic disease . 3.  No large vessel occlusion identified            This study was analyzed by the [address] ai algorithm. CTA NECK W CONTRAST   Final Result   1. Estimated stenosis of the proximal right and left internal carotid   artery by NASCET criteria is not hemodynamically significant   2. Mild atherosclerotic disease . 3. No large vessel occlusion identified            This study was analyzed by the [employer]. ai algorithm. CT BRAIN PERFUSION   Final Result      No significant ischemic penumbra identified      This study was analyzed by the [employer]. ai algorithm. XR CHEST PORTABLE   Final Result   No acute process         CT Head WO Contrast   Final Result   No acute intracranial abnormality.                I have personally reviewed the following images: MRI brain     Electronically signed by JAYDEN Birmingham on 5/30/2022 at 1:47 PM

## 2022-05-31 LAB
BLOOD CULTURE, ROUTINE: NORMAL
CULTURE, BLOOD 2: NORMAL

## 2022-06-05 NOTE — DISCHARGE SUMMARY
Hospitalist Discharge Summary    Patient ID: Chandu Quinonez   Patient : 1955  Patient's PCP: No primary care provider on file. Admit Date: 2022   Admitting Physician: Elsie Miguel DO    Discharge Date:  2022   Discharge Physician: Jamison Crespo MD   Discharge Condition: Stable  Discharge Disposition: [facility] course in brief:  (Please refer to daily progress notes for a comprehensive review of the hospitalization by requesting medical records)    Patient presented to the emergency department after losing consciousness. 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 Vital signs show the patient is bradycardic with a rate in the 50s. Otherwise vital signs are within normal limits and stable. Laboratory studies demonstrate anion gap 19, lactic acid 5.3, glucose 199, troponin 16, ammonia 105. Chest x-ray unremarkable. CT head, CTA head/neck/brain perfusion unremarkable. CT abdomen pelvis shows diffusely dilated fluid-filled small bowel loops and colon with inflammatory changes between the jejunal loops in the left upper quadrant and surrounding the sigmoid colon concerning for inflammatory process like enterocolitis. Distended fluid-filled stomach which may be due to ileus or low-grade bowel obstruction or due to edema. High density fluid/hemorrhagic ascites in the pelvis. Atelectasis/groundglass densities in the lung bases. Nonspecific cystic lesion in the tail the pancreas with indeterminate. Neurology was consulted. Advised starting on Keppra. - Assessed by stroke team at admission and felt to have a stroke mimic with a history of intracranial hemorrhage and global unresponsiveness and she is now on Keppra. MRI was negative for acute stroke  - Aspiration pneumonia treated by infectious disease  - Colitis. Seen by surgery.  Recommended abx and OP GI follow up to continue evaluation and work up for her IBD  - By ID stop Rocephin and Flagyl and begin Oral omnicef 300 mg po q 12 hrs and Flagyl po 500 mg q 8 hrs ~ 5 days   Neurologist recommended against driving in 6 months. Up as an outpatient in office continue with Keppra. Consults:   IP CONSULT TO INTERNAL MEDICINE  IP CONSULT TO GENERAL SURGERY  IP CONSULT TO NEUROLOGY  IP CONSULT TO INFECTIOUS DISEASES  IP CONSULT TO GENERAL SURGERY    Discharge Diagnoses:    Hx of CVA with prior hemorrhagic stroke   Lactic acidosis   Aspiration pneumonia  Dysarthria from prior stroke   Hypertension  Hyperlipidemia  DM type 2  Diabetic ulcer of left foot associated with type 1 diabetes mellitus   Right foot infection         Discharge Instructions / Follow up:    Future Appointments   Date Time Provider Lila Mabry   6/6/2022 10:15 AM Sandra Ulysses [facility]   7/18/2022  3:00 PM Sandra [facility]       Continued appropriate risk factor modification of blood pressure, diabetes and serum lipids will remain essential to reducing risk of future atherosclerotic development    Activity: activity as tolerated    Significant labs:  CBC:   No results for input(s): WBC, RBC, HGB, HCT, MCV, RDW, PLT in the last 72 hours. BMP: No results for input(s): NA, K, CL, CO2, BUN, CREATININE, CA, MG, PHOS in the last 72 hours. LFT:  No results for input(s): PROT, ALB, ALKPHOS, ALT, AST, BILITOT, AMYLASE, LIPASE in the last 72 hours. PT/INR: No results for input(s): INR, APTT in the last 72 hours. BNP: No results for input(s): BNP in the last 72 hours.   Hgb A1C:   Lab Results   Component Value Date    LABA1C 6.3 (H) 05/27/2022     Folate and B12:   Lab Results   Component Value Date    HYACFXQM12 >2000 (H) 04/12/2021   ,   Lab Results   Component Value Date    FOLATE >20.0 04/12/2021     Thyroid Studies:   Lab Results   Component Value Date    TSH 6.750 (H) 05/26/2022    Q1KVIRJ 99.51 03/16/2016       Urinalysis:    Lab Results   Component Value Date    NITRU Negative 05/26/2022    WBCUA NONE 05/26/2022    BACTERIA NONE SEEN 05/26/2022    RBCUA NONE 05/26/2022    BLOODU Negative 05/26/2022    SPECGRAV <=1.005 05/26/2022    GLUCOSEU 100 05/26/2022       Imaging:  CT ABDOMEN PELVIS WO CONTRAST Additional Contrast? None    Result Date: 5/26/2022  EXAMINATION: CT OF THE ABDOMEN AND PELVIS WITHOUT CONTRAST 5/26/2022 6:21 pm TECHNIQUE: CT of the abdomen and pelvis was performed without the administration of intravenous contrast. Multiplanar reformatted images are provided for review. Automated exposure control, iterative reconstruction, and/or weight based adjustment of the mA/kV was utilized to reduce the radiation dose to as low as reasonably achievable. COMPARISON: None. HISTORY: ORDERING SYSTEM PROVIDED HISTORY: elevated ammonia, alk phos TECHNOLOGIST PROVIDED HISTORY: Reason for exam:->elevated ammonia, alk phos Additional Contrast?->None What reading provider will be dictating this exam?->CRC FINDINGS: The lung bases demonstrate atelectasis/ground-glass densities concerning for pneumonia. Liver is fatty infiltrated. Dilated fluid-filled stomach is identified. Gallbladder is absent. Spleen appears normal.   there is a 1.4 x 1.1 cm nonspecific indeterminate cystic lesion in the tail of the pancreas. The adrenals and the kidneys are normal.  There is dilated fluid-filled small bowel loops with jejunum measuring up to 2.2 cm with mural thickening and inflammatory changes surrounding the bowel loops in the left upper quadrant. Degenerative changes are identified in the lumbar spine. Pelvis. Bladder is collapsed. There is distended colon with the liquid stool. There is diffuse mural thickening of a long segment of descending and sigmoid colon with inflammatory changes especially on the sigmoid colon. There is a small amount of high-density fluid in the pelvis which could be hemorrhagic.   The appendix is normal.     Diffusely dilated fluid-filled small bowel loops and colon with inflammatory changes between the jejunal loops in the left upper quadrant and surrounding the sigmoid colon concerning for inflammatory process like enterocolitis. Considering the long segment of mural thickening of the descending and sigmoid colon, underlying malignancy/colon cancer is considered. Consider colonoscopy when feasible. Distended fluid-filled stomach which may be due to ileus or low-grade bowel obstruction due to edema. High density fluid/hemorrhagic ascites in the pelvis. Atelectasis/ground-glass densities in lung bases. Clinical assessment is recommended to evaluate for pneumonia. Nonspecific cystic lesion in the tail of the pancreas which is indeterminate. Surveillance preferably by MRI is recommended RECOMMENDATIONS: Unavailable     CT Head WO Contrast    Result Date: 5/26/2022  EXAMINATION: CT OF THE HEAD WITHOUT CONTRAST  5/26/2022 1:55 pm TECHNIQUE: CT of the head was performed without the administration of intravenous contrast. Automated exposure control, iterative reconstruction, and/or weight based adjustment of the mA/kV was utilized to reduce the radiation dose to as low as reasonably achievable. COMPARISON: None. HISTORY: ORDERING SYSTEM PROVIDED HISTORY: stroke alert TECHNOLOGIST PROVIDED HISTORY: Reason for exam:->stroke alert Has a \"code stroke\" or \"stroke alert\" been called? ->Yes Decision Support Exception - unselect if not a suspected or confirmed emergency medical condition->Emergency Medical Condition (MA) What reading provider will be dictating this exam?->CRC FINDINGS: BRAIN/VENTRICLES: There is no acute intracranial hemorrhage, mass effect or midline shift. No abnormal extra-axial fluid collection. The gray-white differentiation is maintained without evidence of an acute infarct. There is no evidence of hydrocephalus. ORBITS: The visualized portion of the orbits demonstrate no acute abnormality. SINUSES: The visualized paranasal sinuses and mastoid air cells demonstrate no acute abnormality.  SOFT TISSUES/SKULL:  No acute abnormality of the visualized skull or soft tissues. No acute intracranial abnormality. CT ABDOMEN PELVIS W IV CONTRAST Additional Contrast? Oral    Result Date: 5/27/2022  EXAMINATION: CT OF THE ABDOMEN AND PELVIS WITH CONTRAST 5/27/2022 7:51 am TECHNIQUE: CT of the abdomen and pelvis was performed with the administration of intravenous contrast. Multiplanar reformatted images are provided for review. Automated exposure control, iterative reconstruction, and/or weight based adjustment of the mA/kV was utilized to reduce the radiation dose to as low as reasonably achievable. COMPARISON: None. HISTORY: ORDERING SYSTEM PROVIDED HISTORY: Abdominal pain TECHNOLOGIST PROVIDED HISTORY: Additional Contrast?->Oral Reason for exam:->Abdominal pain What reading provider will be dictating this exam?->CRC FINDINGS: Lower Chest: Patchy ground-glass opacity identified in the periphery of the right middle lobe and right lower lobe. Atelectatic change or infiltrate cannot be excluded. The left lung base is clear. Organs: The liver is homogeneous in appearance. The gallbladder is been surgically removed. Fluid seen surrounding the liver. The spleen is unremarkable. Pancreas reveals dilatation identified of the distal pancreatic duct. There is mild atrophy identified of the distal body and tail the pancreas. There is symmetry identified in the enhancement pattern of the renal parenchyma. Parapelvic cyst identified in the left kidney. No distension of the renal collecting system. No evidence of stones. Both adrenal glands are within normal limits. GI/Bowel: The stomach is unremarkable no evidence of wall thickening. Small bowel is within normal limits. No evidence of mucosal thickening. Stool seen scattered diffusely throughout the colon. Abnormal wall thickening identified descending colon and sigmoid colon as well as the rectum to suggest a colitis.   There is abnormal pericolonic stranding and fluid in the left pericolic gutter. Minimal fluid in the pelvis. Pelvis: The bladder reveals Correia catheter balloon with decompression. The uterus has been surgically removed. Peritoneum/Retroperitoneum: There is no abdominal or retroperitoneal lymphadenopathy with moderate atherosclerotic disease seen within the abdominal aorta and iliac vessels. No pelvic adenopathy. Bones/Soft Tissues: Bony structures reveal minimal degenerative changes identified within the spine and pelvis. No ventral abdominal wall mass or defect. There is ground-glass opacity identified in the periphery of the right middle lobe and right lower lobe in which infiltrate cannot be excluded. Findings may suggest atelectatic change. Abnormal diffuse wall thickening of the descending colon sigmoid colon and rectum to suggest diffuse colitis. There is no perforation with abnormal wall thickening and pericolonic stranding and fluid in the pericolic gutter. Fluid as well seen within the pelvis and surrounding the liver. XR CHEST PORTABLE    Result Date: 2022  EXAMINATION: ONE XRAY VIEW OF THE CHEST 2022 2:13 pm COMPARISON: 2021 HISTORY: ORDERING SYSTEM PROVIDED HISTORY: sepsis, r/o pneumonia TECHNOLOGIST PROVIDED HISTORY: Reason for exam:->sepsis, r/o pneumonia What reading provider will be dictating this exam?->CRC FINDINGS: Depth of inspiration is not ideal.  There are no obvious infiltrates or effusions.   Heart size is normal.     No acute process     CTA NECK W CONTRAST    Result Date: 2022  Patient MRN:  [id number] : 1955 Age: 61 years Gender: Female Order Date:  2022 EXAM: CTA NECK W CONTRAST, CTA HEAD W CONTRAST NUMBER OF IMAGES:  6:15 INDICATION:  stroke alert stroke alert Decision Support Exception - unselect if not a suspected or confirmed emergency medical condition->Emergency Medical Condition (MA) What reading provider will be dictating this exam?->[facility] COMPARISON: None Technique: Low-dose CT  acquisition technique included one of following options; 1 . Automated exposure control, 2. Adjustment of MA and or KV according to patient's size or 3. Use of iterative reconstruction. Contiguous spiral images were obtained in the axial plane, following the administration of intravenous contrast using CT angiographic protocol. Sagittal and coronal images were reconstructed from the axial plane acquisition. Additional MIP reconstructions were presented to aid in the interpretation of this study. Images were obtained from the skull base cranially. There is mild calcified plaque identified in the vessels compatible with atherosclerotic disease. The right carotid is unremarkable. The left carotid is unremarkable. The right vertebral artery is unremarkable The left vertebral artery is unremarkable The basilar artery is unremarkable The middle cerebral arteries are unremarkable The anterior cerebral arteries are unremarkable The posterior cerebral arteries are unremarkable     1. Estimated stenosis of the proximal right and left internal carotid artery by NASCET criteria is not hemodynamically significant 2. Mild atherosclerotic disease . 3. No large vessel occlusion identified This study was analyzed by the [employer] ai algorithm. CT BRAIN PERFUSION    Result Date: 2022  Patient MRN: [id number] : 1955 Age:  61 years Gender: Female Order Date:   2022 Exam: CT BRAIN PERFUSION Number of Images: 333 views Indication:   stroke alert stroke alert Comparison: None. Findings: Perfusion images demonstrate symmetric blood volume Blood flow images demonstrate symmetric blood flow There is no significant ischemic penumbra identified. There is no significant core infarct identified. No significant ischemic penumbra identified This study was analyzed by the [employer]. ai algorithm.      CTA HEAD W CONTRAST    Result Date: 2022  Patient MRN:  [id number] : 1955 Age: 61 years Gender: Female Order Date:  2022 EXAM: CTA NECK W CONTRAST, CTA HEAD W CONTRAST NUMBER OF IMAGES:  6:15 INDICATION:  stroke alert stroke alert Decision Support Exception - unselect if not a suspected or confirmed emergency medical condition->Emergency Medical Condition (MA) What reading provider will be dictating this exam?->[facility] COMPARISON: None Technique: Low-dose CT  acquisition technique included one of following options; 1 . Automated exposure control, 2. Adjustment of MA and or KV according to patient's size or 3. Use of iterative reconstruction. Contiguous spiral images were obtained in the axial plane, following the administration of intravenous contrast using CT angiographic protocol. Sagittal and coronal images were reconstructed from the axial plane acquisition. Additional MIP reconstructions were presented to aid in the interpretation of this study. Images were obtained from the skull base cranially. There is mild calcified plaque identified in the vessels compatible with atherosclerotic disease. The right carotid is unremarkable. The left carotid is unremarkable. The right vertebral artery is unremarkable The left vertebral artery is unremarkable The basilar artery is unremarkable The middle cerebral arteries are unremarkable The anterior cerebral arteries are unremarkable The posterior cerebral arteries are unremarkable     1. Estimated stenosis of the proximal right and left internal carotid artery by NASCET criteria is not hemodynamically significant 2. Mild atherosclerotic disease . 3. No large vessel occlusion identified This study was analyzed by the [employer]. ai algorithm.      MRI BRAIN WO CONTRAST    Result Date: 5/29/2022  EXAMINATION: MRI OF THE BRAIN WITHOUT CONTRAST  5/29/2022 2:48 pm TECHNIQUE: Multiplanar multisequence MRI of the brain was performed without the administration of intravenous contrast. COMPARISON: CT head without contrast, 05/26/2022 HISTORY: ORDERING SYSTEM PROVIDED HISTORY: stroke TECHNOLOGIST PROVIDED HISTORY: Reason for exam:->stroke What reading provider will be dictating this exam?->CRC FINDINGS: INTRACRANIAL STRUCTURES/VENTRICLES: There is no acute infarct. Chronic hemorrhage is seen in the right external capsular region. The remainder of the brain is notable for minimal volume loss, with mild chronic microvascular ischemic changes in the alpa. The skull base arterial flow voids are grossly intact. No hydrocephalus or extra-axial fluid is seen. ORBITS: The visualized portion of the orbits demonstrate no acute abnormality. SINUSES: The visualized paranasal sinuses and mastoid air cells demonstrate no acute abnormality. BONES/SOFT TISSUES: The bone marrow signal intensity appears normal. The soft tissues demonstrate no acute abnormality. 1.  No acute intracranial abnormality. 2. Small chronic infarction in the right external capsule.  RECOMMENDATIONS: Unavailable       Discharge Medications:      Medication List      START taking these medications    levETIRAcetam 500 MG tablet  Commonly known as: KEPPRA  Take 1 tablet by mouth 2 times daily     polyethylene glycol 17 g packet  Commonly known as: GLYCOLAX  Take 17 g by mouth daily as needed for Constipation        CONTINUE taking these medications    amLODIPine 5 MG tablet  Commonly known as: NORVASC  TAKE 1 TABLET DAILY IN THE MORNING     atenolol 50 MG tablet  Commonly known as: TENORMIN  TAKE 1 TABLET DAILY     atorvastatin 40 MG tablet  Commonly known as: LIPITOR  TAKE 1 TABLET DAILY     esomeprazole 40 MG delayed release capsule  Commonly known as: NEXIUM  TAKE 1 CAPSULE EVERY MORNING BEFORE BREAKFAST     Janumet  MG per tablet  Generic drug: sitaGLIPtan-metFORMIN  Take 1 tablet by mouth daily     lisinopril 30 MG tablet  Commonly known as: PRINIVIL;ZESTRIL  TAKE 1 TABLET DAILY AT NIGHT     potassium chloride 10 MEQ extended release capsule  Commonly known as: MICRO-K  Take 1 capsule by mouth daily     venlafaxine 150 MG extended release capsule  Commonly known as: EFFEXOR XR  TAKE 1 CAPSULE DAILY        ASK your doctor about these medications    cefdinir 300 MG capsule  Commonly known as: OMNICEF  Take 1 capsule by mouth every 12 hours for 5 days  Ask about: Should I take this medication?     metroNIDAZOLE 500 MG tablet  Commonly known as: FLAGYL  Take 1 tablet by mouth every 8 hours for 5 days  Ask about: Should I take this medication? Where to Get Your Medications      These medications were [facility] [id number] [address] - F [phone] [id number] [facility], [facility] [address] [place] [address]    Phone: [phone]   · cefdinir 300 MG capsule  · levETIRAcetam 500 MG tablet  · metroNIDAZOLE 500 MG tablet  · polyethylene glycol 17 g packet  · potassium chloride 10 MEQ extended release capsule         Time Spent on discharge is more than 45 minutes in the examination, evaluation, counseling and review of medications and discharge plan.    +++++++++++++++++++++++++++++++++++++++++++++++++  Kia Sin MD  [facility] - [address] [place] [address]  +++++++++++++++++++++++++++++++++++++++++++++++++  NOTE: This report was transcribed using voice recognition software. Every effort was made to ensure accuracy; however, inadvertent computerized transcription errors may be present.

## 2022-06-13 ENCOUNTER — OFFICE VISIT (OUTPATIENT)
Dept: FAMILY MEDICINE CLINIC | Age: 67
End: 2022-06-13
Payer: COMMERCIAL

## 2022-06-13 VITALS
BODY MASS INDEX: 24.01 KG/M2 | TEMPERATURE: 97.8 F | HEIGHT: 67 IN | WEIGHT: 153 LBS | SYSTOLIC BLOOD PRESSURE: 136 MMHG | RESPIRATION RATE: 16 BRPM | DIASTOLIC BLOOD PRESSURE: 78 MMHG | HEART RATE: 72 BPM | OXYGEN SATURATION: 96 %

## 2022-06-13 DIAGNOSIS — R29.898 WEAKNESS OF BOTH HANDS: ICD-10-CM

## 2022-06-13 DIAGNOSIS — R29.898 WEAKNESS OF BOTH LOWER EXTREMITIES: ICD-10-CM

## 2022-06-13 DIAGNOSIS — Z09 HOSPITAL DISCHARGE FOLLOW-UP: Primary | ICD-10-CM

## 2022-06-13 DIAGNOSIS — R41.89 UNRESPONSIVENESS: ICD-10-CM

## 2022-06-13 DIAGNOSIS — Z74.09 IMPAIRED FUNCTIONAL MOBILITY, BALANCE, GAIT, AND ENDURANCE: ICD-10-CM

## 2022-06-13 PROCEDURE — 1111F DSCHRG MED/CURRENT MED MERGE: CPT | Performed by: FAMILY MEDICINE

## 2022-06-13 PROCEDURE — 99214 OFFICE O/P EST MOD 30 MIN: CPT | Performed by: FAMILY MEDICINE

## 2022-06-13 RX ORDER — AMLODIPINE BESYLATE 5 MG/1
TABLET ORAL
Qty: 90 TABLET | Refills: 1 | Status: SHIPPED
Start: 2022-06-13 | End: 2022-07-06 | Stop reason: SDUPTHER

## 2022-06-13 RX ORDER — ATORVASTATIN CALCIUM 40 MG/1
TABLET, FILM COATED ORAL
Qty: 90 TABLET | Refills: 1 | Status: SHIPPED
Start: 2022-06-13 | End: 2022-07-06 | Stop reason: SDUPTHER

## 2022-06-13 ASSESSMENT — ENCOUNTER SYMPTOMS
DIARRHEA: 0
CONSTIPATION: 0
SORE THROAT: 0
BACK PAIN: 0
VOMITING: 0
NAUSEA: 0
ABDOMINAL PAIN: 0
BLOOD IN STOOL: 0
WHEEZING: 0
COUGH: 0
SHORTNESS OF BREATH: 0

## 2022-06-13 NOTE — PROGRESS NOTES
Post-Discharge Transitional Care Follow Up      Madeleine Flores   YOB: 1955    Date of Office Visit:  2022  Date of Hospital Admission: 22  Date of Hospital Discharge: 22  Readmission Risk Score (high >=14%. Medium >=10%):Readmission Risk Score: 10 ( )      Care management risk score Rising risk (score 2-5) and Complex Care (Scores >=6): 1     Non face to face  following discharge, date last encounter closed (first attempt may have been earlier): *No documented post hospital discharge outreach found in the last 14 days     Call initiated 2 business days of discharge: *No response recorded in the last 14 days     Hospital discharge follow-up  -     DE DISCHARGE MEDS RECONCILED W/ CURRENT OUTPATIENT MED LIST  Unresponsiveness  -     DE DISCHARGE MEDS RECONCILED W/ CURRENT OUTPATIENT MED LIST  Weakness of both hands  -     [address], [facility] [place] MED LIST  Weakness of both lower extremities  -     [facility], [facility] MED LIST  Impaired functional mobility, balance, gait, and endurance  -     [facility], [address] Decision Making: moderate complexity  Return Schedule New Patient visit with Dr. Chalino Talamantes (see note below). Subjective:   Patient ID: Madeleine Flores   Patient : 1955  Patient's PCP: No primary care provider on file.     Admit Date: 2022   Admitting Physician: Adryan Harris DO     Discharge Date:  2022   Discharge Physician: Mona Carrel, MD   Discharge Condition: Stable  Discharge Disposition: [address] [place] [address] course in brief:  (Please refer to daily progress notes for a comprehensive review of the hospitalization by requesting medical records)     Patient presented to the emergency department after losing consciousness.  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 Vital signs show the patient is bradycardic with a rate in the 50s. Otherwise vital signs are within normal limits and stable. Laboratory studies demonstrate anion gap 19, lactic acid 5.3, glucose 199, troponin 16, ammonia 105. Chest x-ray unremarkable. CT head, CTA head/neck/brain perfusion unremarkable. CT abdomen pelvis shows diffusely dilated fluid-filled small bowel loops and colon with inflammatory changes between the jejunal loops in the left upper quadrant and surrounding the sigmoid colon concerning for inflammatory process like enterocolitis. Distended fluid-filled stomach which may be due to ileus or low-grade bowel obstruction or due to edema. High density fluid/hemorrhagic ascites in the pelvis. Atelectasis/groundglass densities in the lung bases. Nonspecific cystic lesion in the tail the pancreas with indeterminate. Neurology was consulted. Advised starting on Keppra. Shellye Rash by stroke team at admission and felt to have a stroke mimic with a history of intracranial hemorrhage and global unresponsiveness and she is now on Keppra. MRI was negative for acute stroke  - Aspiration pneumonia treated by infectious disease  - Colitis. Seen by surgery. Recommended abx and OP GI follow up to continue evaluation and work up for her IBD  - By ID stop Rocephin and Flagyl and begin Oral omnicef 300 mg po q 12 hrs and Flagyl po 500 mg q 8 hrs ~ 5 days   Neurologist recommended against driving in 6 months.   Up as an outpatient in office continue with Keppra.     Consults:   IP CONSULT TO INTERNAL MEDICINE  IP CONSULT TO GENERAL SURGERY  IP CONSULT TO NEUROLOGY  IP CONSULT TO INFECTIOUS DISEASES  IP CONSULT TO GENERAL SURGERY     Discharge Diagnoses:     Hx of CVA with prior hemorrhagic stroke   Lactic acidosis   Aspiration pneumonia  Dysarthria from prior stroke   Hypertension  Hyperlipidemia  DM type 2  Diabetic ulcer of left foot associated with type 1 diabetes mellitus Right foot infection           Discharge Instructions / Follow up:           Future Appointments  Date Time Provider Lila Mabry  6/6/2022 10:15 AM Beverly De La Garza DO [facility]  7/18/2022  3:00 PM Beverly De La Garza DO [facility]        Continued appropriate risk factor modification of blood pressure, diabetes and serum lipids will remain essential to reducing risk of future atherosclerotic development     Activity: activity as tolerated     Significant labs:  CBC:   No results for input(s): WBC, RBC, HGB, HCT, MCV, RDW, PLT in the last 72 hours. BMP: No results for input(s): NA, K, CL, CO2, BUN, CREATININE, CA, MG, PHOS in the last 72 hours. LFT:  No results for input(s): PROT, ALB, ALKPHOS, ALT, AST, BILITOT, AMYLASE, LIPASE in the last 72 hours. PT/INR: No results for input(s): INR, APTT in the last 72 hours. BNP: No results for input(s): BNP in the last 72 hours. Hgb A1C:        Lab Results  Component Value Date    LABA1C 6.3 (H) 05/27/2022     Folate and B12:        Lab Results  Component Value Date    PSQIAAOU11 >2000 (H) 04/12/2021  ,        Lab Results  Component Value Date    FOLATE >20.0 04/12/2021     Thyroid Studies:        Lab Results  Component Value Date    TSH 6.750 (H) 05/26/2022    O4AFWUH 99.51 03/16/2016        Urinalysis:         Lab Results  Component Value Date    NITRU Negative 05/26/2022    WBCUA NONE 05/26/2022    BACTERIA NONE SEEN 05/26/2022    RBCUA NONE 05/26/2022    BLOODU Negative 05/26/2022    SPECGRAV <=1.005 05/26/2022    GLUCOSEU 100 05/26/2022        Imaging:  CT ABDOMEN PELVIS WO CONTRAST Additional Contrast? None     Result Date: 5/26/2022  EXAMINATION: CT OF THE ABDOMEN AND PELVIS WITHOUT CONTRAST 5/26/2022 6:21 pm TECHNIQUE: CT of the abdomen and pelvis was performed without the administration of intravenous contrast. Multiplanar reformatted images are provided for review.  Automated exposure control, iterative reconstruction, and/or weight based adjustment of the mA/kV was utilized to reduce the radiation dose to as low as reasonably achievable. COMPARISON: None. HISTORY: ORDERING SYSTEM PROVIDED HISTORY: elevated ammonia, alk phos TECHNOLOGIST PROVIDED HISTORY: Reason for exam:->elevated ammonia, alk phos Additional Contrast?->None What reading provider will be dictating this exam?->CRC FINDINGS: The lung bases demonstrate atelectasis/ground-glass densities concerning for pneumonia. Liver is fatty infiltrated. Dilated fluid-filled stomach is identified. Gallbladder is absent. Spleen appears normal.   there is a 1.4 x 1.1 cm nonspecific indeterminate cystic lesion in the tail of the pancreas. The adrenals and the kidneys are normal.  There is dilated fluid-filled small bowel loops with jejunum measuring up to 2.2 cm with mural thickening and inflammatory changes surrounding the bowel loops in the left upper quadrant. Degenerative changes are identified in the lumbar spine. Pelvis. Bladder is collapsed. There is distended colon with the liquid stool. There is diffuse mural thickening of a long segment of descending and sigmoid colon with inflammatory changes especially on the sigmoid colon. There is a small amount of high-density fluid in the pelvis which could be hemorrhagic. The appendix is normal.      Diffusely dilated fluid-filled small bowel loops and colon with inflammatory changes between the jejunal loops in the left upper quadrant and surrounding the sigmoid colon concerning for inflammatory process like enterocolitis. Considering the long segment of mural thickening of the descending and sigmoid colon, underlying malignancy/colon cancer is considered. Consider colonoscopy when feasible. Distended fluid-filled stomach which may be due to ileus or low-grade bowel obstruction due to edema. High density fluid/hemorrhagic ascites in the pelvis. Atelectasis/ground-glass densities in lung bases.   Clinical assessment is recommended to evaluate for pneumonia. Nonspecific cystic lesion in the tail of the pancreas which is indeterminate. Surveillance preferably by MRI is recommended RECOMMENDATIONS: Unavailable      CT Head WO Contrast     Result Date: 5/26/2022  EXAMINATION: CT OF THE HEAD WITHOUT CONTRAST  5/26/2022 1:55 pm TECHNIQUE: CT of the head was performed without the administration of intravenous contrast. Automated exposure control, iterative reconstruction, and/or weight based adjustment of the mA/kV was utilized to reduce the radiation dose to as low as reasonably achievable. COMPARISON: None. HISTORY: ORDERING SYSTEM PROVIDED HISTORY: stroke alert TECHNOLOGIST PROVIDED HISTORY: Reason for exam:->stroke alert Has a \"code stroke\" or \"stroke alert\" been called? ->Yes Decision Support Exception - unselect if not a suspected or confirmed emergency medical condition->Emergency Medical Condition (MA) What reading provider will be dictating this exam?->CRC FINDINGS: BRAIN/VENTRICLES: There is no acute intracranial hemorrhage, mass effect or midline shift. No abnormal extra-axial fluid collection. The gray-white differentiation is maintained without evidence of an acute infarct. There is no evidence of hydrocephalus. ORBITS: The visualized portion of the orbits demonstrate no acute abnormality. SINUSES: The visualized paranasal sinuses and mastoid air cells demonstrate no acute abnormality. SOFT TISSUES/SKULL:  No acute abnormality of the visualized skull or soft tissues.      No acute intracranial abnormality.      CT ABDOMEN PELVIS W IV CONTRAST Additional Contrast? Oral     Result Date: 5/27/2022  EXAMINATION: CT OF THE ABDOMEN AND PELVIS WITH CONTRAST 5/27/2022 7:51 am TECHNIQUE: CT of the abdomen and pelvis was performed with the administration of intravenous contrast. Multiplanar reformatted images are provided for review.  Automated exposure control, iterative reconstruction, and/or weight based adjustment of the mA/kV was utilized to reduce the radiation dose to as low as reasonably achievable. COMPARISON: None. HISTORY: ORDERING SYSTEM PROVIDED HISTORY: Abdominal pain TECHNOLOGIST PROVIDED HISTORY: Additional Contrast?->Oral Reason for exam:->Abdominal pain What reading provider will be dictating this exam?->CRC FINDINGS: Lower Chest: Patchy ground-glass opacity identified in the periphery of the right middle lobe and right lower lobe. Atelectatic change or infiltrate cannot be excluded. The left lung base is clear. Organs: The liver is homogeneous in appearance. The gallbladder is been surgically removed. Fluid seen surrounding the liver. The spleen is unremarkable. Pancreas reveals dilatation identified of the distal pancreatic duct. There is mild atrophy identified of the distal body and tail the pancreas. There is symmetry identified in the enhancement pattern of the renal parenchyma. Parapelvic cyst identified in the left kidney. No distension of the renal collecting system. No evidence of stones. Both adrenal glands are within normal limits. GI/Bowel: The stomach is unremarkable no evidence of wall thickening. Small bowel is within normal limits. No evidence of mucosal thickening. Stool seen scattered diffusely throughout the colon. Abnormal wall thickening identified descending colon and sigmoid colon as well as the rectum to suggest a colitis. There is abnormal pericolonic stranding and fluid in the left pericolic gutter. Minimal fluid in the pelvis. Pelvis: The bladder reveals Correia catheter balloon with decompression. The uterus has been surgically removed. Peritoneum/Retroperitoneum: There is no abdominal or retroperitoneal lymphadenopathy with moderate atherosclerotic disease seen within the abdominal aorta and iliac vessels. No pelvic adenopathy. Bones/Soft Tissues: Bony structures reveal minimal degenerative changes identified within the spine and pelvis.   No ventral abdominal wall mass or defect.      There is ground-glass opacity identified in the periphery of the right middle lobe and right lower lobe in which infiltrate cannot be excluded. Findings may suggest atelectatic change. Abnormal diffuse wall thickening of the descending colon sigmoid colon and rectum to suggest diffuse colitis. There is no perforation with abnormal wall thickening and pericolonic stranding and fluid in the pericolic gutter. Fluid as well seen within the pelvis and surrounding the liver.      XR CHEST PORTABLE     Result Date: 2022  EXAMINATION: ONE XRAY VIEW OF THE CHEST 2022 2:13 pm COMPARISON: 2021 HISTORY: ORDERING SYSTEM PROVIDED HISTORY: sepsis, r/o pneumonia TECHNOLOGIST PROVIDED HISTORY: Reason for exam:->sepsis, r/o pneumonia What reading provider will be dictating this exam?->CRC FINDINGS: Depth of inspiration is not ideal.  There are no obvious infiltrates or effusions. Heart size is normal.      No acute process      CTA NECK W CONTRAST     Result Date: 2022  Patient MRN:  [id number] : 1955 Age: 61 years Gender: Female Order Date:  2022 EXAM: CTA NECK W CONTRAST, CTA HEAD W CONTRAST NUMBER OF IMAGES:  6:15 INDICATION:  stroke alert stroke alert Decision Support Exception - unselect if not a suspected or confirmed emergency medical condition->Emergency Medical Condition (MA) What reading provider will be dictating this exam?->[facility] COMPARISON: None Technique: Low-dose CT  acquisition technique included one of following options; 1 . Automated exposure control, 2. Adjustment of MA and or KV according to patient's size or 3. Use of iterative reconstruction. Contiguous spiral images were obtained in the axial plane, following the administration of intravenous contrast using CT angiographic protocol. Sagittal and coronal images were reconstructed from the axial plane acquisition. Additional MIP reconstructions were presented to aid in the interpretation of this study.  Images were obtained from the skull base cranially. There is mild calcified plaque identified in the vessels compatible with atherosclerotic disease. The right carotid is unremarkable. The left carotid is unremarkable. The right vertebral artery is unremarkable The left vertebral artery is unremarkable The basilar artery is unremarkable The middle cerebral arteries are unremarkable The anterior cerebral arteries are unremarkable The posterior cerebral arteries are unremarkable      1. Estimated stenosis of the proximal right and left internal carotid artery by NASCET criteria is not hemodynamically significant 2. Mild atherosclerotic disease . 3. No large vessel occlusion identified This study was analyzed by the [employer] ai algorithm.      CT BRAIN PERFUSION     Result Date: 2022  Patient MRN: [id number] : 1955 Age:  61 years Gender: Female Order Date:   2022 Exam: CT BRAIN PERFUSION Number of Images: 333 views Indication:   stroke alert stroke alert Comparison: None. Findings: Perfusion images demonstrate symmetric blood volume Blood flow images demonstrate symmetric blood flow There is no significant ischemic penumbra identified. There is no significant core infarct identified.      No significant ischemic penumbra identified This study was analyzed by the [employer] ai algorithm.      CTA HEAD W CONTRAST     Result Date: 2022  Patient MRN:  [id number] : 1955 Age: 61 years Gender: Female Order Date:  2022 EXAM: CTA NECK W CONTRAST, CTA HEAD W CONTRAST NUMBER OF IMAGES:  6:15 INDICATION:  stroke alert stroke alert Decision Support Exception - unselect if not a suspected or confirmed emergency medical condition->Emergency Medical Condition (MA) What reading provider will be dictating this exam?->[facility] COMPARISON: None Technique: Low-dose CT  acquisition technique included one of following options; 1 . Automated exposure control, 2. Adjustment of MA and or KV according to patient's size or 3.  Use of iterative reconstruction. Contiguous spiral images were obtained in the axial plane, following the administration of intravenous contrast using CT angiographic protocol. Sagittal and coronal images were reconstructed from the axial plane acquisition. Additional MIP reconstructions were presented to aid in the interpretation of this study. Images were obtained from the skull base cranially. There is mild calcified plaque identified in the vessels compatible with atherosclerotic disease. The right carotid is unremarkable. The left carotid is unremarkable. The right vertebral artery is unremarkable The left vertebral artery is unremarkable The basilar artery is unremarkable The middle cerebral arteries are unremarkable The anterior cerebral arteries are unremarkable The posterior cerebral arteries are unremarkable      1. Estimated stenosis of the proximal right and left internal carotid artery by NASCET criteria is not hemodynamically significant 2. Mild atherosclerotic disease . 3. No large vessel occlusion identified This study was analyzed by the [employer] algorithm.      MRI BRAIN WO CONTRAST     Result Date: 5/29/2022  EXAMINATION: MRI OF THE BRAIN WITHOUT CONTRAST  5/29/2022 2:48 pm TECHNIQUE: Multiplanar multisequence MRI of the brain was performed without the administration of intravenous contrast. COMPARISON: CT head without contrast, 05/26/2022 HISTORY: ORDERING SYSTEM PROVIDED HISTORY: stroke TECHNOLOGIST PROVIDED HISTORY: Reason for exam:->stroke What reading provider will be dictating this exam?->CRC FINDINGS: INTRACRANIAL STRUCTURES/VENTRICLES: There is no acute infarct. Chronic hemorrhage is seen in the right external capsular region. The remainder of the brain is notable for minimal volume loss, with mild chronic microvascular ischemic changes in the alpa. The skull base arterial flow voids are grossly intact. No hydrocephalus or extra-axial fluid is seen.  ORBITS: The visualized portion of the orbits demonstrate no acute abnormality. SINUSES: The visualized paranasal sinuses and mastoid air cells demonstrate no acute abnormality. BONES/SOFT TISSUES: The bone marrow signal intensity appears normal. The soft tissues demonstrate no acute abnormality.      1. No acute intracranial abnormality. 2. Small chronic infarction in the right external capsule. RECOMMENDATIONS: Unavailable         Discharge Medications:           Medication List     START taking these medications        levETIRAcetam 500 MG tablet  Commonly known as: KEPPRA  Take 1 tablet by mouth 2 times daily     polyethylene glycol 17 g packet  Commonly known as: GLYCOLAX  Take 17 g by mouth daily as needed for Constipation                  CONTINUE taking these medications        amLODIPine 5 MG tablet  Commonly known as: NORVASC  TAKE 1 TABLET DAILY IN THE MORNING     atenolol 50 MG tablet  Commonly known as: TENORMIN  TAKE 1 TABLET DAILY     atorvastatin 40 MG tablet  Commonly known as: LIPITOR  TAKE 1 TABLET DAILY     esomeprazole 40 MG delayed release capsule  Commonly known as: NEXIUM  TAKE 1 CAPSULE EVERY MORNING BEFORE BREAKFAST     Janumet  MG per tablet  Generic drug: sitaGLIPtan-metFORMIN  Take 1 tablet by mouth daily     lisinopril 30 MG tablet  Commonly known as: PRINIVIL;ZESTRIL  TAKE 1 TABLET DAILY AT NIGHT     potassium chloride 10 MEQ extended release capsule  Commonly known as: MICRO-K  Take 1 capsule by mouth daily     venlafaxine 150 MG extended release capsule  Commonly known as: EFFEXOR XR  TAKE 1 CAPSULE DAILY                  ASK your doctor about these medications        cefdinir 300 MG capsule  Commonly known as: OMNICEF  Take 1 capsule by mouth every 12 hours for 5 days  Ask about: Should I take this medication?     metroNIDAZOLE 500 MG tablet  Commonly known as: FLAGYL  Take 1 tablet by mouth every 8 hours for 5 days  Ask about: Should I take this medication?         Inpatient course: Discharge summary reviewed- see chart.    Interval history/Current status:   She is accompanied by her . She feels okay, but feels that she is weak in hands and lower extremities and she feels off balance. S/P CVA by history in 2020; there was no assessment at the time and F/U imaging of head remarkable only for small chronic right capsular infarct. Patient is not taking Keppra, citing side effects. Patient and her  inquired about PT/OT    Patient Active Problem List   Diagnosis    Right foot infection    Diabetic ulcer of left foot associated with type 1 diabetes mellitus ([address]    Type 2 diabetes mellitus without complication, without long-term current use of insulin ([address]    Essential hypertension    Gastroesophageal reflux disease without esophagitis    Mixed hyperlipidemia    Subacute osteomyelitis of foot ([address]    Dysarthria    [facility]    Altered mental status    Lactic acidosis       Medications listed as ordered at the time of discharge from hospital     Medication List          Accurate as of June 13, 2022 10:40 AM. If you have any questions, ask your nurse or doctor.             CONTINUE taking these medications    amLODIPine 5 MG tablet  Commonly known as: NORVASC  TAKE 1 TABLET DAILY IN THE MORNING     atenolol 50 MG tablet  Commonly known as: TENORMIN  TAKE 1 TABLET DAILY     atorvastatin 40 MG tablet  Commonly known as: LIPITOR  TAKE 1 TABLET DAILY     esomeprazole 40 MG delayed release capsule  Commonly known as: NEXIUM  TAKE 1 CAPSULE EVERY MORNING BEFORE BREAKFAST     Janumet  MG per tablet  Generic drug: sitaGLIPtan-metFORMIN  Take 1 tablet by mouth daily     levETIRAcetam 500 MG tablet  Commonly known as: KEPPRA  Take 1 tablet by mouth 2 times daily     lisinopril 30 MG tablet  Commonly known as: PRINIVIL;ZESTRIL  TAKE 1 TABLET DAILY AT NIGHT     MULTI-VITAMIN DAILY PO     polyethylene glycol 17 g packet  Commonly known as: GLYCOLAX  Take 17 g by mouth daily as needed for Constipation     potassium chloride 10 MEQ extended release capsule  Commonly known as: MICRO-K  Take 1 capsule by mouth daily     venlafaxine 150 MG extended release capsule  Commonly known as: EFFEXOR XR  TAKE 1 CAPSULE DAILY             Medications marked \"taking\" at this time  Outpatient Medications Marked as Taking for the 6/13/22 encounter (Office Visit) with Sharan Hernandes MD   Medication Sig Dispense Refill    Multiple Vitamin (MULTI-VITAMIN DAILY PO) Take by mouth      potassium chloride (MICRO-K) 10 MEQ extended release capsule Take 1 capsule by mouth daily 90 capsule 0    amLODIPine (NORVASC) 5 MG tablet TAKE 1 TABLET DAILY IN THE MORNING 90 tablet 1    atenolol (TENORMIN) 50 MG tablet TAKE 1 TABLET DAILY 90 tablet 1    atorvastatin (LIPITOR) 40 MG tablet TAKE 1 TABLET DAILY 90 tablet 1    esomeprazole (NEXIUM) 40 MG delayed release capsule TAKE 1 CAPSULE EVERY MORNING BEFORE BREAKFAST 90 capsule 1    lisinopril (PRINIVIL;ZESTRIL) 30 MG tablet TAKE 1 TABLET DAILY AT NIGHT 90 tablet 1    sitaGLIPtan-metFORMIN (JANUMET)  MG per tablet Take 1 tablet by mouth daily 90 tablet 1    venlafaxine (EFFEXOR XR) 150 MG extended release capsule TAKE 1 CAPSULE DAILY 90 capsule 1        Medications patient taking as of now reconciled against medications ordered at time of hospital discharge: Yes    Review of Systems   HENT: Negative for congestion, ear pain and sore throat. Respiratory: Negative for cough, shortness of breath and wheezing. Cardiovascular: Negative for chest pain, palpitations and leg swelling. Gastrointestinal: Negative for abdominal pain, blood in stool, constipation, diarrhea, nausea and vomiting. Genitourinary: Negative for dysuria, frequency, hematuria and urgency. Musculoskeletal: Negative for back pain, myalgias and neck pain. Skin: Negative for rash.    Neurological: Positive for speech difficulty (Dysarthria following suspected CVA 2020) and weakness (hands, lower extremities L>R). Negative for dizziness and headaches. Psychiatric/Behavioral: The patient is not nervous/anxious. Objective:    /78   Pulse 72   Temp 97.8 °F (36.6 °C) (Infrared)   Resp 16   Ht 5' 7\" (1.702 m)   Wt 153 lb (69.4 kg)   SpO2 96%   BMI 23.96 kg/m²   Physical Exam  Neurological:      Cranial Nerves: Dysarthria (chronic) present. Motor: Weakness (weakness of hands and bilateral LE, L>R) present. Assessment / Plan:      Prisca Hernandez was seen today for other. Diagnoses and all orders for this visit:    Hospital discharge follow-up: Patient is currently not taking Keppra as directed  -     [place] DISCHARGE MEDS RECONCILED W/ CURRENT OUTPATIENT MED LIST  -     Patient is to make appointment with neurology for hospital follow-up    Unresponsiveness: Patient is currently not taking Keppra as directed  -     [place] DISCHARGE MEDS RECONCILED W/ CURRENT OUTPATIENT MED LIST  -     Patient is to make appointment with neurology for hospital follow-up      Weakness of both hands: Bilateral.  Etiology unclear  -     [facility] - Occupational Therapy, [facility] MED LIST    Weakness of both lower extremities: Left greater than right. Etiology unclear  -     [facility] - Physical Therapy, [facility] MED LIST    Impaired functional mobility, balance, gait, and endurance  -     [facility] - Physical Therapy, [facility]      Patient was advised to resume Keppra; she declines. Other chronic medications checked; patient has enough medication to get her to new patient appointment on 7/6/2022. An electronic signature was used to authenticate this note.   --Mikayla Singh MD

## 2022-06-29 ENCOUNTER — EVALUATION (OUTPATIENT)
Dept: PHYSICAL THERAPY | Age: 67
End: 2022-06-29
Payer: COMMERCIAL

## 2022-06-29 DIAGNOSIS — R53.1 ASTHENIA: Primary | ICD-10-CM

## 2022-06-29 DIAGNOSIS — Z74.09 IMPAIRED FUNCTIONAL MOBILITY, BALANCE, GAIT, AND ENDURANCE: ICD-10-CM

## 2022-06-29 DIAGNOSIS — R29.898 WEAKNESS OF BOTH LOWER EXTREMITIES: Primary | ICD-10-CM

## 2022-06-29 DIAGNOSIS — M21.379 FOOT-DROP, UNSPECIFIED LATERALITY: ICD-10-CM

## 2022-06-29 PROCEDURE — 97163 PT EVAL HIGH COMPLEX 45 MIN: CPT | Performed by: PHYSICAL THERAPIST

## 2022-06-29 NOTE — PROGRESS NOTES
[facility] OUTPATIENT REHABILITATION  PHYSICAL THERAPY INITIAL EVALUATION         Date:  2022   Patient: Syd Kahn  : 1955  MRN: [id number]  Referring Provider: Cassie Kohli MD   [address]     Medical Diagnosis:   R29.898 (ICD-10-CM) - Weakness of both lower extremities   Z74.09 (ICD-10-CM) - Impaired functional mobility, balance, gait, and endurance     Physician Order: Eval and Treat      SUBJECTIVE:     History: Patient reports decreased functional mobility over the past 8  month(s) due to TIA/CVA? Tahira Valente Chief complaint: weakness, difficulty walking, decreased balance, foot drop L    Behavior: condition is getting worse    Pain: 0/10    Imaging results: No results found.     Past Medical History:  Past Medical History:   Diagnosis Date    Abnormal mammogram of left breast 2019    Anxiety     [address]erebral artery occlusion with cerebral infarction ([address]     Depression     Diabetes mellitus ([address]     Dysarthria 2021    Erosive esophagitis     Esophageal stricture     GERD (gastroesophageal reflux disease)     Gestational diabetes     Hemorrhagic stroke ([address] 2021    Hyperlipidemia     Hypertension     Hypokalemia     Osteoarthritis     knees    Postmenopausal     Type 2 diabetes mellitus without complication ([address]      Past Surgical History:   Procedure Laterality Date    BREAST BIOPSY Left 2019    Dr. Candy Gutierrez Shilpi Right      SECTION      x4    CHOLECYSTECTOMY      COLONOSCOPY  2011    HYSTERECTOMY (CERVIX STATUS UNKNOWN)      JOINT REPLACEMENT Right     knee       Medications:   Current Outpatient Medications   Medication Sig Dispense Refill    Multiple Vitamin (MULTI-VITAMIN DAILY PO) Take by mouth      amLODIPine (NORVASC) 5 MG tablet TAKE 1 TABLET DAILY IN THE MORNING 90 tablet 1    atorvastatin (LIPITOR) 40 MG tablet TAKE 1 TABLET DAILY 90 tablet 1    levETIRAcetam (KEPPRA) 500 MG tablet Take 1 tablet by mouth 2 times daily (Patient not taking: Reported on 6/13/2022) 60 tablet 0    potassium chloride (MICRO-K) 10 MEQ extended release capsule Take 1 capsule by mouth daily 90 capsule 0    atenolol (TENORMIN) 50 MG tablet TAKE 1 TABLET DAILY 90 tablet 1    esomeprazole (NEXIUM) 40 MG delayed release capsule TAKE 1 CAPSULE EVERY MORNING BEFORE BREAKFAST 90 capsule 1    lisinopril (PRINIVIL;ZESTRIL) 30 MG tablet TAKE 1 TABLET DAILY AT NIGHT 90 tablet 1    sitaGLIPtan-metFORMIN (JANUMET)  MG per tablet Take 1 tablet by mouth daily 90 tablet 1    venlafaxine (EFFEXOR XR) 150 MG extended release capsule TAKE 1 CAPSULE DAILY 90 capsule 1     No current facility-administered medications for this visit. Social history: Patient lives with spouse and family in a 1 story house with 1 step to enter without railing. Equipment owned: none    Occupation: retired. Physical , [employer]    Exercise regimen: none    Hobbies: crafts    Patient Goals: walk normally, and get strength  back    Contraindications/Precautions: none    OBJECTIVE:     Estimated body mass index is 23.96 kg/m² as calculated from the following:    Height as of 6/13/22: 5' 7\" (1.702 m). Weight as of 6/13/22: 153 lb (69.4 kg).      Observations: well nourished female, normal affect    Inspection: normal orthopedic exam       Gait: drop foot left , ataxic    Functional Strength:   Able to toe walk, heel walk(unable on L), and able to 1/2 squat only    Range of Motion:    Upper Extremity:   Right:   [x] Normal   [] Limited    Left:   [x] Normal   [] Limited     Trunk:   Flexion:  [x] Normal   [] Limited    Extension:  [x] Normal   [] Limited      Lower Extremity:   Right:   [x] Normal   [] Limited    Left:   [x] Normal   [] Limited       Strength:   R UE: 4-/5 shld, weakness of hand intrinsic musculature   L UE: 4-/5 shld, weakness of hand intrinsic musculature   R LE: 4+/5   L LE: 5-/5, dorsiflexors 0/5    Functional Mobility/Tests:  Test    Basic Transfer independent   Sit/Stand independent   Squat    Double stance, feet together, eyes open Good-   Double stance, feet together, eyes closed Fair   Single leg stance Unable on either leg   360 degree turns    Dysdiadochokinesia Not present    Dysmetria  Not present      TUG Test:  _11_  Seconds. Test date: 6/29/2022  Notes: An older adult who takes ? 12 seconds to complete the TUG is at high risk for falling. 30-Sec. Chair Stand Test:  Number:  _8_  Test date: 6/29/2022    Notes:      Scoring criteria. Chair Stand--Below Average Scores Age  Men  Women    60-64  < 14  < 12    65-69  < 12  < 11    70-74  < 12  < 10    75-79  < 11  < 10    80-84  < 10  < 9    85-89  < 8  < 8    90-94  < 7  < 4         ASSESSMENT     Marisol has significant bilateral weakness, shoulder elevation, hand intrinsics, and L foot drop that do not match up with her CVA residual weakness. I contacted her referring physician about my fear of a upper motor neuron pathology. We will work on strengthening and gait.      Outcome Measure:   LEFS 56% impairment    Problems:   Strength decreased   Balance decreased in both standing and walking   Limitations with walking, use of right upper extremity, use of left upper extremity, use of right lower extremity, use of left lower extremity      [x] There are no barriers affecting plan of care or recovery    [] Barriers to this patient's plan of care or recovery include:      Domestic Concerns:  [x] No  [] Yes:    Short Term goals (2-3 weeks)   Increase Strength 1/2 grade    Demonstrate improved balance in standing and walking    Long Term goals (4-6 weeks)   Increase Strength 1/2 to 1 grade    Demonstrate improved balance in standing and walking   Able to perform / complete the following functions / tasks: normal gait   Independent with Home Exercise Programs   LEFS 30% impairment     Rehab Potential: [x] Good  [] Fair  [] Poor    PLAN     Time: 4413-3650   40 minutes    Treatment Plan:  instruction in home exercise program   therapeutic exercise   therapeutic activity   neuromuscular re-education   gait training   balance training     The following CPT codes are likely to be used in the care of this patient:   87455 PT Evaluation: High Complexity   71138 Therapeutic Exercise   36069 Neuromuscular Re-Education   80393 Therapeutic Activities   11853 Gait Training     Suggested Professional Referral: [x] No  [] Yes:     Patient Education:  [x] Plans / Goals, Risks / Benefits discussed  [x] Home exercise program  Method of Education: [x] Verbal  [x] Demo  [x] Written  Comprehension of Education:  [x] Verbalizes understanding. [x] Demonstrates understanding. [] Needs Review. [] Demonstrates / verbalizes understanding of HEP / Obadiah Battiest previously given. Frequency:  1-2 days per week for 4-6 weeks    Patient understands diagnosis/prognosis and consents to treatment, plan and goals: [x] Yes    [] No     Thank you for the opportunity to work with your patient. If you have questions or comments, please contact me at [phone]; fax: [phone]. Electronically signed by: Paulo Bosworth, PT         Please sign Physician's Certification and return to: [address] [place] [address] PHYSICAL THERAPY  [facility] [address]  Dept: [phone]  Dept Fax: [phone]  Loc: [phone] Certification / Comments     Frequency/Duration 1-2 days per week for 4-6 weeks. Certification period from 6/29/2022  to 9/22/2022. I have reviewed the Plan of Care established for skilled therapy services and certify that the services are required and that they will be provided while the patient is under my care.     Physician's Comments/Revisions:               Physician's Printed Name: Physician's Signature:                                                               Date:

## 2022-07-06 RX ORDER — ATENOLOL 50 MG/1
TABLET ORAL
Qty: 30 TABLET | Refills: 0 | Status: SHIPPED
Start: 2022-07-06 | End: 2022-07-28

## 2022-07-06 RX ORDER — LISINOPRIL 30 MG/1
TABLET ORAL
Qty: 14 TABLET | Refills: 0 | Status: SHIPPED
Start: 2022-07-06 | End: 2022-07-28 | Stop reason: SDUPTHER

## 2022-07-06 RX ORDER — POTASSIUM CHLORIDE 750 MG/1
CAPSULE, EXTENDED RELEASE ORAL
Qty: 30 CAPSULE | Refills: 0 | Status: SHIPPED
Start: 2022-07-06 | End: 2022-07-22

## 2022-07-06 RX ORDER — AMLODIPINE BESYLATE 5 MG/1
TABLET ORAL
Qty: 14 TABLET | Refills: 0 | Status: SHIPPED
Start: 2022-07-06 | End: 2022-07-28 | Stop reason: SDUPTHER

## 2022-07-06 RX ORDER — LEVETIRACETAM 500 MG/1
500 TABLET ORAL 2 TIMES DAILY
Qty: 28 TABLET | Refills: 0 | Status: SHIPPED
Start: 2022-07-06 | End: 2022-07-28 | Stop reason: SINTOL

## 2022-07-06 RX ORDER — VENLAFAXINE HYDROCHLORIDE 150 MG/1
CAPSULE, EXTENDED RELEASE ORAL
Qty: 30 CAPSULE | Refills: 0 | Status: SHIPPED
Start: 2022-07-06 | End: 2022-07-28 | Stop reason: SDUPTHER

## 2022-07-06 RX ORDER — ATENOLOL 50 MG/1
TABLET ORAL
Qty: 14 TABLET | Refills: 0 | Status: SHIPPED
Start: 2022-07-06 | End: 2022-07-28 | Stop reason: SDUPTHER

## 2022-07-06 RX ORDER — ATORVASTATIN CALCIUM 40 MG/1
TABLET, FILM COATED ORAL
Qty: 90 TABLET | Refills: 1 | Status: SHIPPED
Start: 2022-07-06 | End: 2022-07-28 | Stop reason: SDUPTHER

## 2022-07-06 RX ORDER — ESOMEPRAZOLE MAGNESIUM 40 MG/1
CAPSULE, DELAYED RELEASE ORAL
Qty: 14 CAPSULE | Refills: 0 | Status: SHIPPED
Start: 2022-07-06 | End: 2022-07-28 | Stop reason: SDUPTHER

## 2022-07-06 NOTE — TELEPHONE ENCOUNTER
Patient called she needs 2 week supply she is waiting on mail order      Last seen 6/13/2022  Next appt Visit date not found    [facility] [address] [address]

## 2022-07-07 ENCOUNTER — TREATMENT (OUTPATIENT)
Dept: PHYSICAL THERAPY | Age: 67
End: 2022-07-07
Payer: COMMERCIAL

## 2022-07-07 DIAGNOSIS — Z74.09 IMPAIRED FUNCTIONAL MOBILITY, BALANCE, GAIT, AND ENDURANCE: ICD-10-CM

## 2022-07-07 DIAGNOSIS — R29.898 WEAKNESS OF BOTH LOWER EXTREMITIES: Primary | ICD-10-CM

## 2022-07-07 PROCEDURE — 97110 THERAPEUTIC EXERCISES: CPT

## 2022-07-07 NOTE — PROGRESS NOTES
Physical Therapy Daily Treatment Note    Date: 2022  Patient Name: Josephine Bueno  : 1955   MRN: [id number]  [facility]: DOSx:   Referring Provider: Johny Gar MD  [facility]     Medical Diagnosis:     R29.898 (ICD-10-CM) - Weakness of both lower extremities   Z74.09 (ICD-10-CM) - Impaired functional mobility, balance, gait, and endurance       Outcome Measure:   LEFS 56% impairment        X = TO BE PERFORMED NEXT VISIT  > = PROGRESS TO THIS    S: no changes, but feels worse just weak  O: Access Code: [email or web address]  URL: [email or web address]  Date: 2022  Prepared by: Yvette Rhodes    Exercises  Standing March with Counter Support - 1 x daily - 7 x weekly - 3 sets - 10 reps  Squat - 1 x daily - 7 x weekly - 3 sets - 10 reps  Standing Hip Abduction with Counter Support - 1 x daily - 7 x weekly - 3 sets - 10 reps  Standing Ankle Dorsiflexion with Table Support - 1 x daily - 7 x weekly - 3 sets - 10 reps    Time 4807-0453     Visit 2/ 60 visit limit    Pain Pain 0/10     ROM      Modalities            Exercise      Nustep   L5/ 10 min UE too TE         Squats 3 x 10  TA      TA   Toe Raises 3 x 10  Sitting with towel TA   Marches 3 x 10 hold onto leg press  TA   Alt. Sidekicks 3 x 10 hold onto leg press  TA         Sit/Stands 9x/  30 sec  TA         Gait training   GT         Marching Gait   NR   Sidestepping   NR         Leg press  20# 2 x 15                       A:  Tolerated well. No calf raises due to foot drop.   Gave pt HEP   P: Continue with rehab plan  Yvette Rhodes, PTA    Treatment Charges: Mins Units   Initial Evaluation     Re-Evaluation     Ther Exercise         TE 40 3   Manual Therapy     MT     Ther Activities        TA     Gait Training          GT     Neuro Re-education NR     Modalities     Non-Billable Service Time     Other     Total Time/Units 40 3

## 2022-07-11 ENCOUNTER — EVALUATION (OUTPATIENT)
Dept: OCCUPATIONAL THERAPY | Age: 67
End: 2022-07-11
Payer: COMMERCIAL

## 2022-07-11 ENCOUNTER — TREATMENT (OUTPATIENT)
Dept: PHYSICAL THERAPY | Age: 67
End: 2022-07-11
Payer: COMMERCIAL

## 2022-07-11 DIAGNOSIS — R29.898 MUSCLE FUNCTION LOSS: Primary | ICD-10-CM

## 2022-07-11 DIAGNOSIS — R29.898 WEAKNESS OF BOTH LOWER EXTREMITIES: Primary | ICD-10-CM

## 2022-07-11 DIAGNOSIS — Z74.09 IMPAIRED FUNCTIONAL MOBILITY, BALANCE, GAIT, AND ENDURANCE: ICD-10-CM

## 2022-07-11 PROCEDURE — 97110 THERAPEUTIC EXERCISES: CPT

## 2022-07-11 PROCEDURE — 97166 OT EVAL MOD COMPLEX 45 MIN: CPT | Performed by: OCCUPATIONAL THERAPIST

## 2022-07-11 NOTE — PROGRESS NOTES
Physical Therapy Daily Treatment Note    Date: 2022  Patient Name: Vish Estrella  : 1955   MRN: [id number]  DOInjury: DOSx:   Referring Provider: Gerianne Peabody, MD  [facility]     Medical Diagnosis:     R29.898 (ICD-10-CM) - Weakness of both lower extremities   Z74.09 (ICD-10-CM) - Impaired functional mobility, balance, gait, and endurance       Outcome Measure:   LEFS 56% impairment        X = TO BE PERFORMED NEXT VISIT  > = PROGRESS TO THIS    S: no changes, but feels worse just weak  O: Access Code: [email or web address]  URL: [email or web address]  Date: 2022  Prepared by: Melanee Bound    Exercises  Standing March with Counter Support - 1 x daily - 7 x weekly - 3 sets - 10 reps  Squat - 1 x daily - 7 x weekly - 3 sets - 10 reps  Standing Hip Abduction with Counter Support - 1 x daily - 7 x weekly - 3 sets - 10 reps  Standing Ankle Dorsiflexion with Table Support - 1 x daily - 7 x weekly - 3 sets - 10 reps    Time 1420- 1500     Visit 3 / 60 visit limit    Pain Pain 0/10     ROM      Modalities            Exercise      Nustep   L5/ 10 min UE too TE         Squats 3 x 10  TE           DF stretch  3 x 10 using pro stretch   TE   Marches 3 x 10 hold onto leg press  TE   Alt. Sidekicks 3 x 10 hold onto leg press  TE         Sit/Stands 9x/  30 sec then posterior LOB x 1 into chair   TE         Gait training   GT         Marching Gait   NR   Sidestepping   NR         Leg press  20# 2 x 15                       A:  Tolerated well. Pt fatigued end of rx session . Gave pt HEP   P: Continue with rehab plan  . Pt wants x 1 for both therapies at this time only .   Farnaz Salazar, PTA    Treatment Charges: Mins Units   Initial Evaluation     Re-Evaluation     Ther Exercise         TE 40 3   Manual Therapy     MT     Ther Activities        TA     Gait Training          GT     Neuro Re-education NR     Modalities     Non-Billable Service Time     Other     Total Time/Units 40 3

## 2022-07-11 NOTE — PROGRESS NOTES
OCCUPATIONAL THERAPY INITIAL EVALUATION    [address] THERAPY   Isidro Drake [address] [place] [address]  Dept: [phone]  Loc: [phone]   Marilynn Jansen OT Fax: [phone]    Date:  2022  Initial Evaluation Date: 2022   Evaluating Therapist: Edgardo Castillo OT    Patient Name:  Rae Sandoval    :  1955    Restrictions/Precautions:  Non noted - has L foot drop, moderated fall risk  Diagnosis:  Weakness both Hands   R29.898 (ICD-10-CM) - Weakness of both hands       Date of Surgery/Injury: over the past few months, recent ED admission     Insurance/Certification information:  Jacinda Nunn  - ( 61 visits combined OT,PT , SP a year)  Plan of care signed (Y/N): N  Visit# / total visits:     Referring Practitioner:  Dr. Shawn Payne Practitioner Orders: None noted - evaluate and treat as indicated. Assessment of current deficits   [] Functional mobility  [x] ADLs  [x] Strength   [] Cognition   [] Functional transfers   [] IADLs  [] Safety Awareness  [x] Endurance   [x] Fine Motor Coordination  [] Balance  [] Vision/perception  [] Sensation    [x] Gross Motor Coordination [x] ROM  [] Pain   [x] Edema    [] Scar Adhesion/Skin Integrity     OT PLAN OF CARE   OT POC based on physician orders, patient diagnosis and results of clinical assessment    Frequency/Duration   1-2 x's a week for 8 sessions.   Specific OT Treatment to include:     [x] Instruction in HEP                   Modalities:  [x] Therapeutic Exercise        [] Ultrasound               [] Electrical Stimulation/Attended  [x] PROM/Stretching                    [x] Fluidotherapy          [x]  Paraffin                   [x] AAROM  [x] AROM                 [] Iontophoresis:   [x] Tendon Glides                                               [x] Neuromuscular Re-Ed            [] ADL/IADL re-training    [x] Therapeutic Activity       [x] Pain Management with/without modalities PRN                 [x] Manual Therapy                      [] Splinting                      [] Scar Management                   []Joint Protection/Training  []Ergonomics                             [] Joint Mobilization        [] Adaptive Equipment Assessment/Training                             [] Manual Edema Mobilization   [] Myofascial Release                 [] Energy Conservation/Work Simplification  [x] GM/FM Coordination       [] Safety retraining/education per  individual diagnosis/goals  [] Desensitization       Patient Specific Goal: To get her hands/ arms stronger - especially her R one. GOALS (Long term same as Short term):  1. ) Patient will demonstrate good understanding of home program (exercises/activities/diagnosis/prognosis/goals) with good accuracy. 2. ) Patient will demonstrate increased active/passive range of motion of their Both UE's at the shoulders to 115- 120* for  ADL/IADL completion. 3. ) Patient will demonstrate increased /pinch strength of at least 5-10  / 2-4 pinch pounds in both hands. STG  - be able to open a unsealed 2\" lids in 2- 3 weeks , LTG - open a 1\" unsealed lid in 4 weeks. Rochelle Rodriguez 4. ) Increase in fine motor function as evidenced by decreased time to complete 9-hole peg test by  5-10 seconds. 6. ) Patient will report ADL functions as Mod I/I using Both her UE\"s - goal to use R UE for about 60 % of the time as her dominant instead of 30%. 7. ) Patient will decrease QuickDASH score to 45% or less for increased participation in daily functional activities. 8. ) Patient to demonstrate proper follow through of home modification/adaptive recommendations to increase safe functional ADLs.       Past Medical History:   Past Medical History:   Diagnosis Date    Abnormal mammogram of left breast 05/2019    Anxiety     Cerebral artery occlusion with cerebral infarction ([facility]     Depression     Diabetes mellitus does the yard work and laundry. Hobbies include crafts - but not since the first stroke. She is not driving at this time.;     ADL STATUS:   Ind Mod I Min A Mod A Max A Dep Other   Feeding:   x       Grooming:   x       Bathing:  x        UE Dressing:  x        LE Dressing:    x      Toileting: x         Transfers: x           Comments:  Feeds self with R UE , brush teeth with L , has not been wearing make up - may be difficult. PT uses a shower chair in her tub / shower unit with a hand held shower wand. Pt dons her bra by slipping it into her feet and up. With LE dressing has diffuculty  pulling pants up- fastening and zipping pants. She mostly wears elsastic shorts  Pt cannot tie shoes    Pain Level: No pain    UE Assessment:  PT has limited ROM in her shoulders activily 2* to weakness but her PROM is WNL's. Limitations in AROM and MG as follows: Both  Upper Extremity ROM  -                                                                                     MG  R  / L       AROM [x]     PROM[] R    L       SHOULDER Flexion 0-180* 103*/104 F-  /  F-               Extension 0-60* 42*/56 F   /   F+      Abduction 0-180* 98*/118*  F-  /     F-      Internal Rotation 0-70* WFL   F    /     F      External Rotation 0-90* WFL F  /     F         ELBOW Flexion 0-150* R   L G-  /    G      Extension 0* WFL's   G+  /     G+       FOREARM Pronation 80-90* WFL  F-         F      Supination 80-90* WFL F    /    F -       WRIST Flexion 0-80* WFL F-  /    F+      Extension 0-70* WFL F-     / F+      Radial Deviation 0-20* WFL F-  /    G-      Ulnar Deviation 0-30* WFL F-  /   F+       Left  Hand/  Thumb Function:       Pt showing nerve weakness in both her hand with muscle atrophy noted in her adductor / abductor muscles and adductors muscle of both thumbs. She has full finger flexion and extension but is very weak and pt cannot take any resistance for flexion or extension of her fingers.   She can radially abduct her thumb but cannot isolate palmar abduction. She has poor thumb rotation and so has a poor functional thumb oppostion- she has a  lateral oppoision ( monkey opposition). She has significantly decreased coordination in her R hand 2* to instrinsic hand weakness and for thumb use. She states she gets fatigued very quickly. Comment: Hand Dominance is Right    Sensation:  Pt states she has normal sensation in both her UE's. . Therapist will assess this at a later date. Edema Description/Circumferential Measurements:   WNL's  Dynamometer (setting 2):    level 3            Left: 15#       16#             Right: 5#              7#      Pinch Meter:   Lateral: Left= 1/2#,    Right= o#    Palmar 3 point: Left= 1/2#,     Right= 0#    9 Hole Peg Test:   Left: :50.55   Right:  :2:12.61  ( very fatigued by the end). Poor ability to manipulate with fingers and thumb. QuickDASH Score: 60% disability    Intervention: Therapist discussed with pt she needs to contact her family physician or nuerologist to get an appointment. Therapist strongly explained to her these deficits that have gotten worse over the last month do not happen for no reason. Therapist will assist with her seeing an appropriate Dr.. Therapist suspects a nuerological reason for the current limitations pt is having ( not a stroke). Pt appeared to understand. Eval Complexity: Moderate level eval,   Profile and History- Chart reviewed and history from pt.;    Assessment of Occupational Performance and Identification of Deficits- 7   Clinical Decision Making- no additional modifications required    Rehab Potential:                                 [x] Good  [] Fair  [] Poor        Suggested Professional Referral:       [x] No  [] Yes:  Barriers to Goal Achievement[de-identified]          [x] No  [] Yes:  Domestic Concerns:                           [x] No  [] Yes:       Patient.  Education:  [x] Plans/Goals, Risks/Benefits discussed  [x] Home exercise program  Method of Education: [x] Verbal  [] Demo  [] Written  Comprehension of Education:  [] Verbalizes understanding. [] Demonstrates understanding. [] Needs Review. [] Demonstrates/verbalizes understanding of HEP/Ed previously given. Patient understands diagnosis/prognosis and consents to treatment, plan and goals: [x] Yes    [] No     Goal Formulation: Patient  Time In: 3:00 pm             Time Out: 3:50 pm                       Timed Code Treatment Minutes: 50 minutes      CODE  Minutes  Units   10196 OT Eval Low     69058 OT Eval Medium 45 1   37815 OT Eval High     80379 Fluidotherapy     88150 Manual     61168 Therapeutic Ex     92291 Therapeutic Activity     24260 ADL/COMP Tech Train     26409 Neuromuscular Re-Ed     04180 OrthoManagementTraining     39446 Paraffin     91751 Electrical Stim - Attended     [id number] Iontophoresis     06722 Ultrasound      Other                Electronically signed by: Brooks Francois OT /L, CHT  # [id number]     [email or web address] Certification / Comments      Frequency/Duration 1-2 x's a week  / week for 8 visits. Certification period From: this date   To: 10/22/2022     I have reviewed the Plan of Care established for skilled therapy services and certify that the services are required and that they will be provided while the patient is under my care. Physician's Comments/Revisions:                   Physicians's Printed Name:  Dr. Carter Knight                                   Physician's Signature:                                                               Date:      Please review Patient's OT evaluation and if you agree sign/date and fax back to us at our [facility] OT Fax: [phone].  Thank you for your referral!

## 2022-07-18 ENCOUNTER — TREATMENT (OUTPATIENT)
Dept: PHYSICAL THERAPY | Age: 67
End: 2022-07-18
Payer: COMMERCIAL

## 2022-07-18 ENCOUNTER — TREATMENT (OUTPATIENT)
Dept: OCCUPATIONAL THERAPY | Age: 67
End: 2022-07-18
Payer: COMMERCIAL

## 2022-07-18 DIAGNOSIS — R29.898 WEAKNESS OF BOTH LOWER EXTREMITIES: Primary | ICD-10-CM

## 2022-07-18 DIAGNOSIS — R29.898 MUSCLE FUNCTION LOSS: Primary | ICD-10-CM

## 2022-07-18 DIAGNOSIS — Z74.09 IMPAIRED FUNCTIONAL MOBILITY, BALANCE, GAIT, AND ENDURANCE: ICD-10-CM

## 2022-07-18 PROCEDURE — 97110 THERAPEUTIC EXERCISES: CPT | Performed by: OCCUPATIONAL THERAPY ASSISTANT

## 2022-07-18 PROCEDURE — 97112 NEUROMUSCULAR REEDUCATION: CPT

## 2022-07-18 PROCEDURE — 97530 THERAPEUTIC ACTIVITIES: CPT | Performed by: OCCUPATIONAL THERAPY ASSISTANT

## 2022-07-18 PROCEDURE — 97110 THERAPEUTIC EXERCISES: CPT

## 2022-07-18 NOTE — PROGRESS NOTES
Physical Therapy Daily Treatment Note    Date: 2022  Patient Name: Makenzie Pham  : 1955   MRN: [id number]  DOInjury: DOSx:   Referring Provider: Keyonna Robertson MD   [facility] [address] [place] [address]     Medical Diagnosis:     R29.898 (ICD-10-CM) - Weakness of both lower extremities   Z74.09 (ICD-10-CM) - Impaired functional mobility, balance, gait, and endurance       Outcome Measure:   LEFS 56% impairment        X = TO BE PERFORMED NEXT VISIT  > = PROGRESS TO THIS    S: no new changes . O: Access Code: [email or web address]  URL: [email or web address]  Date: 2022  Prepared by: Shilo Browning    Exercises  Standing March with Counter Support - 1 x daily - 7 x weekly - 3 sets - 10 reps  Squat - 1 x daily - 7 x weekly - 3 sets - 10 reps  Standing Hip Abduction with Counter Support - 1 x daily - 7 x weekly - 3 sets - 10 reps  Standing Ankle Dorsiflexion with Table Support - 1 x daily - 7 x weekly - 3 sets - 10 reps    Time 1420- 1500     Visit 4 / 60 visit limit    Pain Pain 0/10     ROM      Modalities            Exercise      Nustep   L5/ 10 min UE too TE         Squats 3 x 10 at leg press on blue foam   TE           DF stretch  3 x 10 using pro stretch  TE   Marches X 2 min  hold onto leg press on blue foam   TE   Alt. Sidekicks X 2 min  hold onto leg press on blue foam   TE         Sit/Stands    TE         Gait training   GT         Marching Gait 45 feet x 2 with need of min assist x 1 also cues for increase NADEEN. NR   Sidestepping   NR         Leg press  20# 3 x 15                       A:  Tolerated well. Pt fatigued end of rx session . Gave pt HEP   P: Continue with rehab plan  .   Valencia Shun, PTA    Treatment Charges: Mins Units   Initial Evaluation     Re-Evaluation     Ther Exercise         TE 30 2   Manual Therapy     MT     Ther Activities        TA     Gait Training          GT     Neuro Re-education NR 10 1   Modalities     Non-Billable Service Time     Other Total Time/Units 40 3

## 2022-07-18 NOTE — PROGRESS NOTES
OCCUPATIONAL THERAPY PROGRESS NOTE  [address] THERAPY   [place] [address] [place] [address]  Dept: [phone]  Loc: [address] OT Fax: [phone]    Date:  2022  Initial Evaluation Date: 22    Patient Name:  Vish Estrella    :  1955    Restrictions/Precautions:  Non noted - has L foot drop, moderated fall risk  Diagnosis:  Weakness both Hands   R29.898 (ICD-10-CM) - Weakness of both hands                                                       Date of Surgery/Injury: over the past few months, recent ED admission     Insurance/Certification information:  Altamese Mis  - ( 60 visits combined OT,PT , SP a year)  Plan of care signed (Y/N): N  Visit# / total visits:      Referring Practitioner:  Dr. Felicitas Piña Practitioner Orders: None noted - evaluate and treat as indicated. Assessment of current deficits  [] Functional mobility             [x] ADLs          [x] Strength                  [] Cognition  [] Functional transfers           [] IADLs         [] Safety Awareness  [x] Endurance  [x] Fine Motor Coordination    [] Balance      [] Vision/perception   [] Sensation   2  [x] Gross Motor Coordination [x] ROM           [] Pain                        [x] Edema          [] Scar Adhesion/Skin Integrity     OT PLAN OF CARE   OT POC based on physician orders, patient diagnosis and results of clinical assessment     Frequency/Duration   1-2 x's a week for 8 sessions.   Specific OT Treatment to include:      [x] Instruction in HEP                   Modalities:  [x] Therapeutic Exercise                 [] Ultrasound               [] Electrical Stimulation/Attended  [x] PROM/Stretching                    [x] Fluidotherapy          [x]  Paraffin                   [x] AAROM  [x] AROM                 [] Iontophoresis:   [x] Tendon Glides                                               [x] Neuromuscular Re-Ed            [] ADL/IADL re-training    [x] Therapeutic Activity                  [x] Pain Management with/without modalities PRN                 [x] Manual Therapy                      [] Splinting                                   [] Scar Management                   []Joint Protection/Training  []Ergonomics                             [] Joint Mobilization                      [] Adaptive Equipment Assessment/Training                             [] Manual Edema Mobilization  [] Myofascial Release                 [] Energy Conservation/Work Simplification  [x] GM/FM Coordination                [] Safety retraining/education per  individual diagnosis/goals  [] Desensitization        Patient Specific Goal: To get her hands/ arms stronger - especially her R one. GOALS (Long term same as Short term):  1. ) Patient will demonstrate good understanding of home program (exercises/activities/diagnosis/prognosis/goals) with good accuracy. 2. ) Patient will demonstrate increased active/passive range of motion of their Both UE's at the shoulders to 115- 120* for  ADL/IADL completion. 3. ) Patient will demonstrate increased /pinch strength of at least 5-10  / 2-4 pinch pounds in both hands. STG  - be able to open a unsealed 2\" lids in 2- 3 weeks , LTG - open a 1\" unsealed lid in 4 weeks. Gt Joel 4. ) Increase in fine motor function as evidenced by decreased time to complete 9-hole peg test by  5-10 seconds. 6. ) Patient will report ADL functions as Mod I/I using Both her UE\"s - goal to use R UE for about 60 % of the time as her dominant instead of 30%. 7. ) Patient will decrease QuickDASH score to 45% or less for increased participation in daily functional activities. 8. ) Patient to demonstrate proper follow through of home modification/adaptive recommendations to increase safe functional ADLs.     Pain Level: 1 on scale of 1-10    Subjective: Pt. Stated \"Everything is about the same\"      Objective:  Updated POC to be completed by 8/11/22. INTERVENTION: COMPLETED: SPECIFICS/COMMENTS:   Modality:               AROM:     BUE's x -Cone transfer ax- 2 shelves. FMC/ In hand manipulation skills            x    BUE's X  X  x -[place] manipulation- 5 coins at a time. -exercise spheres-- BUE's (increased difficulty)  -Bunchems deconstruction ax. PROM/Stretching:               Scar Mass/Edema Control:     BUE's x -Resistive clothespin ax- yellow only with BUE's. Strengthening:               Other:     Energy conservation handout x           Assessment/Comments: Pt is making Fair progress toward stated plan of care. Pt. Tolerated all exercises and stretches   Pt. Required frequent rest breaks. Issued energy conservation handout to assist patient with activities at a home.     -Rehab Potential: Good  -Requires OT Follow Up: Yes  Time In:1300            Time Out: 1400             Visit #: 2    Treatment Charges: Mins Units   Modalities     Ther Exercise 25 2   Manual Therapy     Thera Activities 30 2   ADL/Home Mgt      Neuro Re-education     Group Therapy     Non-Billable Service Time     Other     Total Time/Units 55 4         -Response to Treatment: F. Pt. Is frustrated with her overall weakness and decreased coordination. Support issued. Goals: Goals for pt can be see on initial eval occurring on 7/11/22    Plan:   [x]  Continues Plan of care: Pt education continues at each visit to obtain maximum benefits from skilled OT intervention.   []  [address] of care:   []  Discharge:      Pj Hannon, [facility]

## 2022-07-20 ENCOUNTER — TREATMENT (OUTPATIENT)
Dept: OCCUPATIONAL THERAPY | Age: 67
End: 2022-07-20
Payer: COMMERCIAL

## 2022-07-20 ENCOUNTER — TREATMENT (OUTPATIENT)
Dept: PHYSICAL THERAPY | Age: 67
End: 2022-07-20
Payer: COMMERCIAL

## 2022-07-20 DIAGNOSIS — Z74.09 IMPAIRED FUNCTIONAL MOBILITY, BALANCE, GAIT, AND ENDURANCE: ICD-10-CM

## 2022-07-20 DIAGNOSIS — R29.898 WEAKNESS OF BOTH LOWER EXTREMITIES: Primary | ICD-10-CM

## 2022-07-20 DIAGNOSIS — R29.898 MUSCLE FUNCTION LOSS: Primary | ICD-10-CM

## 2022-07-20 PROCEDURE — 97530 THERAPEUTIC ACTIVITIES: CPT | Performed by: OCCUPATIONAL THERAPY ASSISTANT

## 2022-07-20 PROCEDURE — 97110 THERAPEUTIC EXERCISES: CPT

## 2022-07-20 PROCEDURE — 97110 THERAPEUTIC EXERCISES: CPT | Performed by: OCCUPATIONAL THERAPY ASSISTANT

## 2022-07-20 NOTE — PROGRESS NOTES
OCCUPATIONAL THERAPY PROGRESS NOTE  [address] THERAPY   [place] [address] [facility] [address]  Dept: [phone]  Loc: [address] OT Fax: [phone]    Date:  2022  Initial Evaluation Date: 22    Patient Name:  Makenzie Pham    :  1955    Restrictions/Precautions:  Non noted - has L foot drop, moderated fall risk  Diagnosis:  Weakness both Hands   R29.898 (ICD-10-CM) - Weakness of both hands                                                       Date of Surgery/Injury: over the past few months, recent ED admission     Insurance/Certification information:  Priscila Waddell 150  - ( 60 visits combined OT,PT , SP a year)  Plan of care signed (Y/N): N  Visit# / total visits: 3 / 8     Referring Practitioner:  Dr. Arsalan Presley Practitioner Orders: None noted - evaluate and treat as indicated. Assessment of current deficits  [] Functional mobility             [x] ADLs          [x] Strength                  [] Cognition  [] Functional transfers           [] IADLs         [] Safety Awareness  [x] Endurance  [x] Fine Motor Coordination    [] Balance      [] Vision/perception   [] Sensation   2  [x] Gross Motor Coordination [x] ROM           [] Pain                        [x] Edema          [] Scar Adhesion/Skin Integrity     OT PLAN OF CARE   OT POC based on physician orders, patient diagnosis and results of clinical assessment     Frequency/Duration   1-2 x's a week for 8 sessions.   Specific OT Treatment to include:      [x] Instruction in HEP                   Modalities:  [x] Therapeutic Exercise                 [] Ultrasound               [] Electrical Stimulation/Attended  [x] PROM/Stretching                    [x] Fluidotherapy          [x]  Paraffin                   [x] AAROM  [x] AROM                 [] Iontophoresis:   [x] Tendon Glides                                               [x] Neuromuscular Re-Ed            [] ADL/IADL re-training    [x] Therapeutic Activity                  [x] Pain Management with/without modalities PRN                 [x] Manual Therapy                      [] Splinting                                   [] Scar Management                   []Joint Protection/Training  []Ergonomics                             [] Joint Mobilization                      [] Adaptive Equipment Assessment/Training                             [] Manual Edema Mobilization  [] Myofascial Release                 [] Energy Conservation/Work Simplification  [x] GM/FM Coordination                [] Safety retraining/education per  individual diagnosis/goals  [] Desensitization        Patient Specific Goal: To get her hands/ arms stronger - especially her R one. GOALS (Long term same as Short term):  1. ) Patient will demonstrate good understanding of home program (exercises/activities/diagnosis/prognosis/goals) with good accuracy. 2. ) Patient will demonstrate increased active/passive range of motion of their Both UE's at the shoulders to 115- 120* for  ADL/IADL completion. 3. ) Patient will demonstrate increased /pinch strength of at least 5-10  / 2-4 pinch pounds in both hands. STG  - be able to open a unsealed 2\" lids in 2- 3 weeks , LTG - open a 1\" unsealed lid in 4 weeks. Deanna Fanning 4. ) Increase in fine motor function as evidenced by decreased time to complete 9-hole peg test by  5-10 seconds. 6. ) Patient will report ADL functions as Mod I/I using Both her UE\"s - goal to use R UE for about 60 % of the time as her dominant instead of 30%. 7. ) Patient will decrease QuickDASH score to 45% or less for increased participation in daily functional activities. 8. ) Patient to demonstrate proper follow through of home modification/adaptive recommendations to increase safe functional ADLs.     Pain Level: 1 on scale of 1-10    Subjective: Pt. Stated \"Everything is about the same\"      Objective:  Updated POC to be completed by 8/11/22. INTERVENTION: COMPLETED: SPECIFICS/COMMENTS:   Modality:               AROM:     BUE's X  X    x -Cone transfer ax- 2 shelves. -lift and place small ball onto cones- 10 reps each hand. (Rest breaks needed)   -Towel stretches on wall- \"W\" pattern- 7 reps (increased fatigue in shldrs.)        FMC/ In hand manipulation skills            x    BUE's   X    X  x -[place] manipulation- 5 coins at a time. -exercise spheres-- BUE's (increased difficulty)  -Bunchems deconstruction ax.   -FM threading nuts/bolts ax  -pom pom in hand manipulation task. PROM/Stretching:               Scar Mass/Edema Control:     BUE's x -Resistive clothespin ax- yellow only with BUE's. Strengthening:      x -UBE- 3 mins forward and 3 mins backwards with BUE's. Other:     Energy conservation handout x           Assessment/Comments: Pt is making Fair progress toward stated plan of care. Pt. Tolerated all exercises and stretches   Pt. Required frequent rest breaks due to fatigue in BUE\"s. Will monitor and advance as tolerated. Collaborated with OT on current plan of care. -Rehab Potential: Good  -Requires OT Follow Up: Yes  Time In:1300            Time Out: 1400             Visit #: 3    Treatment Charges: Mins Units   Modalities     Ther Exercise 30 2   Manual Therapy     Thera Activities 30 2   ADL/Home Mgt      Neuro Re-education     Group Therapy     Non-Billable Service Time     Other     Total Time/Units 60 4         -Response to Treatment: F. Pt. Is frustrated with her overall weakness and decreased coordination. Support issued. Goals: Goals for pt can be see on initial eval occurring on 7/11/22    Plan:   [x]  Continues Plan of care: Focus on AROM, GMC, FMC and strength of BUE's. Pt education continues at each visit to obtain maximum benefits from skilled OT intervention.   []  Alter Plan of care:   []  Discharge:      [facility] [id number]

## 2022-07-22 RX ORDER — POTASSIUM CHLORIDE 750 MG/1
CAPSULE, EXTENDED RELEASE ORAL
Qty: 30 CAPSULE | Refills: 11 | Status: SHIPPED
Start: 2022-07-22 | End: 2022-07-27 | Stop reason: SDUPTHER

## 2022-07-25 ENCOUNTER — TREATMENT (OUTPATIENT)
Dept: OCCUPATIONAL THERAPY | Age: 67
End: 2022-07-25
Payer: COMMERCIAL

## 2022-07-25 ENCOUNTER — TREATMENT (OUTPATIENT)
Dept: PHYSICAL THERAPY | Age: 67
End: 2022-07-25
Payer: COMMERCIAL

## 2022-07-25 DIAGNOSIS — Z74.09 IMPAIRED FUNCTIONAL MOBILITY, BALANCE, GAIT, AND ENDURANCE: ICD-10-CM

## 2022-07-25 DIAGNOSIS — R29.898 WEAKNESS OF BOTH LOWER EXTREMITIES: Primary | ICD-10-CM

## 2022-07-25 DIAGNOSIS — R29.898 MUSCLE FUNCTION LOSS: Primary | ICD-10-CM

## 2022-07-25 PROCEDURE — 97110 THERAPEUTIC EXERCISES: CPT | Performed by: OCCUPATIONAL THERAPIST

## 2022-07-25 PROCEDURE — 97530 THERAPEUTIC ACTIVITIES: CPT | Performed by: OCCUPATIONAL THERAPIST

## 2022-07-25 PROCEDURE — 97110 THERAPEUTIC EXERCISES: CPT

## 2022-07-25 NOTE — PROGRESS NOTES
Physical Therapy Daily Treatment Note    Date: 2022  Patient Name: Ana Ruelas  : 1955   MRN: [id number]  DOInjury: DOSx:   Referring Provider: Tamir Briceno MD   [facility] [address] [place] [address]     Medical Diagnosis:     R29.898 (ICD-10-CM) - Weakness of both lower extremities   Z74.09 (ICD-10-CM) - Impaired functional mobility, balance, gait, and endurance       Outcome Measure:   LEFS 56% impairment        X = TO BE PERFORMED NEXT VISIT  > = PROGRESS TO THIS    S: pt reports being tired today . O: Access Code: [email or web address]  URL: [email or web address]  Date: 2022  Prepared by: Tianna Umaña    Exercises  Standing March with Counter Support - 1 x daily - 7 x weekly - 3 sets - 10 reps  Squat - 1 x daily - 7 x weekly - 3 sets - 10 reps  Standing Hip Abduction with Counter Support - 1 x daily - 7 x weekly - 3 sets - 10 reps  Standing Ankle Dorsiflexion with Table Support - 1 x daily - 7 x weekly - 3 sets - 10 reps    Time 4806-2628       Visit 5/ 60 visit limit    Pain Pain 0/10     ROM      Modalities            Exercise      Nustep   L5/ 10 min UE too TE         Squats 3 x 10 at leg press on blue foam  Need of seated rest periods between reps . TE           DF stretch    TE   Marches X 2 min  hold onto leg press on blue foam   TE   Alt. Sidekicks X 2 min  hold onto leg press on blue foam   TE         Sit/Stands    TE   LAQ's  3# 3 x 10      Gait training   GT         Marching Gait  NR   Sidestepping   NR         Leg press  20# 3 x 15  TE                     A:  Tolerated well. Pt fatigued start of rx session today . Therefore needing consistent seated rest periods t/o rx session. .     (Gave pt HEP ). P: Continue with rehab plan  .   Sean Roy PTA    Treatment Charges: Mins Units   Initial Evaluation     Re-Evaluation     Ther Exercise         TE 40 3   Manual Therapy     MT     Ther Activities        TA     Gait Training          GT     Neuro Re-education NR Modalities     Non-Billable Service Time     Other     Total Time/Units 40 3

## 2022-07-25 NOTE — PROGRESS NOTES
OCCUPATIONAL THERAPY PROGRESS NOTE  [address] THERAPY   [place] [address] [place] [address]  Dept: [phone]  Loc: [facility] [address] OT Fax: [phone]    Date:  2022  Initial Evaluation Date: 22    Patient Name:  Barbara Ojeda    :  1955    Restrictions/Precautions:  Non noted - has L foot drop, moderated fall risk  Diagnosis:  Weakness both Hands   R29.898 (ICD-10-CM) - Weakness of both hands                                                       Date of Surgery/Injury: over the past few months, recent ED admission     Insurance/Certification information:  Jim Peguero  - ( 60 visits combined OT,PT , SP a year)  Plan of care signed (Y/N): N  Visit# / total visits:      Referring Practitioner:  Dr. Louis Edmondson Practitioner Orders: None noted - evaluate and treat as indicated. Assessment of current deficits  [] Functional mobility             [x] ADLs          [x] Strength                  [] Cognition  [] Functional transfers           [] IADLs         [] Safety Awareness  [x] Endurance  [x] Fine Motor Coordination    [] Balance      [] Vision/perception   [] Sensation   2  [x] Gross Motor Coordination [x] ROM           [] Pain                        [x] Edema          [] Scar Adhesion/Skin Integrity     OT PLAN OF CARE   OT POC based on physician orders, patient diagnosis and results of clinical assessment     Frequency/Duration   1-2 x's a week for 8 sessions.   Specific OT Treatment to include:      [x] Instruction in HEP                   Modalities:  [x] Therapeutic Exercise                 [] Ultrasound               [] Electrical Stimulation/Attended  [x] PROM/Stretching                    [x] Fluidotherapy          [x]  Paraffin                   [x] AAROM  [x] AROM                 [] Iontophoresis:   [x] Tendon Glides                                               [x] Neuromuscular Re-Ed            [] ADL/IADL re-training    [x] Therapeutic Activity                  [x] Pain Management with/without modalities PRN                 [x] Manual Therapy                      [] Splinting                                   [] Scar Management                   []Joint Protection/Training  []Ergonomics                             [] Joint Mobilization                      [] Adaptive Equipment Assessment/Training                             [] Manual Edema Mobilization  [] Myofascial Release                 [] Energy Conservation/Work Simplification  [x] GM/FM Coordination                [] Safety retraining/education per  individual diagnosis/goals  [] Desensitization        Patient Specific Goal: To get her hands/ arms stronger - especially her R one. GOALS (Long term same as Short term):  1. ) Patient will demonstrate good understanding of home program (exercises/activities/diagnosis/prognosis/goals) with good accuracy. 2. ) Patient will demonstrate increased active/passive range of motion of their Both UE's at the shoulders to 115- 120* for  ADL/IADL completion. 3. ) Patient will demonstrate increased /pinch strength of at least 5-10  / 2-4 pinch pounds in both hands. STG  - be able to open a unsealed 2\" lids in 2- 3 weeks , LTG - open a 1\" unsealed lid in 4 weeks. Sherryle Moder 4. ) Increase in fine motor function as evidenced by decreased time to complete 9-hole peg test by  5-10 seconds. 6. ) Patient will report ADL functions as Mod I/I using Both her UE\"s - goal to use R UE for about 60 % of the time as her dominant instead of 30%. 7. ) Patient will decrease QuickDASH score to 45% or less for increased participation in daily functional activities. 8. ) Patient to demonstrate proper follow through of home modification/adaptive recommendations to increase safe functional ADLs.     Pain Level: 1 on scale of 1-10    Subjective: Pt. Stated \"we can work on my shoulder on wednesday\"      Objective:  Updated POC to be completed by 8/11/22. INTERVENTION: COMPLETED: SPECIFICS/COMMENTS:   Modality:               AROM:     BUE's X  X    x -Cone transfer ax- 2 shelves. -lift and place small ball onto cones- 10 reps each hand. (Rest breaks needed)   -Towel stretches on wall- \"W\" pattern- 7 reps (increased fatigue in shldrs.)        FMC/ In hand manipulation skills            x    BUE's x  X  x  X  x -[place] manipulation- 5 coins at a time. -exercise spheres-- BUE's (increased difficulty)  -Bunchems deconstruction ax.   -FM threading nuts/bolts ax  -pom pom in hand manipulation task. PROM/Stretching:               Scar Mass/Edema Control:     BUE's x -Resistive clothespin ax- yellow only with BUE's. Strengthening:       -UBE- 3 mins forward and 3 mins backwards with BUE's. Other:     Energy conservation handout x           Assessment/Comments: Pt is making Fair progress toward stated plan of care. Pt tolerated session well with increased endurance noted with decreased rest breaks required. Will focus on shoulder ROM next session. Will increase as tolerated. -Rehab Potential: Good  -Requires OT Follow Up: Yes  Time In:1300            Time Out: 1400             Visit #: 4    Treatment Charges: Mins Units   Modalities     Ther Exercise 30 2   Manual Therapy     Thera Activities 30 2   ADL/Home Mgt      Neuro Re-education     Group Therapy     Non-Billable Service Time     Other     Total Time/Units 60 4         -Response to Treatment: F. Pt. Is frustrated with her overall weakness and decreased coordination. Support issued. Goals: Goals for pt can be see on initial eval occurring on 7/11/22    Plan:   [x]  Continues Plan of care: Focus on AROM, GMC, FMC and strength of BUE's. Pt education continues at each visit to obtain maximum benefits from skilled OT intervention. []  Alter Plan of care:   []  Discharge:       [address] [place] [address], OTR/L #[id number]

## 2022-07-27 ENCOUNTER — TREATMENT (OUTPATIENT)
Dept: PHYSICAL THERAPY | Age: 67
End: 2022-07-27
Payer: COMMERCIAL

## 2022-07-27 ENCOUNTER — TREATMENT (OUTPATIENT)
Dept: OCCUPATIONAL THERAPY | Age: 67
End: 2022-07-27
Payer: COMMERCIAL

## 2022-07-27 DIAGNOSIS — R29.898 MUSCLE FUNCTION LOSS: Primary | ICD-10-CM

## 2022-07-27 DIAGNOSIS — Z74.09 IMPAIRED FUNCTIONAL MOBILITY, BALANCE, GAIT, AND ENDURANCE: ICD-10-CM

## 2022-07-27 DIAGNOSIS — R29.898 WEAKNESS OF BOTH LOWER EXTREMITIES: Primary | ICD-10-CM

## 2022-07-27 PROCEDURE — 97110 THERAPEUTIC EXERCISES: CPT | Performed by: OCCUPATIONAL THERAPIST

## 2022-07-27 PROCEDURE — 97110 THERAPEUTIC EXERCISES: CPT

## 2022-07-27 PROCEDURE — 97530 THERAPEUTIC ACTIVITIES: CPT | Performed by: OCCUPATIONAL THERAPIST

## 2022-07-27 RX ORDER — POTASSIUM CHLORIDE 750 MG/1
CAPSULE, EXTENDED RELEASE ORAL
Qty: 90 CAPSULE | Refills: 3 | Status: SHIPPED
Start: 2022-07-27 | End: 2022-07-28

## 2022-07-27 NOTE — PROGRESS NOTES
OCCUPATIONAL THERAPY PROGRESS NOTE  [address] THERAPY   [facility] [address] [place] [address]  Dept: [phone]  Loc: [facility]  92. OT Fax: [phone]    Date:  2022  Initial Evaluation Date: 22    Patient Name:  Jessenia Nash    :  1955    Restrictions/Precautions:  Non noted - has L foot drop, moderated fall risk  Diagnosis:  Weakness both Hands   R29.898 (ICD-10-CM) - Weakness of both hands                                                       Date of Surgery/Injury: over the past few months, recent ED admission     Insurance/Certification information:  Priscila Waddell 150  - ( 60 visits combined OT,PT , SP a year)  Plan of care signed (Y/N): N  Visit# / total visits:      Referring Practitioner:  Dr. Treasure Almaraz Practitioner Orders: None noted - evaluate and treat as indicated. Assessment of current deficits  [] Functional mobility             [x] ADLs          [x] Strength                  [] Cognition  [] Functional transfers           [] IADLs         [] Safety Awareness  [x] Endurance  [x] Fine Motor Coordination    [] Balance      [] Vision/perception   [] Sensation   2  [x] Gross Motor Coordination [x] ROM           [] Pain                        [x] Edema          [] Scar Adhesion/Skin Integrity     OT PLAN OF CARE   OT POC based on physician orders, patient diagnosis and results of clinical assessment     Frequency/Duration   1-2 x's a week for 8 sessions.   Specific OT Treatment to include:      [x] Instruction in HEP                   Modalities:  [x] Therapeutic Exercise                 [] Ultrasound               [] Electrical Stimulation/Attended  [x] PROM/Stretching                    [x] Fluidotherapy          [x]  Paraffin                   [x] AAROM  [x] AROM                 [] Iontophoresis:   [x] Tendon Glides                                               [x] Neuromuscular Re-Ed            [] ADL/IADL re-training    [x] Therapeutic Activity                  [x] Pain Management with/without modalities PRN                 [x] Manual Therapy                      [] Splinting                                   [] Scar Management                   []Joint Protection/Training  []Ergonomics                             [] Joint Mobilization                      [] Adaptive Equipment Assessment/Training                             [] Manual Edema Mobilization  [] Myofascial Release                 [] Energy Conservation/Work Simplification  [x] GM/FM Coordination                [] Safety retraining/education per  individual diagnosis/goals  [] Desensitization        Patient Specific Goal: To get her hands/ arms stronger - especially her R one. GOALS (Long term same as Short term):  1. ) Patient will demonstrate good understanding of home program (exercises/activities/diagnosis/prognosis/goals) with good accuracy. 2. ) Patient will demonstrate increased active/passive range of motion of their Both UE's at the shoulders to 115- 120* for  ADL/IADL completion. 3. ) Patient will demonstrate increased /pinch strength of at least 5-10  / 2-4 pinch pounds in both hands. STG  - be able to open a unsealed 2\" lids in 2- 3 weeks , LTG - open a 1\" unsealed lid in 4 weeks. Janell Remedies 4. ) Increase in fine motor function as evidenced by decreased time to complete 9-hole peg test by  5-10 seconds. 6. ) Patient will report ADL functions as Mod I/I using Both her UE\"s - goal to use R UE for about 60 % of the time as her dominant instead of 30%. 7. ) Patient will decrease QuickDASH score to 45% or less for increased participation in daily functional activities. 8. ) Patient to demonstrate proper follow through of home modification/adaptive recommendations to increase safe functional ADLs.     Pain Level: 1 on scale of 1-10    Subjective: Pt. Reports no new changes Objective:  Updated POC to be completed by 8/11/22. INTERVENTION: COMPLETED: SPECIFICS/COMMENTS:   Modality:               AROM:     BUE's X  X    X    x -Cone transfer ax- 2 shelves. -lift and place small ball onto cones- 10 reps each hand. (Rest breaks needed)   -Towel stretches on wall- \"W\" pattern- 7 reps (increased fatigue in shldrs.)  -overhead pulleys-4 mins to increase shoulder mobility        FMC/ In hand manipulation skills            x    BUE's x  X  x  X  x -[place] manipulation- 5 coins at a time. -exercise spheres-- BUE's (increased difficulty)  -Bunchems deconstruction ax.   -FM threading nuts/bolts ax  -pom pom in hand manipulation task. PROM/Stretching:               Scar Mass/Edema Control:     BUE's x -Resistive clothespin ax- yellow only with BUE's. Strengthening:       -UBE- 3 mins forward and 3 mins backwards with BUE's. Other:     Energy conservation handout x           Assessment/Comments: Pt is making Fair progress toward stated plan of care. Pt tolerated session well with increased tolerance to ROM exercises for the shoulder for functional reaching activities. Will increase as tolerated. -Rehab Potential: Good  -Requires OT Follow Up: Yes  Time In:1300            Time Out: 1400             Visit #: 5    Treatment Charges: Mins Units   Modalities     Ther Exercise 30 2   Manual Therapy     Thera Activities 30 2   ADL/Home Mgt      Neuro Re-education     Group Therapy     Non-Billable Service Time     Other     Total Time/Units 60 4         -Response to Treatment: F. Pt. Is frustrated with her overall weakness and decreased coordination. Support issued. Goals: Goals for pt can be see on initial eval occurring on 7/11/22    Plan:   [x]  Continues Plan of care: Focus on AROM, GMC, FMC and strength of BUE's. Pt education continues at each visit to obtain maximum benefits from skilled OT intervention.   []  Alter Plan of care:   [] Discharge:       [address] [place] [address], OTR/L #[id number]

## 2022-07-27 NOTE — PROGRESS NOTES
Physical Therapy Daily Treatment Note    Date: 2022  Patient Name: Jessenia Nash  : 1955   MRN: [id number]  DOInjury: DOSx:   Referring Provider: Linwood Dawson MD   [place] [id number] [place] [address]     Medical Diagnosis:     R29.898 (ICD-10-CM) - Weakness of both lower extremities   Z74.09 (ICD-10-CM) - Impaired functional mobility, balance, gait, and endurance       Outcome Measure:   LEFS 56% impairment        X = TO BE PERFORMED NEXT VISIT  > = PROGRESS TO THIS    S: pt reports feeling better today , also asking about doing more dorsi flexion stretching ex's. O: Access Code: [email or web address]  URL: [email or web address]  Date: 2022  Prepared by: Pranay Johnson    Exercises  Standing March with Counter Support - 1 x daily - 7 x weekly - 3 sets - 10 reps  Squat - 1 x daily - 7 x weekly - 3 sets - 10 reps  Standing Hip Abduction with Counter Support - 1 x daily - 7 x weekly - 3 sets - 10 reps  Standing Ankle Dorsiflexion with Table Support - 1 x daily - 7 x weekly - 3 sets - 10 reps    Time [id number]       Visit 6/ 60 visit limit    Pain Pain 0/10     ROM      Modalities            Exercise      Nustep   L5/ 10 min UE too TE         Squats 3 x 10 at leg press on blue foam  Need of seated rest periods between reps . TE         Toe Raises  DF stretch  X 2 min using pro stretch  seated  TE   Marches X 2 min  hold onto leg press on blue foam   TE   Alt. Sidekicks X 2 min  hold onto leg press on blue foam   TE         Sit/Stands    TE   LAQ's  3# x 2 min alternating      Gait training   GT         Marching Gait  NR   Sidestepping   NR         Leg press  20# 3 x 15  TE         Calf raises into DF stretch on step  4\" step x 2 min new            A:  Tolerated well. Pt able to tolerate newly added DF stretches on 4\" step as listed above. (Gave pt HEP ). P: Continue with rehab plan  .   Maggie Vivas PTA    Treatment Charges: Mins Units   Initial Evaluation     Re-Evaluation Ther Exercise         TE 40 3   Manual Therapy     MT     Ther Activities        TA     Gait Training          GT     Neuro Re-education NR     Modalities     Non-Billable Service Time     Other     Total Time/Units 40 3

## 2022-07-28 ENCOUNTER — OFFICE VISIT (OUTPATIENT)
Dept: FAMILY MEDICINE CLINIC | Age: 67
End: 2022-07-28
Payer: COMMERCIAL

## 2022-07-28 VITALS
BODY MASS INDEX: 23.42 KG/M2 | WEIGHT: 149.2 LBS | OXYGEN SATURATION: 97 % | RESPIRATION RATE: 16 BRPM | SYSTOLIC BLOOD PRESSURE: 130 MMHG | TEMPERATURE: 98 F | HEART RATE: 64 BPM | HEIGHT: 67 IN | DIASTOLIC BLOOD PRESSURE: 88 MMHG

## 2022-07-28 DIAGNOSIS — Z76.89 ESTABLISHING CARE WITH NEW DOCTOR, ENCOUNTER FOR: ICD-10-CM

## 2022-07-28 DIAGNOSIS — E87.8 ELECTROLYTE ABNORMALITY: ICD-10-CM

## 2022-07-28 DIAGNOSIS — Z23 NEED FOR SHINGLES VACCINE: ICD-10-CM

## 2022-07-28 DIAGNOSIS — R53.1 ASTHENIA: ICD-10-CM

## 2022-07-28 DIAGNOSIS — Z71.3 DIETARY COUNSELING: ICD-10-CM

## 2022-07-28 DIAGNOSIS — I10 ESSENTIAL HYPERTENSION: ICD-10-CM

## 2022-07-28 DIAGNOSIS — R47.1 DYSARTHRIA: Primary | ICD-10-CM

## 2022-07-28 DIAGNOSIS — Z71.82 EXERCISE COUNSELING: ICD-10-CM

## 2022-07-28 DIAGNOSIS — Z86.73 HISTORY OF STROKE: ICD-10-CM

## 2022-07-28 DIAGNOSIS — R13.10 DYSPHAGIA, UNSPECIFIED TYPE: ICD-10-CM

## 2022-07-28 DIAGNOSIS — F32.A DEPRESSION, UNSPECIFIED DEPRESSION TYPE: ICD-10-CM

## 2022-07-28 DIAGNOSIS — R56.9 SEIZURE-LIKE ACTIVITY (HCC): ICD-10-CM

## 2022-07-28 DIAGNOSIS — K21.9 GASTROESOPHAGEAL REFLUX DISEASE WITHOUT ESOPHAGITIS: ICD-10-CM

## 2022-07-28 DIAGNOSIS — E78.5 HYPERLIPIDEMIA, UNSPECIFIED HYPERLIPIDEMIA TYPE: ICD-10-CM

## 2022-07-28 DIAGNOSIS — E11.9 TYPE 2 DIABETES MELLITUS WITHOUT COMPLICATION, WITHOUT LONG-TERM CURRENT USE OF INSULIN (HCC): ICD-10-CM

## 2022-07-28 DIAGNOSIS — K22.2 ESOPHAGEAL STRICTURE: ICD-10-CM

## 2022-07-28 LAB
ANION GAP SERPL CALCULATED.3IONS-SCNC: 14 MMOL/L (ref 7–16)
BUN BLDV-MCNC: 12 MG/DL (ref 6–23)
CALCIUM SERPL-MCNC: 9.9 MG/DL (ref 8.6–10.2)
CHLORIDE BLD-SCNC: 100 MMOL/L (ref 98–107)
CO2: 25 MMOL/L (ref 22–29)
CREAT SERPL-MCNC: 0.8 MG/DL (ref 0.5–1)
GFR AFRICAN AMERICAN: >60
GFR NON-AFRICAN AMERICAN: >60 ML/MIN/1.73
GLUCOSE BLD-MCNC: 145 MG/DL (ref 74–99)
POTASSIUM SERPL-SCNC: 4.8 MMOL/L (ref 3.5–5)
SODIUM BLD-SCNC: 139 MMOL/L (ref 132–146)

## 2022-07-28 PROCEDURE — 99215 OFFICE O/P EST HI 40 MIN: CPT | Performed by: SURGERY

## 2022-07-28 PROCEDURE — 90677 PCV20 VACCINE IM: CPT | Performed by: SURGERY

## 2022-07-28 PROCEDURE — 3044F HG A1C LEVEL LT 7.0%: CPT | Performed by: SURGERY

## 2022-07-28 RX ORDER — ZOSTER VACCINE RECOMBINANT, ADJUVANTED 50 MCG/0.5
0.5 KIT INTRAMUSCULAR SEE ADMIN INSTRUCTIONS
Qty: 0.5 ML | Refills: 0 | Status: SHIPPED | OUTPATIENT
Start: 2022-07-28 | End: 2023-01-24

## 2022-07-28 RX ORDER — SITAGLIPTIN AND METFORMIN HYDROCHLORIDE 1000; 50 MG/1; MG/1
1 TABLET, FILM COATED ORAL DAILY
Qty: 90 TABLET | Refills: 1 | Status: SHIPPED | OUTPATIENT
Start: 2022-07-28

## 2022-07-28 RX ORDER — ATORVASTATIN CALCIUM 40 MG/1
TABLET, FILM COATED ORAL
Qty: 90 TABLET | Refills: 1 | Status: SHIPPED
Start: 2022-07-28 | End: 2022-10-06 | Stop reason: SDUPTHER

## 2022-07-28 RX ORDER — VENLAFAXINE HYDROCHLORIDE 150 MG/1
CAPSULE, EXTENDED RELEASE ORAL
Qty: 30 CAPSULE | Refills: 5 | Status: SHIPPED | OUTPATIENT
Start: 2022-07-28

## 2022-07-28 RX ORDER — ATENOLOL 50 MG/1
TABLET ORAL
Qty: 14 TABLET | Refills: 0 | Status: SHIPPED | OUTPATIENT
Start: 2022-07-28

## 2022-07-28 RX ORDER — AMLODIPINE BESYLATE 5 MG/1
TABLET ORAL
Qty: 14 TABLET | Refills: 0 | Status: SHIPPED | OUTPATIENT
Start: 2022-07-28

## 2022-07-28 RX ORDER — ESOMEPRAZOLE MAGNESIUM 40 MG/1
CAPSULE, DELAYED RELEASE ORAL
Qty: 14 CAPSULE | Refills: 0 | Status: SHIPPED | OUTPATIENT
Start: 2022-07-28

## 2022-07-28 RX ORDER — LISINOPRIL 30 MG/1
TABLET ORAL
Qty: 14 TABLET | Refills: 0 | Status: SHIPPED | OUTPATIENT
Start: 2022-07-28

## 2022-07-28 SDOH — ECONOMIC STABILITY: FOOD INSECURITY: WITHIN THE PAST 12 MONTHS, THE FOOD YOU BOUGHT JUST DIDN'T LAST AND YOU DIDN'T HAVE MONEY TO GET MORE.: NEVER TRUE

## 2022-07-28 SDOH — ECONOMIC STABILITY: FOOD INSECURITY: WITHIN THE PAST 12 MONTHS, YOU WORRIED THAT YOUR FOOD WOULD RUN OUT BEFORE YOU GOT MONEY TO BUY MORE.: NEVER TRUE

## 2022-07-28 ASSESSMENT — PATIENT HEALTH QUESTIONNAIRE - PHQ9
SUM OF ALL RESPONSES TO PHQ QUESTIONS 1-9: 4
SUM OF ALL RESPONSES TO PHQ9 QUESTIONS 1 & 2: 0
4. FEELING TIRED OR HAVING LITTLE ENERGY: 0
SUM OF ALL RESPONSES TO PHQ QUESTIONS 1-9: 4
2. FEELING DOWN, DEPRESSED OR HOPELESS: 0
7. TROUBLE CONCENTRATING ON THINGS, SUCH AS READING THE NEWSPAPER OR WATCHING TELEVISION: 0
5. POOR APPETITE OR OVEREATING: 1
10. IF YOU CHECKED OFF ANY PROBLEMS, HOW DIFFICULT HAVE THESE PROBLEMS MADE IT FOR YOU TO DO YOUR WORK, TAKE CARE OF THINGS AT HOME, OR GET ALONG WITH OTHER PEOPLE: 1
1. LITTLE INTEREST OR PLEASURE IN DOING THINGS: 0
9. THOUGHTS THAT YOU WOULD BE BETTER OFF DEAD, OR OF HURTING YOURSELF: 0
6. FEELING BAD ABOUT YOURSELF - OR THAT YOU ARE A FAILURE OR HAVE LET YOURSELF OR YOUR FAMILY DOWN: 0
SUM OF ALL RESPONSES TO PHQ QUESTIONS 1-9: 4
8. MOVING OR SPEAKING SO SLOWLY THAT OTHER PEOPLE COULD HAVE NOTICED. OR THE OPPOSITE, BEING SO FIGETY OR RESTLESS THAT YOU HAVE BEEN MOVING AROUND A LOT MORE THAN USUAL: 3
SUM OF ALL RESPONSES TO PHQ QUESTIONS 1-9: 4
3. TROUBLE FALLING OR STAYING ASLEEP: 0

## 2022-07-28 ASSESSMENT — SOCIAL DETERMINANTS OF HEALTH (SDOH): HOW HARD IS IT FOR YOU TO PAY FOR THE VERY BASICS LIKE FOOD, HOUSING, MEDICAL CARE, AND HEATING?: NOT HARD AT ALL

## 2022-07-28 NOTE — PROGRESS NOTES
Nata Galan (:  1955) is a 61 y.o. female,Established patient, here for evaluation of the following chief complaint(s):  Establish Care (Former  patient) and Health Maintenance (Due for Pneumococcal, HIV Screen,Micro, Diabetic Eye, Shingles, Hep C, Diabetic Foot)         ASSESSMENT/PLAN:  1. Dysarthria  -     [address] [facility], [facility]  2. Dysphagia, unspecified type  -     [address] [facility], [address] [place] [address]; Future  3. Esophageal stricture  -     [place] MODIFIED BARIUM SWALLOW W VIDEO; Future  4. Seizure-like activity (HCC)  -     EEG awake and drowsy; Future  5. Essential hypertension  -     amLODIPine (NORVASC) 5 MG tablet; TAKE 1 TABLET DAILY IN THE MORNING, Disp-14 tablet, R-0Normal  -     atenolol (TENORMIN) 50 MG tablet; TAKE 1 TABLET DAILY, Disp-14 tablet, R-0Normal  -     lisinopril (PRINIVIL;ZESTRIL) 30 MG tablet; TAKE 1 TABLET DAILY AT NIGHT, Disp-14 tablet, R-0Normal  6. Asthenia  Continues with PT  7. Type 2 diabetes mellitus without complication, without long-term current use of insulin (HCC)  -     sitaGLIPtan-metFORMIN (JANUMET)  MG per tablet; Take 1 tablet by mouth in the morning., Disp-90 tablet, R-1Normal  8. Hyperlipidemia, unspecified hyperlipidemia type  -     atorvastatin (LIPITOR) 40 MG tablet; TAKE 1 TABLET DAILY, Disp-90 tablet, R-1Normal  9. History of stroke  -Optimal blood pressure control  -Statin  -In spite of some recent studies that demonstrate low-dose aspirin treatment after hemorrhagic stroke conferring increased survival benefits/decreased morbidity, we still typically avoid this in people after cerebral hemorrhage. 10. Dietary counseling  Counseled the patient on diet, continue to make positive choices. It is important to focus on meeting food group needs with nutrient dense foods and stay within caloric limits.     Nutrient dense foods will provide you with vitamins, minerals, and other health promoting nutrients. You should avoid added sugars, saturated fats, hydrogenated oils, and limit sodium intake (this isn't just table salt. .. turn the package over, read the label, stay under 2000 mg or 2g of total sodium daily). Track your calories, this will help you get an understanding of what a proper portion size is. Every day you should eat these:  -Veggies of all types  -Fruits  -Grains (strive for at least half of these to be whole-grain)  -Lean protein (poultry, fish, legumes, nuts)    Stick to the plate diet.    -86% of your dinner plate should be leafy green vegetables, dark green, red and orange vegetables. Do not use high-calorie dressing is a ranch or dressings that are filled with added sugars. 25% of your dinner plate should be whole grains. The remaining 25% of your dinner plate should be a lean protein. Limit your red meat intake to once per week and no more than 4 ounces in any serving.  -No need for second helpings. Limit or avoid these foods:  -Added sugars (less than 10% of your total calories in any given day should be from sugars)  -Saturated fats and hydrogenated oils (less than 10% of your total calories in any given day should be from these)  -Sodium (less than 2000 mg/day)  -Alcoholic beverages (even in moderation, alcohol can cause long-term health issues involving hypertension, electrolyte abnormalities, liver disease and even cancers of the mouth and throat)    Stay hydrated. Unless instructed otherwise by your physician, you should strive to drink at least eight 8 ounce glasses of water daily, some of these being fortified with electrolytes (such as a Pedialyte, or low calorie sports drink). Again, avoid added sugars. 11. Exercise counseling  Counseled the patient on importance of cardiovascular and resistance training.     Make every attempt to engage in a level of activity, sustained for at least 30 minutes, where you saturate your undergarments with sweat. This should be done, at a minimum, three times per week. 12. Establishing care with new doctor, encounter for  All personal, family, social, surgical, medical history is reviewed and updated with patient. Allergies, medications updated. List of specialists follows with updated. DM/HM updated. 13. Need for shingles vaccine  -     zoster recombinant adjuvanted vaccine [facility]) 50 MCG/0.5ML SUSR injection; Inject 0.5 mLs into the muscle See Admin Instructions 1 dose now and repeat in 2-6 months, Disp-0.5 mL, R-0Print  14. Electrolyte abnormality  -     Basic Metabolic Panel; Future  - Was started on potassium in the hospital due to gastrointestinal losses and hypokalemia I do not believe she needs any further. 15. Gastroesophageal reflux disease without esophagitis  -     esomeprazole (NEXIUM) 40 MG delayed release capsule; TAKE 1 CAPSULE EVERY MORNING BEFORE BREAKFAST, Disp-14 capsule, R-0Normal  16. Depression, unspecified depression type  -     venlafaxine (EFFEXOR XR) 150 MG extended release capsule; TAKE 1 CAPSULE DAILY, Disp-30 capsule, R-5YOUR PATIENT HAS REQUESTED A REFILL OF THIS MEDICATION, PREVIOUSLY AUTHORIZED BY ANOTHER PRESCRIBER. Normal    Return in about 2 months (around 9/28/2022) for diabetes . Subjective   SUBJECTIVE/OBJECTIVE:  HPI:    Dysarthria/CVA:  -SLP last note \"Patient seen for 30 minute in person session this date. Patient reports doing well. We reviewed all of the oral motor exercises and she is completing them with fair+/good performance with min cues. We worked on over articulation/slowed rate strategies with reading long sentences out loud. She completed the task with good intelligibility but needed min/mod cues for implementation of the 2 strategies. Homework activities encouraged. Will continue. Rosemary Saunders.  Ishaan Cortes MA/CCC-SLP\"  -last SLP visit June of 2021    -CT head, CTA head/neck/brain perfusion unremarkable  -Assessed by stroke team at admission and felt to have a stroke mimic with a history of intracranial hemorrhage and global unresponsiveness and she is now on Keppra. MRI was negative for acute stroke. Brain perfusion study normal.    -she can forcefully cough. She complains that with liquids she will have a cough (more than half the time but not all the time)    -has known stricture with stretching.   Gets back in to see Javy King soon.    -on statin    Seizure d/o:  -levETIRAcetam 500 MG tablet  Commonly known as: KEPPRA  Take 1 tablet by mouth 2 times daily    -not taking KEPPRA     Asthenia/weakness:  -back in with OT and PT  -doing well, improving    DM2:  -last A1c in May of 2022 was 6.3  -Janumet (does not take with full meal)  -not checking BG fasting regular     -eye doctor is (unsure) but will start to go back now     GERD:  -has stricture  -EGD periodically for stretching with Jose Antonio  -no breakthrough as long as on nexium    HTN:  -lisinopril   -atenolol  -amlodipine    Depression/Anxiety:  -PHQ-9 Total Score: 4 (7/28/2022  2:19 PM)  Thoughts that you would be better off dead, or of hurting yourself in some way: Not at all (7/28/2022  2:19 PM)  -venlafaxine daily     Preventative:  Health Maintenance   Topic Date Due    Diabetic microalbuminuria test  Never done    Diabetic retinal exam  Never done    Shingles vaccine (1 of 2) Never done    Diabetic foot exam  07/15/2017    Depression Monitoring  03/16/2022    COVID-19 Vaccine (4 - Booster for Moderna series) 05/18/2022    Flu vaccine (1) 09/01/2022    Lipids  03/09/2023    A1C test (Diabetic or Prediabetic)  05/27/2023    Breast cancer screen  10/26/2023    Colorectal Cancer Screen  05/15/2024    DTaP/Tdap/Td vaccine (2 - Td or Tdap) 03/03/2032    Pneumococcal 0-64 years Vaccine  Completed    Hepatitis A vaccine  Aged Out    Hib vaccine  Aged Out    Meningococcal (ACWY) vaccine  Aged Out    Hepatitis C screen  Discontinued    HIV screen  Discontinued           ROS:    Denies 10pt ROS other than noted in HPI. Objective       PHYSICAL EXAM:    /88   Pulse 64   Temp 98 °F (36.7 °C) (Temporal)   Resp 16   Ht 5' 7\" (1.702 m)   Wt 149 lb 3.2 oz (67.7 kg)   SpO2 97%   BMI 23.37 kg/m²     AVSS    GA: Well-groomed, appears well, no acute distress. HEENT: Atraumatic normocephalic. Extraocular muscles are grossly intact. Pupils are equal round reactive to light. Conjunctiva pink and moist.  Hearing is grossly intact. Nares patent without external drainage. Buccal mucosa pink and moist.  Posterior oropharynx clear without lesion or exudate. NECK: Trachea is midline, supple, nontender, no lymphadenopathy. CARDIO: Regular rate and rhythm without murmur rub or gallop. Cap refill 2+. Radial pulses 2+ bilaterally. RESPIRATORY: Clear to auscultation bilaterally without wheezes rales or rhonchi. Normal inspiratory and expiratory effort. Normoxic on room air    ABD: Rounded, normoactive bowel sounds. Soft, nontender, no organomegaly. MSK: Structurally appropriate for age. No gross deficit. NEURO: Alert, no gross deficit. Dysarthria, able to forcefully cough/protect airway. Palatal rise symmetric and smooth. Generalized asthenia. PSYCH:  Mood is normal and congruent with affect. No signs of psychomotor retardation or agitation. Thought content seems normal, speech is fluent and non-pressured. SKIN: Generally warm pink and dry. On this date 7/28/2022 I have spent 40 minutes reviewing previous notes, test results and face to face with the patient discussing the diagnosis and importance of compliance with the treatment plan as well as documenting on the day of the visit. An electronic signature was used to authenticate this note.     --Chrissy Hudson DO

## 2022-07-28 NOTE — PATIENT INSTRUCTIONS
Get diabetic eye exam done.      _______________________________________________      Carbs limit to 45 to 60g per meal  Fats limit to 60g per day (no more than 20g of saturated fats)  Cholesterol limit to no more than 300mg per day  Sodium less than 2000mg per day    MyFitnessPal or similar application (or track by journal) to monitor calories and macronutrients. This is the most critical component to a heart healthy, balanced diet: honesty, keeping oneself accountable, ensure you are tracking daily goals and meeting them. Food is medicine!

## 2022-08-01 ENCOUNTER — TREATMENT (OUTPATIENT)
Dept: PHYSICAL THERAPY | Age: 67
End: 2022-08-01
Payer: COMMERCIAL

## 2022-08-01 ENCOUNTER — TREATMENT (OUTPATIENT)
Dept: OCCUPATIONAL THERAPY | Age: 67
End: 2022-08-01
Payer: COMMERCIAL

## 2022-08-01 DIAGNOSIS — Z74.09 IMPAIRED FUNCTIONAL MOBILITY, BALANCE, GAIT, AND ENDURANCE: ICD-10-CM

## 2022-08-01 DIAGNOSIS — R29.898 WEAKNESS OF BOTH LOWER EXTREMITIES: Primary | ICD-10-CM

## 2022-08-01 DIAGNOSIS — R29.898 MUSCLE FUNCTION LOSS: Primary | ICD-10-CM

## 2022-08-01 PROCEDURE — 97110 THERAPEUTIC EXERCISES: CPT

## 2022-08-01 PROCEDURE — 97110 THERAPEUTIC EXERCISES: CPT | Performed by: OCCUPATIONAL THERAPY ASSISTANT

## 2022-08-01 PROCEDURE — 97530 THERAPEUTIC ACTIVITIES: CPT | Performed by: OCCUPATIONAL THERAPY ASSISTANT

## 2022-08-01 NOTE — PROGRESS NOTES
OCCUPATIONAL THERAPY PROGRESS NOTE  [address] THERAPY   [facility] [address] [place] [address]  Dept: [phone]  Loc: [facility]  92. OT Fax: [phone]    Date:  2022  Initial Evaluation Date: 22    Patient Name:  Xavier Weldon    :  1955    Restrictions/Precautions:  Non noted - has L foot drop, moderated fall risk  Diagnosis:  Weakness both Hands   R29.898 (ICD-10-CM) - Weakness of both hands                                                       Date of Surgery/Injury: over the past few months, recent ED admission     Insurance/Certification information:  Marisela Blair  - ( 60 visits combined OT,PT , SP a year)  Plan of care signed (Y/N): N  Visit# / total visits:      Referring Practitioner:  Dr. Sandra Bird Practitioner Orders: None noted - evaluate and treat as indicated. Assessment of current deficits  [] Functional mobility             [x] ADLs          [x] Strength                  [] Cognition  [] Functional transfers           [] IADLs         [] Safety Awareness  [x] Endurance  [x] Fine Motor Coordination    [] Balance      [] Vision/perception   [] Sensation   2  [x] Gross Motor Coordination [x] ROM           [] Pain                        [x] Edema          [] Scar Adhesion/Skin Integrity     OT PLAN OF CARE   OT POC based on physician orders, patient diagnosis and results of clinical assessment     Frequency/Duration   1-2 x's a week for 8 sessions.   Specific OT Treatment to include:      [x] Instruction in HEP                   Modalities:  [x] Therapeutic Exercise                 [] Ultrasound               [] Electrical Stimulation/Attended  [x] PROM/Stretching                    [x] Fluidotherapy          [x]  Paraffin                   [x] AAROM  [x] AROM                 [] Iontophoresis:   [x] Tendon Glides                                               [x] Neuromuscular Re-Ed            [] ADL/IADL re-training    [x] Therapeutic Activity                  [x] Pain Management with/without modalities PRN                 [x] Manual Therapy                      [] Splinting                                   [] Scar Management                   []Joint Protection/Training  []Ergonomics                             [] Joint Mobilization                      [] Adaptive Equipment Assessment/Training                             [] Manual Edema Mobilization  [] Myofascial Release                 [] Energy Conservation/Work Simplification  [x] GM/FM Coordination                [] Safety retraining/education per  individual diagnosis/goals  [] Desensitization        Patient Specific Goal: To get her hands/ arms stronger - especially her R one. GOALS (Long term same as Short term):  1. ) Patient will demonstrate good understanding of home program (exercises/activities/diagnosis/prognosis/goals) with good accuracy. 2. ) Patient will demonstrate increased active/passive range of motion of their Both UE's at the shoulders to 115- 120* for  ADL/IADL completion. 3. ) Patient will demonstrate increased /pinch strength of at least 5-10  / 2-4 pinch pounds in both hands. STG  - be able to open a unsealed 2\" lids in 2- 3 weeks , LTG - open a 1\" unsealed lid in 4 weeks. Clarissa Mora 4. ) Increase in fine motor function as evidenced by decreased time to complete 9-hole peg test by  5-10 seconds. 6. ) Patient will report ADL functions as Mod I/I using Both her UE\"s - goal to use R UE for about 60 % of the time as her dominant instead of 30%. 7. ) Patient will decrease QuickDASH score to 45% or less for increased participation in daily functional activities. 8. ) Patient to demonstrate proper follow through of home modification/adaptive recommendations to increase safe functional ADLs. Pain Level: 1 on scale of 1-10    Subjective: Pt. Presented with no new complaints. Objective:  Updated POC to be completed by 8/11/22. INTERVENTION: COMPLETED: SPECIFICS/COMMENTS:   Modality:               AROM:     BUE's            -Cone transfer ax- 2 shelves. -lift and place small ball onto cones- 10 reps each hand. (Rest breaks needed)   -Towel stretches on wall- \"W\" pattern- 7 reps (increased fatigue in shldrs.)  -overhead pulleys-4 mins to increase shoulder mobility        FMC/ In hand manipulation skills                BUE's x  X  x  X    x -Orla manipulation- 5 coins at a time. -exercise spheres-- BUE's (increased difficulty)  -Bunchems deconstruction ax.   -FM threading nuts/bolts ax  -pom pom in hand manipulation task. -Pick and place beads using frap strap to increase palmar abduction of thumb         PROM/Stretching:               Scar Mass/Edema Control:     BUE's  -Resistive clothespin ax- yellow only with BUE's. Strengthening:      x -UBE- 3 mins forward and 3 mins backwards with BUE's. Other:     Energy conservation handout x           Assessment/Comments: Pt is making Fair progress toward stated plan of care. Pt tolerated all exercises and stretches today. Pt. Utilized frap strap for thumb positioning today to increase proper tip to tip prehension. Will continue to progress as tolerated. -Rehab Potential: Good  -Requires OT Follow Up: Yes  Time In:1300            Time Out: 1400             Visit #: 6    Treatment Charges: Mins Units   Modalities     Ther Exercise 30 2   Manual Therapy     Thera Activities 30 2   ADL/Home Mgt      Neuro Re-education     Group Therapy     Non-Billable Service Time     Other     Total Time/Units 60 4         -Response to Treatment: F. Pt. Is frustrated with her overall weakness and decreased coordination. Support issued. Goals: Goals for pt can be see on initial eval occurring on 7/11/22    Plan:   [x]  Continues Plan of care: Focus on AROM, GMC, FMC and strength of BUE's.   Pt education continues at each visit to obtain maximum benefits from skilled OT intervention.   []  [address] of care:   []  Discharge:      Ravinder Baker, KIKI/L [id number]

## 2022-08-01 NOTE — PROGRESS NOTES
Physical Therapy Daily Treatment Note    Date: 2022  Patient Name: Syd Kahn  : 1955   MRN: [id number]  DOInjury: DOSx:   Referring Provider: Cassie Kohli MD   [place] [id number] [place] [address]     Medical Diagnosis:     R29.898 (ICD-10-CM) - Weakness of both lower extremities   Z74.09 (ICD-10-CM) - Impaired functional mobility, balance, gait, and endurance       Outcome Measure:   LEFS 56% impairment        X = TO BE PERFORMED NEXT VISIT  > = PROGRESS TO THIS    S: pt reports no new issues feels weaker at times. O: Access Code: [email or web address]  URL: [email or web address]  Date: 2022  Prepared by: Jace Kazakh    Exercises  Standing March with Counter Support - 1 x daily - 7 x weekly - 3 sets - 10 reps  Squat - 1 x daily - 7 x weekly - 3 sets - 10 reps  Standing Hip Abduction with Counter Support - 1 x daily - 7 x weekly - 3 sets - 10 reps  Standing Ankle Dorsiflexion with Table Support - 1 x daily - 7 x weekly - 3 sets - 10 reps    Time 3315-0342     Visit  visit limit shared with OT/SP    Pain Pain 0/10     ROM      Modalities            Exercise      Nustep   L5/ 10 min UE too TE         Squats 3 x 10 at leg press on blue foam  TE         Toe Raises  DF stretch  X 2 min using pro stretch  seated  TE   Marches X 2 min  hold onto leg press on blue foam   TE   Alt. Sidekicks X 2 min  hold onto leg press on blue foam   TE         Sit/Stands    TE   LAQ's  3# x 2 min alternating      Gait training   GT         Marching Gait  NR   Sidestepping   NR         Leg press  20# 3 x 15  TE         Calf raises into DF stretch on step  4\" step x 2 min new            A:  Tolerated well. Pt able to tolerate newly added DF stretches on 4\" step as listed above. (Gave pt HEP ). P: Continue with rehab plan  .     Merrill Kazakh, PTA    Treatment Charges: Mins Units   Initial Evaluation     Re-Evaluation     Ther Exercise         TE 40 3   Manual Therapy     MT     Ther Activities        TA     Gait Training          GT     Neuro Re-education NR     Modalities     Non-Billable Service Time     Other     Total Time/Units 40 3

## 2022-08-03 ENCOUNTER — OFFICE VISIT (OUTPATIENT)
Dept: NEUROLOGY | Age: 67
End: 2022-08-03
Payer: COMMERCIAL

## 2022-08-03 VITALS
SYSTOLIC BLOOD PRESSURE: 140 MMHG | BODY MASS INDEX: 23.39 KG/M2 | DIASTOLIC BLOOD PRESSURE: 80 MMHG | HEIGHT: 67 IN | WEIGHT: 149 LBS

## 2022-08-03 DIAGNOSIS — Z86.73 HISTORY OF CEREBROVASCULAR ACCIDENT (CVA) IN ADULTHOOD: ICD-10-CM

## 2022-08-03 DIAGNOSIS — E11.42 DIABETIC PERIPHERAL NEUROPATHY (HCC): ICD-10-CM

## 2022-08-03 DIAGNOSIS — M21.372 ACQUIRED LEFT FOOT DROP: Primary | ICD-10-CM

## 2022-08-03 PROCEDURE — 99214 OFFICE O/P EST MOD 30 MIN: CPT | Performed by: PSYCHIATRY & NEUROLOGY

## 2022-08-03 PROCEDURE — 3044F HG A1C LEVEL LT 7.0%: CPT | Performed by: PSYCHIATRY & NEUROLOGY

## 2022-08-03 ASSESSMENT — ENCOUNTER SYMPTOMS
GASTROINTESTINAL NEGATIVE: 1
EYES NEGATIVE: 1
RESPIRATORY NEGATIVE: 1
ALLERGIC/IMMUNOLOGIC NEGATIVE: 1

## 2022-08-03 NOTE — PROGRESS NOTES
Neurology Progress Note, Follow-up:    Patient: Cassandra Eastman  : 1955  Date: 22  Primary provider: Mirian Lopez DO     Re: Followup OV, foot drop    Dear Mirian Lopez DO:    I have seen Niviakeith Trevino for a follow-up office visit in regards to complaint of lt. foot drop, patient reports she first noted in May 2022, while hospitalized. She denies numbness, tingling or pain of her left foot or leg. There are no acute or subacute sciatica symptoms or low back pain complaints. I observed that she crosses her legs frequently and have cautioned her against that as she may have developed a left peroneal compression neuropathy at or below the level of the fibular head as a result. She also is diabetic and her diabetic condition also makes her prone to develop a focal compression neuropathy. I shall order an NCS/EMG study of the left lower extremity to evaluate for peripheral neuropathy, diabetic neuropathy or lumbar radiculopathy and an MRI of the lumbar spine without contrast as was discussed with her today. Please refer to Neurology hospitalist consult note, Dr. BONILLA, 2022 for additional information and my prior Neurology office progress note of 2021 for additional information if needed. Medical/vascular risk factors include hypertension, type 1 diabetes mellitus, mixed hyperlipidemia, prior stroke. Dr. BONILLA, Neurohospitalist consult note reviewed: 61 y.o. female  with h/o DM, HTN, HLD, hemorrhagic stroke, dysarthria, and GERD who was admitted to [facility]  on 2022 with presentation of altered mental status. On day of presentation, patient had been work when she became acutely unresponsive prompting EMS transport to the ED. On ED arrival about 30 minutes after onset, she was diaphoretic and minimally responsive with blood glucose of 251. Her initial blood pressure was 83/60 with pulse of 54 and 57.   Blood pressure and heart rate gradually improved over time in the ED. Stroke alert was called with CT head, CTA head/neck/brain perfusion labs were all unremarkable. Telestroke service was consulted with suspicion of stroke mimic despite history of intracranial hemorrhage. Patient was not considered tPA or endovascular procedure candidate. Note: No actual witnessed seizure was reported in the ED. There is no tongue biting or urinary incontinence, generalized tonic-clonic activity though she was initially treated with Keppra. Resolving deficits were noted with normalized BP. Left foot drop on exam was of unclear significance secondary to questionable stroke residual or new deficit, however her brain MRI showed no acute focal infarct. Imaging data: MR brain scan completed 5/29/2022, reviewed, showed no acute focal intracranial abnormality, only the old chronic external capsule small vessel infarct involving the right external capsule as previous. She had a prior hemorrhagic stroke in this region. CTA of head/neck, CT brain perfusion, showed no hemodynamically significant stenoses and brain perfusion scan was negative for acute infarct. Lab data: Reviewed from 7/28/2022, 5/30/2022. Blood glucose 185, 148.     Current Outpatient Medications   Medication Sig Dispense Refill    zoster recombinant adjuvanted vaccine (SHINGRIX) 50 MCG/0.5ML SUSR injection Inject 0.5 mLs into the muscle See Admin Instructions 1 dose now and repeat in 2-6 months 0.5 mL 0    amLODIPine (NORVASC) 5 MG tablet TAKE 1 TABLET DAILY IN THE MORNING 14 tablet 0    atenolol (TENORMIN) 50 MG tablet TAKE 1 TABLET DAILY 14 tablet 0    atorvastatin (LIPITOR) 40 MG tablet TAKE 1 TABLET DAILY 90 tablet 1    esomeprazole (NEXIUM) 40 MG delayed release capsule TAKE 1 CAPSULE EVERY MORNING BEFORE BREAKFAST 14 capsule 0    lisinopril (PRINIVIL;ZESTRIL) 30 MG tablet TAKE 1 TABLET DAILY AT NIGHT 14 tablet 0    sitaGLIPtan-metFORMIN (JANUMET)  MG per tablet Take 1 tablet by mouth in the morning. 90 tablet 1    venlafaxine (EFFEXOR XR) 150 MG extended release capsule TAKE 1 CAPSULE DAILY 30 capsule 5    Multiple Vitamin (MULTI-VITAMIN DAILY PO) Take by mouth       No current facility-administered medications for this visit.        No Known Allergies    Patient Active Problem List   Diagnosis    Right foot infection    Diabetic ulcer of left foot associated with type 1 diabetes mellitus (HCC)    Type 2 diabetes mellitus without complication, without long-term current use of insulin ([address]    Essential hypertension    Gastroesophageal reflux disease without esophagitis    Mixed hyperlipidemia    Subacute osteomyelitis of foot (HCC)    Dysarthria    Hemorrhagic stroke (HCC)    Altered mental status    Lactic acidosis    Muscle function loss    Impaired functional mobility, balance, gait, and endurance     Past Medical History:   Diagnosis Date    Abnormal mammogram of left breast 05/2019    Anxiety     Cerebral artery occlusion with cerebral infarction ([address]     Depression     Diabetes mellitus ([address]     Dysarthria 5/12/2021    Erosive esophagitis     Esophageal stricture     GERD (gastroesophageal reflux disease)     Gestational diabetes     Hemorrhagic stroke ([facility] 5/12/2021    Hyperlipidemia     Hypertension     Hypokalemia     Osteoarthritis     knees    Postmenopausal     Type 2 diabetes mellitus without complication [facility])        Past Surgical History:   Procedure Laterality Date    BREAST BIOPSY Left 05/2019    Dr. Quinonez Hidden Dr. Abhilash Cuevas      x4    CHOLECYSTECTOMY      COLONOSCOPY  11/2011    HYSTERECTOMY (CERVIX STATUS UNKNOWN)      JOINT REPLACEMENT Right     knee       Family History   Problem Relation Age of Onset    Diabetes Mother         complication     Coronary Art Dis Father     Rheum Arthritis Sister     Mult Sclerosis Brother     Diabetes Brother        Social History     Socioeconomic History Marital status:      Spouse name: Not on file    Number of children: Not on file    Years of education: Not on file    Highest education level: Not on file   Occupational History    Not on file   Tobacco Use    Smoking status: Never    Smokeless tobacco: Never   Substance and Sexual Activity    Alcohol use: No    Drug use: No    Sexual activity: Not on file   Other Topics Concern    Not on file   Social History Narrative    Not on file     Social Determinants of Health     Financial Resource Strain: Low Risk     Difficulty of Paying Living Expenses: Not hard at all   Food Insecurity: No Food Insecurity    Worried About Running Out of Food in the Last Year: Never true    Ran Out of Food in the Last Year: Never true   Transportation Needs: Not on file   Physical Activity: Not on file   Stress: Not on file   Social Connections: Not on file   Intimate Partner Violence: Not on file   Housing Stability: Not on file     Review of Systems   Constitutional: Negative. HENT: Negative. Eyes: Negative. Respiratory: Negative. Cardiovascular: Negative. Gastrointestinal: Negative. Endocrine: Negative. Genitourinary: Negative. Musculoskeletal:         Chronic left foot drop, since May 2022   Skin: Negative. Allergic/Immunologic: Negative. Neurological:         Left foot drop. Prior right external capsule hemorrhagic stroke residual, dysarthria, dysphasia, chronic mild cognitive impairment   Hematological: Negative. Psychiatric/Behavioral:  Positive for decreased concentration. The patient is nervous/anxious. Neurologic Exam:  BP (!) 140/80 (Site: Left Upper Arm, Position: Sitting, Cuff Size: Medium Adult)   Ht 5' 7\" (1.702 m)   Wt 149 lb (67.6 kg)   BMI 23.34 kg/m²    Mental Status: Alert, oriented x3. Speech is moderately dysarthric as previous with a paucity of speech and decreased spontaneity speech and language production. There are no gross paraphasic word errors. Insight/judgment: Fair. Comprehension abilities grossly intact. Affect is mildly constricted and mood is anxious. CN's II-XII: Remains grossly intact throughout. Pupils are equal and reactive to light. EOMs are full without nystagmus. Visual fields are grossly full. Facial expression and sensation are normal and symmetric. There is no facial droop. Hearing is grossly intact. Tongue is midline. Motor/Sensory Exam: Grossly intact strength in the upper and lower extremities except for left dorsiflexion weakness, 1-2/5 power. Motor tone is normal.  DTRs: 1+ in the upper extremities, 1-2+ patellar, trace to 1+ ankle jerks, no ankle clonus. Sensory: Denies focal subjective sensory deficits to light touch and sharp stick testing. Coordination/Gait: No gross limb dysmetria or gait ataxia. Favors left foot due to dorsiflexion weakness, foot drop. Assessment/Plan:   1. Chronic left foot drop, uncertain etiology, may be related to focal compression neuropathy of the peroneal nerve at or about the level of the fibular head. She is diabetic which increases her risk of developing a compression neuropathy as well as my observation of frequent crossing of her legs that I have cautioned against.  2.  MRI of the lumbar spine without contrast is ordered to evaluate for lumbar degenerative disc disease, disc herniation and NCS/EMG study of the left lower extremity to evaluate for peroneal neuropathy, lumbar radiculopathy once resulted will receive test results by phone. 3.  History chronic right external capsular hemorrhagic stroke with residual dysarthria/dyphasia, mild chronic cognitive impairment. 4.  Medical management of her vascular risk factors and chronic medical conditions are otherwise advisable for secondary stroke risk reduction. 5.Acquired left foot drop. An NCS/EMG study was ordered. 6.   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 The left foot drop decreases her activity due to impaired balance and fear of falling. She is thus in need of a custom left ankle-foot orthosis to offset her ankle and foot deformities and improve proprioceptive/tactile feedback. The orthosis will improve gait, balance, and reduce pain, enabling the patient to better ambulate and reduce risk of falling      Sincerely,      Azeem Atkins MD    Neurology & Clinical Neurophysiology    Please note this report has been partially produced using speech recognition software and may cause contain errors related to that system including grammar, punctuation and spelling or words and phrases that may not seem appropriate. If there are questions or concerns please feel free to contact me to clarify. Note: A total time of 30 mins. was spent on the date of service in preparation for this visit, which included face-to-face patient care, completing clinical documentation, counseling and coordination of care based on clinical impression, neurologic diagnosis, review of pertinent imaging studies, test results, implementation and discussion of treatment plan, risk factor reduction and patient and/or family education. Orders Placed This Encounter   Procedures    MRI LUMBAR SPINE WO CONTRAST     Standing Status:   Future     Standing Expiration Date:   10/3/2022     Order Specific Question:   Reason for exam:     Answer:   evaluate for disc herniation, DJD    EMG     Standing Status:   Future     Standing Expiration Date:   10/3/2022     Scheduling Instructions:      Evaluate for left peroneal neuropathy; patient diabetic, crosses legs, no back pain or sciatica sxs. Can be scheduled with earliest available in [place].  location     Order Specific Question:   Which body part?      Answer:   left foot drop since May 2022

## 2022-08-08 ENCOUNTER — TREATMENT (OUTPATIENT)
Dept: PHYSICAL THERAPY | Age: 67
End: 2022-08-08
Payer: COMMERCIAL

## 2022-08-08 ENCOUNTER — TREATMENT (OUTPATIENT)
Dept: OCCUPATIONAL THERAPY | Age: 67
End: 2022-08-08
Payer: COMMERCIAL

## 2022-08-08 DIAGNOSIS — R29.898 MUSCLE FUNCTION LOSS: Primary | ICD-10-CM

## 2022-08-08 DIAGNOSIS — R29.898 WEAKNESS OF BOTH LOWER EXTREMITIES: Primary | ICD-10-CM

## 2022-08-08 DIAGNOSIS — Z74.09 IMPAIRED FUNCTIONAL MOBILITY, BALANCE, GAIT, AND ENDURANCE: ICD-10-CM

## 2022-08-08 PROCEDURE — 97110 THERAPEUTIC EXERCISES: CPT | Performed by: OCCUPATIONAL THERAPY ASSISTANT

## 2022-08-08 PROCEDURE — 97530 THERAPEUTIC ACTIVITIES: CPT | Performed by: OCCUPATIONAL THERAPY ASSISTANT

## 2022-08-08 PROCEDURE — 97110 THERAPEUTIC EXERCISES: CPT

## 2022-08-08 NOTE — PROGRESS NOTES
Physical Therapy Daily Treatment Note    Date: 2022  Patient Name: Nataliya Solis  : 1955   MRN: [id number]  DOInjury: DOSx:   Referring Provider: Olivia Samayoa MD   [place] [id number] [place] [address]     Medical Diagnosis:     R29.898 (ICD-10-CM) - Weakness of both lower extremities   Z74.09 (ICD-10-CM) - Impaired functional mobility, balance, gait, and endurance       Outcome Measure:   LEFS 56% impairment        X = TO BE PERFORMED NEXT VISIT  > = PROGRESS TO THIS    S: pt reports no new issues feels weaker at times. Went to the [facility] and per the dr did not have a stroke. O: Access Code: [email or web address]  URL: [email or web address]  Date: 2022  Prepared by: Harley Licona    Exercises  Standing March with Counter Support - 1 x daily - 7 x weekly - 3 sets - 10 reps  Squat - 1 x daily - 7 x weekly - 3 sets - 10 reps  Standing Hip Abduction with Counter Support - 1 x daily - 7 x weekly - 3 sets - 10 reps  Standing Ankle Dorsiflexion with Table Support - 1 x daily - 7 x weekly - 3 sets - 10 reps    Time 0331-0710     Visit 8 60 visit limit shared with OT/SP    Pain Pain 0/10     ROM      Modalities            Exercise      Nustep   L5/ 10 min UE too TE         Squats 3 x 10 at leg press on blue foam  TE         Toe Raises  DF stretch  X 2 min using pro stretch  seated  TE   Marches X 2 min  hold onto leg press on blue foam   TE   Alt. Sidekicks X 2 min  hold onto leg press on blue foam   TE         Sit/Stands 8x/  30 sec then posterior LOB x 1 into chair   TE   LAQ's  3# x 2 min alternating      Gait training   GT         Marching Gait 45 feet x 2 with need of min assist x 1 also cues for increase NADEEN. NR   Sidestepping   NR         Leg press  20# 3 x 15  TE         Calf raises into DF stretch on step  4\" step x 2 min new      Tug test 12 sec     A:  Tolerated well. Increased fatigue with tx. Standing with feet together eyes closed Fair.   P: Continue with rehab plan Wesley Barnes, TAE    Treatment Charges: Mins Units   Initial Evaluation     Re-Evaluation     Ther Exercise         TE 40 3   Manual Therapy     MT     Ther Activities        TA     Gait Training          GT     Neuro Re-education NR     Modalities     Non-Billable Service Time     Other     Total Time/Units 40 3

## 2022-08-08 NOTE — PROGRESS NOTES
OCCUPATIONAL THERAPY PROGRESS NOTE  [address] THERAPY   [place] [address] [place] [id number]  Dept: [phone]  Loc: [facility]  [address]. OT Fax: [phone]    Date:  2022  Initial Evaluation Date: 22    Patient Name:  Cheryl Angela    :  1955    Restrictions/Precautions:  Non noted - has L foot drop, moderated fall risk  Diagnosis:  Weakness both Hands   R29.898 (ICD-10-CM) - Weakness of both hands                                                       Date of Surgery/Injury: over the past few months, recent ED admission     Insurance/Certification information:  Jaquelin Giraldo  - ( 60 visits combined OT,PT , SP a year)  Plan of care signed (Y/N): N  Visit# / total visits:      Referring Practitioner:  Dr. Laura Chan Practitioner Orders: None noted - evaluate and treat as indicated. Assessment of current deficits  [] Functional mobility             [x] ADLs          [x] Strength                  [] Cognition  [] Functional transfers           [] IADLs         [] Safety Awareness  [x] Endurance  [x] Fine Motor Coordination    [] Balance      [] Vision/perception   [] Sensation   2  [x] Gross Motor Coordination [x] ROM           [] Pain                        [x] Edema          [] Scar Adhesion/Skin Integrity     OT PLAN OF CARE   OT POC based on physician orders, patient diagnosis and results of clinical assessment     Frequency/Duration   1-2 x's a week for 8 sessions.   Specific OT Treatment to include:      [x] Instruction in HEP                   Modalities:  [x] Therapeutic Exercise                 [] Ultrasound               [] Electrical Stimulation/Attended  [x] PROM/Stretching                    [x] Fluidotherapy          [x]  Paraffin                   [x] AAROM  [x] AROM                 [] Iontophoresis:   [x] Tendon Glides                                               [x] Neuromuscular Re-Ed            [] ADL/IADL re-training    [x] Therapeutic Activity                  [x] Pain Management with/without modalities PRN                 [x] Manual Therapy                      [] Splinting                                   [] Scar Management                   []Joint Protection/Training  []Ergonomics                             [] Joint Mobilization                      [] Adaptive Equipment Assessment/Training                             [] Manual Edema Mobilization  [] Myofascial Release                 [] Energy Conservation/Work Simplification  [x] GM/FM Coordination                [] Safety retraining/education per  individual diagnosis/goals  [] Desensitization        Patient Specific Goal: To get her hands/ arms stronger - especially her R one. GOALS (Long term same as Short term):  1. ) Patient will demonstrate good understanding of home program (exercises/activities/diagnosis/prognosis/goals) with good accuracy. 2. ) Patient will demonstrate increased active/passive range of motion of their Both UE's at the shoulders to 115- 120* for  ADL/IADL completion. 3. ) Patient will demonstrate increased /pinch strength of at least 5-10  / 2-4 pinch pounds in both hands. STG  - be able to open a unsealed 2\" lids in 2- 3 weeks , LTG - open a 1\" unsealed lid in 4 weeks. [place] 4. ) Increase in fine motor function as evidenced by decreased time to complete 9-hole peg test by  5-10 seconds. 6. ) Patient will report ADL functions as Mod I/I using Both her UE\"s - goal to use R UE for about 60 % of the time as her dominant instead of 30%. 7. ) Patient will decrease QuickDASH score to 45% or less for increased participation in daily functional activities. 8. ) Patient to demonstrate proper follow through of home modification/adaptive recommendations to increase safe functional ADLs. Pain Level: 1 on scale of 1-10    Subjective: Pt. Presented with no new complaints. Objective:  Updated POC to be completed by 8/11/22. INTERVENTION: COMPLETED: SPECIFICS/COMMENTS:   Modality:               AROM:     BUE's x           -Cone transfer ax- 2 shelves or stacking.   -lift and place small ball onto cones- 10 reps each hand. (Rest breaks needed)   -Towel stretches on wall- \"W\" pattern- 7 reps (increased fatigue in shldrs.)  -overhead pulleys-4 mins to increase shoulder mobility        FMC/ In hand manipulation skills                BUE's   X  x      x -[place] manipulation- 5 coins at a time. -exercise spheres-- BUE's (increased difficulty)  -[place]chems deconstruction ax.   -FM threading nuts/bolts ax  -pom pom in hand manipulation task. -Pick and place beads using frap strap to increase palmar abduction of thumb         PROM/Stretching:               Scar Mass/Edema Control:               Strengthening:     BUE's x -UBE- 3 mins forward and 3 mins backwards with BUE's. X    x -Yellow resistive clothespin ax- pick and place pom poms. (Frap strap for LUE  -Power hand gripper 2nd setting with BUE's. Dynamic-15 reps each Static- 3 reps with 10 count    Other:     Energy conservation handout x           Assessment/Comments: Pt is making Fair progress toward stated plan of care. Pt tolerated all new exercises and stretches today. Discussed visit with neurologist. Decreased strength and ROM was not discussed in depth. Pt. To follow up with PCP to discuss Neurology report. Will re-eval next session.     -Rehab Potential: Good  -Requires OT Follow Up: Yes  Time In:1300            Time Out: 1400             Visit #: 7    Treatment Charges: Mins Units   Modalities     Ther Exercise 30 2   Manual Therapy     Thera Activities 30 2   ADL/Home Mgt      Neuro Re-education     Group Therapy     Non-Billable Service Time     Other     Total Time/Units 60 4         -Response to Treatment: F. Pt. Is frustrated with her overall weakness and decreased coordination. Support issued.    Goals: Goals for pt can be see on initial eval occurring on 7/11/22    Plan:   [x]  Continues Plan of care: Focus on AROM, GMC, FMC and strength of BUE's. Pt education continues at each visit to obtain maximum benefits from skilled OT intervention.   []  [address] of care:   []  Discharge:      KIKI Lei/URIAH [id number]

## 2022-08-09 ENCOUNTER — EVALUATION (OUTPATIENT)
Dept: SPEECH THERAPY | Age: 67
End: 2022-08-09
Payer: COMMERCIAL

## 2022-08-09 DIAGNOSIS — R13.12 OROPHARYNGEAL DYSPHAGIA: ICD-10-CM

## 2022-08-09 DIAGNOSIS — R47.1 DYSARTHRIA: Primary | ICD-10-CM

## 2022-08-09 PROCEDURE — 92523 SPEECH SOUND LANG COMPREHEN: CPT | Performed by: SPEECH-LANGUAGE PATHOLOGIST

## 2022-08-09 PROCEDURE — 92610 EVALUATE SWALLOWING FUNCTION: CPT | Performed by: SPEECH-LANGUAGE PATHOLOGIST

## 2022-08-10 ENCOUNTER — TREATMENT (OUTPATIENT)
Dept: PHYSICAL THERAPY | Age: 67
End: 2022-08-10
Payer: COMMERCIAL

## 2022-08-10 ENCOUNTER — TREATMENT (OUTPATIENT)
Dept: OCCUPATIONAL THERAPY | Age: 67
End: 2022-08-10
Payer: COMMERCIAL

## 2022-08-10 DIAGNOSIS — R29.898 WEAKNESS OF BOTH LOWER EXTREMITIES: Primary | ICD-10-CM

## 2022-08-10 DIAGNOSIS — Z74.09 IMPAIRED FUNCTIONAL MOBILITY, BALANCE, GAIT, AND ENDURANCE: ICD-10-CM

## 2022-08-10 DIAGNOSIS — R29.898 MUSCLE FUNCTION LOSS: Primary | ICD-10-CM

## 2022-08-10 PROCEDURE — 97110 THERAPEUTIC EXERCISES: CPT | Performed by: OCCUPATIONAL THERAPY ASSISTANT

## 2022-08-10 PROCEDURE — 97530 THERAPEUTIC ACTIVITIES: CPT | Performed by: OCCUPATIONAL THERAPY ASSISTANT

## 2022-08-10 PROCEDURE — 97110 THERAPEUTIC EXERCISES: CPT

## 2022-08-10 NOTE — PROGRESS NOTES
with RUE. 9 hole peg test decreased from 2 mins 12 secs to 1 min 28 secs. LUE remained unchanged at 51 secs. 6. ) Patient will report ADL functions as Mod I/I using Both her UE\"s - goal to use R UE for about 60 % of the time as her dominant instead of 30%. Progress noted. Pt. Stated she tries to use RUE more, but still relies on LUE more for ADL tasks. Approx 50% of the time. 7. ) Patient will decrease QuickDASH score to 45% or less for increased participation in daily functional activities. Good progress towards goal. QuickDASH score decreased from 60% to 42.5% today. 8. ) Patient to demonstrate proper follow through of home modification/adaptive recommendations to increase safe functional ADLs. Progress noted. Pt. Issued frap stap for thumb positioning. Pain Level: 1 on scale of 1-10    Subjective: Pt. Presented with no new complaints. Objective:  Updated POC to be completed by 8/11/22. INTERVENTION: COMPLETED: SPECIFICS/COMMENTS:   Modality:               AROM:     BUE's           X  x -Cone transfer ax- 2 shelves or stacking.   -lift and place small ball onto cones- 10 reps each hand. (Rest breaks needed)   -Towel stretches on wall- \"W\" pattern- 7 reps (increased fatigue in shldrs.)  -overhead pulleys-4 mins to increase shoulder mobility  -Finger ladder- 2 reps each on BUE's   -Tabletop towel stretches- 20 reps in 3 planes. FMC/ In hand manipulation skills                BUE's          -[place] manipulation- 5 coins at a time. -exercise spheres-- BUE's (increased difficulty)  -Bunchems deconstruction ax.   -FM threading nuts/bolts ax  -pom pom in hand manipulation task. -Pick and place beads using frap strap to increase palmar abduction of thumb         PROM/Stretching:               Scar Mass/Edema Control:               Strengthening:     BUE's x -UBE- 3 mins forward and 3 mins backwards with BUE's. x -Yellow resistive clothespin ax- pick and place pom poms.   (Frap strap for LUE  -Power hand gripper 2nd setting with BUE's. Dynamic-15 reps each Static- 3 reps with 10 count    Other:     Energy conservation handout x           Assessment/Comments: Pt is making scattered progress toward stated plan of care. Pt tolerated all new exercises and testing today. Continued OT is recommended at 2x/ week x 4 more weeks. There is a concern with progress weakness in the UE. Pt reports the doctors are aware. May need to adjust the program depending on progress over the next 4 weeks. Shoulder flexion    7/11/22   8/10/22   LUE     104*      105*   RUE     103*       75*            -Rehab Potential: Good  -Requires OT Follow Up: Yes  Time In:1305           Time Out: 1400             Visit #: 8    Treatment Charges: Mins Units   Modalities     Ther Exercise 25 2   Manual Therapy     Thera Activities 30 2   ADL/Home Mgt      Neuro Re-education     Group Therapy     Non-Billable Service Time     Other     Total Time/Units 55 4       -Response to Treatment: Pt. Is frustrated with her overall weakness and decreased coordination. Support issued. Goals: Goals for pt can be see on initial eval occurring on 7/11/22    Plan:   [x]  Continues Plan of care: Continue OT for 2x a week for 4 more weeks to address above goals. Focus on AROM, GMC, FMC and strength of BUE's. Pt education continues at each visit to obtain maximum benefits from skilled OT intervention.   []  [facility] of care:   []  Discharge:      KIKI Knapp/URIAH [id number]

## 2022-08-10 NOTE — PROGRESS NOTES
Physical Therapy Daily Treatment Note    Date: 8/10/2022  Patient Name: Isaias Joiner  : 1955   MRN: [id number]  DOInjury: DOSx:     Referring Provider:   Darleene Goodpasture, MD   [facility] [address] [place] [address]       Medical Diagnosis:   R29.898 (ICD-10-CM) - Weakness of both lower extremities   Z74.09 (ICD-10-CM) - Impaired functional mobility, balance, gait, and endurance       Outcome Measure:  LEFS 56% impairment        X = TO BE PERFORMED NEXT VISIT  > = PROGRESS TO THIS    S: Patient reports continued weakness. Weight 148.6. O: Access Code: [email or web address]  URL: [email or web address]  Date: 2022  Prepared by: Aretha Manirquez    Exercises  Standing March with Counter Support - 1 x daily - 7 x weekly - 3 sets - 10 reps  Squat - 1 x daily - 7 x weekly - 3 sets - 10 reps  Standing Hip Abduction with Counter Support - 1 x daily - 7 x weekly - 3 sets - 10 reps  Standing Ankle Dorsiflexion with Table Support - 1 x daily - 7 x weekly - 3 sets - 10 reps    Time 6094-7563     Visit  visit limit shared with OT/SP    Pain Pain 0/10     ROM      Modalities            Exercise      Nustep   L5/ 10 min UE too TE         Squats 3 x 10 at leg press on blue foam  TE         Toe Raises  DF stretch  X 2 min using pro stretch seated  TE   Marches X 2 min  hold onto leg press on blue foam   TE   Alt. Sidekicks X 2 min hold onto leg press on blue foam   TE         Sit/Stands 8 x 30 sec   TE   LAQ's  3# x 2 min alternating      Gait training   GT         Marching Gait 45 feet x 2 with need of min assist x 1 also cues for increase NADEEN  NR   Sidestepping   NR         Leg press  20# 3 x 15  TE         Calf raises into DF stretch on step  4\" step x 2 min      Tug test     A: Tolerated well. P: Continue with rehab plan.     Rich Hernandes, PTA    Treatment Charges: Mins Units   Initial Evaluation     Re-Evaluation     Ther Exercise         TE 40 3   Manual Therapy     MT     Ther Activities TA     Gait Training          GT     Neuro Re-education NR     Modalities     Non-Billable Service Time     Other     Total Time/Units 40 3

## 2022-08-15 ENCOUNTER — TREATMENT (OUTPATIENT)
Dept: OCCUPATIONAL THERAPY | Age: 67
End: 2022-08-15
Payer: COMMERCIAL

## 2022-08-15 ENCOUNTER — TREATMENT (OUTPATIENT)
Dept: PHYSICAL THERAPY | Age: 67
End: 2022-08-15
Payer: COMMERCIAL

## 2022-08-15 ENCOUNTER — HOSPITAL ENCOUNTER (OUTPATIENT)
Dept: GENERAL RADIOLOGY | Age: 67
Discharge: HOME | End: 2022-08-17
Payer: COMMERCIAL

## 2022-08-15 DIAGNOSIS — R29.898 WEAKNESS OF BOTH LOWER EXTREMITIES: Primary | ICD-10-CM

## 2022-08-15 DIAGNOSIS — R29.898 MUSCLE FUNCTION LOSS: Primary | ICD-10-CM

## 2022-08-15 DIAGNOSIS — R13.10 DYSPHAGIA, UNSPECIFIED TYPE: ICD-10-CM

## 2022-08-15 DIAGNOSIS — Z74.09 IMPAIRED FUNCTIONAL MOBILITY, BALANCE, GAIT, AND ENDURANCE: ICD-10-CM

## 2022-08-15 DIAGNOSIS — K22.2 ESOPHAGEAL STRICTURE: ICD-10-CM

## 2022-08-15 PROCEDURE — 2500000003 HC RX 250 WO HCPCS: Performed by: SURGERY

## 2022-08-15 PROCEDURE — 92526 ORAL FUNCTION THERAPY: CPT | Performed by: SPEECH-LANGUAGE PATHOLOGIST

## 2022-08-15 PROCEDURE — 97110 THERAPEUTIC EXERCISES: CPT | Performed by: OCCUPATIONAL THERAPY ASSISTANT

## 2022-08-15 PROCEDURE — 74230 X-RAY XM SWLNG FUNCJ C+: CPT

## 2022-08-15 PROCEDURE — 92611 MOTION FLUOROSCOPY/SWALLOW: CPT | Performed by: SPEECH-LANGUAGE PATHOLOGIST

## 2022-08-15 PROCEDURE — 97530 THERAPEUTIC ACTIVITIES: CPT | Performed by: OCCUPATIONAL THERAPY ASSISTANT

## 2022-08-15 PROCEDURE — 97110 THERAPEUTIC EXERCISES: CPT

## 2022-08-15 RX ADMIN — BARIUM SULFATE 45 G: 0.81 POWDER, FOR SUSPENSION ORAL at 10:41

## 2022-08-15 RX ADMIN — BARIUM SULFATE 45 ML: 400 SUSPENSION ORAL at 10:41

## 2022-08-15 RX ADMIN — BARIUM SULFATE 45 ML: 400 PASTE ORAL at 10:41

## 2022-08-15 NOTE — PROGRESS NOTES
Physical Therapy Daily Treatment Note    Date: 8/15/2022  Patient Name: Janette Uribe  : 1955   MRN: [id number]  DOInjury: DOSx:     Referring Provider:   Mayra Sanchez MD   [place] [id number] [place] [address]       Medical Diagnosis:   R29.898 (ICD-10-CM) - Weakness of both lower extremities   Z74.09 (ICD-10-CM) - Impaired functional mobility, balance, gait, and endurance       Outcome Measure:  LEFS 56% impairment        X = TO BE PERFORMED NEXT VISIT  > = PROGRESS TO THIS    S: Patient reports feeling pretty good. Swallow study done this morning. Weight 150.4  O: Access Code: [email or web address]  URL: [email or web address]  Date: 2022  Prepared by: Zenaida Wesley    Exercises  Standing March with Counter Support - 1 x daily - 7 x weekly - 3 sets - 10 reps  Squat - 1 x daily - 7 x weekly - 3 sets - 10 reps  Standing Hip Abduction with Counter Support - 1 x daily - 7 x weekly - 3 sets - 10 reps  Standing Ankle Dorsiflexion with Table Support - 1 x daily - 7 x weekly - 3 sets - 10 reps    Time 4641-2307     Visit 10 60 visit limit shared with OT/SP    Pain Pain 0/10     ROM      Modalities            Exercise      Nustep   L5/ 10 min UE too TE         Squats 3 x 10 at leg press on blue foam  TE         Toe Raises  DF stretch  X 2 min using pro stretch seated  TE   Marches X 2 min  hold onto leg press on blue foam   TE   Alt. Sidekicks X 2 min hold onto leg press on blue foam   TE         Sit/Stands 9 x 30 sec   TE   LAQ's  3# x 2 min alternating      Gait training   GT         Marching Gait 45 feet x 2 with need of min assist x 1 also cues for increase NADEEN  NR   Sidestepping   NR         Leg press  20# 3 x 15  TE         Calf raises into DF stretch on step  4\" step x 2 min      Tug test     A: Tolerated well. P: Continue with rehab plan.     Zenaida Wesley PTA    Treatment Charges: Mins Units   Initial Evaluation     Re-Evaluation     Ther Exercise         TE 40 3   Manual Therapy MT     Ther Activities        TA     Gait Training          GT     Neuro Re-education NR     Modalities     Non-Billable Service Time     Other     Total Time/Units 40 3

## 2022-08-15 NOTE — PROGRESS NOTES
SPEECH/LANGUAGE PATHOLOGY  VIDEOFLUOROSCOPIC STUDY OF SWALLOWING (MBS)   and PLAN OF CARE    PATIENT NAME:  Janette Uribe  (female)     MRN:  [id number]    :  1955  (61 y.o.)  STATUS:  Outpatient    TODAY'S DATE:  8/15/2022  REFERRING PROVIDER:   Dr. Iman Byers: SLP video swallow  Date of order:  2022   REASON FOR REFERRAL: dysphagia    EVALUATING THERAPIST: Martin Cano, SLP      RESULTS:      DYSPHAGIA DIAGNOSIS:  moderate-severe oral phase dysphagia  and minimal pharyngeal phase dysphagia     DIET RECOMMENDATIONS:  Easy to chew consistency solids (IDDSI level 7, transitional) with  thin liquids (IDDSI level 0)    FEEDING RECOMMENDATIONS:    Assistance level:  No assistance needed     Compensatory strategies recommended: Double swallow, Small bites/sips, Alternate solids and liquids, and Check for oral pocketing     Discussed recommendations with nursing and/or faxed report to referring provider: Yes    Laryngeal Penetration and Aspiration:  Neither penetration nor aspiration was observed in today's study     SPEECH THERAPY  PLAN OF CARE   The dysphagia POC is established based on physician order and dysphagia diagnosis    Outpatient OR Home Care Skilled SLP intervention for dysphagia management is recommended 1-2 times per week to address the established treatment plan      Conditions Requiring Skilled Therapeutic Intervention for dysphagia:    Reduced oral control of bolus   Slow incoordinated mastication with poor bolus formation resulting in posterior escape of bolus  Oral motor coordination impairment  Pocketing of food primarily on palate with solids     SPECIFIC DYSPHAGIA INTERVENTIONS TO INCLUDE:     Training in positioning for improved integrity of swallow  Compensatory strategy training   Therapeutic exercises    Specific instructions for next treatment:  development and training of compensatory swallow strategies to improve airway protection and swallow function  Treatment Goals:    Short Term Goals:  Pt will complete oral motor coordination exercises to improve bolus prep/ control and mastication with  minimal verbal prompts . Long Term Goals:   Pt will improve oropharyngeal swallow function to ensure airway protection during PO intake to maintain adequate nutrition/hydration and decrease signs/symptoms of aspiration to less than 1 x/day. OTHER:  patient would benefit from Neurology referral to determine the etiology of significantly impaired oral musculature that results in very poor oral coordination for mastication and her significant dysarthria      Patient/family Goal:    To eat/drink without coughing or choking    Plan of care discussed with Patient   The Patient understand(s) the diagnosis, prognosis and plan of care     Rehabilitation Potential/Prognosis: good                      ADMITTING DIAGNOSIS: Dysphagia, unspecified type [R13.10]  Esophageal stricture [K22.2]     VISIT DIAGNOSIS:   Visit Diagnoses         Codes    Dysphagia, unspecified type     R13.10    Esophageal stricture     K22.2                PATIENT REPORT/COMPLAINT: coughing with liquids    PRIOR LEVEL OF SWALLOW FUNCTION:    Past History of Dysphagia?:  yes she reports a change in her swallow function following her [facility] admission to [facility] for     Home diet: Regular consistency solids (IDDSI level 7) with  thin liquids (IDDSI level 0)  Current Diet Order:  No diet orders on file    PROCEDURE:  Consistencies Administered During the Evaluation   Liquids: thin liquid, nectar thick liquid, and honey thick liquid   Solids:  pureed foods and solid foods      Method of Intake:   cup, spoon  Self fed      Position:   Seated, upright, Lateral plane    INSTRUMENTAL ASSESSMENT:      MBSImP Results:   Lip closure for intraoral bolus containment resulted in no labial escape.  Tongue control during bolus hold maintained a cohesive bolus held between tongue to palate seal. Bolus preparation and mastication resulted in incoordinated chewing and mashing with poor bolus recollection. Solids resulted in significant residue on palate that she had a difficult time clearing off with her tongue. Liquid wash was beneficial but she did need to use a finger scrape to remove all of the residue. Bolus transport/lingual motion was with delayed tongue motion. Oral residue was present but cleared adequately for all items except the solids see above. Initiation of the pharyngeal swallow occurred as the bolus head reached the valleculae. Soft palate elevation resulted in no bolus between the soft palate and the pharyngeal wall. Laryngeal elevation demonstrated complete superior movement of the thyroid cartilage with complete approximation of the arytenoids to the epiglottic petiole. Anterior hyoid excursion demonstrated complete anterior movement. Epiglottic movement resulted in complete inversion. Laryngeal vestibule closure was complete, as indicated by no air or contrast within the laryngeal vestibule at the height of the swallow. Pharyngeal stripping wave was present and complete. Pharyngeal contraction could not be determined due to logistical reasons not related to physiologic impairment. Pharyngoesophageal segment opening was completely distended for complete duration with no obstruction of bolus flow. Tongue base retraction allowed no contrast between the retracted tongue base and the posterior pharyngeal wall. Pharyngeal residue was not present. Esophageal clearance in the upright position could not be assessed due to logistical reasons not related to physiologic impairment.        PENETRATION-ASPIRATION SCALE (PAS):  THIN 1 = Material does not enter the airway  MILDLY THICK 1 = Material does not enter the airway  MODERATELY THICK 1 = Material does not enter the airway  PUREE 1 = Material does not enter the airway  HARD SOLID 1 = Material does not enter the airway       COMPENSATORY Lactic acidosis    Muscle function loss    Impaired functional mobility, balance, gait, and endurance         Patient seen for swallow therapy 10 minutes. Reviewed current solid/liquid consistency diet recommendation for   Easy to chew consistency solids (IDDSI level 7, transitional) with thin liquids (IDDSI level 0)and discussed compensatory strategies to ensure safe PO intake. Reviewed aspiration precautions. Discussed use of Alternate solids and liquids and Check for oral pocketing to decrease risk of aspiration with implementation of strengthening exercises to improve swallow function as the long term goal.  Patient was able to restate compensatory strategies during session. .  will continue POC.        [id number]  dysphagia tx    Bright Uribe MSCCC/SLP  Speech Language Pathologist  [phone]

## 2022-08-15 NOTE — PROGRESS NOTES
OCCUPATIONAL THERAPY PROGRESS NOTE  [address] THERAPY   [facility] [address] [place] [address]  Dept: [phone]  Loc: [address] OT Fax: [phone]    Date:  8/15/2022  Initial Evaluation Date: 22    Patient Name:  Cassandra Eastman    :  1955    Restrictions/Precautions:  Non noted - has L foot drop, moderated fall risk  Diagnosis:  Weakness both Hands   R29.898 (ICD-10-CM) - Weakness of both hands                                                       Date of Surgery/Injury: over the past few months, recent ED admission     Insurance/Certification information:  Priscila Waddell 150  - ( 60 visits combined OT,PT , SP a year)  Plan of care signed (Y/N): N  Visit# / total visits:      Referring Practitioner:  Dr. Sarah Becerril Practitioner Orders: None noted - evaluate and treat as indicated. Assessment of current deficits  [] Functional mobility             [x] ADLs          [x] Strength                  [] Cognition  [] Functional transfers           [] IADLs         [] Safety Awareness  [x] Endurance  [x] Fine Motor Coordination    [] Balance      [] Vision/perception   [] Sensation   2  [x] Gross Motor Coordination [x] ROM           [] Pain                        [x] Edema          [] Scar Adhesion/Skin Integrity     OT PLAN OF CARE   OT POC based on physician orders, patient diagnosis and results of clinical assessment     Frequency/Duration   1-2 x's a week for 8 sessions.   Specific OT Treatment to include:      [x] Instruction in HEP                   Modalities:  [x] Therapeutic Exercise                 [] Ultrasound               [] Electrical Stimulation/Attended  [x] PROM/Stretching                    [x] Fluidotherapy          [x]  Paraffin                   [x] AAROM  [x] AROM                 [] Iontophoresis:   [x] Tendon Glides                                               [x] Neuromuscular Re-Ed            [] ADL/IADL re-training    [x] Therapeutic Activity                  [x] Pain Management with/without modalities PRN                 [x] Manual Therapy                      [] Splinting                                   [] Scar Management                   []Joint Protection/Training  []Ergonomics                             [] Joint Mobilization                      [] Adaptive Equipment Assessment/Training                             [] Manual Edema Mobilization  [] Myofascial Release                 [] Energy Conservation/Work Simplification  [x] GM/FM Coordination                [] Safety retraining/education per  individual diagnosis/goals  [] Desensitization        Patient Specific Goal: To get her hands/ arms stronger - especially her R one. GOALS (Long term same as Short term): Updated on 8/10/22  1. ) Patient will demonstrate good understanding of home program (exercises/activities/diagnosis/prognosis/goals) with good accuracy. Ongoing goal. Pt. Completes HEP daily. 2. ) Patient will demonstrate increased active/passive range of motion of their Both UE's at the shoulders to 115- 120* for  ADL/IADL completion. Progress noted with LUE. Decreased AROM noted with RUE. Se measurements below. 3. ) Patient will demonstrate increased /pinch strength of at least 5-10  / 2-4 pinch pounds in both hands. STG  - be able to open a unsealed 2\" lids in 2- 3 weeks , LTG - open a 1\" unsealed lid in 4 weeks. Progress noted.  strength- LUE remains at 15# RUE increased from 5# to 7# today. Lateral pinch- LUE increased from 1/2# to 2# and RUE increased from 0# to 1#. Palmar 3 point pinch- LUE increased from 1/2# to 2# and RUE increased from 0# to 2# today. Pt. Still unable to open 2 inch sealed lid as of today. 4. ) Increase in fine motor function as evidenced by decreased time to complete 9-hole peg test by  5-10 seconds. Good progress towards goal with RUE.  9 hole peg test decreased from 2 mins 12 secs to 1 min 28 secs. LUE remained unchanged at 51 secs. 6. ) Patient will report ADL functions as Mod I/I using Both her UE\"s - goal to use R UE for about 60 % of the time as her dominant instead of 30%. Progress noted. Pt. Stated she tries to use RUE more, but still relies on LUE more for ADL tasks. Approx 50% of the time. 7. ) Patient will decrease QuickDASH score to 45% or less for increased participation in daily functional activities. Good progress towards goal. QuickDASH score decreased from 60% to 42.5% today. 8. ) Patient to demonstrate proper follow through of home modification/adaptive recommendations to increase safe functional ADLs. Progress noted. Pt. Issued frap stap for thumb positioning. Pain Level: 1 on scale of 1-10    Subjective: Pt. Stated I did the swallow test and it came out fine. They are attributing my tongue to my slurred speech. Objective:  Updated POC to be completed by 8/11/22. INTERVENTION: COMPLETED: SPECIFICS/COMMENTS:   Modality:               AROM:     BUE's           X   -[place] transfer ax- 2 shelves or stacking.   -lift and place small ball onto cones- 10 reps each hand. (Rest breaks needed)   -Towel stretches on wall- \"W\" pattern- 7 reps (increased fatigue in shldrs.)  -overhead pulleys-4 mins to increase shoulder mobility  -Finger ladder- 2 reps each on BUE's   -Tabletop towel stretches- 20 reps in 3 planes. FMC/ In hand manipulation skills                BUE's     x          x -[place] manipulation- 5 coins at a time. -exercise spheres-- BUE's (increased difficulty)  -Bunchems deconstruction ax.   -FM threading nuts/bolts ax  -pom pom in hand manipulation task. -Pick and place beads using frap strap to increase palmar abduction of thumb   -[place] pegboard ax.  (Increased time)         PROM/Stretching:               Scar Mass/Edema Control:               Strengthening:     BUE's x -UBE- 3 mins forward and 3 mins backwards with BUE's. x     -Yellow resistive clothespin ax- pick and place pom poms. (Frap strap for LUE  -Power hand gripper 2nd setting with BUE's. Dynamic-15 reps each Static- 3 reps with 10 count    Other:     Energy conservation handout x           Assessment/Comments: Pt is making scattered progress toward stated plan of care. Pt tolerated all exercises and stretches today. Pt. Continues to have weakness and fatigue in B hands. Pt. Does recover with frequent breaks during activity. Will continue to progress as tolerated. -Rehab Potential: Good  -Requires OT Follow Up: Yes  Time In:1305           Time Out: 1400             Visit #: 9    Treatment Charges: Mins Units   Modalities     Ther Exercise 25 2   Manual Therapy     Thera Activities 30 2   ADL/Home Mgt      Neuro Re-education     Group Therapy     Non-Billable Service Time     Other     Total Time/Units 55 4       -Response to Treatment: Pt. Is frustrated with her overall weakness and decreased coordination. Support issued. Goals: Goals for pt can be see on initial eval occurring on 7/11/22    Plan:   [x]  Continues Plan of care: Focus on AROM, GMC, FMC and strength of BUE's. Pt education continues at each visit to obtain maximum benefits from skilled OT intervention.   []  [facility] of care:   []  Discharge:      KIKI Bhatt/URIAH [id number]

## 2022-08-16 NOTE — PROGRESS NOTES
[address] [facility]  Outpatient Speech Therapy  Phone: [phone]   Fax:  [phone]    SPEECH/LANGUAGE PATHOLOGY  SPEECH/LANGUAGE/COGNITIVE EVALUATION   and PLAN OF CARE      PATIENT NAME:  Nata Galan  (female)     MRN:  [id number]    :  1955  (61 y.o.)  STATUS:  Outpatient clinic   TODAY'S DATE:  2022  REFERRING PROVIDER:    Dr. Dave Frame: [facility]  Date of order:  2022  EVALUATING THERAPIST: MARIA LUZ Sanchez    CERTIFICATION/RECERTIFICATION PERIOD: 2022 to 22  INSURANCE PROVIDER:  Payor: Brigitte Hanna / Plan: Genia Ghosh / Product Type: *No Product type* /    INSURANCE ID:  [id number] - ([facility])    CPT Codes  EVALUATION: 59230 Evaluation of Speech Sound Language Comprehension     60 Minutes     TREATMENT:  Requesting treatment authorization for  12   visits over 12 weeks focusing on the following CPT codes:      96609 Speech/Language Therapy     30-45 Minutes    REFERRING/TREATMENT DIAGNOSIS: Dysarthria and Dysphagia      SPEECH THERAPY  PLAN OF CARE   The speech therapy  POC is established based on physician order, speech pathology diagnosis and results of clinical assessment     SPEECH PATHOLOGY DIAGNOSIS:    Moderate to marked dysarthria    Outpatient Speech Pathology intervention is recommended 1 time  per week for the above certification period. Conditions Requiring Skilled Therapeutic Intervention for speech, language and/or cognition    Dysarthria     Specific Speech Therapy Interventions to Include: Therapeutic exercises for dysarthria    Specific instructions for next treatment:      To initiate POC  To initiate therapeutic exercises  To initiate speech production tasks    SHORT/LONG TERM GOALS   Pt will improve speech intelligibility and increased articulatory precision via compensatory strategies and oral motor exercises     Patient stated goals: Agreed with limits    COGNITION     Attention/Orientation  Attention: Sustained attention   Orientation:  Oriented to Person, Place, Date, Reason for hospitalization    Memory   Immediate Recall: Repeated 3/3    Delayed Recall:   Recalled 3/3    Long Term Recall:   Recalled Address, Birthdate, Age, and Family    Organization/Problem Solving/Reasoning   Verbal Sequencing:   Functional        Verbal Problem solving:   Functional          CLINICAL OBSERVATIONS NOTED DURING THE EVALUATION  Within functional limits                  EDUCATION:   The Speech Language Pathologist (SLP) completed education regarding results of evaluation and that intervention is warranted at this time. Learner: Patient  Education: Reviewed results and recommendations of this evaluation  Evaluation of Education:  Yao Guevara understanding    This plan may be re-evaluated and revised as warranted. Treatment goals discussed with Patient   The Patient understand(s) the diagnosis, prognosis and plan of care     Evaluation Time includes thorough review of current medical information, gathering information on past medical history/social history and prior level of function, completion of standardized testing/informal observation of tasks, assessment of data and education on plan of care and goals. The admitting diagnosis and active problem list, as listed below have been reviewed prior to initiation of this evaluation.         ACTIVE PROBLEM LIST:   Patient Active Problem List   Diagnosis    Right foot infection    Diabetic ulcer of left foot associated with type 1 diabetes mellitus ([address]    Type 2 diabetes mellitus without complication, without long-term current use of insulin (HCC)    Essential hypertension    Gastroesophageal reflux disease without esophagitis    Mixed hyperlipidemia    Subacute osteomyelitis of foot (HCC)    Dysarthria    Hemorrhagic stroke ([address]    Altered mental status    Lactic acidosis    Muscle function loss    Impaired

## 2022-08-16 NOTE — PROGRESS NOTES
[address] [facility]  Outpatient Speech Therapy  Phone: [phone]   Fax:  [phone]    SPEECH/LANGUAGE PATHOLOGY  CLINICAL ASSESSMENT OF SWALLOWING FUNCTION   and PLAN OF CARE:      PATIENT NAME:  Mercy Chowdary  (female)     MRN:  [id number]    :  1955  (61 y.o.)  STATUS:  Outpatient clinic   TODAY'S DATE:  2022  REFERRING PROVIDER:    Dr. Kamari Shaffer: [facility]  Date of order:  2022  EVALUATING THERAPIST: Allison Kidd, MARIA LUZ    CERTIFICATION/RECERTIFICATION PERIOD: 2022 to 22  INSURANCE PROVIDER:  Payor: Damon Anthony / Plan: [address] [place] [address] / Product Type: *No Product type* /    INSURANCE ID:  [id number] - ([facility])     CPT Codes  EVALUATION: 27971  bedside swallow eval    TREATMENT:   Requesting treatment authorization for  12 visits over 12 weeks focusing on the following CPT codes:     75124  dysphagia tx        REFERRING/TREATMENT DIAGNOSIS: Dysarthria and Dysphagia                  RESULTS:    DYSPHAGIA DIAGNOSIS:   Clinical indicators of moderate oropharyngeal phase dysphagia       DIET RECOMMENDATIONS:  Soft and bite size consistency solids (IDDSI level 6) with  nectar consistency (mildly thick - IDDSI level 2) liquids     FEEDING RECOMMENDATIONS:     Assistance level:  No assistance needed      Compensatory strategies recommended: Double swallow, Small bites/sips, Alternate solids and liquids, Check for oral pocketing, and No straw       SPEECH THERAPY  PLAN OF CARE   The dysphagia POC is established based on physician order, dysphagia diagnosis and results of clinical assessment     Outpatient SLP intervention for dysphagia management is recommended 1-2 times per week to address the established treatment plan    Conditions Requiring Skilled Therapeutic Intervention for dysphagia:    Reduced oral control of bolus   Reduced bolus formation and/or manipulation  Impaired mastication  Throat clearing during PO intake   Coughing during PO intake      Specific dysphagia interventions to include:     Training in positioning for improved integrity of swallow  Compensatory strategy training   Therapeutic exercises    Specific instructions for next treatment:  development and training of compensatory swallow strategies to improve airway protection and swallow function    Patient Treatment Goals:    Short Term Goals:  Pt will participate in MBSS to fully assess oropharyngeal swallow function and to assist in determining the least restrictive PO diet to maintain adequate nutrition/hydration   Pt will complete oral motor strength/ coordination exercises to improve bolus prep/ control and mastication with  minimal verbal prompts . Long Term Goals:   Pt will improve oropharyngeal swallow function to ensure airway protection during PO intake to maintain adequate nutrition/hydration and decrease signs/symptoms of aspiration to less than 1 x/day. A Video Swallow Study (MBSS) is recommended and requires a physician order      Patient/family Goal:    To be able to eat regular foods and drink regular liquids and To get stronger    Plan of care discussed with Patient   The Patient understand(s) the diagnosis, prognosis and plan of care     Rehabilitation Potential/Prognosis: fair dependent upon medical condition                    ADMITTING DIAGNOSIS: Dysarthria and Dysphagia    VISIT DIAGNOSIS: Moderate oropharyngeal dysphagia    PATIENT REPORT/COMPLAINT: difficulty chewing  occasional cough  poor appetite    PRIOR LEVEL OF SWALLOW FUNCTION:    PAST HISTORY OF DYSPHAGIA?: yes    Home diet: Easy to chew consistency solids (IDDSI level 7, transitional) with  thin liquids (IDDSI level 0)      Marisol reports that she has unintentionally lost 10-15 pounds over the last few months. She states that she has a reduced appetite.   Beatriz Grant reports that she is avoiding specific foods that she finds difficult to chew and reports coughing on liquids. She states that she feels that she is getting weaker overall. She has moderate dysarthria which was evaluated under a separate report. Please refer to that report for full details. Anastasia Ruvalcaba reports that she is to have further testing as recommended by her neurologist to determine the cause for her progressive weakness. She is currently receiving physical and occupational therapy. Once the video swallow study (MBS) is completed, changes in goals/POC may occur dependent on the results of the study. PROCEDURE:  Consistencies Administered During the Evaluation   Liquids: thin liquid and nectar thick liquid   Solids:  pureed foods and soft solid foods      Method of Intake:   cup, spoon  Self fed      Position:   Seated, upright    CLINICAL ASSESSMENT:  Oral Stage:       Decreased mastication due to:  decreased lingual control  Delayed A-P transit due to: reduced lingual strength   Oral residuals were noted :  throughout the oral cavity      Pharyngeal Stage:    Throat clearing present after presentation of thin liquid  Immediate wet cough was noted after presentation of thin liquid  Multiple swallows were noted after presentation of thin liquid and solid foods    Cognition:   Within functional limits for this exam    Oral Peripheral Examination   Generalized oral weakness    Current Respiratory Status    room air     Parameters of Speech Production  Respiration:  Adequate for speech production  Quality:   Within functional limits  Intensity: Within functional limits    Volitional Swallow: present     Volitional Cough:   present     Pain: No pain reported. EDUCATION:   The Speech Language Pathologist (SLP) completed education regarding results of evaluation and that intervention is warranted at this time.   Learner: Patient  Education: Reviewed results and recommendations of this evaluation including use of nectar thick liquids until video swallow study can be completed. Evaluation of Education:  Verbalizes understanding    This plan may be re-evaluated and revised as warranted. Treatment goals discussed with Patient   The Patient understand(s) the diagnosis, prognosis and plan of care     Evaluation Time includes thorough review of current medical information, gathering information on past medical history/social history and prior level of function, completion of standardized testing/informal observation of tasks, assessment of data and education on plan of care and goals. The admitting diagnosis and active problem list, as listed below have been reviewed prior to initiation of this evaluation. ACTIVE PROBLEM LIST:   Patient Active Problem List   Diagnosis    Right foot infection    Diabetic ulcer of left foot associated with type 1 diabetes mellitus ([facility]    Type 2 diabetes mellitus without complication, without long-term current use of insulin ([facility]    Essential hypertension    Gastroesophageal reflux disease without esophagitis    Mixed hyperlipidemia    Subacute osteomyelitis of foot (HCC)    Dysarthria    Hemorrhagic stroke ([facility]    Altered mental status    Lactic acidosis    Muscle function loss    Impaired functional mobility, balance, gait, and endurance       Bhavana Donis MA/MARISEL-SLP  [facility].           Phone: [phone]       If you have any questions or concerns, please don't hesitate to call. Thank you for your referral.    Physician/Provider Signature:________________________________Date:__________________  By signing above, the therapists plan is approved by the physician/provider. All patients under [employer] must be signed by physician/provider.

## 2022-08-17 ENCOUNTER — TREATMENT (OUTPATIENT)
Dept: PHYSICAL THERAPY | Age: 67
End: 2022-08-17
Payer: COMMERCIAL

## 2022-08-17 ENCOUNTER — TREATMENT (OUTPATIENT)
Dept: SPEECH THERAPY | Age: 67
End: 2022-08-17
Payer: COMMERCIAL

## 2022-08-17 ENCOUNTER — TREATMENT (OUTPATIENT)
Dept: OCCUPATIONAL THERAPY | Age: 67
End: 2022-08-17
Payer: COMMERCIAL

## 2022-08-17 DIAGNOSIS — R29.898 WEAKNESS OF BOTH LOWER EXTREMITIES: Primary | ICD-10-CM

## 2022-08-17 DIAGNOSIS — R13.12 OROPHARYNGEAL DYSPHAGIA: ICD-10-CM

## 2022-08-17 DIAGNOSIS — Z74.09 IMPAIRED FUNCTIONAL MOBILITY, BALANCE, GAIT, AND ENDURANCE: ICD-10-CM

## 2022-08-17 DIAGNOSIS — R47.1 DYSARTHRIA: Primary | ICD-10-CM

## 2022-08-17 DIAGNOSIS — R29.898 MUSCLE FUNCTION LOSS: Primary | ICD-10-CM

## 2022-08-17 PROCEDURE — 92507 TX SP LANG VOICE COMM INDIV: CPT | Performed by: SPEECH-LANGUAGE PATHOLOGIST

## 2022-08-17 PROCEDURE — 92526 ORAL FUNCTION THERAPY: CPT | Performed by: SPEECH-LANGUAGE PATHOLOGIST

## 2022-08-17 PROCEDURE — 97110 THERAPEUTIC EXERCISES: CPT

## 2022-08-17 PROCEDURE — 97110 THERAPEUTIC EXERCISES: CPT | Performed by: OCCUPATIONAL THERAPY ASSISTANT

## 2022-08-17 PROCEDURE — 97530 THERAPEUTIC ACTIVITIES: CPT | Performed by: OCCUPATIONAL THERAPY ASSISTANT

## 2022-08-17 NOTE — PROGRESS NOTES
OCCUPATIONAL THERAPY PROGRESS NOTE  [address] THERAPY   [facility] [address] [place] [address]  Dept: [phone]  Loc: [address] OT Fax: [phone]    Date:  2022  Initial Evaluation Date: 22    Patient Name:  Cassandra Eastman    :  1955    Restrictions/Precautions:  Non noted - has L foot drop, moderated fall risk  Diagnosis:  Weakness both Hands   R29.898 (ICD-10-CM) - Weakness of both hands                                                       Date of Surgery/Injury: over the past few months, recent ED admission     Insurance/Certification information:  Kristen Elias  - ( 60 visits combined OT,PT , SP a year)  Plan of care signed (Y/N): N  Visit# / total visits:      Referring Practitioner:  Dr. Sarah Becerril Practitioner Orders: None noted - evaluate and treat as indicated. Assessment of current deficits  [] Functional mobility             [x] ADLs          [x] Strength                  [] Cognition  [] Functional transfers           [] IADLs         [] Safety Awareness  [x] Endurance  [x] Fine Motor Coordination    [] Balance      [] Vision/perception   [] Sensation   2  [x] Gross Motor Coordination [x] ROM           [] Pain                        [x] Edema          [] Scar Adhesion/Skin Integrity     OT PLAN OF CARE   OT POC based on physician orders, patient diagnosis and results of clinical assessment     Frequency/Duration   1-2 x's a week for 8 sessions.   Specific OT Treatment to include:      [x] Instruction in HEP                   Modalities:  [x] Therapeutic Exercise                 [] Ultrasound               [] Electrical Stimulation/Attended  [x] PROM/Stretching                    [x] Fluidotherapy          [x]  Paraffin                   [x] AAROM  [x] AROM                 [] Iontophoresis:   [x] Tendon Glides                                               [x] Neuromuscular Re-Ed            [] ADL/IADL re-training    [x] Therapeutic Activity                  [x] Pain Management with/without modalities PRN                 [x] Manual Therapy                      [] Splinting                                   [] Scar Management                   []Joint Protection/Training  []Ergonomics                             [] Joint Mobilization                      [] Adaptive Equipment Assessment/Training                             [] Manual Edema Mobilization  [] Myofascial Release                 [] Energy Conservation/Work Simplification  [x] GM/FM Coordination                [] Safety retraining/education per  individual diagnosis/goals  [] Desensitization        Patient Specific Goal: To get her hands/ arms stronger - especially her R one. GOALS (Long term same as Short term): Updated on 8/10/22  1. ) Patient will demonstrate good understanding of home program (exercises/activities/diagnosis/prognosis/goals) with good accuracy. Ongoing goal. Pt. Completes HEP daily. 2. ) Patient will demonstrate increased active/passive range of motion of their Both UE's at the shoulders to 115- 120* for  ADL/IADL completion. Progress noted with LUE. Decreased AROM noted with RUE. Se measurements below. 3. ) Patient will demonstrate increased /pinch strength of at least 5-10  / 2-4 pinch pounds in both hands. STG  - be able to open a unsealed 2\" lids in 2- 3 weeks , LTG - open a 1\" unsealed lid in 4 weeks. Progress noted.  strength- LUE remains at 15# RUE increased from 5# to 7# today. Lateral pinch- LUE increased from 1/2# to 2# and RUE increased from 0# to 1#. Palmar 3 point pinch- LUE increased from 1/2# to 2# and RUE increased from 0# to 2# today. Pt. Still unable to open 2 inch sealed lid as of today. 4. ) Increase in fine motor function as evidenced by decreased time to complete 9-hole peg test by  5-10 seconds. Good progress towards goal with RUE.  9 hole peg test decreased from 2 mins 12 secs to 1 min 28 secs. LUE remained unchanged at 51 secs. 6. ) Patient will report ADL functions as Mod I/I using Both her UE\"s - goal to use R UE for about 60 % of the time as her dominant instead of 30%. Progress noted. Pt. Stated she tries to use RUE more, but still relies on LUE more for ADL tasks. Approx 50% of the time. 7. ) Patient will decrease QuickDASH score to 45% or less for increased participation in daily functional activities. Good progress towards goal. QuickDASH score decreased from 60% to 42.5% today. 8. ) Patient to demonstrate proper follow through of home modification/adaptive recommendations to increase safe functional ADLs. Progress noted. Pt. Issued frap stap for thumb positioning. Pain Level: 1 on scale of 1-10    Subjective: Pt. Presented with no new complaints or comments. Objective:  Updated POC to be completed by 9/10/22. INTERVENTION: COMPLETED: SPECIFICS/COMMENTS:   Modality:               AROM:     BUE's           X    x -Cone transfer ax- 2 shelves or stacking.   -lift and place small ball onto cones- 10 reps each hand. (Rest breaks needed)   -Towel stretches on wall- \"W\" pattern- 7 reps (increased fatigue in shldrs.)  -overhead pulleys-4 mins to increase shoulder mobility  -Finger ladder- 2 reps each on BUE's   -Tabletop towel stretches- 20 reps in 3 planes. FMC/ In hand manipulation skills                BUE's   x        x      X  x -Harrison manipulation- 5 coins at a time. -exercise spheres-- BUE's (increased difficulty) (using table)  -Bunchems deconstruction ax.   -FM threading nuts/bolts ax  -pom pom in hand manipulation task. -Pick and place beads using frap strap to increase palmar abduction of thumb   -Purdue pegboard ax. (Increased time)   -tying bows- 5x  -Connect four ax for AROM and prehension of BUE's.          PROM/Stretching:               Scar Mass/Edema Control:               Strengthening:     BUE's x -UBE- 3 mins forward and 3 mins backwards with BUE's.          -Yellow resistive clothespin ax- pick and place pom poms. (Frap strap for LUE  -Power hand gripper 2nd setting with BUE's. Dynamic-15 reps each Static- 3 reps with 10 count    Other:     Energy conservation handout x           Assessment/Comments: Pt is making scattered progress toward stated plan of care. Pt tolerated all exercises and stretches today. Pt. Continues to focus on prehension and FM ax's with which she has the most difficulty. Will continue to progress as tolerated. -Rehab Potential: Good  -Requires OT Follow Up: Yes  Time In:1305           Time Out: 1400             Visit #: 10    Treatment Charges: Mins Units   Modalities     Ther Exercise 25 2   Manual Therapy     Thera Activities 30 2   ADL/Home Mgt      Neuro Re-education     Group Therapy     Non-Billable Service Time     Other     Total Time/Units 55 4       -Response to Treatment: Pt. Is frustrated with her overall weakness and decreased coordination. Support issued. Goals: Goals for pt can be see on initial eval occurring on 7/11/22    Plan:   [x]  Continues Plan of care: Focus on AROM, GMC, FMC and strength of BUE's. Pt education continues at each visit to obtain maximum benefits from skilled OT intervention.   []  [facility] of care:   []  Discharge:      KIKI Boyle/URIAH [id number]

## 2022-08-17 NOTE — PROGRESS NOTES
Physical Therapy Daily Treatment Note    Date: 2022  Patient Name: Nataliya Solis  : 1955   MRN: [id number]  DOInjury: DOSx:     Referring Provider:   Olivia Samayoa MD   [place] [id number] [place] [address]       Medical Diagnosis:   R29.898 (ICD-10-CM) - Weakness of both lower extremities   Z74.09 (ICD-10-CM) - Impaired functional mobility, balance, gait, and endurance       Outcome Measure:  LEFS 56% impairment        X = TO BE PERFORMED NEXT VISIT  > = PROGRESS TO THIS    S: Patient reports still having difficulty walking due to drop foot. Weight 150.4  O: Access Code: [email or web address]  URL: [email or web address]  Date: 2022  Prepared by: Harley Licona    Exercises  Standing March with Counter Support - 1 x daily - 7 x weekly - 3 sets - 10 reps  Squat - 1 x daily - 7 x weekly - 3 sets - 10 reps  Standing Hip Abduction with Counter Support - 1 x daily - 7 x weekly - 3 sets - 10 reps  Standing Ankle Dorsiflexion with Table Support - 1 x daily - 7 x weekly - 3 sets - 10 reps    Time 9889-6523     Visit 11 60 visit limit shared with OT/SP    Pain Pain 0/10     ROM      Modalities            Exercise      Nustep   L5/ 10 min UE too TE         Squats 3 x 10 at leg press on blue foam  TE   Dorsiflexion stretch with black strap 3 x 30 sec hold  TE         Toe Raises  DF stretch  X 2 min using pro stretch seated  TE   Marches X 2 min  hold onto leg press on blue foam   TE   Alt. Sidekicks X 2 min hold onto leg press on blue foam   TE         Sit/Stands 9 x 30 sec   TE   LAQ's  3# x 2 min alternating      Gait training   GT         Marching Gait 45 feet x 2 with need of min assist x 1 also cues for increase NDAEEN  NR   Sidestepping   NR         Leg press  20# 3 x 15  TE         Calf raises into DF stretch on step  4\" step x 2 min      Tug test     A: Tolerated well. P: Continue with rehab plan.     Beryl Livings, PTA    Treatment Charges: Mins Units   Initial Evaluation Re-Evaluation     Ther Exercise         TE 40 3   Manual Therapy     MT     Ther Activities        TA     Gait Training          GT     Neuro Re-education NR     Modalities     Non-Billable Service Time     Other     Total Time/Units 40 3

## 2022-08-22 ENCOUNTER — TREATMENT (OUTPATIENT)
Dept: PHYSICAL THERAPY | Age: 67
End: 2022-08-22
Payer: COMMERCIAL

## 2022-08-22 ENCOUNTER — TREATMENT (OUTPATIENT)
Dept: OCCUPATIONAL THERAPY | Age: 67
End: 2022-08-22
Payer: COMMERCIAL

## 2022-08-22 DIAGNOSIS — R29.898 WEAKNESS OF BOTH LOWER EXTREMITIES: Primary | ICD-10-CM

## 2022-08-22 DIAGNOSIS — Z74.09 IMPAIRED FUNCTIONAL MOBILITY, BALANCE, GAIT, AND ENDURANCE: ICD-10-CM

## 2022-08-22 DIAGNOSIS — R29.898 MUSCLE FUNCTION LOSS: Primary | ICD-10-CM

## 2022-08-22 PROCEDURE — 97530 THERAPEUTIC ACTIVITIES: CPT | Performed by: OCCUPATIONAL THERAPY ASSISTANT

## 2022-08-22 PROCEDURE — 97110 THERAPEUTIC EXERCISES: CPT

## 2022-08-22 PROCEDURE — 97110 THERAPEUTIC EXERCISES: CPT | Performed by: OCCUPATIONAL THERAPY ASSISTANT

## 2022-08-22 NOTE — PROGRESS NOTES
OCCUPATIONAL THERAPY PROGRESS NOTE  [address] THERAPY   Bessy Chavez [address] [place] [id number]  Dept: [phone]  Loc: [facility]  [address]. OT Fax: [phone]    Date:  2022  Initial Evaluation Date: 22    Patient Name:  Omega Becerra    :  1955    Restrictions/Precautions:  Non noted - has L foot drop, moderated fall risk  Diagnosis:  Weakness both Hands   R29.898 (ICD-10-CM) - Weakness of both hands                                                       Date of Surgery/Injury: over the past few months, recent ED admission     Insurance/Certification information:  Francesco Gil  - ( 60 visits combined OT,PT , SP a year)  Plan of care signed (Y/N): N  Visit# / total visits:      Referring Practitioner:  Dr. Kendal Christensen Practitioner Orders: None noted - evaluate and treat as indicated. Assessment of current deficits  [] Functional mobility             [x] ADLs          [x] Strength                  [] Cognition  [] Functional transfers           [] IADLs         [] Safety Awareness  [x] Endurance  [x] Fine Motor Coordination    [] Balance      [] Vision/perception   [] Sensation   2  [x] Gross Motor Coordination [x] ROM           [] Pain                        [x] Edema          [] Scar Adhesion/Skin Integrity     OT PLAN OF CARE   OT POC based on physician orders, patient diagnosis and results of clinical assessment     Frequency/Duration   1-2 x's a week for 8 sessions.   Specific OT Treatment to include:      [x] Instruction in HEP                   Modalities:  [x] Therapeutic Exercise                 [] Ultrasound               [] Electrical Stimulation/Attended  [x] PROM/Stretching                    [x] Fluidotherapy          [x]  Paraffin                   [x] AAROM  [x] AROM                 [] Iontophoresis:   [x] Tendon Glides                                               [x] Neuromuscular Re-Ed            [] ADL/IADL re-training    [x] Therapeutic Activity                  [x] Pain Management with/without modalities PRN                 [x] Manual Therapy                      [] Splinting                                   [] Scar Management                   []Joint Protection/Training  []Ergonomics                             [] Joint Mobilization                      [] Adaptive Equipment Assessment/Training                             [] Manual Edema Mobilization  [] Myofascial Release                 [] Energy Conservation/Work Simplification  [x] GM/FM Coordination                [] Safety retraining/education per  individual diagnosis/goals  [] Desensitization        Patient Specific Goal: To get her hands/ arms stronger - especially her R one. GOALS (Long term same as Short term): Updated on 8/10/22  1. ) Patient will demonstrate good understanding of home program (exercises/activities/diagnosis/prognosis/goals) with good accuracy. Ongoing goal. Pt. Completes HEP daily. 2. ) Patient will demonstrate increased active/passive range of motion of their Both UE's at the shoulders to 115- 120* for  ADL/IADL completion. Progress noted with LUE. Decreased AROM noted with RUE. Se measurements below. 3. ) Patient will demonstrate increased /pinch strength of at least 5-10  / 2-4 pinch pounds in both hands. STG  - be able to open a unsealed 2\" lids in 2- 3 weeks , LTG - open a 1\" unsealed lid in 4 weeks. Progress noted.  strength- LUE remains at 15# RUE increased from 5# to 7# today. Lateral pinch- LUE increased from 1/2# to 2# and RUE increased from 0# to 1#. Palmar 3 point pinch- LUE increased from 1/2# to 2# and RUE increased from 0# to 2# today. Pt. Still unable to open 2 inch sealed lid as of today. 4. ) Increase in fine motor function as evidenced by decreased time to complete 9-hole peg test by  5-10 seconds. Good progress towards goal with RUE.  9 hole peg test decreased from 2 mins 12 secs to 1 min 28 secs. LUE remained unchanged at 51 secs. 6. ) Patient will report ADL functions as Mod I/I using Both her UE\"s - goal to use R UE for about 60 % of the time as her dominant instead of 30%. Progress noted. Pt. Stated she tries to use RUE more, but still relies on LUE more for ADL tasks. Approx 50% of the time. 7. ) Patient will decrease QuickDASH score to 45% or less for increased participation in daily functional activities. Good progress towards goal. QuickDASH score decreased from 60% to 42.5% today. 8. ) Patient to demonstrate proper follow through of home modification/adaptive recommendations to increase safe functional ADLs. Progress noted. Pt. Issued frap stap for thumb positioning. Pain Level: 1 on scale of 1-10    Subjective: Pt. Presented with no new complaints or comments. Objective:  Updated POC to be completed by 9/10/22. INTERVENTION: COMPLETED: SPECIFICS/COMMENTS:   Modality:               AROM:     BUE's           X    x -Cone transfer ax- 2 shelves or stacking.   -lift and place small ball onto cones- 10 reps each hand. (Rest breaks needed)   -Towel stretches on wall- \"W\" pattern- 7 reps (increased fatigue in shldrs.)  -overhead pulleys-4 mins to increase shoulder mobility  -Finger ladder- 2 reps each on BUE's   -Tabletop towel stretches- 20 reps in 3 planes. FMC/ In hand manipulation skills                BUE's   x    x           -[place] manipulation- 5 coins at a time. -exercise spheres-- BUE's (increased difficulty) (using table)  -Bunchems deconstruction ax.   -FM threading nuts/bolts ax  -pom pom in hand manipulation task. -Pick and place beads using frap strap to increase palmar abduction of thumb   -Purdue pegboard ax. (Increased time)   -tying bows- 5x  -Connect four ax for AROM and prehension of BUE's.          PROM/Stretching:               Scar Mass/Edema Control:               Strengthening:     BUE's x -UBE- 3 mins forward and 3 mins backwards with BUE's. x     -Yellow resistive clothespin ax- pick and place pom poms. (Frap strap for LUE  -Power hand gripper 2nd setting with BUE's. Dynamic-15 reps each Static- 3 reps with 10 count    Other:     Energy conservation handout x           Assessment/Comments: Pt is making scattered progress toward stated plan of care. Pt tolerated all exercises and stretches today. Pt. Continues to require frequent rest breaks during FM ax's that require increased focus. Will continue to progress as tolerated. -Rehab Potential: Good  -Requires OT Follow Up: Yes  Time In:1305           Time Out: 1400             Visit #: 11    Treatment Charges: Mins Units   Modalities     Ther Exercise 25 2   Manual Therapy     Thera Activities 30 2   ADL/Home Mgt      Neuro Re-education     Group Therapy     Non-Billable Service Time     Other     Total Time/Units 55 4       -Response to Treatment: Pt. Is frustrated with her overall weakness and decreased coordination. Support issued. Goals: Goals for pt can be see on initial eval occurring on 7/11/22    Plan:   [x]  Continues Plan of care: Focus on AROM, GMC, FMC and strength of BUE's. Pt education continues at each visit to obtain maximum benefits from skilled OT intervention.   []  [facility] of care:   []  Discharge:      Early KIKI Esteban/URIAH [id number]

## 2022-08-22 NOTE — PROGRESS NOTES
Physical Therapy Daily Treatment Note    Date: 2022  Patient Name: Ana Ruelas  : 1955   MRN: [id number]  DOInjury: DOSx:     Referring Provider:   Tamir Briceno MD   [facility] [address] [place] [address]       Medical Diagnosis:   R29.898 (ICD-10-CM) - Weakness of both lower extremities   Z74.09 (ICD-10-CM) - Impaired functional mobility, balance, gait, and endurance       Outcome Measure:  LEFS 56% impairment        X = TO BE PERFORMED NEXT VISIT  > = PROGRESS TO THIS    S: Patient got a brace over weekend and it seemed to help her when she walked around at a festival. Weight 150.4  O: Access Code: [email or web address]  URL: [email or web address]  Date: 2022  Prepared by: Tianna mUaña    Exercises  Standing March with Counter Support - 1 x daily - 7 x weekly - 3 sets - 10 reps  Squat - 1 x daily - 7 x weekly - 3 sets - 10 reps  Standing Hip Abduction with Counter Support - 1 x daily - 7 x weekly - 3 sets - 10 reps  Standing Ankle Dorsiflexion with Table Support - 1 x daily - 7 x weekly - 3 sets - 10 reps    Time 0451-1260     Visit 12 60 visit limit shared with OT/SP    Pain Pain 0/10     ROM      Modalities            Exercise      Nustep   L5 x 10 min UE too TE         Dorsiflexion stretch with black strap 3 x 30 sec hold HEP TE         Toe Raises  DF stretch  X 2 min using pro stretch seated  TE         Marches X 2 min  hold onto leg press on blue foam  1 hand hold TE   Alt. Sidekicks X 2 min hold onto leg press on blue foam   TE   Squats 3 x 10 at leg press on blue foam            Calf raises into DF stretch on step 4\" step x 2 min     Sit/Stands 9 x 30 sec   TE   Single leg balance 2 reps each x 30 sec hold New; difficult NR         LAQ's  3# x 2 min alternating         GT   Marching Gait 45 feet x 2 with need of min assist x 1 also cues for increase NADEEN  NR   Sidestepping   NR         Leg press  20# 3 x 15  TE         Tug test     A: Tolerated well.  Feedback and cues necessary for developing neuromuscular control. Large, functional, dynamic, global movements used to build strength, balance, endurance, and flexibility and to improve physical performance. Discussed anatomy, physiology, body mechanics, principles of loading, and progressive loading/activity. Sadia banks  P: Continue with rehab plan.     Jhonathan Hernández PTA    Treatment Charges: Mins Units   Initial Evaluation     Re-Evaluation     Ther Exercise         TE 40 3   Manual Therapy     MT     Ther Activities        TA     Gait Training          GT     Neuro Re-education NR     Modalities     Non-Billable Service Time     Other     Total Time/Units 40 3

## 2022-08-24 ENCOUNTER — TREATMENT (OUTPATIENT)
Dept: SPEECH THERAPY | Age: 67
End: 2022-08-24
Payer: COMMERCIAL

## 2022-08-24 ENCOUNTER — TREATMENT (OUTPATIENT)
Dept: OCCUPATIONAL THERAPY | Age: 67
End: 2022-08-24
Payer: COMMERCIAL

## 2022-08-24 ENCOUNTER — TREATMENT (OUTPATIENT)
Dept: PHYSICAL THERAPY | Age: 67
End: 2022-08-24
Payer: COMMERCIAL

## 2022-08-24 DIAGNOSIS — Z74.09 IMPAIRED FUNCTIONAL MOBILITY, BALANCE, GAIT, AND ENDURANCE: ICD-10-CM

## 2022-08-24 DIAGNOSIS — R13.12 OROPHARYNGEAL DYSPHAGIA: Primary | ICD-10-CM

## 2022-08-24 DIAGNOSIS — R29.898 WEAKNESS OF BOTH LOWER EXTREMITIES: Primary | ICD-10-CM

## 2022-08-24 DIAGNOSIS — R29.898 MUSCLE FUNCTION LOSS: Primary | ICD-10-CM

## 2022-08-24 DIAGNOSIS — R47.1 DYSARTHRIA: ICD-10-CM

## 2022-08-24 PROCEDURE — 92507 TX SP LANG VOICE COMM INDIV: CPT | Performed by: SPEECH-LANGUAGE PATHOLOGIST

## 2022-08-24 PROCEDURE — 97110 THERAPEUTIC EXERCISES: CPT | Performed by: OCCUPATIONAL THERAPY ASSISTANT

## 2022-08-24 PROCEDURE — 97530 THERAPEUTIC ACTIVITIES: CPT | Performed by: OCCUPATIONAL THERAPY ASSISTANT

## 2022-08-24 PROCEDURE — 97112 NEUROMUSCULAR REEDUCATION: CPT

## 2022-08-24 PROCEDURE — 92526 ORAL FUNCTION THERAPY: CPT | Performed by: SPEECH-LANGUAGE PATHOLOGIST

## 2022-08-24 PROCEDURE — 97110 THERAPEUTIC EXERCISES: CPT

## 2022-08-24 NOTE — PROGRESS NOTES
Patient seen for 45 minute in person session today. She reports that she is feeling good/ok. We reviewed the results of her recent video swallow study. All her questions were answered to her satisfaction. Please refer to that report for full details of results. Patient reports that she continues to cough on thin liquids. Today, she took small sips but on the 4th sips, she presented with coughing and watery eyes. She was instructed to use the thickening gel with thin liquids when she notes she is having difficulty. The swallow study did not reveal any s/s of penetration/aspiration with thin liquids despite coughing that occurs after the swallow. We discussed and agreed upon safe swallow protocol and she demonstrated understanding. She was noted to have decreased breath support and increased fatigue with speaking throughout the session. Because we still do not have a definitive diagnosis for the increasing weakness she is experiencing, an aggressive rehab program is not going to be pursued until a medical diagnosis is received. She was instructed to get back to working on oral motor exercises but to watch for fatigue and stop exercises if she becomes too tired. She expressed understanding. Will continue. Roxana Morelos.  Mariia Lackey MA/CCC-SLP  [id number]  [phone]

## 2022-08-24 NOTE — PROGRESS NOTES
OCCUPATIONAL THERAPY PROGRESS NOTE  [address] THERAPY   [facility] [address] [place] [address]  Dept: [phone]  Loc: [facility] [address] OT Fax: [phone]    Date:  2022  Initial Evaluation Date: 22    Patient Name:  Jaylin Barry    :  1955    Restrictions/Precautions:  Non noted - has L foot drop, moderated fall risk  Diagnosis:  Weakness both Hands   R29.898 (ICD-10-CM) - Weakness of both hands                                                       Date of Surgery/Injury: over the past few months, recent ED admission     Insurance/Certification information:  Priscila Waddell 150  - ( 60 visits combined OT,PT , SP a year)  Plan of care signed (Y/N): N  Visit# / total visits: 15 / 12     Referring Practitioner:  Dr. Randa Hanson Practitioner Orders: None noted - evaluate and treat as indicated. Assessment of current deficits  [] Functional mobility             [x] ADLs          [x] Strength                  [] Cognition  [] Functional transfers           [] IADLs         [] Safety Awareness  [x] Endurance  [x] Fine Motor Coordination    [] Balance      [] Vision/perception   [] Sensation   2  [x] Gross Motor Coordination [x] ROM           [] Pain                        [x] Edema          [] Scar Adhesion/Skin Integrity     OT PLAN OF CARE   OT POC based on physician orders, patient diagnosis and results of clinical assessment     Frequency/Duration   1-2 x's a week for 8 sessions.   Specific OT Treatment to include:      [x] Instruction in HEP                   Modalities:  [x] Therapeutic Exercise                 [] Ultrasound               [] Electrical Stimulation/Attended  [x] PROM/Stretching                    [x] Fluidotherapy          [x]  Paraffin                   [x] AAROM  [x] AROM                 [] Iontophoresis:   [x] Tendon Glides                                               [x] Neuromuscular Re-Ed            [] ADL/IADL re-training    [x] Therapeutic Activity                  [x] Pain Management with/without modalities PRN                 [x] Manual Therapy                      [] Splinting                                   [] Scar Management                   []Joint Protection/Training  []Ergonomics                             [] Joint Mobilization                      [] Adaptive Equipment Assessment/Training                             [] Manual Edema Mobilization  [] Myofascial Release                 [] Energy Conservation/Work Simplification  [x] GM/FM Coordination                [] Safety retraining/education per  individual diagnosis/goals  [] Desensitization        Patient Specific Goal: To get her hands/ arms stronger - especially her R one. GOALS (Long term same as Short term): Updated on 8/10/22  1. ) Patient will demonstrate good understanding of home program (exercises/activities/diagnosis/prognosis/goals) with good accuracy. Ongoing goal. Pt. Completes HEP daily. 2. ) Patient will demonstrate increased active/passive range of motion of their Both UE's at the shoulders to 115- 120* for  ADL/IADL completion. Progress noted with LUE. Decreased AROM noted with RUE. Se measurements below. 3. ) Patient will demonstrate increased /pinch strength of at least 5-10  / 2-4 pinch pounds in both hands. STG  - be able to open a unsealed 2\" lids in 2- 3 weeks , LTG - open a 1\" unsealed lid in 4 weeks. Progress noted.  strength- LUE remains at 15# RUE increased from 5# to 7# today. Lateral pinch- LUE increased from 1/2# to 2# and RUE increased from 0# to 1#. Palmar 3 point pinch- LUE increased from 1/2# to 2# and RUE increased from 0# to 2# today. Pt. Still unable to open 2 inch sealed lid as of today. 4. ) Increase in fine motor function as evidenced by decreased time to complete 9-hole peg test by  5-10 seconds. Good progress towards goal with RUE.  9 hole peg test decreased from 2 mins 12 secs to 1 min 28 secs. LUE remained unchanged at 51 secs. 6. ) Patient will report ADL functions as Mod I/I using Both her UE\"s - goal to use R UE for about 60 % of the time as her dominant instead of 30%. Progress noted. Pt. Stated she tries to use RUE more, but still relies on LUE more for ADL tasks. Approx 50% of the time. 7. ) Patient will decrease QuickDASH score to 45% or less for increased participation in daily functional activities. Good progress towards goal. QuickDASH score decreased from 60% to 42.5% today. 8. ) Patient to demonstrate proper follow through of home modification/adaptive recommendations to increase safe functional ADLs. Progress noted. Pt. Issued frap stap for thumb positioning. Pain Level: 1 on scale of 1-10    Subjective: Pt. Presented with no new complaints or comments. Objective:  Updated POC to be completed by 9/10/22. INTERVENTION: COMPLETED: SPECIFICS/COMMENTS:   Modality:               AROM:     BUE's       x      x    x -Cone transfer ax- 2 shelves or stacking.   -lift and place small ball onto cones- 10 reps each hand. (Rest breaks needed)   -Cone stacking ax. BUE's  -Towel stretches on wall- \"W\" pattern- 7 reps (increased fatigue in shldrs.)  -overhead pulleys-4 mins to increase shoulder mobility  -Finger ladder- 2 reps each on BUE's   -Tabletop towel stretches- 20 reps in 3 planes. FMC/ In hand manipulation skills                BUE's   x  x                X  x -[place] manipulation- 5 coins at a time. -exercise spheres-- BUE's (increased difficulty) (using table)  -Bunchems deconstruction ax.   -FM threading nuts/bolts ax  -pom pom in hand manipulation task. -Pick and place beads using frap strap to increase palmar abduction of thumb   -Purdue pegboard ax. (Increased time)   -tying bows- 5x  -Connect four ax for AROM and prehension of BUE's.    -Bean grasp/release ax.- BUE's  -Card playing ax- for [address] and in hand manipulation of BUE's. PROM/Stretching:               Scar Mass/Edema Control:               Strengthening:     BUE's  -UBE- 3 mins forward and 3 mins backwards with BUE's. x -Yellow resistive clothespin ax- pick and place pom poms. (Frap strap for LUE  -Power hand gripper 2nd setting with BUE's. Dynamic-10  reps x2 each  Static- 5 reps with 10 count    Other:     Energy conservation handout x           Assessment/Comments: Pt is making scattered progress toward stated plan of care. Pt tolerated all exercises and stretches today. Discussed energy conservation techniques again and taking breaks in order to finish tasks correctly without compensation. Will continue to progress as tolerated. -Rehab Potential: Good  -Requires OT Follow Up: Yes  Time In:1300           Time Out: 1355         Visit #: 12    Treatment Charges: Mins Units   Modalities     Ther Exercise 25 2   Manual Therapy     Thera Activities 30 2   ADL/Home Mgt      Neuro Re-education     Group Therapy     Non-Billable Service Time     Other     Total Time/Units 55 4       -Response to Treatment: Pt. Is frustrated with her overall weakness and decreased coordination. Support issued. Goals: Goals for pt can be see on initial eval occurring on 7/11/22    Plan:   [x]  Continues Plan of care: Focus on AROM, GMC, FMC and strength of BUE's. Pt education continues at each visit to obtain maximum benefits from skilled OT intervention.   []  [address] of care:   []  Discharge:      KIKI Hansen/URIAH [id number]

## 2022-08-24 NOTE — PROGRESS NOTES
Physical Therapy Daily Treatment Note    Date: 2022  Patient Name: Cassandra Eastman  : 1955   MRN: [id number]  DOInjury: DOSx:     Referring Provider:   Soledad Costa MD   [facility] [id number] [place] [address]       Medical Diagnosis:   R29.898 (ICD-10-CM) - Weakness of both lower extremities   Z74.09 (ICD-10-CM) - Impaired functional mobility, balance, gait, and endurance       Outcome Measure:  LEFS 56% impairment        X = TO BE PERFORMED NEXT VISIT  > = PROGRESS TO THIS    S: pt reports feeling ok today . O: Access Code: [email or web address]  URL: [email or web address]  Date: 2022  Prepared by: Quique Boston    Exercises  Standing March with Counter Support - 1 x daily - 7 x weekly - 3 sets - 10 reps  Squat - 1 x daily - 7 x weekly - 3 sets - 10 reps  Standing Hip Abduction with Counter Support - 1 x daily - 7 x weekly - 3 sets - 10 reps  Standing Ankle Dorsiflexion with Table Support - 1 x daily - 7 x weekly - 3 sets - 10 reps    Time 8841-4879      Visit 13 60 visit limit shared with OT/SP    Pain Pain 0/10     ROM      Modalities            Exercise      Nustep   L5 x 10 min UE too TE         Dorsiflexion stretch with black strap HEP TE         Toe Raises  DF stretch  X 2 min using pro stretch seated  TE         Marches X 2 min  hold onto leg press on blue foam  1 hand hold TE   Alt. Sidekicks X 2 min hold onto leg press on blue foam   TE   Squats 3 x 10 at leg press on blue foam            Calf raises into DF stretch on step 4\" step x 2 min     Sit/Stands 9 x 30 sec   TE   Single leg balance 2 reps each x 30 sec hold NR         LAQ's  3# x 2 min alternating         GT   Marching Gait 45 feet x 2 with need of min assist x 1 also cues for increase NADEEN  NR   Sidestepping   NR         Leg press  20# 3 x 15  TE         Tug test     A: Tolerated well. Feedback and cues necessary for developing neuromuscular control.  Large, functional, dynamic, global movements used to build strength,

## 2022-08-29 ENCOUNTER — TREATMENT (OUTPATIENT)
Dept: OCCUPATIONAL THERAPY | Age: 67
End: 2022-08-29
Payer: COMMERCIAL

## 2022-08-29 ENCOUNTER — TREATMENT (OUTPATIENT)
Dept: PHYSICAL THERAPY | Age: 67
End: 2022-08-29
Payer: COMMERCIAL

## 2022-08-29 DIAGNOSIS — Z74.09 IMPAIRED FUNCTIONAL MOBILITY, BALANCE, GAIT, AND ENDURANCE: ICD-10-CM

## 2022-08-29 DIAGNOSIS — R29.898 MUSCLE FUNCTION LOSS: Primary | ICD-10-CM

## 2022-08-29 DIAGNOSIS — R29.898 WEAKNESS OF BOTH LOWER EXTREMITIES: Primary | ICD-10-CM

## 2022-08-29 PROCEDURE — 97112 NEUROMUSCULAR REEDUCATION: CPT

## 2022-08-29 PROCEDURE — 97110 THERAPEUTIC EXERCISES: CPT

## 2022-08-29 PROCEDURE — 97110 THERAPEUTIC EXERCISES: CPT | Performed by: OCCUPATIONAL THERAPY ASSISTANT

## 2022-08-29 PROCEDURE — 97530 THERAPEUTIC ACTIVITIES: CPT | Performed by: OCCUPATIONAL THERAPY ASSISTANT

## 2022-08-29 NOTE — PROGRESS NOTES
OCCUPATIONAL THERAPY PROGRESS NOTE  [address] THERAPY   [place] [address] [place] [address]  Dept: [phone]  Loc: [facility] [address] OT Fax: [phone]    Date:  2022  Initial Evaluation Date: 22    Patient Name:  Wendy Villavicencio    :  1955    Restrictions/Precautions:  Non noted - has L foot drop, moderated fall risk  Diagnosis:  Weakness both Hands   R29.898 (ICD-10-CM) - Weakness of both hands                                                       Date of Surgery/Injury: over the past few months, recent ED admission     Insurance/Certification information:  Dylan Benitez  - ( 60 visits combined OT,PT , SP a year)  Plan of care signed (Y/N): N  Visit# / total visits: 15 / 12     Referring Practitioner:  Dr. Clementina Joyner Practitioner Orders: None noted - evaluate and treat as indicated. Assessment of current deficits  [] Functional mobility             [x] ADLs          [x] Strength                  [] Cognition  [] Functional transfers           [] IADLs         [] Safety Awareness  [x] Endurance  [x] Fine Motor Coordination    [] Balance      [] Vision/perception   [] Sensation   2  [x] Gross Motor Coordination [x] ROM           [] Pain                        [x] Edema          [] Scar Adhesion/Skin Integrity     OT PLAN OF CARE   OT POC based on physician orders, patient diagnosis and results of clinical assessment     Frequency/Duration   1-2 x's a week for 8 sessions.   Specific OT Treatment to include:      [x] Instruction in HEP                   Modalities:  [x] Therapeutic Exercise                 [] Ultrasound               [] Electrical Stimulation/Attended  [x] PROM/Stretching                    [x] Fluidotherapy          [x]  Paraffin                   [x] AAROM  [x] AROM                 [] Iontophoresis:   [x] Tendon Glides                                               [x] Neuromuscular Re-Ed            [] ADL/IADL re-training    [x] Therapeutic Activity                  [x] Pain Management with/without modalities PRN                 [x] Manual Therapy                      [] Splinting                                   [] Scar Management                   []Joint Protection/Training  []Ergonomics                             [] Joint Mobilization                      [] Adaptive Equipment Assessment/Training                             [] Manual Edema Mobilization  [] Myofascial Release                 [] Energy Conservation/Work Simplification  [x] GM/FM Coordination                [] Safety retraining/education per  individual diagnosis/goals  [] Desensitization        Patient Specific Goal: To get her hands/ arms stronger - especially her R one. GOALS (Long term same as Short term): Updated on 8/10/22  1. ) Patient will demonstrate good understanding of home program (exercises/activities/diagnosis/prognosis/goals) with good accuracy. Ongoing goal. Pt. Completes HEP daily. 2. ) Patient will demonstrate increased active/passive range of motion of their Both UE's at the shoulders to 115- 120* for  ADL/IADL completion. Progress noted with LUE. Decreased AROM noted with RUE. Se measurements below. 3. ) Patient will demonstrate increased /pinch strength of at least 5-10  / 2-4 pinch pounds in both hands. STG  - be able to open a unsealed 2\" lids in 2- 3 weeks , LTG - open a 1\" unsealed lid in 4 weeks. Progress noted.  strength- LUE remains at 15# RUE increased from 5# to 7# today. Lateral pinch- LUE increased from 1/2# to 2# and RUE increased from 0# to 1#. Palmar 3 point pinch- LUE increased from 1/2# to 2# and RUE increased from 0# to 2# today. Pt. Still unable to open 2 inch sealed lid as of today. 4. ) Increase in fine motor function as evidenced by decreased time to complete 9-hole peg test by  5-10 seconds. Good progress towards goal with RUE.  9 ax- for [facility] and in hand manipulation of BUE's. PROM/Stretching:               Scar Mass/Edema Control:               Strengthening:     BUE's  -UBE- 3 mins forward and 3 mins backwards with BUE's.          -Yellow resistive clothespin ax- pick and place pom poms. (Frap strap for LUE  -Power hand gripper 2nd setting with BUE's. Dynamic-10  reps x2 each  Static- 5 reps with 10 count    Other:     Energy conservation handout x           Assessment/Comments: Pt is making scattered progress toward stated plan of care. Pt tolerated all exercises and stretches today. Pt. Received her personal (official) frap straps today. Frap straps used through out therapy session and pt instructed on how to linn strap to increase palmar abduction/adduction. Will continue to advance as tolerated. -Rehab Potential: Good  -Requires OT Follow Up: Yes  Time In:1300           Time Out: 1400        Visit #: 13    Treatment Charges: Mins Units   Modalities     Ther Exercise 30 2   Manual Therapy     Thera Activities 30 2   ADL/Home Mgt      Neuro Re-education     Group Therapy     Non-Billable Service Time     Other     Total Time/Units 60 4       -Response to Treatment: Pt. Is frustrated with her overall weakness and decreased coordination. Support issued. Goals: Goals for pt can be see on initial eval occurring on 7/11/22    Plan:   [x]  Continues Plan of care: Focus on AROM, GMC, FMC and strength of BUE's. Pt education continues at each visit to obtain maximum benefits from skilled OT intervention.   []  [facility]:   []  Discharge:      KIKI Moses/URIAH [id number]

## 2022-08-29 NOTE — PROGRESS NOTES
Physical Therapy Daily Treatment Note    Date: 2022  Patient Name: Nathanael Mcnally  : 1955   MRN: [id number]  DOInjury: DOSx:     Referring Provider:   Jose R Austin MD   [place] [id number] [place] [address]       Medical Diagnosis:   R29.898 (ICD-10-CM) - Weakness of both lower extremities   Z74.09 (ICD-10-CM) - Impaired functional mobility, balance, gait, and endurance       Outcome Measure:  LEFS 56% impairment        X = TO BE PERFORMED NEXT VISIT  > = PROGRESS TO THIS    S: pt reports feeling ok today. Walked around the [employer] yesterday. Got an ankle brace and wears it out. Seems to work. Did not wear it into therapy  O: Access Code: [email or web address]  URL: [email or web address]  Date: 2022  Prepared by: Shemar Ruby    Exercises  Standing March with Counter Support - 1 x daily - 7 x weekly - 3 sets - 10 reps  Squat - 1 x daily - 7 x weekly - 3 sets - 10 reps  Standing Hip Abduction with Counter Support - 1 x daily - 7 x weekly - 3 sets - 10 reps  Standing Ankle Dorsiflexion with Table Support - 1 x daily - 7 x weekly - 3 sets - 10 reps    Time 9326-8804      Visit 14 60 visit limit shared with OT/SP    Pain Pain 0/10     ROM      Modalities            Exercise      Nustep   L6 x 10 min UE too TE         Dorsiflexion stretch with black strap HEP TE         Toe Raises  DF stretch  X 2 min using pro stretch seated  TE         Marches X 2 min  hold onto leg press on blue foam  1 hand hold TE   Alt. Sidekicks X 2 min hold onto leg press on blue foam   TE   Squats 3 x 10 at leg press on blue foam            Calf raises into DF stretch on step 4\" step x 2 min     Sit/Stands 9 x 30 sec   TE   Single leg balance 2 reps each x 30 sec hold NR         LAQ's  4# x 2 min alternating         GT   Marching Gait 45 feet x 2 with need of min assist x 1 also cues for increase NADEEN  NR   Sidestepping   NR         Leg press  20# 3 x 15  TE         Tug test     A: Tolerated well. Pt weighed 146# today. Feedback and cues necessary for developing neuromuscular control. Large, functional, dynamic, global movements used to build strength, balance, endurance, and flexibility and to improve physical performance. Discussed anatomy, physiology, body mechanics, principles of loading, and progressive loading/activity. Alexander banks  P: Continue with rehab plan.     Tianna Umaña PTA    Treatment Charges: Mins Units   Initial Evaluation     Re-Evaluation     Ther Exercise         TE 30 2   Manual Therapy     MT     Ther Activities        TA     Gait Training          GT     Neuro Re-education NR 10 1   Modalities     Non-Billable Service Time     Other     Total Time/Units 40 3

## 2022-08-31 ENCOUNTER — TREATMENT (OUTPATIENT)
Dept: SPEECH THERAPY | Age: 67
End: 2022-08-31
Payer: COMMERCIAL

## 2022-08-31 ENCOUNTER — TREATMENT (OUTPATIENT)
Dept: OCCUPATIONAL THERAPY | Age: 67
End: 2022-08-31
Payer: COMMERCIAL

## 2022-08-31 ENCOUNTER — TREATMENT (OUTPATIENT)
Dept: PHYSICAL THERAPY | Age: 67
End: 2022-08-31
Payer: COMMERCIAL

## 2022-08-31 DIAGNOSIS — R47.1 DYSARTHRIA: ICD-10-CM

## 2022-08-31 DIAGNOSIS — R13.12 OROPHARYNGEAL DYSPHAGIA: Primary | ICD-10-CM

## 2022-08-31 DIAGNOSIS — Z74.09 IMPAIRED FUNCTIONAL MOBILITY, BALANCE, GAIT, AND ENDURANCE: ICD-10-CM

## 2022-08-31 DIAGNOSIS — R29.898 WEAKNESS OF BOTH LOWER EXTREMITIES: Primary | ICD-10-CM

## 2022-08-31 DIAGNOSIS — R29.898 MUSCLE FUNCTION LOSS: Primary | ICD-10-CM

## 2022-08-31 PROCEDURE — 97112 NEUROMUSCULAR REEDUCATION: CPT

## 2022-08-31 PROCEDURE — 97110 THERAPEUTIC EXERCISES: CPT | Performed by: OCCUPATIONAL THERAPY ASSISTANT

## 2022-08-31 PROCEDURE — 92507 TX SP LANG VOICE COMM INDIV: CPT | Performed by: SPEECH-LANGUAGE PATHOLOGIST

## 2022-08-31 PROCEDURE — 97530 THERAPEUTIC ACTIVITIES: CPT | Performed by: OCCUPATIONAL THERAPY ASSISTANT

## 2022-08-31 PROCEDURE — 97110 THERAPEUTIC EXERCISES: CPT

## 2022-08-31 PROCEDURE — 92526 ORAL FUNCTION THERAPY: CPT | Performed by: SPEECH-LANGUAGE PATHOLOGIST

## 2022-08-31 NOTE — PROGRESS NOTES
OCCUPATIONAL THERAPY PROGRESS NOTE  [address] THERAPY   [facility] [address] [place] [address]  Dept: [phone]  Loc: [facility]  [address] OT Fax: [phone]    Date:  2022  Initial Evaluation Date: 22    Patient Name:  Shantell Bradshaw    :  1955    Restrictions/Precautions:  Non noted - has L foot drop, moderated fall risk  Diagnosis:  Weakness both Hands   R29.898 (ICD-10-CM) - Weakness of both hands                                                       Date of Surgery/Injury: over the past few months, recent ED admission     Insurance/Certification information:  Priscila Waddell 150  - ( 60 visits combined OT,PT , SP a year)  Plan of care signed (Y/N): N  Visit# / total visits: 15 / 12     Referring Practitioner:  Dr. Bellamy Marking Practitioner Orders: None noted - evaluate and treat as indicated. Assessment of current deficits  [] Functional mobility             [x] ADLs          [x] Strength                  [] Cognition  [] Functional transfers           [] IADLs         [] Safety Awareness  [x] Endurance  [x] Fine Motor Coordination    [] Balance      [] Vision/perception   [] Sensation   2  [x] Gross Motor Coordination [x] ROM           [] Pain                        [x] Edema          [] Scar Adhesion/Skin Integrity     OT PLAN OF CARE   OT POC based on physician orders, patient diagnosis and results of clinical assessment     Frequency/Duration   1-2 x's a week for 8 sessions.   Specific OT Treatment to include:      [x] Instruction in HEP                   Modalities:  [x] Therapeutic Exercise                 [] Ultrasound               [] Electrical Stimulation/Attended  [x] PROM/Stretching                    [x] Fluidotherapy          [x]  Paraffin                   [x] AAROM  [x] AROM                 [] Iontophoresis:   [x] Tendon Glides                                               [x] Neuromuscular Re-Ed            [] ADL/IADL re-training    [x] Therapeutic Activity                  [x] Pain Management with/without modalities PRN                 [x] Manual Therapy                      [] Splinting                                   [] Scar Management                   []Joint Protection/Training  []Ergonomics                             [] Joint Mobilization                      [] Adaptive Equipment Assessment/Training                             [] Manual Edema Mobilization  [] Myofascial Release                 [] Energy Conservation/Work Simplification  [x] GM/FM Coordination                [] Safety retraining/education per  individual diagnosis/goals  [] Desensitization        Patient Specific Goal: To get her hands/ arms stronger - especially her R one. GOALS (Long term same as Short term): Updated on 8/10/22  1. ) Patient will demonstrate good understanding of home program (exercises/activities/diagnosis/prognosis/goals) with good accuracy. Ongoing goal. Pt. Completes HEP daily. 2. ) Patient will demonstrate increased active/passive range of motion of their Both UE's at the shoulders to 115- 120* for  ADL/IADL completion. Progress noted with LUE. Decreased AROM noted with RUE. Se measurements below. 3. ) Patient will demonstrate increased /pinch strength of at least 5-10  / 2-4 pinch pounds in both hands. STG  - be able to open a unsealed 2\" lids in 2- 3 weeks , LTG - open a 1\" unsealed lid in 4 weeks. Progress noted.  strength- LUE remains at 15# RUE increased from 5# to 7# today. Lateral pinch- LUE increased from 1/2# to 2# and RUE increased from 0# to 1#. Palmar 3 point pinch- LUE increased from 1/2# to 2# and RUE increased from 0# to 2# today. Pt. Still unable to open 2 inch sealed lid as of today. 4. ) Increase in fine motor function as evidenced by decreased time to complete 9-hole peg test by  5-10 seconds. Good progress towards goal with RUE.  9 hole peg test decreased from 2 mins 12 secs to 1 min 28 secs. LUE remained unchanged at 51 secs. 6. ) Patient will report ADL functions as Mod I/I using Both her UE\"s - goal to use R UE for about 60 % of the time as her dominant instead of 30%. Progress noted. Pt. Stated she tries to use RUE more, but still relies on LUE more for ADL tasks. Approx 50% of the time. 7. ) Patient will decrease QuickDASH score to 45% or less for increased participation in daily functional activities. Good progress towards goal. QuickDASH score decreased from 60% to 42.5% today. 8. ) Patient to demonstrate proper follow through of home modification/adaptive recommendations to increase safe functional ADLs. Progress noted. Pt. Issued frap stap for thumb positioning. Pain Level: 1 on scale of 1-10    Subjective: Pt. Presented with no new complaints or comments. Objective:  Updated POC to be completed by 9/10/22. INTERVENTION: COMPLETED: SPECIFICS/COMMENTS:   Modality:               AROM:     BUE's   x          x    X   -Cone transfer ax- 2 shelves or stacking.   -lift and place small ball onto cones- 10 reps each hand. (Rest breaks needed)   -Cone stacking ax. BUE's (with frap straps)   -Towel stretches on wall- \"W\" pattern- 7 reps (increased fatigue in shldrs.)  -overhead pulleys-4 mins to increase shoulder mobility  -Finger ladder- 2 reps each on BUE's   -Tabletop towel stretches- 20 reps in 3 planes. FMC/ In hand manipulation skills                BUE's   x        x                  x -Kennard manipulation- 5 coins at a time. -exercise spheres-- BUE's (increased difficulty) (using table)  -Bunchems deconstruction ax.   -FM threading nuts/bolts ax  -pom pom in hand manipulation task. -Pick and place beads using frap strap to increase palmar abduction of thumb   -Purdue pegboard ax. (Increased time)   -tying bows- 5x  -Connect four ax for AROM and prehension of BUE's.    -Bean grasp/release ax.- BUE's  -Card playing ax- for [facility] and in hand manipulation of BUE's.   -FM ax to manipulate earring backs. PROM/Stretching:               Scar Mass/Edema Control:               Strengthening:     BUE's x -UBE- 3 mins forward and 3 mins backwards with BUE's. x     -Yellow resistive clothespin ax- pick and place pom poms. (Frap strap for LUE  -Power hand gripper 2nd setting with BUE's. Dynamic-10  reps x2 each  Static- 5 reps with 10 count    Other:     Energy conservation handout x           Assessment/Comments: Pt is making scattered progress toward stated plan of care. Pt tolerated all exercises and stretches today. Pt. Continues to use frap straps to facilitate  and prehension. Will continue to progress as tolerated. -Rehab Potential: Good  -Requires OT Follow Up: Yes  Time In:1300           Time Out: 1400        Visit #: 14    Treatment Charges: Mins Units   Modalities     Ther Exercise 30 2   Manual Therapy     Thera Activities 30 2   ADL/Home Mgt      Neuro Re-education     Group Therapy     Non-Billable Service Time     Other     Total Time/Units 60 4       -Response to Treatment: Pt. Is frustrated with her overall weakness and decreased coordination. Support issued. Goals: Goals for pt can be see on initial eval occurring on 7/11/22    Plan:   [x]  Continues Plan of care: Focus on AROM, GMC, FMC and strength of BUE's. Pt education continues at each visit to obtain maximum benefits from skilled OT intervention.   []  [address] [facility] of care:   []  Discharge:      KIKI Linton/URIAH [id number]

## 2022-08-31 NOTE — PROGRESS NOTES
Patient seen for 45 minute in person session this date. Patient reports she is doing fair. Today, we worked on over articulation with /s/ and /k/ words in sentences with patient completing the task with fair performance with min cues. Patient's tongue movements are concerning due to noted spasm-like movements and almost cramping appearance of the tongue. She demonstrates weak lingual movements throughout speech and oral movement exercises. We reviewed safe swallow protocol today and patient reports some compliance. She reports coughing when she forgets to take only small sips. She reports some usage of thickener when she is feeling more fatigued but is trying to remember to take only small sips to avoid using it. She reports a slight increase in appetite. She was instructed to eat several small meals throughout the day to increase intake but not expend too much energy at one time. She is going to have an EMG next week but only on her drop foot. This therapist is concerned about what is causing the deterioration in her speech and swallowing from last year's performance at her evaluation. Will discuss with other therapists to determine course of action. Will continue. Corrina Slider.  Jonel Santiago MA/MARISEL-SLP  [id number]  [facility] [id number]

## 2022-08-31 NOTE — PROGRESS NOTES
Patient seen for 45 minute in person session this date. Patient reports fatigue. She states that she has lost 2 pounds this week. She was again instructed to increase frequency of small meals, include high nutrition and calorie items, along with adding 1-2 supplement drinks per day. She also reports a coughing spell when she drank too large of a sip of liquid. She was again instructed to either use thickener to allow for larger sips or small sips with thin. She expressed understanding for all. Today, we worked on tongue coordination exercises. She struggled with all and needed max visual cues to complete. Patient was instructed to discuss her current issues with the decline in lingual coordination, speech intelligibility, and swallowing abilities with her neurologist at her appt tomorrow. Will continue. Jan Hendrickson.  Maribel Meadows MA/MARISEL-SLP  [id number]  [facility] [id number]

## 2022-09-01 ENCOUNTER — TELEPHONE (OUTPATIENT)
Dept: NEUROLOGY | Age: 67
End: 2022-09-01

## 2022-09-01 ENCOUNTER — OFFICE VISIT (OUTPATIENT)
Dept: NEUROLOGY | Age: 67
End: 2022-09-01
Payer: COMMERCIAL

## 2022-09-01 VITALS
WEIGHT: 149 LBS | DIASTOLIC BLOOD PRESSURE: 80 MMHG | BODY MASS INDEX: 23.39 KG/M2 | HEIGHT: 67 IN | SYSTOLIC BLOOD PRESSURE: 150 MMHG

## 2022-09-01 DIAGNOSIS — R94.131 ABNORMAL EMG: ICD-10-CM

## 2022-09-01 DIAGNOSIS — G57.32 PERONEAL NEUROPATHY AT KNEE, LEFT: ICD-10-CM

## 2022-09-01 DIAGNOSIS — M54.17 LEFT LUMBOSACRAL RADICULOPATHY: Primary | ICD-10-CM

## 2022-09-01 DIAGNOSIS — Z86.73 HISTORY OF CEREBROVASCULAR ACCIDENT (CVA) IN ADULTHOOD: ICD-10-CM

## 2022-09-01 DIAGNOSIS — M21.372 ACQUIRED LEFT FOOT DROP: ICD-10-CM

## 2022-09-01 DIAGNOSIS — E11.42 DIABETIC PERIPHERAL NEUROPATHY (HCC): ICD-10-CM

## 2022-09-01 PROCEDURE — 95910 NRV CNDJ TEST 7-8 STUDIES: CPT | Performed by: PSYCHIATRY & NEUROLOGY

## 2022-09-01 PROCEDURE — 95886 MUSC TEST DONE W/N TEST COMP: CPT | Performed by: PSYCHIATRY & NEUROLOGY

## 2022-09-01 NOTE — TELEPHONE ENCOUNTER
Abnormal EMG/NCS study today. Results discussed with patient. MR lumbar spine study-still pending completion. Orthotics referral made to [facility] and prosthetics, for left AFO splint. Please schedule follow-up office visit with me within 4 weeks, Neurology clinic.

## 2022-09-01 NOTE — PROGRESS NOTES
[address] [facility]  Electrodiagnostic Laboratory  *Accredited by the [facility] with exemplary status  [address] [facility]  Phone: [phone]  Fax: [phone]    Referring Provider: Almita Stevenson MD  Primary Care Physician: Martinez Summers DO  Patient Name: Shantelle Aiken  Patient YOB: 1955  Gender: female  BMI: Body mass index is 23.34 kg/m². Height 5' 7\" (1.702 m), weight 149 lb (67.6 kg). 9/1/2022    Description of clinical problem: Left lower extremity study. History acquired left foot drop, chronic rt. MCA subcortical CVA, diabetic peripheral neuropathy with hyperglycemia. 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 Chief Complaint   Patient presents with    Procedure     EMG LLE     Pain No   ; Numbness/tingling  No; Weakness  Yes-left foot drop (reports first noted, May 2022)       Brief physical exam:   Sensory deficit No-denies decreased sensation in a stocking distribution, no right/left confusion.; Weakness Yes-acquired left foot drop, 0-1 dorsiflexion weakness; Atrophy  No; Reflex abnormality Yes, trace left ankle jerk. Musculoskeletal: Negative straight leg raising test.  No fasciculations. No ankle clonus. Please refer to recent Neurology consult note of Dr. Yael Hobbs, 8/3/2022 for additional information if needed. Study Limitations: Left foot drop, hx CVA, expressive aphasia. Full Name: Conrado Mgaana Gender: Female  MRN: [id number] YOB: 1955      Visit Date: 9/1/2022 08:50  Age: 61 Years 5 Months Old  Examining Physician: Dr. Sebastien Sepulveda   Referring Physician: Dr. Sebastien Sepulveda   Technician: Marchia Aase   Height: 5 feet 7 inch  Weight: 149 lbs  Notes: Diabetic Peripheral Neuropathy/Left foot drop      Motor NCS      Nerve / Sites Latency Amplitude Amp. 1-2 Distance Lat Diff Velocity Temp.    ms mV % cm ms m/s °C   L Peroneal - EDB      Ankle NR NR NR 8   31      Fib head NR NR NR  NR  31      Pop fossa NR NR NR left peroneal motor nerve conductions (recording from the tibialis anterior and extensor digitorum brevis muscles) show no response. The left tibial motor nerve conductions (recording from the abductor hallucis muscle) is normal in distal latency with reduced amplitudes and borderline slowed motor nerve conduction velocity. The right peroneal motor nerve conductions (recording from the extensor digitorum brevis muscle) is normal in distal latency with slowed motor nerve conduction velocities. The left H-reflex is mildly prolonged in latency compared to the right side. All other nerve conduction studies listed in the table above were normal in latency, amplitude and conduction velocity. Needle EMG:   Needle EMG was performed using a concentric needle. The following abnormalities were seen on needle EMG: Increased insertional activity, 1-2+ fibrillation potentials, enlarged motor unit potentials in duration and amplitude with decreased recruitment (loss of motor units) in primarily a left lumbosacral (I.e., L5/S1) myotomal distribution of a moderately severe degree. There is also enlargement of the motor unit potentials in duration and amplitude with decreased recruitment (loss of motor units) in a mbypnh-mo-ffxptdhb gradient pattern of denervation consistent with a chronic sensorimotor (diabetic) peripheral neuropathy of moderately severe degree underlying the above. All other muscles tested, as listed in the table above demonstrated normal amplitude, duration, phases and recruitment and no active denervation signs were seen. Diagnostic Interpretation: This study was abnormal.     Electrodiagnosis: There is electrodiagnostic evidence of the followin. A chronic (diabetic) sensorimotor peripheral neuropathy with ongoing and chronic denervation in a auctde-oi-zehbwxkq gradient pattern of denervation of a moderately severe degree.   2.  A chronic left lumbosacral radiculopathy (I.e., primarily L5/S1 myotomal distribution) with ongoing denervation of a mild-moderate degree and chronic denervation of a moderately severe degree. 3.  A chronic left peroneal neuropathy, nonlocalizable, but probably at or about the level of the fibular head with ongoing and chronic denervation of a moderately severe degree. There is not a prior study for comparison. Clinical correlation is recommended. Technologist: RAHUL  Physician: Felix Robertson MD    Nerve conduction studies and electromyography were performed according to our laboratory policies and procedures which can be provided upon request. All abnormal values are identified in the table.  Laboratory normal values can also be provided upon request.       Cc: MD Melva Coppola DO

## 2022-09-01 NOTE — TELEPHONE ENCOUNTER
Contacted pt and informed her via voicemail, to call and scheduled Lumbar MRI, and to return call to schedule f/u.

## 2022-09-14 ENCOUNTER — TELEPHONE (OUTPATIENT)
Dept: OCCUPATIONAL THERAPY | Age: 67
End: 2022-09-14

## 2022-09-14 ENCOUNTER — TELEPHONE (OUTPATIENT)
Dept: PHYSICAL THERAPY | Age: 67
End: 2022-09-14

## 2022-09-14 NOTE — TELEPHONE ENCOUNTER
Pt. Called and cancelled remaining OT visits today. Pt. Is seeing another doctor to get a second opinion on her current medical condition.      Isrrael WOMACK/URIAH [id number]

## 2022-09-21 ENCOUNTER — TELEPHONE (OUTPATIENT)
Dept: NEUROLOGY | Age: 67
End: 2022-09-21

## 2022-09-21 NOTE — TELEPHONE ENCOUNTER
----- Message from Dai Mcdaniel MD sent at 9/18/2022  6:37 PM EDT -----  Notify pt of DDD of lower lumbar spine & L2/3 on rt. Side.

## 2022-09-30 DIAGNOSIS — M25.372 ANKLE INSTABILITY, LEFT: ICD-10-CM

## 2022-09-30 DIAGNOSIS — E11.40 TYPE 2 DIABETES MELLITUS WITH DIABETIC NEUROPATHY, WITHOUT LONG-TERM CURRENT USE OF INSULIN (HCC): ICD-10-CM

## 2022-09-30 DIAGNOSIS — M21.372 ACQUIRED LEFT FOOT DROP: Primary | ICD-10-CM

## 2022-09-30 DIAGNOSIS — R26.2 AMBULATORY DYSFUNCTION: ICD-10-CM

## 2022-10-06 ENCOUNTER — OFFICE VISIT (OUTPATIENT)
Dept: FAMILY MEDICINE CLINIC | Age: 67
End: 2022-10-06
Payer: COMMERCIAL

## 2022-10-06 VITALS
RESPIRATION RATE: 18 BRPM | TEMPERATURE: 97.3 F | SYSTOLIC BLOOD PRESSURE: 130 MMHG | HEART RATE: 72 BPM | HEIGHT: 67 IN | DIASTOLIC BLOOD PRESSURE: 72 MMHG | WEIGHT: 141.8 LBS | OXYGEN SATURATION: 100 % | BODY MASS INDEX: 22.26 KG/M2

## 2022-10-06 DIAGNOSIS — M50.30 DISC-OSTEOPHYTE COMPLEX: ICD-10-CM

## 2022-10-06 DIAGNOSIS — M48.061 NEURAL FORAMINAL STENOSIS OF LUMBAR SPINE: ICD-10-CM

## 2022-10-06 DIAGNOSIS — E11.9 TYPE 2 DIABETES MELLITUS WITHOUT COMPLICATION, WITHOUT LONG-TERM CURRENT USE OF INSULIN (HCC): Primary | ICD-10-CM

## 2022-10-06 DIAGNOSIS — R93.7 BONE MARROW EDEMA: ICD-10-CM

## 2022-10-06 DIAGNOSIS — R53.1 ASTHENIA: ICD-10-CM

## 2022-10-06 DIAGNOSIS — E78.5 HYPERLIPIDEMIA, UNSPECIFIED HYPERLIPIDEMIA TYPE: ICD-10-CM

## 2022-10-06 DIAGNOSIS — I63.9 CEREBROVASCULAR ACCIDENT (CVA), UNSPECIFIED MECHANISM (HCC): ICD-10-CM

## 2022-10-06 DIAGNOSIS — R29.898 MUSCLE FUNCTION LOSS: ICD-10-CM

## 2022-10-06 DIAGNOSIS — M25.78 DISC-OSTEOPHYTE COMPLEX: ICD-10-CM

## 2022-10-06 DIAGNOSIS — M48.062 SPINAL STENOSIS OF LUMBAR REGION WITH NEUROGENIC CLAUDICATION: ICD-10-CM

## 2022-10-06 DIAGNOSIS — E11.65 TYPE 2 DIABETES MELLITUS WITH HYPERGLYCEMIA, WITHOUT LONG-TERM CURRENT USE OF INSULIN (HCC): ICD-10-CM

## 2022-10-06 DIAGNOSIS — R56.9 SEIZURE-LIKE ACTIVITY (HCC): ICD-10-CM

## 2022-10-06 DIAGNOSIS — M54.17 LEFT LUMBOSACRAL RADICULOPATHY: ICD-10-CM

## 2022-10-06 DIAGNOSIS — E11.42 DIABETIC PERIPHERAL NEUROPATHY (HCC): ICD-10-CM

## 2022-10-06 LAB — HBA1C MFR BLD: 6.4 %

## 2022-10-06 PROCEDURE — 83036 HEMOGLOBIN GLYCOSYLATED A1C: CPT | Performed by: SURGERY

## 2022-10-06 PROCEDURE — 99214 OFFICE O/P EST MOD 30 MIN: CPT | Performed by: SURGERY

## 2022-10-06 PROCEDURE — 3044F HG A1C LEVEL LT 7.0%: CPT | Performed by: SURGERY

## 2022-10-06 RX ORDER — ORAL SEMAGLUTIDE 7 MG/1
7 TABLET ORAL DAILY
Qty: 30 TABLET | Refills: 5 | Status: SHIPPED | OUTPATIENT
Start: 2022-11-06

## 2022-10-06 RX ORDER — GABAPENTIN 100 MG/1
100 CAPSULE ORAL 2 TIMES DAILY
Qty: 60 CAPSULE | Refills: 0 | Status: SHIPPED
Start: 2022-10-06 | End: 2022-11-04

## 2022-10-06 RX ORDER — ATORVASTATIN CALCIUM 40 MG/1
TABLET, FILM COATED ORAL
Qty: 90 TABLET | Refills: 1 | Status: SHIPPED | OUTPATIENT
Start: 2022-10-06

## 2022-10-06 RX ORDER — POTASSIUM CHLORIDE 750 MG/1
CAPSULE, EXTENDED RELEASE ORAL
COMMUNITY
Start: 2022-09-26

## 2022-10-06 NOTE — PROGRESS NOTES
Dejon Quiroz (:  1955) is a 61 y.o. female,Established patient, here for evaluation of the following chief complaint(s):  Follow-up and Health Maintenance (Due for Diabetic Eye/Foot, Pap, Shingles, Covid Booster, Flu Vaccine)         ASSESSMENT/PLAN:  1. Type 2 diabetes mellitus without complication, without long-term current use of insulin (HCC)  -     POCT glycosylated hemoglobin (Hb A1C)  No change  Stable  A1c is not optimal  She will work on diet and we will add rybelsus  2. Hyperlipidemia, unspecified hyperlipidemia type  -     atorvastatin (LIPITOR) 40 MG tablet; TAKE 1 TABLET DAILY, Disp-90 tablet, R-1Normal  3. Type 2 diabetes mellitus with hyperglycemia, without long-term current use of insulin ([facility])  -     Semaglutide 3 MG TABS; Take 3 mg by mouth daily If tolerated, will increase to 7mg for next fill., Disp-30 tablet, R-0Normal  -     Semaglutide (RYBELSUS) 7 MG TABS; Take 7 mg by mouth daily Start this after completing the 3mg dose., Disp-30 tablet, R-5Normal  -     [facility]  4. Diabetic peripheral neuropathy [facility])  -     ANASTACIO Gutierrez  [place], Podiatry, [place]  -     gabapentin (NEURONTIN) 100 MG capsule; Take 1 capsule by mouth 2 times daily for 30 days. Intended supply: 30 days, Disp-60 capsule, R-0Normal  5. Seizure-like activity [facility])  -     External Referral To Neurology  She wants 2nd opinion from [facility]  6. Asthenia  -     External Referral To Neurology  7. Cerebrovascular accident (CVA), unspecified mechanism ([facility] [address]  -     External Referral To Neurology  8. Left lumbosacral radiculopathy  -     External Referral To Neurology  9. Muscle function loss  -     External Referral To Neurology  10. Spinal stenosis of lumbar region with neurogenic claudication  -     External Referral To Neurology  -     [address] [place] [address]  11.  Neural foraminal stenosis of lumbar spine  -     External Referral To Neurology  -     [address] [place] [address] [place]  12. Disc-osteophyte complex  -     External Referral To Neurology  -     [facility] [place]  13. Bone marrow edema  -     External Referral To Neurology  [facility] [place]  Seen on MRI  Likely related to chronic back pain  Interesting clinical trial / study regarding 60 to 100 day augmentin therapy that significantly improved back pain. Return in about 3 months (around 1/6/2023) for diabetic checkup . Subjective   SUBJECTIVE/OBJECTIVE:  HPI:    DM2:  -janumet with meal  -checking fasting BG but is unaware of what the averages are  A1c 6.4% today, was 6.3% last checked  -not tracking carbs per meal, nor restricting calories, no dietary changes    -[facility] following just recently had exam    HTN:  -atenolol  -lisinopril  -amlodipine        Neuro/Right Capsular Hemorrhagic Stroke:  -NCS/EMG study of the left lower extremity to evaluate for peripheral neuropathy, diabetic neuropathy or lumbar radiculopathy and an MRI of the lumbar spine without contrast as was discussed with her today. -EMG demonstrated     MRI  1. No fracture or bony destructive lesion. 2. Marrow edema along the right lateral endplates at S7-0 likely represent   Modic type 1 changes. 3. Mild central canal stenosis at L2-3.   4.  Multilevel neural foraminal stenoses, worst (moderate) at the left L4-5   and bilateral L5-S1 levels. 5. Disc osteophyte complex within the left L4-5 neural foramina impinges the   inferior aspect of the exiting left L4 nerve.      Preventative:  Health Maintenance   Topic Date Due    Diabetic microalbuminuria test  Never done    Diabetic retinal exam  Never done    Cervical cancer screen  Never done    Shingles vaccine (1 of 2) Never done    Diabetic foot exam  07/15/2017    COVID-19 Vaccine (4 - Booster for Moderna series) 05/18/2022    Flu vaccine (1) Never done    Lipids  03/09/2023    Depression Monitoring  07/28/2023    A1C test (Diabetic or Prediabetic)  10/06/2023 Breast cancer screen  10/26/2023    Colorectal Cancer Screen  05/15/2024    DTaP/Tdap/Td vaccine (2 - Td or Tdap) 03/03/2032    Pneumococcal 0-64 years Vaccine  Completed    Hepatitis A vaccine  Aged Out    Hib vaccine  Aged Out    Meningococcal (ACWY) vaccine  Aged Out    Hepatitis C screen  Discontinued    HIV screen  Discontinued           ROS:    Denies 10pt ROS other than noted in HPI. Objective       PHYSICAL EXAM:    /72   Pulse 72   Temp 97.3 °F (36.3 °C) (Temporal)   Resp 18   Ht 5' 7\" (1.702 m)   Wt 141 lb 12.8 oz (64.3 kg)   SpO2 100%   BMI 22.21 kg/m²     AVSS    GA: Well-groomed, appears well, no acute distress. HEENT: Atraumatic normocephalic. Extraocular muscles are grossly intact. Pupils are equal round reactive to light. Conjunctiva pink and moist.  Hearing is grossly intact. Nares patent without external drainage. Buccal mucosa pink and moist.  Posterior oropharynx clear without lesion or exudate. NECK: Trachea is midline. CARDIO: Regular rate and rhythm without murmur rub or gallop. RESPIRATORY: Clear to auscultation bilaterally without wheezes rales or rhonchi. Normal inspiratory and expiratory effort. Normoxic on room air    ABD: mildly rounded, normoactive bowel sounds. MSK: Structurally appropriate for age. No gross deficit. Well leg straight leg with reproduction on bilateral sides. NEURO: Alert. Dysarthria. PSYCH:  Mood is normal and congruent with affect. No signs of psychomotor retardation or agitation. Thought content seems normal, speech is fluent and non-pressured. SKIN: Generally warm pink and dry. An electronic signature was used to authenticate this note.     --Ap Feeler, DO

## 2022-10-06 NOTE — PATIENT INSTRUCTIONS
Start gabapentin at night. Understand how this medication will affect you and if tolerated, you can take twice daily. Do not drive or operate heavy machinery, do not put yourself into dangerous situations while on this medication as some people become drowsy with it. We will get you into pain management about the low back issues you face - Mild central canal stenosis at L2-3, Multilevel neural foraminal stenoses, worst (moderate) at the left L4-5 and bilateral L5-S1 levels, Disc osteophyte complex within the left L4-5 neural foramina impinges the inferior aspect of the exiting left L4 nerve. Marrow edema along the right lateral endplates at J8-3 likely represent Modic Type 1 change - this is highly associated with patients that have chronic low back pain. Discuss this further with either neurology or pain management as there are clinical trials that show benefit from long-term Augmentin in paint reduction (60 to 100 days of therapy according to the trial: Antibiotic treatment in patients with chronic low back pain and vertebral bone edema (Modic type 1 changes): a double-blind randomized clinical controlled trial of efficacy). _______________________________________________      -Carbohydrates limit to 40 to 50g per meal (120g to 150g per day)  -Fats limit to 60g per day (total fat intake should be 30% to 35% of your total daily calories, so restrict to whichever number is lower)   -Of the fats you do eat, never eat trans fats, never eat unsaturated fats, limit your saturated fat intake to less than 20g per day (or 7% of your total daily calories, so restrict to whichever number is lower)  -Cholesterol limit to no more than 300mg per day (200mg per day if you have elevated triglycerides or total cholesterol)  -Sodium less than 2000mg per day    MyFitnessPal or similar application (or track by journal) to monitor calories and macronutrients.   This is the most critical component to a heart healthy, balanced diet: honesty, keeping oneself accountable, ensure you are tracking daily goals and meeting them. Food is medicine! Counseled the patient on importance of cardiovascular and resistance training. Make every attempt to engage in a level of activity, sustained for at least 30 minutes, where you saturate your undergarments with sweat. This should be done, at a minimum, three times per week.

## 2022-10-12 ENCOUNTER — OFFICE VISIT (OUTPATIENT)
Dept: PODIATRY | Age: 67
End: 2022-10-12
Payer: COMMERCIAL

## 2022-10-12 VITALS — BODY MASS INDEX: 22.13 KG/M2 | HEIGHT: 67 IN | WEIGHT: 141 LBS

## 2022-10-12 DIAGNOSIS — E11.51 TYPE II DIABETES MELLITUS WITH PERIPHERAL CIRCULATORY DISORDER (HCC): Primary | ICD-10-CM

## 2022-10-12 DIAGNOSIS — R26.2 DIFFICULTY WALKING: ICD-10-CM

## 2022-10-12 DIAGNOSIS — M21.372 FOOT DROP, LEFT FOOT: ICD-10-CM

## 2022-10-12 DIAGNOSIS — M79.662 PAIN IN BOTH LOWER LEGS: ICD-10-CM

## 2022-10-12 DIAGNOSIS — M79.661 PAIN IN BOTH LOWER LEGS: ICD-10-CM

## 2022-10-12 DIAGNOSIS — I73.9 PVD (PERIPHERAL VASCULAR DISEASE) (HCC): ICD-10-CM

## 2022-10-12 PROCEDURE — 99203 OFFICE O/P NEW LOW 30 MIN: CPT | Performed by: PODIATRIST

## 2022-10-12 NOTE — PROGRESS NOTES
Patient is in today for evaluation of the diabetic foot. Patient says her feet are cold all the time and she has drop foot in the left foot.  Pcp is Vonna Mcardle, DO  Last ov 10/6/22

## 2022-10-12 NOTE — PROGRESS NOTES
10/12/22     Jay Villegas    : 1955 Sex: female   Age: 61 y.o. Patient was referred by: Callie Chan DO  Patient's PCP/Provider is:  Callie Chan DO    Subjective:    Patient seen today in regards to her diabetes and chronic pain and coldness into both lower extremities. Chief Complaint   Patient presents with    Diabetes     Foot exam        HPI: Patient stated issues have been going on for several months now. She does have lumbar disc issues which has led to a dropfoot issue left. Patient does  her dropfoot brace in 2 weeks. She denies any additional issues at this time. ROS:  Const: Positives and pertinent negatives as per HPI. Musculo: Denies symptoms other than stated above. Neuro: Denies symptoms other than stated above. Skin: Denies symptoms other than stated above. Current Medications:    Current Outpatient Medications:     potassium chloride (MICRO-K) 10 MEQ extended release capsule, TAKE 1 CAPSULE BY MOUTH EVERY DAY, Disp: , Rfl:     atorvastatin (LIPITOR) 40 MG tablet, TAKE 1 TABLET DAILY, Disp: 90 tablet, Rfl: 1    Semaglutide 3 MG TABS, Take 3 mg by mouth daily If tolerated, will increase to 7mg for next fill., Disp: 30 tablet, Rfl: 0    [START ON 2022] Semaglutide (RYBELSUS) 7 MG TABS, Take 7 mg by mouth daily Start this after completing the 3mg dose., Disp: 30 tablet, Rfl: 5    gabapentin (NEURONTIN) 100 MG capsule, Take 1 capsule by mouth 2 times daily for 30 days.  Intended supply: 30 days, Disp: 60 capsule, Rfl: 0    zoster recombinant adjuvanted vaccine (SHINGRIX) 50 MCG/0.5ML SUSR injection, Inject 0.5 mLs into the muscle See Admin Instructions 1 dose now and repeat in 2-6 months, Disp: 0.5 mL, Rfl: 0    amLODIPine (NORVASC) 5 MG tablet, TAKE 1 TABLET DAILY IN THE MORNING, Disp: 14 tablet, Rfl: 0    atenolol (TENORMIN) 50 MG tablet, TAKE 1 TABLET DAILY, Disp: 14 tablet, Rfl: 0    esomeprazole (NEXIUM) 40 MG delayed release capsule, TAKE 1 CAPSULE EVERY MORNING BEFORE BREAKFAST, Disp: 14 capsule, Rfl: 0    lisinopril (PRINIVIL;ZESTRIL) 30 MG tablet, TAKE 1 TABLET DAILY AT NIGHT, Disp: 14 tablet, Rfl: 0    sitaGLIPtan-metFORMIN (JANUMET)  MG per tablet, Take 1 tablet by mouth in the morning., Disp: 90 tablet, Rfl: 1    venlafaxine (EFFEXOR XR) 150 MG extended release capsule, TAKE 1 CAPSULE DAILY, Disp: 30 capsule, Rfl: 5    Multiple Vitamin (MULTI-VITAMIN DAILY PO), Take by mouth, Disp: , Rfl:     Allergies:  No Known Allergies    Vitals:    10/12/22 1459   Weight: 141 lb (64 kg)   Height: 5' 7\" (1.702 m)        Past Medical History:   Diagnosis Date    Abnormal mammogram of left breast 05/2019    Anxiety     Cerebral artery occlusion with cerebral infarction ([facility] [address]     Depression     Diabetes mellitus ([address]     Diabetic peripheral neuropathy ([facility] [address] 9/1/2022    Dysarthria 5/12/2021    Erosive esophagitis     Esophageal stricture     GERD (gastroesophageal reflux disease)     Gestational diabetes     Hemorrhagic stroke ([address] 5/12/2021    Hyperlipidemia     Hypertension     Hypokalemia     Left lumbosacral radiculopathy 9/1/2022    Osteoarthritis     knees    Peroneal neuropathy at knee, left 9/1/2022    Postmenopausal     Type 2 diabetes mellitus without complication ([address]      Family History   Problem Relation Age of Onset    Diabetes Mother         complication     Coronary Art Dis Father     Rheum Arthritis Sister     Mult Sclerosis Brother     Diabetes Brother      Past Surgical History:   Procedure Laterality Date    BREAST BIOPSY Left 05/2019    Dr. Alberto Tolentino      x4    CHOLECYSTECTOMY      COLONOSCOPY  11/2011    HYSTERECTOMY (CERVIX STATUS UNKNOWN)      JOINT REPLACEMENT Right     knee     Social History     Tobacco Use    Smoking status: Never    Smokeless tobacco: Never   Substance Use Topics    Alcohol use: No    Drug use:  No Diagnostic studies:    MRI LUMBAR SPINE WO CONTRAST    Result Date: 9/17/2022  EXAMINATION: MRI OF THE LUMBAR SPINE WITHOUT CONTRAST, 9/16/2022 2:28 pm TECHNIQUE: Multiplanar multisequence MRI of the lumbar spine was performed without the administration of intravenous contrast. COMPARISON: None. HISTORY: ORDERING SYSTEM PROVIDED HISTORY: Acquired left foot drop TECHNOLOGIST PROVIDED HISTORY: Reason for exam:->evaluate for disc herniation, DJD FINDINGS: BONES/ALIGNMENT:  No fracture or joint dislocation. The vertebral body heights are preserved. The lumbar lordosis is intact. Mild marrow edema along the endplates on the right at L2-3 are consistent with Modic type 1 changes. No evidence of a marrow infiltrative process. SPINAL CORD: The conus terminates normally. SOFT TISSUES: No paraspinal mass identified. L1-L2: Disc desiccation with a small right-sided disc bulge. No significant central canal or lateral recess stenosis. Mild right neural foraminal stenosis. L2-L3: Prominent loss of disc height with a disc osteophyte complex. Mild facet hypertrophy, right greater than left. Mild central canal stenosis. Mild-to-moderate lateral recess stenoses. Moderate neural foraminal stenoses. L3-L4: Disc desiccation with a disc bulge. Mild facet and ligamentous hypertrophy. No significant central canal stenosis. Mild lateral recess and neural foraminal stenoses, somewhat worse on the left. L4-L5: Prominent loss of disc height with a disc osteophyte complex. Mild right and moderate left facet hypertrophy. No significant central canal stenosis. Mild-to-moderate lateral recess stenoses. Mild right and moderate left neural foraminal stenosis. The disc osteophyte complex within the left neural foramina impinges the inferior aspect of the exiting left L4 nerve. L5-S1: Prominent loss of disc height with a small disc osteophyte complex. Mild facet hypertrophy. No significant central canal stenosis.   Mild stenosis of the right lateral recess. Moderate bilateral neural foraminal stenoses, worse on the right. 1.  No fracture or bony destructive lesion. 2. Marrow edema along the right lateral endplates at R5-2 likely represent Modic type 1 changes. 3. Mild central canal stenosis at L2-3. 4.  Multilevel neural foraminal stenoses, worst (moderate) at the left L4-5 and bilateral L5-S1 levels. 5. Disc osteophyte complex within the left L4-5 neural foramina impinges the inferior aspect of the exiting left L4 nerve. RECOMMENDATIONS: Unavailable         Procedures:    None    Exam:  VASCULAR: Pedal pulses diminished to palpation bilateral foot. Capillary refill time delayed digits 1 through 5 bilateral foot. Absence of hair growth noted to both lower extremities  NEUROLOGICAL: Epicritic sensations diminished with paresthesias noted bilaterally. DERMATOLOGICAL: Dependent ruborous changes noted digital regions bilateral foot with coldness noted to palpation. No skin abrasions, ulcerations, heel fissuring noted bilateral lower extremities. MUSCULOSKELETAL: Dropfoot deformity noted left lower extremity. Antalgic gait noted upon evaluation    Plan Per Assessment  Arik Guerin was seen today for diabetes. Diagnoses and all orders for this visit:    Type II diabetes mellitus with peripheral circulatory disorder (HCC)  -     VL LOWER EXTREMITY ARTERIAL SEGMENTAL PRESSURES W PPG; Future    PVD (peripheral vascular disease) (HCC)  -     VL LOWER EXTREMITY ARTERIAL SEGMENTAL PRESSURES W PPG; Future    Pain in both lower legs  -     VL LOWER EXTREMITY ARTERIAL SEGMENTAL PRESSURES W PPG; Future    Foot drop, left foot    Difficulty walking  -     VL LOWER EXTREMITY ARTERIAL SEGMENTAL PRESSURES W PPG; Future        New patient consultation and management  We did review previous progress notes and testing.   We did recommend obtaining noninvasive arterial studies bilateral lower extremities to assess vascular flow bilaterally. Patient is to utilize the dropfoot brace as instructed once received. Did recommend utilizing a cane or other assistive devices with gait. We did discuss diabetic lower extremity care techniques with patient and her  today. Patient will be followed up at a later date for continued evaluation and diabetic foot management. Seen By:    Jayshree Barron DPM    Electronically signed by Jayshree Barron DPM on 10/12/2022 at 3:14 PM      This note was created using voice recognition software. The note was reviewed however may contain grammatical errors.

## 2022-10-12 NOTE — LETTER
[address] [facility] Abbie Kelly  Phone: [phone]  Fax: Kim Shepard Dear  [phone]   1955  10/12/2022      Dear Elliott Norwood,    I would like to thank you for the kind referral of [place] Stark. She presented to the office today for evaluation regarding chronic pain and coolness noted foot and digital regions bilaterally. We did discuss obtaining noninvasive arterial studies for further assessment. We did discuss use of her dropfoot brace once received and use of assistive devices with gait to prevent falls and/or injuries. We will have continued follow-up to discuss further treatment options available. If you should have any questions concerning her visit today, please do not hesitate to contact me.     Sincerely,    Riki Tavares DPM

## 2022-10-24 ENCOUNTER — HOSPITAL ENCOUNTER (OUTPATIENT)
Dept: INTERVENTIONAL RADIOLOGY/VASCULAR | Age: 67
Discharge: HOME | End: 2022-10-26
Payer: COMMERCIAL

## 2022-10-24 DIAGNOSIS — M79.662 PAIN IN BOTH LOWER LEGS: ICD-10-CM

## 2022-10-24 DIAGNOSIS — E11.51 TYPE II DIABETES MELLITUS WITH PERIPHERAL CIRCULATORY DISORDER (HCC): ICD-10-CM

## 2022-10-24 DIAGNOSIS — R26.2 DIFFICULTY WALKING: ICD-10-CM

## 2022-10-24 DIAGNOSIS — I73.9 PVD (PERIPHERAL VASCULAR DISEASE) (HCC): ICD-10-CM

## 2022-10-24 DIAGNOSIS — M79.661 PAIN IN BOTH LOWER LEGS: ICD-10-CM

## 2022-10-24 PROCEDURE — 93923 UPR/LXTR ART STDY 3+ LVLS: CPT

## 2022-10-28 ENCOUNTER — OFFICE VISIT (OUTPATIENT)
Dept: NEUROLOGY | Age: 67
End: 2022-10-28
Payer: COMMERCIAL

## 2022-10-28 VITALS
DIASTOLIC BLOOD PRESSURE: 90 MMHG | SYSTOLIC BLOOD PRESSURE: 130 MMHG | WEIGHT: 141 LBS | HEIGHT: 67 IN | BODY MASS INDEX: 22.13 KG/M2

## 2022-10-28 DIAGNOSIS — M25.511 PAIN OF BOTH SHOULDER JOINTS: Chronic | ICD-10-CM

## 2022-10-28 DIAGNOSIS — M25.512 PAIN OF BOTH SHOULDER JOINTS: Chronic | ICD-10-CM

## 2022-10-28 DIAGNOSIS — M54.17 LUMBOSACRAL RADICULOPATHY: ICD-10-CM

## 2022-10-28 DIAGNOSIS — I61.9 HEMORRHAGIC STROKE (HCC): Primary | ICD-10-CM

## 2022-10-28 DIAGNOSIS — G57.32 NEUROPATHY OF LEFT PERONEAL NERVE: ICD-10-CM

## 2022-10-28 DIAGNOSIS — M21.372 LEFT FOOT DROP: ICD-10-CM

## 2022-10-28 DIAGNOSIS — G81.94 LEFT HEMIPARESIS (HCC): ICD-10-CM

## 2022-10-28 PROCEDURE — 99214 OFFICE O/P EST MOD 30 MIN: CPT | Performed by: PSYCHIATRY & NEUROLOGY

## 2022-10-28 PROCEDURE — 3074F SYST BP LT 130 MM HG: CPT | Performed by: PSYCHIATRY & NEUROLOGY

## 2022-10-28 PROCEDURE — 3078F DIAST BP <80 MM HG: CPT | Performed by: PSYCHIATRY & NEUROLOGY

## 2022-10-28 ASSESSMENT — ENCOUNTER SYMPTOMS
ALLERGIC/IMMUNOLOGIC NEGATIVE: 1
BACK PAIN: 1
GASTROINTESTINAL NEGATIVE: 1
EYES NEGATIVE: 1
RESPIRATORY NEGATIVE: 1

## 2022-10-28 NOTE — PROGRESS NOTES
Neurology Progress Note, Follow-up:    Patient: Pita Monteiro  : 1955  Date: 10/28/22  Primary provider: Margo Reyes DO     Re: Followup OV, s/p chronic hemorrhagic rt. external capsule stroke, chronic lumbar radiculopathy, chronic left foot drop, orthotic in place, improved gait. Dear Margo Reyes DO:    Rolando Alvarado presented for another follow-up appointment in the Neurology clinic and we reviewed her test results again with her as specified below also with her significant other who accompanies her. We have completed our Neurology diagnostic evaluation in the Neurology clinic as was explained previously. She and her significant other indicates she has an office appointment at [facility] this  for second opinion regarding her conditions as specified in your progress note. She does have her left foot orthotic in place that I ordered through [facility] and is ambulating better with improved foot placement. Please refer to prior Neurology progress notes of 8/3/2022, 2021, 2022, for additional information if needed, also neurohospitalist consult notes from 2022, Dr. Adelia Newman, for additional information if needed. Imaging data: MR brain scan, 22:   1. No acute intracranial abnormality. 2. Small chronic infarction in the right external capsule. 22, Lumbar spine MRI,   1. No fracture or bony destructive lesion. 2. Marrow edema along the right lateral endplates at S7-7 likely represent   Modic type 1 changes. 3. Mild central canal stenosis at L2-3.   4.  Multilevel neural foraminal stenoses, worst (moderate) at the left L4-5   and bilateral L5-S1 levels. 5. Disc osteophyte complex within the left L4-5 neural foramina impinges the   inferior aspect of the exiting left L4 nerve. 2022, NCS/EMG: Electrodiagnosis: There is electrodiagnostic evidence of the followin.   A chronic (diabetic) sensorimotor peripheral neuropathy with ongoing and chronic denervation in a pvklqh-cd-[id number] gradient pattern of denervation of a moderately severe degree. 2.  A chronic left lumbosacral radiculopathy (I.e., primarily L5/S1 myotomal distribution) with ongoing denervation of a mild-moderate degree and chronic denervation of a moderately severe degree. 3.  A chronic left peroneal neuropathy, nonlocalizable, but probably at or about the level of the fibular head with ongoing and chronic denervation of a moderately severe degree. Primary care progress note reviewed from 10/6/2022; patient awaiting external referral to Neurology for second opinion from [facility] clinic as she requested     Current Outpatient Medications   Medication Sig Dispense Refill    potassium chloride (MICRO-K) 10 MEQ extended release capsule TAKE 1 CAPSULE BY MOUTH EVERY DAY      atorvastatin (LIPITOR) 40 MG tablet TAKE 1 TABLET DAILY 90 tablet 1    Semaglutide 3 MG TABS Take 3 mg by mouth daily If tolerated, will increase to 7mg for next fill. 30 tablet 0    [START ON 11/6/2022] Semaglutide (RYBELSUS) 7 MG TABS Take 7 mg by mouth daily Start this after completing the 3mg dose. 30 tablet 5    gabapentin (NEURONTIN) 100 MG capsule Take 1 capsule by mouth 2 times daily for 30 days. Intended supply: 30 days 60 capsule 0    zoster recombinant adjuvanted vaccine (SHINGRIX) 50 MCG/0.5ML SUSR injection Inject 0.5 mLs into the muscle See Admin Instructions 1 dose now and repeat in 2-6 months 0.5 mL 0    amLODIPine (NORVASC) 5 MG tablet TAKE 1 TABLET DAILY IN THE MORNING 14 tablet 0    atenolol (TENORMIN) 50 MG tablet TAKE 1 TABLET DAILY 14 tablet 0    esomeprazole (NEXIUM) 40 MG delayed release capsule TAKE 1 CAPSULE EVERY MORNING BEFORE BREAKFAST 14 capsule 0    lisinopril (PRINIVIL;ZESTRIL) 30 MG tablet TAKE 1 TABLET DAILY AT NIGHT 14 tablet 0    sitaGLIPtan-metFORMIN (JANUMET)  MG per tablet Take 1 tablet by mouth in the morning.  90 tablet 1    venlafaxine (EFFEXOR XR) 150 MG extended release capsule TAKE 1 CAPSULE DAILY 30 capsule 5    Multiple Vitamin (MULTI-VITAMIN DAILY PO) Take by mouth       No current facility-administered medications for this visit. No Known Allergies    Patient Active Problem List   Diagnosis    Right foot infection    Diabetic ulcer of left foot associated with type 1 diabetes mellitus ([facility]    Type 2 diabetes mellitus without complication, without long-term current use of insulin ([facility]    Essential hypertension    Gastroesophageal reflux disease without esophagitis    Mixed hyperlipidemia    Subacute osteomyelitis of foot (HCC)    Dysarthria    Hemorrhagic stroke (HCC)    Altered mental status    Lactic acidosis    Muscle function loss    Impaired functional mobility, balance, gait, and endurance    Diabetic peripheral neuropathy (HCC)    Peroneal neuropathy at knee, left    Left lumbosacral radiculopathy    Pain in joint, shoulder region       Past Medical History:   Diagnosis Date    Abnormal mammogram of left breast 05/2019    Anxiety     Cerebral artery occlusion with cerebral infarction [facility])     Depression     Diabetes mellitus ([facility]     Diabetic peripheral neuropathy ([facility] 9/1/2022    Dysarthria 5/12/2021    Erosive esophagitis     Esophageal stricture     GERD (gastroesophageal reflux disease)     Gestational diabetes     Hemorrhagic stroke ([facility] 5/12/2021    Hyperlipidemia     Hypertension     Hypokalemia     Left lumbosacral radiculopathy 9/1/2022    Osteoarthritis     knees    Pain in joint, shoulder region 10/28/2022    bilat.     Peroneal neuropathy at knee, left 9/1/2022    Postmenopausal     Type 2 diabetes mellitus without complication [facility])        Past Surgical History:   Procedure Laterality Date    BREAST BIOPSY Left 05/2019    Dr. Eagle Singh      x4    CHOLECYSTECTOMY      COLONOSCOPY  11/2011    HYSTERECTOMY (CERVIX STATUS UNKNOWN) usual cognitive baseline. Speech remains moderately dysarthric without gross paraphasic word errors. Insight and judgment remains fair. Mood is anxious. CN's II-XII: Notable for dysarthria, no facial droop, intact EOMs, no nystagmus and pupils remain equal and reactive. Hearing is grossly intact. Tongue is midline. Motor/Sensory Exam: Grossly intact strength except for left dorsiflexion weakness as previous. DTRs notable for trace to 1+ ankle jerks, no ankle clonus, otherwise 1+. Denied focal subjective sensory deficits as previous. No right/left confusion. Coordination/Gait: No gross limb dysmetria, inability to extend arms at shoulder with complaint of chronic shoulder joint pain. Left foot orthotic in place for chronic left foot drop with improved ambulation and balance. Assessment/Plan:   1. Status post chronic right external capsular hemorrhagic stroke with residual dysarthria/dysphasia, mild chronic cognitive impairment. 2.  Chronic left foot drop which may be related to focal compression neuropathy of the peroneal nerve at or about the level of the fibular head, chronic diabetic peripheral neuropathy and lumbar radiculopathy. 3.  She may continue her medical follow-up through your office for management of her chronic medical conditions and vascular risk factors otherwise. 4.  She and her significant other report they have a [facility] second opinion appointment scheduled for this November 16. Sincerely,      Alfredo Fan MD    Neurology & Clinical Neurophysiology    Please note this report has been partially produced using speech recognition software and may cause contain errors related to that system including grammar, punctuation and spelling or words and phrases that may not seem appropriate. If there are any questions please feel free to contact me to clarify. Note: A total time of 30 mins.  was spent on the date of service in preparation for this visit, which included face-to-face patient care, completing clinical documentation, counseling and coordination of care based on clinical impression, neurologic diagnosis, review of pertinent imaging studies, test results, implementation and discussion of treatment plan, risk factor reduction and patient and/or family education.

## 2022-11-01 ENCOUNTER — OFFICE VISIT (OUTPATIENT)
Dept: PODIATRY | Age: 67
End: 2022-11-01
Payer: MEDICARE

## 2022-11-01 VITALS — BODY MASS INDEX: 22.13 KG/M2 | HEIGHT: 67 IN | WEIGHT: 141 LBS

## 2022-11-01 DIAGNOSIS — M21.372 FOOT DROP, LEFT FOOT: ICD-10-CM

## 2022-11-01 DIAGNOSIS — E11.51 TYPE II DIABETES MELLITUS WITH PERIPHERAL CIRCULATORY DISORDER (HCC): Primary | ICD-10-CM

## 2022-11-01 DIAGNOSIS — I73.9 PVD (PERIPHERAL VASCULAR DISEASE) (HCC): ICD-10-CM

## 2022-11-01 DIAGNOSIS — R26.2 DIFFICULTY WALKING: ICD-10-CM

## 2022-11-01 DIAGNOSIS — M79.662 PAIN IN BOTH LOWER LEGS: ICD-10-CM

## 2022-11-01 DIAGNOSIS — M79.661 PAIN IN BOTH LOWER LEGS: ICD-10-CM

## 2022-11-01 PROCEDURE — 99213 OFFICE O/P EST LOW 20 MIN: CPT | Performed by: PODIATRIST

## 2022-11-01 PROCEDURE — 3044F HG A1C LEVEL LT 7.0%: CPT | Performed by: PODIATRIST

## 2022-11-01 NOTE — PROGRESS NOTES
22     Ian Medina    : 1955   Sex: female    Age: 61 y.o. Patient's PCP/Provider is:  Ap Winters DO    Subjective:  Patient is seen today for follow-up regarding chronic pain and coldness into both lower extremities. Patient presents today to discuss her vascular results. Patient is still having issues into both lower extremities and does have issues with certain ambulatory activities at certain distances. No other additional abnormalities noted. Chief Complaint   Patient presents with    Follow-up     Vascular results        ROS:  Const: Positives and pertinent negatives as per HPI. Musculo: Denies symptoms other than stated above. Neuro: Denies symptoms other than stated above. Skin: Denies symptoms other than stated above. Current Medications:    Current Outpatient Medications:     potassium chloride (MICRO-K) 10 MEQ extended release capsule, TAKE 1 CAPSULE BY MOUTH EVERY DAY, Disp: , Rfl:     atorvastatin (LIPITOR) 40 MG tablet, TAKE 1 TABLET DAILY, Disp: 90 tablet, Rfl: 1    Semaglutide 3 MG TABS, Take 3 mg by mouth daily If tolerated, will increase to 7mg for next fill., Disp: 30 tablet, Rfl: 0    [START ON 2022] Semaglutide (RYBELSUS) 7 MG TABS, Take 7 mg by mouth daily Start this after completing the 3mg dose., Disp: 30 tablet, Rfl: 5    gabapentin (NEURONTIN) 100 MG capsule, Take 1 capsule by mouth 2 times daily for 30 days.  Intended supply: 30 days, Disp: 60 capsule, Rfl: 0    zoster recombinant adjuvanted vaccine (SHINGRIX) 50 MCG/0.5ML SUSR injection, Inject 0.5 mLs into the muscle See Admin Instructions 1 dose now and repeat in 2-6 months, Disp: 0.5 mL, Rfl: 0    amLODIPine (NORVASC) 5 MG tablet, TAKE 1 TABLET DAILY IN THE MORNING, Disp: 14 tablet, Rfl: 0    atenolol (TENORMIN) 50 MG tablet, TAKE 1 TABLET DAILY, Disp: 14 tablet, Rfl: 0    esomeprazole (NEXIUM) 40 MG delayed release capsule, TAKE 1 CAPSULE EVERY MORNING BEFORE BREAKFAST, Disp: 14 capsule, Rfl: 0    lisinopril (PRINIVIL;ZESTRIL) 30 MG tablet, TAKE 1 TABLET DAILY AT NIGHT, Disp: 14 tablet, Rfl: 0    sitaGLIPtan-metFORMIN (JANUMET)  MG per tablet, Take 1 tablet by mouth in the morning., Disp: 90 tablet, Rfl: 1    venlafaxine (EFFEXOR XR) 150 MG extended release capsule, TAKE 1 CAPSULE DAILY, Disp: 30 capsule, Rfl: 5    Multiple Vitamin (MULTI-VITAMIN DAILY PO), Take by mouth, Disp: , Rfl:     Allergies:  No Known Allergies    Vitals:    11/01/22 1313   Weight: 141 lb (64 kg)   Height: 5' 7\" (1.702 m)       Exam:  Neurovascular status unchanged. Mild ruborous changes and coldness noted into the forefoot and digital regions bilaterally. No eschar or gangrenous changes noted into the forefoot regions bilaterally. No maceration webspaces noted bilateral foot. No plantar calluses, ulcerations, heel fissuring noted bilateral foot. Limited range of motion noted to both lower extremities. Diagnostic Studies:     VL LOWER EXTREMITY ARTERIAL SEGMENTAL PRESSURES W PPG    Result Date: 10/25/2022  EXAMINATION: LOWER ARTERIAL DUPLEX BILATERAL WITH PVR AND SEGMENTAL PRESSURE 10/24/2022 2:26 pm TECHNIQUE: Arterial duplex JOSE ultrasound. Segmental pressures and PVR performed with Doppler. COMPARISON: None. HISTORY: ORDERING SYSTEM PROVIDED HISTORY: Type II diabetes mellitus with peripheral circulatory disorder [facility]) TECHNOLOGIST PROVIDED HISTORY: Reason for exam:->Peripheral vascular disease What reading provider will be dictating this exam?->CRC FINDINGS: The JOSE on the right is 1.1. The JOSE on the left is 1.1. Right lower extremity: Arterial Doppler evaluation from the femoral artery through to the dorsalis pedis artery demonstrates preserved multiphasic waveforms throughout. PVR evaluation of the right lower extremity from the high thigh to the digit of the right foot demonstrates preserved pulsatility with progressively decreasing amplitudes.  Digital waveform evaluation of the digits of the right foot demonstrate irregular pulsatility with varying degrees of moderate to severe decreased amplitude. Left lower extremity: Arterial Doppler evaluation from the femoral artery through to the dorsalis pedis artery demonstrates preserved multiphasic waveforms throughout. PVR evaluation of the left lower extremity from the high thigh to the digit of the left foot demonstrates preserved pulsatility with progressively decreasing amplitudes. Digital waveform evaluation of the digits of the left foot demonstrate irregular pulsatility with varying degrees of moderate to severe decreased amplitude. 1.  Normal ankle brachial indices bilaterally at 1.1. Preserved multiphasic waveforms at all levels bilaterally on arterial Doppler evaluation lower suspicion for the presence of a high-grade stenosis. 2.  PVR evaluation of the bilateral lower extremities shows preserved pulsatility with progressively decreasing amplitudes. Digital waveform evaluation of the digits of the bilateral feet show irregular pulsatility in all areas with varying degrees of moderate to severe decreased amplitudes. These findings probably reflect changes related to underlying microvascular disease which is somewhat limiting flow to the digits of the bilateral feet. At this time there appears to be decreased collateral flow to the digits of the bilateral feet. Procedures:    None    Plan Per Assessment  Milton Goodman was seen today for follow-up. Diagnoses and all orders for this visit:    Type II diabetes mellitus with peripheral circulatory disorder (HCC)    PVD (peripheral vascular disease) (HCC)    Pain in both lower legs    Foot drop, left foot    Difficulty walking      Evaluation and management  We did review vascular results with patient in detail today. We did recommend vascular consultation due to the current clinical symptoms and appearance to both lower extremities and diminished PVRs into the digital regions bilaterally.   Patient was to continue with her good supportive shoe gear and dropfoot brace with ambulatory activities. Patient will be followed up at a later date for continued evaluation and management. Seen By:    Jhonny Bumpers, DPM    Electronically signed by Jhonny Bumpers, DPM on 11/1/2022 at 1:22 PM    This note was created using voice recognition software. The note was reviewed however may contain grammatical errors.

## 2022-11-03 ENCOUNTER — TELEPHONE (OUTPATIENT)
Dept: VASCULAR SURGERY | Age: 67
End: 2022-11-03

## 2022-11-03 DIAGNOSIS — E11.42 DIABETIC PERIPHERAL NEUROPATHY (HCC): ICD-10-CM

## 2022-11-03 NOTE — TELEPHONE ENCOUNTER
Received referral from Dr. Kristie Naqvi for PVD, left message for patient to schedule appointment to see Dr. Lizzy Jones.

## 2022-11-04 RX ORDER — GABAPENTIN 100 MG/1
100 CAPSULE ORAL 2 TIMES DAILY
Qty: 60 CAPSULE | Refills: 0 | Status: SHIPPED | OUTPATIENT
Start: 2022-11-04 | End: 2022-12-04

## 2022-11-21 ENCOUNTER — APPOINTMENT (OUTPATIENT)
Dept: GENERAL RADIOLOGY | Age: 67
End: 2022-11-21
Payer: MEDICARE

## 2022-11-21 ENCOUNTER — HOSPITAL ENCOUNTER (EMERGENCY)
Age: 67
Discharge: HOME | End: 2022-11-21
Payer: MEDICARE

## 2022-11-21 VITALS
SYSTOLIC BLOOD PRESSURE: 140 MMHG | TEMPERATURE: 97.6 F | HEIGHT: 67 IN | WEIGHT: 140 LBS | DIASTOLIC BLOOD PRESSURE: 75 MMHG | BODY MASS INDEX: 21.97 KG/M2 | RESPIRATION RATE: 18 BRPM | HEART RATE: 75 BPM | OXYGEN SATURATION: 99 %

## 2022-11-21 DIAGNOSIS — S46.911A STRAIN OF RIGHT SHOULDER, INITIAL ENCOUNTER: Primary | ICD-10-CM

## 2022-11-21 PROCEDURE — 73030 X-RAY EXAM OF SHOULDER: CPT

## 2022-11-21 PROCEDURE — 99211 OFF/OP EST MAY X REQ PHY/QHP: CPT

## 2022-11-21 RX ORDER — METHOCARBAMOL 500 MG/1
500 TABLET, FILM COATED ORAL 4 TIMES DAILY PRN
Qty: 30 TABLET | Refills: 0 | Status: SHIPPED | OUTPATIENT
Start: 2022-11-21 | End: 2022-12-01

## 2022-11-21 RX ORDER — NAPROXEN 500 MG/1
500 TABLET ORAL 2 TIMES DAILY WITH MEALS
Qty: 180 TABLET | Refills: 1 | Status: SHIPPED | OUTPATIENT
Start: 2022-11-21

## 2022-11-21 ASSESSMENT — PAIN - FUNCTIONAL ASSESSMENT: PAIN_FUNCTIONAL_ASSESSMENT: 0-10

## 2022-11-21 ASSESSMENT — PAIN SCALES - GENERAL: PAINLEVEL_OUTOF10: 5

## 2022-11-21 ASSESSMENT — PAIN DESCRIPTION - LOCATION: LOCATION: SHOULDER

## 2022-11-21 NOTE — ED PROVIDER NOTES
HPI:  22, Time: 4:15 PM ANA Moreno is a 74 y.o. female presenting to the ED for right shoulder pain, beginning 2 weeks ago after falling when trying to get onto her exercise bike. The complaint has been persistent, moderate in severity, and worsened by movement of right shoulder. Patient and her  provide the history at the urgent care here today. States that that the pain is located on the anterior aspect of the right shoulder and does not radiate. No numbness, tingling or temperature changes to the right upper extremity. No prior history of injuries to the right shoulder. Did not hit her head or lose consciousness. Afebrile without recent travel or sick contacts. Patient denies all other symptoms at this time. Review of Systems:   A complete review of systems was performed and pertinent positives and negatives are stated within HPI, all other systems reviewed and are negative.          --------------------------------------------- PAST HISTORY ---------------------------------------------  Past Medical History:  has a past medical history of Abnormal mammogram of left breast, Anxiety, Cerebral artery occlusion with cerebral infarction ([address] Depression, Diabetes mellitus ([address] Diabetic peripheral neuropathy ([address] Dysarthria, Erosive esophagitis, Esophageal stricture, GERD (gastroesophageal reflux disease), Gestational diabetes, Hemorrhagic stroke ([address] Hyperlipidemia, Hypertension, Hypokalemia, Left lumbosacral radiculopathy, Osteoarthritis, Pain in joint, shoulder region, Peroneal neuropathy at knee, left, Postmenopausal, and Type 2 diabetes mellitus without complication ([address] Past Surgical History:  has a past surgical history that includes Cholecystectomy;  section; Hysterectomy; Breast biopsy (Left, 2019); joint replacement (Right); bronchoscopy (); Colonoscopy (2011); and Carpal tunnel release (Right).     Social History:  reports that she has never smoked. She has never used smokeless tobacco. She reports that she does not drink alcohol and does not use drugs. Family History: family history includes Coronary Art Dis in her father; Diabetes in her brother and mother; Mult Sclerosis in her brother; Rheum Arthritis in her sister. The patients home medications have been reviewed. Allergies: Patient has no known allergies. -------------------------------------------------- RESULTS -------------------------------------------------  All laboratory and radiology results have been personally reviewed by myself   LABS:  No results found for this visit on 11/21/22. RADIOLOGY:  Interpreted by Radiologist.  XR SHOULDER RIGHT (MIN 2 VIEWS)   Final Result   No acute bony abnormalities. ------------------------- NURSING NOTES AND VITALS REVIEWED ---------------------------   The nursing notes within the ED encounter and vital signs as below have been reviewed. BP (!) 140/75   Pulse 75   Temp 97.6 °F (36.4 °C)   Resp 18   Ht 5' 7\" (1.702 m)   Wt 140 lb (63.5 kg)   SpO2 99%   BMI 21.93 kg/m²   Oxygen Saturation Interpretation: Normal      ---------------------------------------------------PHYSICAL EXAM--------------------------------------      Constitutional/General: Alert and oriented x3, well appearing, non toxic in NAD  Head: Normocephalic and atraumatic  Eyes: PERRL, EOMI  Mouth: Oropharynx clear, handling secretions, no trismus  Neck: Supple, full ROM,   Extremities: Moves all extremities x 4. Warm and well perfused. There is palpation to the anterior right shoulder. There is pain with active and passive range of motion of the right shoulder in all directions. No tenderness to palpation over the humerus or clavicle on the right. 2+ radial pulse on the right. Compartments of the right upper extremity soft and nontender.   Skin: warm and dry without rash  Neurologic: GCS 15,  Psych: Normal Affect      ------------------------------ ED COURSE/MEDICAL DECISION MAKING----------------------  Medications - No data to display      ED COURSE:  ED Course as of 11/21/22 1721   Mon Nov 21, 2022   1636 Reassessed patient. Remained stable neurovascularly intact. Discussed results with the patient and her . No acute pathology noted on x-rays done at urgent care today. Patient is nontoxic-appearing, afebrile, no acute distress therefore we will plan on outpatient symptom management. Arm sling provided at urgent care here today. Advised to follow-up with PCP for recheck return the emergency department with any new or worsening symptoms. Patient and  voiced understanding and are agreeable to the above treatment plan. [MS]      ED Course User Index  [MS] Gurwinder Barton       Medical Decision Making:    See ED course above. Counseling: The emergency provider has spoken with the patient and spouse/SO and discussed todays results, in addition to providing specific details for the plan of care and counseling regarding the diagnosis and prognosis. Questions are answered at this time and they are agreeable with the plan.      --------------------------------- IMPRESSION AND DISPOSITION ---------------------------------    IMPRESSION  1. Strain of right shoulder, initial encounter        DISPOSITION  Disposition: Discharge to home  Patient condition is stable      NOTE: This report was transcribed using voice recognition software.  Every effort was made to ensure accuracy; however, inadvertent computerized transcription errors may be present        Gurwinder Barton  11/21/22 1721

## 2022-12-03 ENCOUNTER — APPOINTMENT (OUTPATIENT)
Dept: GENERAL RADIOLOGY | Age: 67
DRG: 435 | End: 2022-12-03
Payer: MEDICARE

## 2022-12-03 ENCOUNTER — HOSPITAL ENCOUNTER (INPATIENT)
Age: 67
LOS: 9 days | Discharge: HOME HEALTH | DRG: 435 | End: 2022-12-12
Attending: EMERGENCY MEDICINE | Admitting: INTERNAL MEDICINE
Payer: MEDICARE

## 2022-12-03 DIAGNOSIS — R77.8 ELEVATED TROPONIN: ICD-10-CM

## 2022-12-03 DIAGNOSIS — E83.42 HYPOMAGNESEMIA: ICD-10-CM

## 2022-12-03 DIAGNOSIS — R47.02 DYSPHASIA: ICD-10-CM

## 2022-12-03 DIAGNOSIS — J10.1 INFLUENZA A: Primary | ICD-10-CM

## 2022-12-03 LAB
ACANTHOCYTES: ABNORMAL
ALBUMIN SERPL-MCNC: 3.6 G/DL (ref 3.5–5.2)
ALP BLD-CCNC: 109 U/L (ref 35–104)
ALT SERPL-CCNC: 27 U/L (ref 0–32)
ANION GAP SERPL CALCULATED.3IONS-SCNC: 10 MMOL/L (ref 7–16)
AST SERPL-CCNC: 46 U/L (ref 0–31)
BACTERIA: ABNORMAL /HPF
BASOPHILS ABSOLUTE: 0 E9/L (ref 0–0.2)
BASOPHILS RELATIVE PERCENT: 0.1 % (ref 0–2)
BILIRUB SERPL-MCNC: 0.5 MG/DL (ref 0–1.2)
BILIRUBIN URINE: ABNORMAL
BLOOD, URINE: NEGATIVE
BUN BLDV-MCNC: 21 MG/DL (ref 6–23)
BURR CELLS: ABNORMAL
CALCIUM SERPL-MCNC: 10.1 MG/DL (ref 8.6–10.2)
CHLORIDE BLD-SCNC: 101 MMOL/L (ref 98–107)
CLARITY: CLEAR
CO2: 27 MMOL/L (ref 22–29)
COLOR: YELLOW
CREAT SERPL-MCNC: 0.7 MG/DL (ref 0.5–1)
EOSINOPHILS ABSOLUTE: 0 E9/L (ref 0.05–0.5)
EOSINOPHILS RELATIVE PERCENT: 0.2 % (ref 0–6)
EPITHELIAL CELLS, UA: ABNORMAL /HPF
GFR SERPL CREATININE-BSD FRML MDRD: >60 ML/MIN/1.73
GLUCOSE BLD-MCNC: 170 MG/DL (ref 74–99)
GLUCOSE URINE: NEGATIVE MG/DL
HCT VFR BLD CALC: 40.4 % (ref 34–48)
HEMOGLOBIN: 13.3 G/DL (ref 11.5–15.5)
KETONES, URINE: NEGATIVE MG/DL
LEUKOCYTE ESTERASE, URINE: ABNORMAL
LYMPHOCYTES ABSOLUTE: 1.93 E9/L (ref 1.5–4)
LYMPHOCYTES RELATIVE PERCENT: 21.4 % (ref 20–42)
MAGNESIUM: 1.4 MG/DL (ref 1.6–2.6)
MCH RBC QN AUTO: 29.8 PG (ref 26–35)
MCHC RBC AUTO-ENTMCNC: 32.9 % (ref 32–34.5)
MCV RBC AUTO: 90.6 FL (ref 80–99.9)
MONOCYTES ABSOLUTE: 0.37 E9/L (ref 0.1–0.95)
MONOCYTES RELATIVE PERCENT: 4.3 % (ref 2–12)
MYELOCYTE PERCENT: 0.9 % (ref 0–0)
NEUTROPHILS ABSOLUTE: 6.81 E9/L (ref 1.8–7.3)
NEUTROPHILS RELATIVE PERCENT: 73.5 % (ref 43–80)
NITRITE, URINE: NEGATIVE
PDW BLD-RTO: 12 FL (ref 11.5–15)
PH UA: 6 (ref 5–9)
PHOSPHORUS: 2.9 MG/DL (ref 2.5–4.5)
PLATELET # BLD: 477 E9/L (ref 130–450)
PMV BLD AUTO: 10.1 FL (ref 7–12)
POIKILOCYTES: ABNORMAL
POLYCHROMASIA: ABNORMAL
POTASSIUM REFLEX MAGNESIUM: 4.7 MMOL/L (ref 3.5–5)
PROTEIN UA: NEGATIVE MG/DL
RBC # BLD: 4.46 E12/L (ref 3.5–5.5)
RBC UA: ABNORMAL /HPF (ref 0–2)
REASON FOR REJECTION: NORMAL
REJECTED TEST: NORMAL
SODIUM BLD-SCNC: 138 MMOL/L (ref 132–146)
SPECIFIC GRAVITY UA: 1.01 (ref 1–1.03)
TOTAL CK: 100 U/L (ref 20–180)
TOTAL PROTEIN: 6.9 G/DL (ref 6.4–8.3)
TROPONIN, HIGH SENSITIVITY: 43 NG/L (ref 0–9)
TROPONIN, HIGH SENSITIVITY: 46 NG/L (ref 0–9)
UROBILINOGEN, URINE: 0.2 E.U./DL
WBC # BLD: 9.2 E9/L (ref 4.5–11.5)
WBC UA: ABNORMAL /HPF (ref 0–5)

## 2022-12-03 PROCEDURE — 82550 ASSAY OF CK (CPK): CPT

## 2022-12-03 PROCEDURE — 96365 THER/PROPH/DIAG IV INF INIT: CPT

## 2022-12-03 PROCEDURE — 6360000002 HC RX W HCPCS: Performed by: STUDENT IN AN ORGANIZED HEALTH CARE EDUCATION/TRAINING PROGRAM

## 2022-12-03 PROCEDURE — 96361 HYDRATE IV INFUSION ADD-ON: CPT

## 2022-12-03 PROCEDURE — 80053 COMPREHEN METABOLIC PANEL: CPT

## 2022-12-03 PROCEDURE — 81001 URINALYSIS AUTO W/SCOPE: CPT

## 2022-12-03 PROCEDURE — 71045 X-RAY EXAM CHEST 1 VIEW: CPT

## 2022-12-03 PROCEDURE — 84484 ASSAY OF TROPONIN QUANT: CPT

## 2022-12-03 PROCEDURE — 2580000003 HC RX 258: Performed by: STUDENT IN AN ORGANIZED HEALTH CARE EDUCATION/TRAINING PROGRAM

## 2022-12-03 PROCEDURE — 99285 EMERGENCY DEPT VISIT HI MDM: CPT

## 2022-12-03 PROCEDURE — 84100 ASSAY OF PHOSPHORUS: CPT

## 2022-12-03 PROCEDURE — 36415 COLL VENOUS BLD VENIPUNCTURE: CPT

## 2022-12-03 PROCEDURE — 1200000000 HC SEMI PRIVATE

## 2022-12-03 PROCEDURE — 93005 ELECTROCARDIOGRAM TRACING: CPT | Performed by: STUDENT IN AN ORGANIZED HEALTH CARE EDUCATION/TRAINING PROGRAM

## 2022-12-03 PROCEDURE — 99223 1ST HOSP IP/OBS HIGH 75: CPT | Performed by: INTERNAL MEDICINE

## 2022-12-03 PROCEDURE — 85025 COMPLETE CBC W/AUTO DIFF WBC: CPT

## 2022-12-03 PROCEDURE — 83735 ASSAY OF MAGNESIUM: CPT

## 2022-12-03 RX ORDER — 0.9 % SODIUM CHLORIDE 0.9 %
1000 INTRAVENOUS SOLUTION INTRAVENOUS ONCE
Status: COMPLETED | OUTPATIENT
Start: 2022-12-03 | End: 2022-12-03

## 2022-12-03 RX ORDER — MAGNESIUM SULFATE IN WATER 40 MG/ML
2000 INJECTION, SOLUTION INTRAVENOUS ONCE
Status: COMPLETED | OUTPATIENT
Start: 2022-12-03 | End: 2022-12-03

## 2022-12-03 RX ORDER — 0.9 % SODIUM CHLORIDE 0.9 %
1000 INTRAVENOUS SOLUTION INTRAVENOUS ONCE
Status: COMPLETED | OUTPATIENT
Start: 2022-12-04 | End: 2022-12-04

## 2022-12-03 RX ADMIN — MAGNESIUM SULFATE HEPTAHYDRATE 2000 MG: 40 INJECTION, SOLUTION INTRAVENOUS at 21:39

## 2022-12-03 RX ADMIN — SODIUM CHLORIDE 1000 ML: 9 INJECTION, SOLUTION INTRAVENOUS at 18:33

## 2022-12-03 ASSESSMENT — ENCOUNTER SYMPTOMS
DIARRHEA: 0
SHORTNESS OF BREATH: 0
NAUSEA: 0
COUGH: 0
ABDOMINAL PAIN: 1
BACK PAIN: 0
COLOR CHANGE: 0
VOMITING: 0

## 2022-12-03 ASSESSMENT — PAIN - FUNCTIONAL ASSESSMENT: PAIN_FUNCTIONAL_ASSESSMENT: 0-10

## 2022-12-03 ASSESSMENT — PAIN SCALES - GENERAL: PAINLEVEL_OUTOF10: 2

## 2022-12-03 NOTE — ED NOTES
Pt refusing straight cath, Pt states she will try to provide urine sample after fluids     Karson Calderon RN  12/03/22 3454

## 2022-12-03 NOTE — ED PROVIDER NOTES
HPI   57-year-old female patient presented to emergency department for evaluation of poor p.o. intake. Patient was diagnosed with the flu on 11/30. She has a past history of stroke about a year ago. She has been having symptoms of fatigue and cough for the last 10 days. Her  is her primary caretaker says she is also having difficulty with swallowing however she did eat small amount of eggs and drink some tea this morning. She denies any chest pain, shortness of breath, vomiting, diarrhea. She does note intermittent suprapubic pain. No specific pain with urination or urinary frequency. She says that [facility] clinic is supposed to schedule her for a PEG tube at some point due to her chronic dysphagia. Her symptoms today are acute on chronic, mild to moderate and persistent. She is requesting some IV fluids as well. Review of Systems   Constitutional:  Positive for fatigue. Negative for chills and fever. HENT:  Negative for congestion. Respiratory:  Negative for cough and shortness of breath. Cardiovascular:  Negative for chest pain. Gastrointestinal:  Positive for abdominal pain. Negative for diarrhea, nausea and vomiting. Genitourinary:  Negative for difficulty urinating, dysuria and hematuria. Musculoskeletal:  Negative for back pain. Skin:  Negative for color change. All other systems reviewed and are negative. Physical Exam  Vitals and nursing note reviewed. Constitutional:       Appearance: Normal appearance. Comments: Patient is cachectic, frail and weak appearing. She has some phlegm that she is coughing up. HENT:      Head: Normocephalic and atraumatic. Nose: Nose normal. No congestion. Mouth/Throat:      Mouth: Mucous membranes are moist.      Pharynx: Oropharynx is clear. Eyes:      Conjunctiva/sclera: Conjunctivae normal.      Pupils: Pupils are equal, round, and reactive to light.    Cardiovascular:      Rate and Rhythm: Normal rate and regular rhythm. Pulses: Normal pulses. Heart sounds: Normal heart sounds. Pulmonary:      Effort: Pulmonary effort is normal. No respiratory distress. Breath sounds: Normal breath sounds. Abdominal:      General: Bowel sounds are normal. There is no distension. Tenderness: There is no abdominal tenderness. Musculoskeletal:         General: Normal range of motion. Cervical back: Normal range of motion and neck supple. Skin:     General: Skin is warm and dry. Capillary Refill: Capillary refill takes less than 2 seconds. Neurological:      General: No focal deficit present. Mental Status: She is alert. Procedures     MDM  44-year-old female patient present to emergency department for evaluation of generalized fatigue, she is flu positive and has past history of stroke. Having difficulty taking care of her at home. Labs and imaging obtained here. Magnesium found to be low at 1.4, troponin elevated from last baseline to 46 today. No chest pain or shortness of breath, no new EKG changes and repeat troponin reassuring at 43. She does have a urinary tract infection as well. She is complaining of dysphagia however this is chronic and she does have follow-up set up for possible PEG tube at [facility]. She was given IV fluids here, magnesium repleted and IV antibiotics started. ED Course as of 12/04/22 0039   Sat Dec 03, 2022   1728 Influenza a positive on swab from [facility] 11/30 [FG]   2200 EKG: Interpreted by me  Sinus rhythm, to 74, normal axis, no ST elevations or depressions, no T wave abnormalities. [SO]      ED Course User Index  [FG] Amina Currie DO  [SO] Kirby Salazar DO     ED Course as of 12/04/22 0039   Sat Dec 03, 2022   1728 Influenza a positive on swab from [facility] 11/30 [FG]   2200 EKG: Interpreted by me  Sinus rhythm, to 74, normal axis, no ST elevations or depressions, no T wave abnormalities.  [SO]      ED Course User Index  [FG] Marivel Argue, DO  [SO] Russellluis Zavala, DO       --------------------------------------------- PAST HISTORY ---------------------------------------------  Past Medical History:  has a past medical history of Abnormal mammogram of left breast, Anxiety, Cerebral artery occlusion with cerebral infarction ([facility] [address] Depression, Diabetes mellitus ([facility] [address] Diabetic peripheral neuropathy ([facility] [address] Dysarthria, Erosive esophagitis, Esophageal stricture, GERD (gastroesophageal reflux disease), Gestational diabetes, Hemorrhagic stroke ([facility] [address] Hyperlipidemia, Hypertension, Hypokalemia, Left lumbosacral radiculopathy, Osteoarthritis, Pain in joint, shoulder region, 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 Past Surgical History:  has a past surgical history that includes Cholecystectomy;  section; Hysterectomy; Breast biopsy (Left, 2019); joint replacement (Right); bronchoscopy (); Colonoscopy (2011); and Carpal tunnel release (Right). Social History:  reports that she has never smoked. She has never used smokeless tobacco. She reports that she does not drink alcohol and does not use drugs. Family History: family history includes Coronary Art Dis in her father; Diabetes in her brother and mother; Mult Sclerosis in her brother; Rheum Arthritis in her sister. The patients home medications have been reviewed. Allergies: Patient has no known allergies.     -------------------------------------------------- RESULTS -------------------------------------------------    LABS:  Results for orders placed or performed during the hospital encounter of 22   CBC with Auto Differential   Result Value Ref Range    WBC 9.2 4.5 - 11.5 E9/L    RBC 4.46 3.50 - 5.50 E12/L    Hemoglobin 13.3 11.5 - 15.5 g/dL    Hematocrit 40.4 34.0 - 48.0 %    MCV 90.6 80.0 - 99.9 fL    MCH 29.8 26.0 - 35.0 pg    MCHC 32.9 32.0 - 34.5 %    RDW 12.0 11.5 - 15.0 fL Platelets 451 (H) 986 - 450 E9/L    MPV 10.1 7.0 - 12.0 fL    Neutrophils % 73.5 43.0 - 80.0 %    Lymphocytes % 21.4 20.0 - 42.0 %    Monocytes % 4.3 2.0 - 12.0 %    Eosinophils % 0.2 0.0 - 6.0 %    Basophils % 0.1 0.0 - 2.0 %    Neutrophils Absolute 6.81 1.80 - 7.30 E9/L    Lymphocytes Absolute 1.93 1.50 - 4.00 E9/L    Monocytes Absolute 0.37 0.10 - 0.95 E9/L    Eosinophils Absolute 0.00 (L) 0.05 - 0.50 E9/L    Basophils Absolute 0.00 0.00 - 0.20 E9/L    Myelocyte Percent 0.9 0 - 0 %    Polychromasia 1+     Poikilocytes 1+     Acanthocytes 1+     Winifred Cells 1+    Comprehensive Metabolic Panel w/ Reflex to MG   Result Value Ref Range    Sodium 138 132 - 146 mmol/L    Potassium reflex Magnesium 4.7 3.5 - 5.0 mmol/L    Chloride 101 98 - 107 mmol/L    CO2 27 22 - 29 mmol/L    Anion Gap 10 7 - 16 mmol/L    Glucose 170 (H) 74 - 99 mg/dL    BUN 21 6 - 23 mg/dL    Creatinine 0.7 0.5 - 1.0 mg/dL    Est, Glom Filt Rate >60 >=60 mL/min/1.73    Calcium 10.1 8.6 - 10.2 mg/dL    Total Protein 6.9 6.4 - 8.3 g/dL    Albumin 3.6 3.5 - 5.2 g/dL    Total Bilirubin 0.5 0.0 - 1.2 mg/dL    Alkaline Phosphatase 109 (H) 35 - 104 U/L    ALT 27 0 - 32 U/L    AST 46 (H) 0 - 31 U/L   Magnesium   Result Value Ref Range    Magnesium 1.4 (L) 1.6 - 2.6 mg/dL   Phosphorus   Result Value Ref Range    Phosphorus 2.9 2.5 - 4.5 mg/dL   CK   Result Value Ref Range    Total  20 - 180 U/L   Urinalysis with Microscopic   Result Value Ref Range    Color, UA Yellow Straw/Yellow    Clarity, UA Clear Clear    Glucose, Ur Negative Negative mg/dL    Bilirubin Urine SMALL (A) Negative    Ketones, Urine Negative Negative mg/dL    Specific Gravity, UA 1.010 1.005 - 1.030    Blood, Urine Negative Negative    pH, UA 6.0 5.0 - 9.0    Protein, UA Negative Negative mg/dL    Urobilinogen, Urine 0.2 <2.0 E.U./dL    Nitrite, Urine Negative Negative    Leukocyte Esterase, Urine MODERATE (A) Negative    WBC, UA 2-5 0 - 5 /HPF    RBC, UA NONE 0 - 2 /HPF Epithelial Cells, UA FEW /HPF    Bacteria, UA RARE (A) None Seen /HPF   SPECIMEN REJECTION   Result Value Ref Range    Rejected Test TRP5     Reason for Rejection see below    Troponin   Result Value Ref Range    Troponin, High Sensitivity 46 (H) 0 - 9 ng/L   Troponin   Result Value Ref Range    Troponin, High Sensitivity 43 (H) 0 - 9 ng/L   EKG 12 Lead   Result Value Ref Range    Ventricular Rate 74 BPM    Atrial Rate 74 BPM    P-R Interval 128 ms    QRS Duration 74 ms    Q-T Interval 390 ms    QTc Calculation (Bazett) 432 ms    P Axis 106 degrees    R Axis 4 degrees    T Axis 18 degrees       RADIOLOGY:  XR CHEST PORTABLE   Final Result   No acute process. ------------------------- NURSING NOTES AND VITALS REVIEWED ---------------------------  Date / Time Roomed:  12/3/2022  5:22 PM  ED Bed Assignment:  13/13    The nursing notes within the ED encounter and vital signs as below have been reviewed.      Patient Vitals for the past 24 hrs:   BP Temp Pulse Resp SpO2 Height Weight   12/03/22 2332 (!) 136/92 -- 76 20 94 % -- --   12/03/22 2326 -- -- 72 22 -- -- --   12/03/22 2256 -- -- 78 21 -- -- --   12/03/22 2226 -- -- 69 25 94 % -- --   12/03/22 2156 -- -- 80 26 97 % -- --   12/03/22 2140 (!) 139/92 -- 79 20 96 % -- --   12/03/22 2004 138/79 -- 73 22 96 % -- --   12/03/22 1958 120/84 -- 74 20 94 % 5' 7\" (1.702 m) 132 lb (59.9 kg)   12/03/22 1956 120/84 -- 71 23 95 % -- --   12/03/22 1949 -- -- 74 22 98 % -- --   12/03/22 1934 -- -- 72 21 96 % -- --   12/03/22 1919 -- -- 74 21 97 % -- --   12/03/22 1904 (!) 130/104 -- 75 19 96 % -- --   12/03/22 1849 -- -- 74 16 95 % -- --   12/03/22 1837 (!) 127/96 -- 73 20 96 % -- --   12/03/22 1712 (!) 148/80 97.4 °F (36.3 °C) 80 18 98 % -- 135 lb (61.2 kg)       Oxygen Saturation Interpretation: Normal    Counseling:  I have spoken with the patient and discussed todays results, in addition to providing specific details for the plan of care and counseling regarding the diagnosis and prognosis. Their questions are answered at this time and they are agreeable with the plan of admission.    --------------------------------- ADDITIONAL PROVIDER NOTES ---------------------------------  Consultations:  Spoke with Dr. China Quinonez.  Discussed case. They will admit the patient. This patient's ED course included: a personal history and physicial examination, re-evaluation prior to disposition, multiple bedside re-evaluations, and IV medications    This patient has remained hemodynamically stable during their ED course. Diagnosis:  1. Influenza A    2. Hypomagnesemia    3. Elevated troponin        Disposition:  Patient's disposition: Admit to med/surg floor  Patient's condition is stable.          Duy Jackson DO  Resident  12/04/22 0944

## 2022-12-04 LAB
ANION GAP SERPL CALCULATED.3IONS-SCNC: 10 MMOL/L (ref 7–16)
BASOPHILS ABSOLUTE: 0.01 E9/L (ref 0–0.2)
BASOPHILS RELATIVE PERCENT: 0.1 % (ref 0–2)
BUN BLDV-MCNC: 14 MG/DL (ref 6–23)
CALCIUM SERPL-MCNC: 8.7 MG/DL (ref 8.6–10.2)
CHLORIDE BLD-SCNC: 109 MMOL/L (ref 98–107)
CO2: 23 MMOL/L (ref 22–29)
CREAT SERPL-MCNC: 0.5 MG/DL (ref 0.5–1)
EKG ATRIAL RATE: 74 BPM
EKG P AXIS: 106 DEGREES
EKG P-R INTERVAL: 128 MS
EKG Q-T INTERVAL: 390 MS
EKG QRS DURATION: 74 MS
EKG QTC CALCULATION (BAZETT): 432 MS
EKG R AXIS: 4 DEGREES
EKG T AXIS: 18 DEGREES
EKG VENTRICULAR RATE: 74 BPM
EOSINOPHILS ABSOLUTE: 0.04 E9/L (ref 0.05–0.5)
EOSINOPHILS RELATIVE PERCENT: 0.6 % (ref 0–6)
GFR SERPL CREATININE-BSD FRML MDRD: >60 ML/MIN/1.73
GLUCOSE BLD-MCNC: 102 MG/DL (ref 74–99)
HCT VFR BLD CALC: 32.1 % (ref 34–48)
HEMOGLOBIN: 10.8 G/DL (ref 11.5–15.5)
IMMATURE GRANULOCYTES #: 0.01 E9/L
IMMATURE GRANULOCYTES %: 0.1 % (ref 0–5)
LYMPHOCYTES ABSOLUTE: 2.09 E9/L (ref 1.5–4)
LYMPHOCYTES RELATIVE PERCENT: 30.9 % (ref 20–42)
MCH RBC QN AUTO: 30.5 PG (ref 26–35)
MCHC RBC AUTO-ENTMCNC: 33.6 % (ref 32–34.5)
MCV RBC AUTO: 90.7 FL (ref 80–99.9)
METER GLUCOSE: 87 MG/DL (ref 74–99)
MONOCYTES ABSOLUTE: 0.45 E9/L (ref 0.1–0.95)
MONOCYTES RELATIVE PERCENT: 6.6 % (ref 2–12)
NEUTROPHILS ABSOLUTE: 4.17 E9/L (ref 1.8–7.3)
NEUTROPHILS RELATIVE PERCENT: 61.7 % (ref 43–80)
PDW BLD-RTO: 12.2 FL (ref 11.5–15)
PLATELET # BLD: 333 E9/L (ref 130–450)
PMV BLD AUTO: 10.1 FL (ref 7–12)
POTASSIUM SERPL-SCNC: 3.7 MMOL/L (ref 3.5–5)
RBC # BLD: 3.54 E12/L (ref 3.5–5.5)
SODIUM BLD-SCNC: 142 MMOL/L (ref 132–146)
WBC # BLD: 6.8 E9/L (ref 4.5–11.5)

## 2022-12-04 PROCEDURE — 85025 COMPLETE CBC W/AUTO DIFF WBC: CPT

## 2022-12-04 PROCEDURE — 6360000002 HC RX W HCPCS: Performed by: EMERGENCY MEDICINE

## 2022-12-04 PROCEDURE — 2500000003 HC RX 250 WO HCPCS: Performed by: INTERNAL MEDICINE

## 2022-12-04 PROCEDURE — 2580000003 HC RX 258: Performed by: EMERGENCY MEDICINE

## 2022-12-04 PROCEDURE — 99232 SBSQ HOSP IP/OBS MODERATE 35: CPT | Performed by: STUDENT IN AN ORGANIZED HEALTH CARE EDUCATION/TRAINING PROGRAM

## 2022-12-04 PROCEDURE — 87088 URINE BACTERIA CULTURE: CPT

## 2022-12-04 PROCEDURE — 93010 ELECTROCARDIOGRAM REPORT: CPT | Performed by: INTERNAL MEDICINE

## 2022-12-04 PROCEDURE — 80048 BASIC METABOLIC PNL TOTAL CA: CPT

## 2022-12-04 PROCEDURE — 82962 GLUCOSE BLOOD TEST: CPT

## 2022-12-04 PROCEDURE — 6360000002 HC RX W HCPCS: Performed by: INTERNAL MEDICINE

## 2022-12-04 PROCEDURE — 2580000003 HC RX 258: Performed by: INTERNAL MEDICINE

## 2022-12-04 PROCEDURE — 2580000003 HC RX 258: Performed by: STUDENT IN AN ORGANIZED HEALTH CARE EDUCATION/TRAINING PROGRAM

## 2022-12-04 PROCEDURE — 6370000000 HC RX 637 (ALT 250 FOR IP): Performed by: EMERGENCY MEDICINE

## 2022-12-04 PROCEDURE — 1200000000 HC SEMI PRIVATE

## 2022-12-04 RX ORDER — ENOXAPARIN SODIUM 100 MG/ML
40 INJECTION SUBCUTANEOUS DAILY
Status: DISCONTINUED | OUTPATIENT
Start: 2022-12-04 | End: 2022-12-12 | Stop reason: HOSPADM

## 2022-12-04 RX ORDER — DEXTROSE MONOHYDRATE 100 MG/ML
INJECTION, SOLUTION INTRAVENOUS CONTINUOUS PRN
Status: DISCONTINUED | OUTPATIENT
Start: 2022-12-04 | End: 2022-12-12 | Stop reason: HOSPADM

## 2022-12-04 RX ORDER — ACETAMINOPHEN 650 MG/1
650 SUPPOSITORY RECTAL EVERY 6 HOURS PRN
Status: DISCONTINUED | OUTPATIENT
Start: 2022-12-04 | End: 2022-12-12 | Stop reason: HOSPADM

## 2022-12-04 RX ORDER — AMLODIPINE BESYLATE 5 MG/1
5 TABLET ORAL DAILY
Status: DISCONTINUED | OUTPATIENT
Start: 2022-12-04 | End: 2022-12-12 | Stop reason: HOSPADM

## 2022-12-04 RX ORDER — SODIUM CHLORIDE 9 MG/ML
INJECTION, SOLUTION INTRAVENOUS CONTINUOUS
Status: DISCONTINUED | OUTPATIENT
Start: 2022-12-04 | End: 2022-12-07

## 2022-12-04 RX ORDER — SODIUM CHLORIDE 0.9 % (FLUSH) 0.9 %
10 SYRINGE (ML) INJECTION PRN
Status: DISCONTINUED | OUTPATIENT
Start: 2022-12-04 | End: 2022-12-12 | Stop reason: HOSPADM

## 2022-12-04 RX ORDER — KETOROLAC TROMETHAMINE 15 MG/ML
15 INJECTION, SOLUTION INTRAMUSCULAR; INTRAVENOUS ONCE
Status: COMPLETED | OUTPATIENT
Start: 2022-12-04 | End: 2022-12-04

## 2022-12-04 RX ORDER — ONDANSETRON 2 MG/ML
4 INJECTION INTRAMUSCULAR; INTRAVENOUS EVERY 6 HOURS PRN
Status: DISCONTINUED | OUTPATIENT
Start: 2022-12-04 | End: 2022-12-12 | Stop reason: HOSPADM

## 2022-12-04 RX ORDER — INSULIN LISPRO 100 [IU]/ML
0-4 INJECTION, SOLUTION INTRAVENOUS; SUBCUTANEOUS NIGHTLY
Status: DISCONTINUED | OUTPATIENT
Start: 2022-12-04 | End: 2022-12-12 | Stop reason: HOSPADM

## 2022-12-04 RX ORDER — ACETAMINOPHEN 325 MG/1
650 TABLET ORAL EVERY 6 HOURS PRN
Status: DISCONTINUED | OUTPATIENT
Start: 2022-12-04 | End: 2022-12-12 | Stop reason: HOSPADM

## 2022-12-04 RX ORDER — ACETYLCYSTEINE 100 MG/ML
4 SOLUTION ORAL; RESPIRATORY (INHALATION) EVERY 4 HOURS
Status: DISCONTINUED | OUTPATIENT
Start: 2022-12-04 | End: 2022-12-10

## 2022-12-04 RX ORDER — INSULIN LISPRO 100 [IU]/ML
0-4 INJECTION, SOLUTION INTRAVENOUS; SUBCUTANEOUS
Status: DISCONTINUED | OUTPATIENT
Start: 2022-12-04 | End: 2022-12-12 | Stop reason: HOSPADM

## 2022-12-04 RX ORDER — LABETALOL HYDROCHLORIDE 5 MG/ML
10 INJECTION, SOLUTION INTRAVENOUS ONCE
Status: COMPLETED | OUTPATIENT
Start: 2022-12-04 | End: 2022-12-04

## 2022-12-04 RX ORDER — GABAPENTIN 100 MG/1
100 CAPSULE ORAL ONCE
Status: COMPLETED | OUTPATIENT
Start: 2022-12-04 | End: 2022-12-04

## 2022-12-04 RX ORDER — ATORVASTATIN CALCIUM 40 MG/1
40 TABLET, FILM COATED ORAL DAILY
Status: DISCONTINUED | OUTPATIENT
Start: 2022-12-04 | End: 2022-12-12 | Stop reason: HOSPADM

## 2022-12-04 RX ORDER — SODIUM CHLORIDE 9 MG/ML
INJECTION, SOLUTION INTRAVENOUS PRN
Status: DISCONTINUED | OUTPATIENT
Start: 2022-12-04 | End: 2022-12-12 | Stop reason: HOSPADM

## 2022-12-04 RX ORDER — VENLAFAXINE HYDROCHLORIDE 150 MG/1
150 CAPSULE, EXTENDED RELEASE ORAL DAILY
Status: DISCONTINUED | OUTPATIENT
Start: 2022-12-04 | End: 2022-12-12 | Stop reason: HOSPADM

## 2022-12-04 RX ORDER — PROMETHAZINE HYDROCHLORIDE 25 MG/1
12.5 TABLET ORAL EVERY 6 HOURS PRN
Status: DISCONTINUED | OUTPATIENT
Start: 2022-12-04 | End: 2022-12-12 | Stop reason: HOSPADM

## 2022-12-04 RX ORDER — SODIUM CHLORIDE 0.9 % (FLUSH) 0.9 %
10 SYRINGE (ML) INJECTION EVERY 12 HOURS SCHEDULED
Status: DISCONTINUED | OUTPATIENT
Start: 2022-12-04 | End: 2022-12-12 | Stop reason: HOSPADM

## 2022-12-04 RX ORDER — POLYETHYLENE GLYCOL 3350 17 G/17G
17 POWDER, FOR SOLUTION ORAL DAILY PRN
Status: DISCONTINUED | OUTPATIENT
Start: 2022-12-04 | End: 2022-12-12 | Stop reason: HOSPADM

## 2022-12-04 RX ORDER — GABAPENTIN 100 MG/1
100 CAPSULE ORAL 2 TIMES DAILY
Status: DISCONTINUED | OUTPATIENT
Start: 2022-12-04 | End: 2022-12-12 | Stop reason: HOSPADM

## 2022-12-04 RX ADMIN — ENOXAPARIN SODIUM 40 MG: 100 INJECTION SUBCUTANEOUS at 09:05

## 2022-12-04 RX ADMIN — Medication 10 ML: at 20:50

## 2022-12-04 RX ADMIN — Medication 10 ML: at 08:56

## 2022-12-04 RX ADMIN — LABETALOL HYDROCHLORIDE 10 MG: 5 INJECTION INTRAVENOUS at 23:23

## 2022-12-04 RX ADMIN — KETOROLAC TROMETHAMINE 15 MG: 15 INJECTION, SOLUTION INTRAMUSCULAR; INTRAVENOUS at 04:38

## 2022-12-04 RX ADMIN — SODIUM CHLORIDE: 9 INJECTION, SOLUTION INTRAVENOUS at 15:32

## 2022-12-04 RX ADMIN — SODIUM CHLORIDE 1000 ML: 9 INJECTION, SOLUTION INTRAVENOUS at 00:11

## 2022-12-04 RX ADMIN — KETOROLAC TROMETHAMINE 15 MG: 15 INJECTION, SOLUTION INTRAMUSCULAR; INTRAVENOUS at 20:50

## 2022-12-04 RX ADMIN — CEFTRIAXONE 1000 MG: 1 INJECTION, POWDER, FOR SOLUTION INTRAMUSCULAR; INTRAVENOUS at 00:08

## 2022-12-04 RX ADMIN — GABAPENTIN 100 MG: 100 CAPSULE ORAL at 04:36

## 2022-12-04 RX ADMIN — SODIUM CHLORIDE: 9 INJECTION, SOLUTION INTRAVENOUS at 04:27

## 2022-12-04 ASSESSMENT — PAIN - FUNCTIONAL ASSESSMENT
PAIN_FUNCTIONAL_ASSESSMENT: 0-10
PAIN_FUNCTIONAL_ASSESSMENT: NONE - DENIES PAIN

## 2022-12-04 ASSESSMENT — PAIN DESCRIPTION - LOCATION
LOCATION: BACK

## 2022-12-04 ASSESSMENT — PAIN SCALES - GENERAL
PAINLEVEL_OUTOF10: 7
PAINLEVEL_OUTOF10: 5
PAINLEVEL_OUTOF10: 8

## 2022-12-04 NOTE — PROGRESS NOTES
[address] [place], [place] [address]   Progress Note    Admitting Date and Time: 12/3/2022  5:22 PM  Admit Dx: Hypomagnesemia [E83.42]  Influenza A [J10.1]  Elevated troponin [R77.8]    Subjective:    Pt does not feel well. She is coughing and can not swallow. History is limited due to dysphagia.      Per RN: Nothing to report    ROS: denies fever, chills, cp, sob, n/v, HA unless stated above.     sodium chloride flush  10 mL IntraVENous 2 times per day    enoxaparin  40 mg SubCUTAneous Daily    [START ON 12/5/2022] cefTRIAXone (ROCEPHIN) IV  1,000 mg IntraVENous Q24H    amLODIPine  5 mg Oral Daily    venlafaxine  150 mg Oral Daily    atorvastatin  40 mg Oral Daily    gabapentin  100 mg Oral BID    lisinopril  30 mg Oral Daily     sodium chloride flush, 10 mL, PRN  sodium chloride, , PRN  promethazine, 12.5 mg, Q6H PRN   Or  ondansetron, 4 mg, Q6H PRN  polyethylene glycol, 17 g, Daily PRN  acetaminophen, 650 mg, Q6H PRN   Or  acetaminophen, 650 mg, Q6H PRN         Objective:    BP (!) 139/91   Pulse 88   Temp 98.7 °F (37.1 °C) (Oral)   Resp 20   Ht 5' 7\" (1.702 m)   Wt 132 lb (59.9 kg)   SpO2 94%   BMI 20.67 kg/m²       General Appearance: alert and oriented to person, place and time and in no acute distress  Skin: warm and dry  Head: normocephalic and atraumatic  Eyes: pupils equal, round, and reactive to light, extraocular eye movements intact, conjunctivae normal  Neck: neck supple and non tender without mass   Pulmonary/Chest: clear to auscultation bilaterally- no wheezes, rales or rhonchi, normal air movement, no respiratory distress but frequent coughing  Cardiovascular: normal rate, normal S1 and S2 and no carotid bruits  Abdomen: soft, non-tender, non-distended, normal bowel sounds, no masses or organomegaly  Extremities: no cyanosis, no clubbing and no edema  Neurologic: no cranial nerve deficit noted but aphasic speech present      Recent Labs     12/03/22  1822 12/04/22  0800    142   K 4.7 3.7    109*   CO2 27 23   BUN 21 14   CREATININE 0.7 0.5   GLUCOSE 170* 102*   CALCIUM 10.1 8.7       Recent Labs     12/03/22  1822   ALKPHOS 109*   PROT 6.9   LABALBU 3.6   BILITOT 0.5   AST 46*   ALT 27       Recent Labs     12/03/22  1822 12/04/22  0800   WBC 9.2 6.8   RBC 4.46 3.54   HGB 13.3 10.8*   HCT 40.4 32.1*   MCV 90.6 90.7   MCH 29.8 30.5   MCHC 32.9 33.6   RDW 12.0 12.2   * 333   MPV 10.1 10.1       Radiology:   XR CHEST PORTABLE   Final Result   No acute process. Assessment:  Principal Problem:    Influenza A  Resolved Problems:    * No resolved hospital problems. *      Plan:  UTI  -UA with LE and rare bacteria  -Urine culture not ordered yet, will order now  -Was already started on ceftriaxone  -Patient afebrile and no leukocytosis, continue to monitor    Influenza A  -Positive on 11/30 from [facility]  -Supportive care, not in range for tamiflu    Acute on chronic dysphagia  -Has had chronic dysphagia after stroke but has acutely worsened  -Not able to take in any PO  -Consult speech/swallow and GI for evaluation  -Patient may need PEG tube  -Keep on IVF for now while NPO    Stroke with residual aphasia and dysphagia  -As above    DM  -Home meds janumet and semaglutide, holding both  -Start ISS with hypoglycemic protocol    HTN  -Continue home amlodipine and lisinopril   -BP labile so far, continue to monitor    HLD  -Continue home statin      NOTE: This report was transcribed using voice recognition software. Every effort was made to ensure accuracy; however, inadvertent computerized transcription errors may be present.      Electronically signed by Cristo Strauss MD on 12/4/2022 at 3:47 PM

## 2022-12-04 NOTE — ED NOTES
Pt given mouth swabs and advised npo and not to drink the water, pt refused mouthwash, pt remains on cardiac monitor with cont pulse ox, bed low/locked with side rails up and call light within reach     Wesley Escobedo RN  12/04/22 2561

## 2022-12-04 NOTE — H&P
[address] [place], [place] [address]ist Group   HISTORY AND PHYSICAL EXAM      AUTHOR: Derik Verdugo MD PATIENT NAME: Jay Villegas   PCP: Arlyn Campuzano   MRN: [id number], : 1955       CHIEF COMPLAINT / REASON FOR ADMISSION:  Flu like illness, progressive dysphagia, generalized weakness  HPI:   This is a 74 y.o. female  with PMH as outlined below presented with Flu like illness, progressive dysphagia, generalized weakness  for last few days prior to arrival to the hospital.  Initially weakness was mild, intermittent but gradually getting more persistent. Patient has chronc aphasia from prior stroke but her dysphagia now worsening and unable to eat by mouth. The patient was seen and examined at bedside, appears alert and awake with no acute distress and is able to answer simple  questions. On direct questioning, patient denied any  resting ongoing chest pain, resting SOB, hemoptysis, productive cough, fever, ongoing palpitation, active abdominal pain, hematemesis, rectal bleeding, carlos, hematuria, any other  and GI complaints, and any new focal neuro deficits . ROS:  Pertinent positives and negatives are noted in the HPI, all other systems are reviewed and negative    PMH:  Past Medical History:   Diagnosis Date    Abnormal mammogram of left breast 2019    Anxiety     Cerebral artery occlusion with cerebral infarction [facility])     Depression     Diabetes mellitus ([address]     Diabetic peripheral neuropathy ([address] 2022    Dysarthria 2021    Erosive esophagitis     Esophageal stricture     GERD (gastroesophageal reflux disease)     Gestational diabetes     Hemorrhagic stroke ([address] 2021    Hyperlipidemia     Hypertension     Hypokalemia     Left lumbosacral radiculopathy 2022    Osteoarthritis     knees    Pain in joint, shoulder region 10/28/2022    bilat.     Peroneal neuropathy at knee, left 2022    Postmenopausal     Type 2 diabetes mellitus without complication ([address] Surgical History:  Past Surgical History:   Procedure Laterality Date    BREAST BIOPSY Left 05/2019    Dr. Cody Boyce      x4    CHOLECYSTECTOMY      COLONOSCOPY  11/2011    HYSTERECTOMY (CERVIX STATUS UNKNOWN)      JOINT REPLACEMENT Right     knee       Medications Prior to Admission:    Prior to Admission medications    Medication Sig Start Date End Date Taking? Authorizing Provider   naproxen (NAPROSYN) 500 MG tablet Take 1 tablet by mouth 2 times daily (with meals) 11/21/22   JAYDEN Barnett   gabapentin (NEURONTIN) 100 MG capsule TAKE 1 CAPSULE BY MOUTH 2 TIMES DAILY FOR 30 DAYS. INTENDED SUPPLY: 30 DAYS 11/4/22 12/4/22  Rosia Salm, DO   potassium chloride (MICRO-K) 10 MEQ extended release capsule TAKE 1 CAPSULE BY MOUTH EVERY DAY 9/26/22   Historical Provider, MD   atorvastatin (LIPITOR) 40 MG tablet TAKE 1 TABLET DAILY 10/6/22   Rosia Salm, DO   Semaglutide (RYBELSUS) 7 MG TABS Take 7 mg by mouth daily Start this after completing the 3mg dose.  11/6/22   Malou Quintanilla, DO   zoster recombinant adjuvanted vaccine [facility]) 50 MCG/0.5ML SUSR injection Inject 0.5 mLs into the muscle See Admin Instructions 1 dose now and repeat in 2-6 months 7/28/22 1/24/23  Rosia Salm, DO   amLODIPine (NORVASC) 5 MG tablet TAKE 1 TABLET DAILY IN THE MORNING 7/28/22   Rosia Salm, DO   atenolol (TENORMIN) 50 MG tablet TAKE 1 TABLET DAILY 7/28/22   Rosia Salm, DO   esomeprazole (NEXIUM) 40 MG delayed release capsule TAKE 1 CAPSULE EVERY MORNING BEFORE BREAKFAST 7/28/22   Rosia Salm, DO   lisinopril (PRINIVIL;ZESTRIL) 30 MG tablet TAKE 1 TABLET DAILY AT NIGHT 7/28/22   Rosia Salm, DO   sitaGLIPtan-metFORMIN (JANUMET)  MG per tablet Take 1 tablet by mouth in the morning. 7/28/22   Rosia Salm, DO   venlafaxine (EFFEXOR XR) 150 MG extended release capsule TAKE 1 CAPSULE DAILY 7/28/22   Olivia Pearce, DO   Multiple Vitamin (MULTI-VITAMIN DAILY PO) Take by mouth    Historical Provider, MD       Allergies:    Patient has no known allergies. Social History:    reports that she has never smoked. She has never used smokeless tobacco. She reports that she does not drink alcohol and does not use drugs. Family History:   family history includes Coronary Art Dis in her father; Diabetes in her brother and mother; Mult Sclerosis in her brother; Rheum Arthritis in her sister. PHYSICAL EXAM:  Vitals:  BP (!) 135/93   Pulse 73   Temp 97.4 °F (36.3 °C)   Resp 21   Ht 5' 7\" (1.702 m)   Wt 132 lb (59.9 kg)   SpO2 94%   BMI 20.67 kg/m²   GENERAL: No acute distress, Alert and awake, Afebrile, Appears tired and weak otherwise hemodynamically stable at present. HEENT: PERRLA, no icterus. OP clear and no exudates. NECK: Supple  no carotid/ophthalmic bruits, JVD None. RESPIRATORY:  Bilateral equal vesicular breath sound with no wheezing. Lung bases are clear. HEART: No tachycardia at bedside and regular rhythm. Normal S1 and S2, No S3 or S4 is audible. No pulsation, thrills, murmur or friction rubs. ABDOMEN: Soft, nondistended, nontender. No hepatomegaly or splenomegaly. No CVA tenderness on the both sides. Bowel sound is present. EXTREMITIES: All peripheral pulses are present. No calf tenderness or swelling. No pedal edema is present. [facility]: Alert and awake. Has motor aphasia with dysphagia. No other focal neuro deficit    LABS:  Recent Labs     12/03/22  1822   WBC 9.2   RBC 4.46   HGB 13.3   HCT 40.4   MCV 90.6   MCH 29.8   MCHC 32.9   RDW 12.0   *   MPV 10.1     Recent Labs     12/03/22  1822      K 4.7      CO2 27   BUN 21   CREATININE 0.7   GLUCOSE 170*   CALCIUM 10.1     No results for input(s): POCGLU in the last 72 hours.   Results for orders placed or performed during the hospital encounter of 12/03/22   CBC with Auto Differential   Result Value Ref Range    WBC 9.2 4.5 - 11.5 E9/L    RBC 4.46 3.50 - 5.50 E12/L    Hemoglobin 13.3 11.5 - 15.5 g/dL    Hematocrit 40.4 34.0 - 48.0 %    MCV 90.6 80.0 - 99.9 fL    MCH 29.8 26.0 - 35.0 pg    MCHC 32.9 32.0 - 34.5 %    RDW 12.0 11.5 - 15.0 fL    Platelets 209 (H) 988 - 450 E9/L    MPV 10.1 7.0 - 12.0 fL    Neutrophils % 73.5 43.0 - 80.0 %    Lymphocytes % 21.4 20.0 - 42.0 %    Monocytes % 4.3 2.0 - 12.0 %    Eosinophils % 0.2 0.0 - 6.0 %    Basophils % 0.1 0.0 - 2.0 %    Neutrophils Absolute 6.81 1.80 - 7.30 E9/L    Lymphocytes Absolute 1.93 1.50 - 4.00 E9/L    Monocytes Absolute 0.37 0.10 - 0.95 E9/L    Eosinophils Absolute 0.00 (L) 0.05 - 0.50 E9/L    Basophils Absolute 0.00 0.00 - 0.20 E9/L    Myelocyte Percent 0.9 0 - 0 %    Polychromasia 1+     Poikilocytes 1+     Acanthocytes 1+     Kulwinder Cells 1+    Comprehensive Metabolic Panel w/ Reflex to MG   Result Value Ref Range    Sodium 138 132 - 146 mmol/L    Potassium reflex Magnesium 4.7 3.5 - 5.0 mmol/L    Chloride 101 98 - 107 mmol/L    CO2 27 22 - 29 mmol/L    Anion Gap 10 7 - 16 mmol/L    Glucose 170 (H) 74 - 99 mg/dL    BUN 21 6 - 23 mg/dL    Creatinine 0.7 0.5 - 1.0 mg/dL    Est, Glom Filt Rate >60 >=60 mL/min/1.73    Calcium 10.1 8.6 - 10.2 mg/dL    Total Protein 6.9 6.4 - 8.3 g/dL    Albumin 3.6 3.5 - 5.2 g/dL    Total Bilirubin 0.5 0.0 - 1.2 mg/dL    Alkaline Phosphatase 109 (H) 35 - 104 U/L    ALT 27 0 - 32 U/L    AST 46 (H) 0 - 31 U/L   Magnesium   Result Value Ref Range    Magnesium 1.4 (L) 1.6 - 2.6 mg/dL   Phosphorus   Result Value Ref Range    Phosphorus 2.9 2.5 - 4.5 mg/dL   CK   Result Value Ref Range    Total  20 - 180 U/L   Urinalysis with Microscopic   Result Value Ref Range    Color, UA Yellow Straw/Yellow    Clarity, UA Clear Clear    Glucose, Ur Negative Negative mg/dL    Bilirubin Urine SMALL (A) Negative    Ketones, Urine Negative Negative mg/dL    Specific Gravity, UA 1.010 1.005 - 1.030    Blood, Urine Negative Negative pH, UA 6.0 5.0 - 9.0    Protein, UA Negative Negative mg/dL    Urobilinogen, Urine 0.2 <2.0 E.U./dL    Nitrite, Urine Negative Negative    Leukocyte Esterase, Urine MODERATE (A) Negative    WBC, UA 2-5 0 - 5 /HPF    RBC, UA NONE 0 - 2 /HPF    Epithelial Cells, UA FEW /HPF    Bacteria, UA RARE (A) None Seen /HPF   SPECIMEN REJECTION   Result Value Ref Range    Rejected Test TRP5     Reason for Rejection see below    Troponin   Result Value Ref Range    Troponin, High Sensitivity 46 (H) 0 - 9 ng/L   Troponin   Result Value Ref Range    Troponin, High Sensitivity 43 (H) 0 - 9 ng/L   EKG 12 Lead   Result Value Ref Range    Ventricular Rate 74 BPM    Atrial Rate 74 BPM    P-R Interval 128 ms    QRS Duration 74 ms    Q-T Interval 390 ms    QTc Calculation (Bazett) 432 ms    P Axis 106 degrees    R Axis 4 degrees    T Axis 18 degrees     ED Course as of 12/04/22 0452   Sat Dec 03, 2022   1728 [facility] [FG]   2200 EKG: Interpreted by me  Sinus rhythm, to 74, normal axis, no ST elevations or depressions, no T wave abnormalities. [SO]      ED Course User Index  [FG] Gwenodlyn Paz DO  [SO] Valentina Valiente DO     Radiology: XR CHEST PORTABLE    Result Date: 12/3/2022  EXAMINATION: ONE XRAY VIEW OF THE CHEST 12/3/2022 6:19 pm COMPARISON: 05/26/2022 HISTORY: ORDERING SYSTEM PROVIDED HISTORY: eval pna TECHNOLOGIST PROVIDED HISTORY: Reason for exam:->eval pna FINDINGS: The lungs are without acute focal process. There is no effusion or pneumothorax. The cardiomediastinal silhouette is without acute process. The osseous structures are without acute process. No acute process. ASSESSMENT:    Present on Admission:   Influenza A    PLAN:  # UTI with Genralized Weakness + Flu positive   Has lower abdominal pain    WBC WNL   UA suggestive on uti as in chart.     Started with IV Ceftriaxone  # NICOLE - secondary to dehydration           Continue IV fluid               Monitor I/O, BUN/Cr accordingly. Avoid Nephrotoxic medications, ACE-i and NSAIDS. US-Renal/Renal consult if fails to improve promptly  # Dysphagia - chronic and progressive   NPO + IVF   GI consult for possible PEG placement  # Elevated Troponin   No active chest pain and   No Specific EKG changes suggestive for ACS   Will trend troponin   Telemetry monitor  # Rest of the chronic medical problems are stable and will be managed with appropriately with home medications, placed nursing communication order to verify home medications before giving them to the patient. # Diet: NPO  # IVF's: Yes  # Fall Precaution: Yes  # Disposition: Home/primary residence   # Code Status: Full code  # DVT Prophylaxis : Lovenox SC  The patient at bedside was counseled about clinical status, laboratory/imaging results, diagnoses, medication side effects, risk, and treatment plan, all questions were answered to patient's satisfaction and verbalized understanding    SIGNATURE: Soni Cardozo MD PATIENT NAME: Tereso Elizabeth   CONTACT #: Hospitalist on call MRN: [id number]     Disclaimer: Portions of this note may have been generated using Dragon voice recognition software. Reasonable efforts were made to correct any dictation errors that resulted due to the programming of this software but some may still be present. Khanh Morillo)  Gastroenterology; Internal Medicine  [address] [place] [address]  Phone: [phone]  Fax: [phone]  Follow Up Time:

## 2022-12-05 ENCOUNTER — APPOINTMENT (OUTPATIENT)
Dept: GENERAL RADIOLOGY | Age: 67
DRG: 435 | End: 2022-12-05
Payer: MEDICARE

## 2022-12-05 LAB
ALBUMIN SERPL-MCNC: 3 G/DL (ref 3.5–5.2)
ALP BLD-CCNC: 87 U/L (ref 35–104)
ALT SERPL-CCNC: 16 U/L (ref 0–32)
ANION GAP SERPL CALCULATED.3IONS-SCNC: 16 MMOL/L (ref 7–16)
AST SERPL-CCNC: 24 U/L (ref 0–31)
BASOPHILS ABSOLUTE: 0.01 E9/L (ref 0–0.2)
BASOPHILS RELATIVE PERCENT: 0.2 % (ref 0–2)
BILIRUB SERPL-MCNC: 0.4 MG/DL (ref 0–1.2)
BUN BLDV-MCNC: 7 MG/DL (ref 6–23)
CALCIUM SERPL-MCNC: 8.7 MG/DL (ref 8.6–10.2)
CEA: 2.1 NG/ML (ref 0–5.2)
CHLORIDE BLD-SCNC: 109 MMOL/L (ref 98–107)
CO2: 18 MMOL/L (ref 22–29)
CREAT SERPL-MCNC: 0.5 MG/DL (ref 0.5–1)
EOSINOPHILS ABSOLUTE: 0.09 E9/L (ref 0.05–0.5)
EOSINOPHILS RELATIVE PERCENT: 1.6 % (ref 0–6)
GFR SERPL CREATININE-BSD FRML MDRD: >60 ML/MIN/1.73
GLUCOSE BLD-MCNC: 76 MG/DL (ref 74–99)
HCT VFR BLD CALC: 35.4 % (ref 34–48)
HEMOGLOBIN: 11.8 G/DL (ref 11.5–15.5)
IMMATURE GRANULOCYTES #: 0.04 E9/L
IMMATURE GRANULOCYTES %: 0.7 % (ref 0–5)
LYMPHOCYTES ABSOLUTE: 1.78 E9/L (ref 1.5–4)
LYMPHOCYTES RELATIVE PERCENT: 32.5 % (ref 20–42)
MAGNESIUM: 1.4 MG/DL (ref 1.6–2.6)
MCH RBC QN AUTO: 30.8 PG (ref 26–35)
MCHC RBC AUTO-ENTMCNC: 33.3 % (ref 32–34.5)
MCV RBC AUTO: 92.4 FL (ref 80–99.9)
METER GLUCOSE: 82 MG/DL (ref 74–99)
METER GLUCOSE: 85 MG/DL (ref 74–99)
METER GLUCOSE: 90 MG/DL (ref 74–99)
METER GLUCOSE: 96 MG/DL (ref 74–99)
MONOCYTES ABSOLUTE: 0.48 E9/L (ref 0.1–0.95)
MONOCYTES RELATIVE PERCENT: 8.8 % (ref 2–12)
NEUTROPHILS ABSOLUTE: 3.08 E9/L (ref 1.8–7.3)
NEUTROPHILS RELATIVE PERCENT: 56.2 % (ref 43–80)
PDW BLD-RTO: 12.3 FL (ref 11.5–15)
PLATELET # BLD: 393 E9/L (ref 130–450)
PMV BLD AUTO: 9.7 FL (ref 7–12)
POTASSIUM REFLEX MAGNESIUM: 3.5 MMOL/L (ref 3.5–5)
RBC # BLD: 3.83 E12/L (ref 3.5–5.5)
SODIUM BLD-SCNC: 143 MMOL/L (ref 132–146)
TOTAL PROTEIN: 5.8 G/DL (ref 6.4–8.3)
WBC # BLD: 5.5 E9/L (ref 4.5–11.5)

## 2022-12-05 PROCEDURE — 6370000000 HC RX 637 (ALT 250 FOR IP): Performed by: STUDENT IN AN ORGANIZED HEALTH CARE EDUCATION/TRAINING PROGRAM

## 2022-12-05 PROCEDURE — 82962 GLUCOSE BLOOD TEST: CPT

## 2022-12-05 PROCEDURE — 92523 SPEECH SOUND LANG COMPREHEN: CPT | Performed by: SPEECH-LANGUAGE PATHOLOGIST

## 2022-12-05 PROCEDURE — A4216 STERILE WATER/SALINE, 10 ML: HCPCS | Performed by: NURSE PRACTITIONER

## 2022-12-05 PROCEDURE — 6360000002 HC RX W HCPCS: Performed by: STUDENT IN AN ORGANIZED HEALTH CARE EDUCATION/TRAINING PROGRAM

## 2022-12-05 PROCEDURE — 2580000003 HC RX 258: Performed by: INTERNAL MEDICINE

## 2022-12-05 PROCEDURE — 82378 CARCINOEMBRYONIC ANTIGEN: CPT

## 2022-12-05 PROCEDURE — 6360000002 HC RX W HCPCS: Performed by: NURSE PRACTITIONER

## 2022-12-05 PROCEDURE — 80053 COMPREHEN METABOLIC PANEL: CPT

## 2022-12-05 PROCEDURE — 74230 X-RAY XM SWLNG FUNCJ C+: CPT

## 2022-12-05 PROCEDURE — 83735 ASSAY OF MAGNESIUM: CPT

## 2022-12-05 PROCEDURE — C9113 INJ PANTOPRAZOLE SODIUM, VIA: HCPCS | Performed by: NURSE PRACTITIONER

## 2022-12-05 PROCEDURE — 2500000003 HC RX 250 WO HCPCS: Performed by: STUDENT IN AN ORGANIZED HEALTH CARE EDUCATION/TRAINING PROGRAM

## 2022-12-05 PROCEDURE — 2500000003 HC RX 250 WO HCPCS: Performed by: NURSE PRACTITIONER

## 2022-12-05 PROCEDURE — 92610 EVALUATE SWALLOWING FUNCTION: CPT | Performed by: SPEECH-LANGUAGE PATHOLOGIST

## 2022-12-05 PROCEDURE — 82105 ALPHA-FETOPROTEIN SERUM: CPT

## 2022-12-05 PROCEDURE — 6360000002 HC RX W HCPCS: Performed by: INTERNAL MEDICINE

## 2022-12-05 PROCEDURE — 2580000003 HC RX 258: Performed by: NURSE PRACTITIONER

## 2022-12-05 PROCEDURE — 1200000000 HC SEMI PRIVATE

## 2022-12-05 PROCEDURE — 99232 SBSQ HOSP IP/OBS MODERATE 35: CPT | Performed by: STUDENT IN AN ORGANIZED HEALTH CARE EDUCATION/TRAINING PROGRAM

## 2022-12-05 PROCEDURE — 36415 COLL VENOUS BLD VENIPUNCTURE: CPT

## 2022-12-05 PROCEDURE — 86301 IMMUNOASSAY TUMOR CA 19-9: CPT

## 2022-12-05 PROCEDURE — 94640 AIRWAY INHALATION TREATMENT: CPT

## 2022-12-05 PROCEDURE — 85025 COMPLETE CBC W/AUTO DIFF WBC: CPT

## 2022-12-05 RX ORDER — IPRATROPIUM BROMIDE AND ALBUTEROL SULFATE 2.5; .5 MG/3ML; MG/3ML
1 SOLUTION RESPIRATORY (INHALATION)
Status: DISCONTINUED | OUTPATIENT
Start: 2022-12-05 | End: 2022-12-11

## 2022-12-05 RX ORDER — KETOROLAC TROMETHAMINE 15 MG/ML
15 INJECTION, SOLUTION INTRAMUSCULAR; INTRAVENOUS ONCE
Status: COMPLETED | OUTPATIENT
Start: 2022-12-05 | End: 2022-12-05

## 2022-12-05 RX ORDER — METOPROLOL TARTRATE 5 MG/5ML
5 INJECTION INTRAVENOUS EVERY 6 HOURS SCHEDULED
Status: DISCONTINUED | OUTPATIENT
Start: 2022-12-05 | End: 2022-12-08

## 2022-12-05 RX ORDER — HYDRALAZINE HYDROCHLORIDE 20 MG/ML
10 INJECTION INTRAMUSCULAR; INTRAVENOUS EVERY 6 HOURS PRN
Status: DISCONTINUED | OUTPATIENT
Start: 2022-12-05 | End: 2022-12-12 | Stop reason: HOSPADM

## 2022-12-05 RX ADMIN — SODIUM CHLORIDE: 9 INJECTION, SOLUTION INTRAVENOUS at 20:25

## 2022-12-05 RX ADMIN — BARIUM SULFATE 45 G: 0.81 POWDER, FOR SUSPENSION ORAL at 13:33

## 2022-12-05 RX ADMIN — SODIUM CHLORIDE: 9 INJECTION, SOLUTION INTRAVENOUS at 09:00

## 2022-12-05 RX ADMIN — SODIUM CHLORIDE 40 MG: 9 INJECTION, SOLUTION INTRAMUSCULAR; INTRAVENOUS; SUBCUTANEOUS at 11:58

## 2022-12-05 RX ADMIN — METOPROLOL TARTRATE 5 MG: 5 INJECTION, SOLUTION INTRAVENOUS at 16:53

## 2022-12-05 RX ADMIN — IPRATROPIUM BROMIDE AND ALBUTEROL SULFATE 1 AMPULE: .5; 2.5 SOLUTION RESPIRATORY (INHALATION) at 14:26

## 2022-12-05 RX ADMIN — Medication 10 ML: at 21:27

## 2022-12-05 RX ADMIN — BARIUM SULFATE 45 ML: 400 SUSPENSION ORAL at 13:33

## 2022-12-05 RX ADMIN — KETOROLAC TROMETHAMINE 15 MG: 15 INJECTION, SOLUTION INTRAMUSCULAR; INTRAVENOUS at 21:26

## 2022-12-05 RX ADMIN — ACETYLCYSTEINE 400 MG: 100 INHALANT RESPIRATORY (INHALATION) at 14:25

## 2022-12-05 RX ADMIN — WATER 1000 MG: 1 INJECTION INTRAMUSCULAR; INTRAVENOUS; SUBCUTANEOUS at 00:41

## 2022-12-05 RX ADMIN — IPRATROPIUM BROMIDE AND ALBUTEROL SULFATE 1 AMPULE: .5; 2.5 SOLUTION RESPIRATORY (INHALATION) at 18:38

## 2022-12-05 RX ADMIN — ENOXAPARIN SODIUM 40 MG: 100 INJECTION SUBCUTANEOUS at 08:57

## 2022-12-05 RX ADMIN — HYDRALAZINE HYDROCHLORIDE 10 MG: 20 INJECTION INTRAMUSCULAR; INTRAVENOUS at 20:47

## 2022-12-05 RX ADMIN — ACETYLCYSTEINE 400 MG: 100 INHALANT RESPIRATORY (INHALATION) at 18:38

## 2022-12-05 ASSESSMENT — PAIN SCALES - GENERAL
PAINLEVEL_OUTOF10: 8
PAINLEVEL_OUTOF10: 0
PAINLEVEL_OUTOF10: 0
PAINLEVEL_OUTOF10: 2
PAINLEVEL_OUTOF10: 0

## 2022-12-05 ASSESSMENT — PAIN DESCRIPTION - ORIENTATION: ORIENTATION: LOWER;MID

## 2022-12-05 ASSESSMENT — PAIN DESCRIPTION - LOCATION: LOCATION: BACK

## 2022-12-05 ASSESSMENT — PAIN DESCRIPTION - DESCRIPTORS: DESCRIPTORS: ACHING

## 2022-12-05 ASSESSMENT — PAIN - FUNCTIONAL ASSESSMENT: PAIN_FUNCTIONAL_ASSESSMENT: PREVENTS OR INTERFERES SOME ACTIVE ACTIVITIES AND ADLS

## 2022-12-05 NOTE — PROGRESS NOTES
SPEECH/LANGUAGE PATHOLOGY  SPEECH/LANGUAGE/COGNITIVE EVALUATION   and PLAN OF CARE      PATIENT NAME:  Vilma Green  (female)     MRN:  [id number]    :  1955  (74 y.o.)  STATUS:  Inpatient: Room 0948/6890-18    TODAY'S DATE:  2022  REFERRING PROVIDER:     SLP eval and treat  Start:  22,   End:  22,   ONE TIME,   Standing Count:  1 Occurrences,   R         Cara Rod MD   REASON FOR REFERRAL:  dysarthria   EVALUATING THERAPIST: MARIA LUZ [place]    ADMITTING DIAGNOSIS: Hypomagnesemia [E83.42]  Influenza A [J10.1]  Elevated troponin [R77.8]    VISIT DIAGNOSIS:   Visit Diagnoses         Codes    Hypomagnesemia     E83.42    Elevated troponin     R77.8               SPEECH THERAPY  PLAN OF CARE   The speech therapy  POC is established based on physician order, speech pathology diagnosis and results of clinical assessment     SPEECH PATHOLOGY DIAGNOSIS:    moderate to severe dysarthria     Speech Pathology intervention is recommended up to 6 times per week for LOS or when goals are met with emphasis on the following:      Conditions Requiring Skilled Therapeutic Intervention for speech, language and/or cognition    Dysarthria     Specific Speech Therapy Interventions to Include: Therapeutic exercises for dysarthria    Specific instructions for next treatment: To initiate POC    SHORT/LONG TERM GOALS  Pt will improve speech intelligibility and increased articulatory precision via compensatory strategies and oral motor exercises   Pt may benefit from augmentative alternate communication device for communication in the future if her dysarthria continues to worsen. Patient goals: Patient/family involved in developing goals and treatment plan:   Treatment goals discussed with Patient    The Patient understand(s) the diagnosis, prognosis and plan of care   The patient/family Agreed with above,     This plan may be re-evaluated and revised as warranted.         Rehabilitation Potential/Prognosis: fair                CLINICAL ASSESSMENT:  MOTOR SPEECH       Oral Peripheral Examination   Generalized oral weakness very slow oral movements and decreased overall range of movements     Parameters of Speech Production  Respiration:  Adequate for speech production  Articulation:  Distortion  Resonance:  Hypernasal  Quality:   Within functional limits  Pitch:    Low  Intensity: Quiet  Fluency:  Intact  Prosody Monotone    RECEPTIVE LANGUAGE    Comprehension of Yes/No Questions: Within functional limits    Process  Simple Verbal Commands:   Within functional limits  Process Intermediate Verbal Commands:   Within functional limits  Process Complex Verbal Commands:     Within functional limits    Comprehension of Conversation:      Within functional limits      EXPRESSIVE LANGUAGE     Serials: Functional    Imitation:  Words   Functional   Sentences Functional    Naming:  (Modality used:  Verbal)  Confrontation Naming  Functional  Functional Description  Functional  Response Naming: Functional    Conversation:      Conversation was within functional limits    COGNITION     Attention/Orientation  Attention: Sustained attention   Orientation:  Oriented to Person, Place    Memory   Immediate Recall: Good     Delayed Recall:   Functional     Long Term Recall:   Recalled Birthdate and Age    Organization/Problem Solving/Reasoning   Verbal Sequencing:   Functional        Verbal Problem solving:   Functional          CLINICAL OBSERVATIONS NOTED DURING THE EVALUATION  Within functional limits                  EDUCATION:   The Speech Language Pathologist (SLP) completed education regarding results of evaluation and that intervention is warranted at this time.   Learner: Patient  Education: Reviewed results and recommendations of this evaluation  Evaluation of Education:  Verbalizes understanding    Evaluation Time includes thorough review of current medical information, gathering information on past medical history/social history and prior level of function, completion of standardized testing/informal observation of tasks, assessment of data and education on plan of care and goals. CPT code:    69546  eval speech sound lang comprehension      The admitting diagnosis and active problem list, as listed below have been reviewed prior to initiation of this evaluation.         ACTIVE PROBLEM LIST:   Patient Active Problem List   Diagnosis    Right foot infection    Diabetic ulcer of left foot associated with type 1 diabetes mellitus ([facility]    Type 2 diabetes mellitus without complication, without long-term current use of insulin ([address]    Essential hypertension    Gastroesophageal reflux disease without esophagitis    Mixed hyperlipidemia    Subacute osteomyelitis of foot (HCC)    Dysarthria    Hemorrhagic stroke (HCC)    Altered mental status    Lactic acidosis    Muscle function loss    Impaired functional mobility, balance, gait, and endurance    Diabetic peripheral neuropathy (HCC)    Peroneal neuropathy at knee, left    Left lumbosacral radiculopathy    Pain in joint, shoulder region    Type II diabetes mellitus with peripheral circulatory disorder ([address]    PVD (peripheral vascular disease) ([facility]    Influenza A       Ishaan Baker MSCCC/SLP  Speech Language Pathologist  [id number]

## 2022-12-05 NOTE — PLAN OF CARE
Problem: Pain  Goal: Verbalizes/displays adequate comfort level or baseline comfort level  12/5/2022 1827 by Marcus Julio RN  Outcome: Progressing     Problem: Safety - Adult  Goal: Free from fall injury  12/5/2022 1827 by Marcus Julio, RN  Outcome: Progressing

## 2022-12-05 NOTE — CARE COORDINATION
Ss note:12/5/2022.1:45 PM  Pt presents from home, per IDR is positive for Influenza A, pt is on room air. Attempted to meet with pt, oor at this time no family present. Per chart review pt resides with spouse, is in isolation to rule out C-Diff. Hx of CVA pt has dyphasia, Speech therapy has been ordered, ? Peg tube. Sw will follow.  KRYSTEN Botello

## 2022-12-05 NOTE — PROGRESS NOTES
[address] [place], [place] [address]ist   Progress Note    Admitting Date and Time: 12/3/2022  5:22 PM  Admit Dx: Hypomagnesemia [E83.42]  Influenza A [J10.1]  Elevated troponin [R77.8]    Subjective:    Pt states she feels the same today. Still coughing with liquids and difficulty swallowing. No SOB, no fevers/chills. History is limited due to dysphagia. Per RN: Nothing to report    ROS: denies fever, chills, cp, sob, n/v, HA unless stated above.      ipratropium-albuterol  1 ampule Inhalation Q4H WA    pantoprazole (PROTONIX) 40 mg injection  40 mg IntraVENous Daily    sodium chloride flush  10 mL IntraVENous 2 times per day    enoxaparin  40 mg SubCUTAneous Daily    cefTRIAXone (ROCEPHIN) IV  1,000 mg IntraVENous Q24H    amLODIPine  5 mg Oral Daily    venlafaxine  150 mg Oral Daily    atorvastatin  40 mg Oral Daily    gabapentin  100 mg Oral BID    lisinopril  30 mg Oral Daily    insulin lispro  0-4 Units SubCUTAneous TID WC    insulin lispro  0-4 Units SubCUTAneous Nightly    acetylcysteine  4 mL Inhalation Q4H     sodium chloride flush, 10 mL, PRN  sodium chloride, , PRN  promethazine, 12.5 mg, Q6H PRN   Or  ondansetron, 4 mg, Q6H PRN  polyethylene glycol, 17 g, Daily PRN  acetaminophen, 650 mg, Q6H PRN   Or  acetaminophen, 650 mg, Q6H PRN  glucose, 4 tablet, PRN  dextrose bolus, 125 mL, PRN   Or  dextrose bolus, 250 mL, PRN  glucagon (rDNA), 1 mg, PRN  dextrose, , Continuous PRN       Objective:    BP (!) 171/86   Pulse 79   Temp 97.5 °F (36.4 °C) (Oral)   Resp 18   Ht 5' 7\" (1.702 m)   Wt 132 lb (59.9 kg)   SpO2 97%   BMI 20.67 kg/m²       General Appearance: alert and oriented to person, place and time and in no acute distress  Skin: warm and dry  Head: normocephalic and atraumatic  Eyes: pupils equal, round, and reactive to light, extraocular eye movements intact, conjunctivae normal  Neck: neck supple and non tender without mass   Pulmonary/Chest: clear to auscultation bilaterally- no wheezes, rales or rhonchi, normal air movement, no respiratory distress but frequent coughing  Cardiovascular: normal rate, normal S1 and S2 and no carotid bruits  Abdomen: soft, non-tender, non-distended, normal bowel sounds, no masses or organomegaly  Extremities: no cyanosis, no clubbing and no edema  Neurologic: no cranial nerve deficit noted but aphasic speech present      Recent Labs     12/03/22  1822 12/04/22  0800 12/05/22  0747    142 143   K 4.7 3.7 3.5    109* 109*   CO2 27 23 18*   BUN 21 14 7   CREATININE 0.7 0.5 0.5   GLUCOSE 170* 102* 76   CALCIUM 10.1 8.7 8.7       Recent Labs     12/03/22 1822 12/05/22  0747   ALKPHOS 109* 87   PROT 6.9 5.8*   LABALBU 3.6 3.0*   BILITOT 0.5 0.4   AST 46* 24   ALT 27 16       Recent Labs     12/03/22 1822 12/04/22  0800 12/05/22  0747   WBC 9.2 6.8 5.5   RBC 4.46 3.54 3.83   HGB 13.3 10.8* 11.8   HCT 40.4 32.1* 35.4   MCV 90.6 90.7 92.4   MCH 29.8 30.5 30.8   MCHC 32.9 33.6 33.3   RDW 12.0 12.2 12.3   * 333 393   MPV 10.1 10.1 9.7       Radiology:   XR CHEST PORTABLE   Final Result   No acute process. CT CHEST W CONTRAST    (Results Pending)   CT ABDOMEN PELVIS W IV CONTRAST Additional Contrast? Oral    (Results Pending)   Fluoroscopy modified barium swallow with video    (Results Pending)   FL ESOPHAGRAM    (Results Pending)       Assessment:  Principal Problem:    Influenza A  Resolved Problems:    * No resolved hospital problems.  *      Plan:  UTI  -UA with LE and rare bacteria  -Urine culture pending  -Was already started on ceftriaxone, today day 2  -Patient afebrile and no leukocytosis, continue to monitor    Influenza A  -Positive on 11/30 from [facility]  -Supportive care, not in range for tamiflu    Acute on chronic dysphagia  -Has had chronic dysphagia but has acutely worsened  -Not able to take in any PO  -Consult speech/swallow and GI for evaluation, GI planning on EGD tomorrow - FU results  -Patient may need PEG tube, but have evaluation first  -Keep on IVF for now while NPO    Unintentional weight loss  -Patient reports 20-30 lb weight loss over past few months  -May be related to dysphagia but also has some abnormal CT findings of colon in May that need to be further investigated  -GI on board, repeating CT and getting tumor markers  -FU results and further recommendations    Colonic wall thickening   -Seen on CT in May, GI to repeat CT today and also recommending colonoscopy at some point  -Tumor markers ordered per GI    Dilated pancreatic bile duct  -Seen on CT in May, being repeated  -Depending on results of CT, consider MRI/MRCP per GI    Stroke with residual aphasia  -As above    DM  -Home meds janumet and semaglutide, holding both  -Start ISS with hypoglycemic protocol    HTN  -Continue home amlodipine and lisinopril   -BP elevated as is not taking PO meds, will add on IV medications    HLD  -Continue home statin      NOTE: This report was transcribed using voice recognition software. Every effort was made to ensure accuracy; however, inadvertent computerized transcription errors may be present.      Electronically signed by Cristo Strauss MD on 12/5/2022 at 11:44 AM

## 2022-12-05 NOTE — PLAN OF CARE
Problem: Pain  Goal: Verbalizes/displays adequate comfort level or baseline comfort level  Outcome: Progressing  Flowsheets  Taken 12/5/2022 0030  Verbalizes/displays adequate comfort level or baseline comfort level:   Encourage patient to monitor pain and request assistance   Assess pain using appropriate pain scale   Administer analgesics based on type and severity of pain and evaluate response   Implement non-pharmacological measures as appropriate and evaluate response   Consider cultural and social influences on pain and pain management   Notify Licensed Independent Practitioner if interventions unsuccessful or patient reports new pain  Taken 12/4/2022 2245  Verbalizes/displays adequate comfort level or baseline comfort level:   Encourage patient to monitor pain and request assistance   Assess pain using appropriate pain scale   Administer analgesics based on type and severity of pain and evaluate response   Implement non-pharmacological measures as appropriate and evaluate response   Consider cultural and social influences on pain and pain management   Notify Licensed Independent Practitioner if interventions unsuccessful or patient reports new pain  Taken 12/4/2022 2030  Verbalizes/displays adequate comfort level or baseline comfort level:   Encourage patient to monitor pain and request assistance   Administer analgesics based on type and severity of pain and evaluate response   Assess pain using appropriate pain scale   Implement non-pharmacological measures as appropriate and evaluate response   Consider cultural and social influences on pain and pain management   Notify Licensed Independent Practitioner if interventions unsuccessful or patient reports new pain     Problem: Safety - Adult  Goal: Free from fall injury  Outcome: Progressing

## 2022-12-05 NOTE — CONSULTS
Name:  Charisma Ansari  :  1955  MRN:  [id number]  Room:  2567/7888-74  DOS:  2022    [address] [facility]  [facility]  Dr. Erasmo Gonzalez M.D. Dr. Martita Fernández M.D. Dr. Artem Garcia D.O. Dr. Diya Birch M.D. Dr. Yuniel Huston D.O.     -NP Consult Note-    PCP:  Bernarda Chowdary DO  Admitting Physician:  Chris Diaz MD  Consultants: GI, cardiology  Chief Complaint:    Chief Complaint   Patient presents with    Dysphagia     Pt c/o weakness and difficulty swallowing that started a couple of weeks ago. Pt diagnosed with the flu a week ago. Hx CVA. Reason for Consult:  Dysphagia    History of Present Illness  Charisma Ansari is a 74 y.o. female on consult for dysphagia. Patient initially presented to the emergency room for evaluation of poor oral intake. Patient reports a weight loss of 20 to 30 pounds over the past few months. Recent diagnosis of influenza 2022. Patient reports that she has some dysphagia, mostly with liquids. She reports coughing with attempting oral intake of liquids. She denies significant esophageal dysphagia to solid foods. She denies chest pain or shortness of breath. Denies dizziness. Reports subjective fever. Denies chills or sweats. Denies nausea or vomiting. Specifically denies hematemesis or emesis of coffee-ground material.  She reports that she has been having mushy stools twice daily for the past 5 days. She denies any bloody stools. She does report some irritation in her hemorrhoids, describing itching and burning. She denies gross hematochezia. Denies melena. History is obtained from patient and chart review. Merril Jayly is limited with patient due to aphasia. PMH: History of CVA, hypertension, dyslipidemia, diabetes, depression, anxiety, history of esophageal stricture. PSH: Breast biopsy, bronchoscopy, carpal tunnel surgery, , cholecystectomy, hysterectomy, knee replacement.   Colonoscopy 5/15/2019 with 2 hyperplastic polyps removed from the rectum. Patient reports prior EGD with dilation in the past.    Family history: Father with CAD. Mother with diabetes. Brother with diabetes and MS. Sister with RA. Family history obtained from chart review. Social history: Patient was never a smoker. Denied alcohol use. Denied recreational or illicit drug use. Social history obtained from chart review. On arrival, WBC 9.2, hemoglobin 13.3, hematocrit 40.4. Normocytic. Platelets 352. BUN 21, creatinine 0.7, sodium 138, potassium 4.7, chloride 101, CO2 27, calcium 10.1. Blood glucose 170. Low magnesium 1.4. Total bilirubin 0.5, ALT 27, AST 46, alkaline phosphatase 109. Urinalysis showing WBC 2-5, RBC 0, moderate leukocyte esterases, negative nitrite, rare bacteria, small blood. Chest x-ray with no acute findings. TRIAGE VITALS  BP: (!) 171/86, Temp: 97.5 °F (36.4 °C), Heart Rate: 79, Resp: 18, SpO2: 97 %    Vitals:    12/04/22 2030 12/04/22 2245 12/05/22 0030 12/05/22 0733   BP: (!) 173/91 (!) 198/87 (!) 160/77 (!) 171/86   Pulse: 80 83 76 79   Resp: 18 20 18 18   Temp: 98.2 °F (36.8 °C) 97.8 °F (36.6 °C) 97.8 °F (36.6 °C) 97.5 °F (36.4 °C)   TempSrc: Axillary Axillary Axillary Oral   SpO2: 96% 96% 96% 97%   Weight:       Height:             Histories  Past Medical History:   Diagnosis Date    Abnormal mammogram of left breast 05/2019    Anxiety     Cerebral artery occlusion with cerebral infarction (HCC)     Depression     Diabetes mellitus (HCC)     Diabetic peripheral neuropathy ([facility] 9/1/2022    Dysarthria 5/12/2021    Erosive esophagitis     Esophageal stricture     GERD (gastroesophageal reflux disease)     Gestational diabetes     Hemorrhagic stroke ([facility] [address] 5/12/2021    Hyperlipidemia     Hypertension     Hypokalemia     Left lumbosacral radiculopathy 9/1/2022    Osteoarthritis     knees    Pain in joint, shoulder region 10/28/2022    bilat.     Peroneal neuropathy at knee, left 9/1/2022    Postmenopausal     Type 2 diabetes mellitus without complication [facility])      Past Surgical History:   Procedure Laterality Date    BREAST BIOPSY Left 05/2019    Dr. Kateryna Caceres      x4    CHOLECYSTECTOMY      COLONOSCOPY  11/2011    HYSTERECTOMY (CERVIX STATUS UNKNOWN)      JOINT REPLACEMENT Right     knee     Family History   Problem Relation Age of Onset    Diabetes Mother         complication     Coronary Art Dis Father     Rheum Arthritis Sister     Mult Sclerosis Brother     Diabetes Brother        Home Medications  Prior to Admission medications    Medication Sig Start Date End Date Taking? Authorizing Provider   naproxen (NAPROSYN) 500 MG tablet Take 1 tablet by mouth 2 times daily (with meals) 11/21/22   JAYDEN Barton   gabapentin (NEURONTIN) 100 MG capsule TAKE 1 CAPSULE BY MOUTH 2 TIMES DAILY FOR 30 DAYS. INTENDED SUPPLY: 30 DAYS 11/4/22 12/4/22  Bernarda Chowdary,    atorvastatin (LIPITOR) 40 MG tablet TAKE 1 TABLET DAILY 10/6/22   Bernarda Chowdary DO   Semaglutide (RYBELSUS) 7 MG TABS Take 7 mg by mouth daily Start this after completing the 3mg dose. 11/6/22   Bernarda Chowdary,    zoster recombinant adjuvanted vaccine [facility]) 50 MCG/0.5ML SUSR injection Inject 0.5 mLs into the muscle See Admin Instructions 1 dose now and repeat in 2-6 months 7/28/22 1/24/23  Bernarda Chowdary,    amLODIPine (NORVASC) 5 MG tablet TAKE 1 TABLET DAILY IN THE MORNING 7/28/22   Bernarda Chowdary, DO   atenolol (TENORMIN) 50 MG tablet TAKE 1 TABLET DAILY 7/28/22   Bernarda Chowdary, DO   esomeprazole (NEXIUM) 40 MG delayed release capsule TAKE 1 CAPSULE EVERY MORNING BEFORE BREAKFAST 7/28/22   Bernarda Chowdary, DO   lisinopril (PRINIVIL;ZESTRIL) 30 MG tablet TAKE 1 TABLET DAILY AT NIGHT 7/28/22   Bernarda Chowdary, DO   sitaGLIPtan-metFORMIN (JANUMET)  MG per tablet Take 1 tablet by mouth in the morning. 7/28/22   Fulton Millying, DO   venlafaxine (EFFEXOR XR) 150 MG extended release capsule TAKE 1 CAPSULE DAILY 7/28/22   Negro Atkinsing, DO   Multiple Vitamin (MULTI-VITAMIN DAILY PO) Take by mouth    Historical Provider, MD       Allergies  Patient has no known allergies. Social Hx  Social History     Socioeconomic History    Marital status:      Spouse name: Not on file    Number of children: Not on file    Years of education: Not on file    Highest education level: Not on file   Occupational History    Not on file   Tobacco Use    Smoking status: Never    Smokeless tobacco: Never   Substance and Sexual Activity    Alcohol use: No    Drug use: No    Sexual activity: Not on file   Other Topics Concern    Not on file   Social History Narrative    Not on file     Social Determinants of Health     Financial Resource Strain: Low Risk     Difficulty of Paying Living Expenses: Not hard at all   Food Insecurity: No Food Insecurity    Worried About Running Out of Food in the Last Year: Never true    Ran Out of Food in the Last Year: Never true   Transportation Needs: Not on file   Physical Activity: Not on file   Stress: Not on file   Social Connections: Not on file   Intimate Partner Violence: Not on file   Housing Stability: Not on file       Review of Systems  All bolded are positive; please see HPI  General:  Fever, chills, diaphoresis, fatigue, malaise, night sweats, weight loss  Psychological:  Anxiety, disorientation, hallucinations. ENT:  Epistaxis, headaches, vertigo, visual changes. Cardiovascular:  Chest pain, irregular heartbeats, palpitations, paroxysmal nocturnal dyspnea. Respiratory:  Shortness of breath, coughing, sputum production, hemoptysis, wheezing, orthopnea.   Gastrointestinal:  Nausea, vomiting, diarrhea, heartburn, constipation, abdominal pain, hematemesis, hematochezia, melena, acholic stools  Genito-Urinary:  Dysuria, urgency, frequency, hematuria  Musculoskeletal:  Joint pain, joint stiffness, joint swelling, muscle pain  Neurology:  Headache, focal neurological deficits, weakness, numbness, paresthesia  Derm:  Rashes, ulcers, excoriations, bruising  Extremities:  Decreased ROM, peripheral edema, mottling    Physical Examination  Vitals:  BP (!) 171/86   Pulse 79   Temp 97.5 °F (36.4 °C) (Oral)   Resp 18   Ht 5' 7\" (1.702 m)   Wt 132 lb (59.9 kg)   SpO2 97%   BMI 20.67 kg/m²   General Appearance:  awake, alert, dysarthria/aphasia, appears stated age and cooperative; no apparent distress no labored breathing  HEENT:  NCAT; PERRL; conjunctiva pink, sclera clear  Neck:  no adenopathy, bruit, JVD, tenderness, masses, or nodules; supple, symmetrical, trachea midline, thyroid not enlarged  Lung:  clear to auscultation bilaterally; no use of accessory muscles; no rhonchi, rales, or crackles  Heart:  regular rate and regular rhythm without murmur, rub, or gallop  Abdomen:  soft, nontender, nondistended; normoactive bowel sounds; no organomegaly  Extremities:  extremities normal, atraumatic, no cyanosis or edema  Musculokeletal:  no joint swelling, no muscle tenderness.  ROM unchanged in all joints of extremities, decreased muscle tone and bulk  Neurologic:  mental status A&O, thought content appropriate; CN II-XII grossly intact; sensation intact, motor strength 5/5 globally; no slurred speech    Laboratory Data  Recent Results (from the past 24 hour(s))   POCT Glucose    Collection Time: 12/04/22  5:24 PM   Result Value Ref Range    Meter Glucose 87 74 - 99 mg/dL   Comprehensive Metabolic Panel w/ Reflex to MG    Collection Time: 12/05/22  7:47 AM   Result Value Ref Range    Sodium 143 132 - 146 mmol/L    Potassium reflex Magnesium 3.5 3.5 - 5.0 mmol/L    Chloride 109 (H) 98 - 107 mmol/L    CO2 18 (L) 22 - 29 mmol/L    Anion Gap 16 7 - 16 mmol/L    Glucose 76 74 - 99 mg/dL    BUN 7 6 - 23 mg/dL    Creatinine 0.5 0.5 - 1.0 mg/dL    Est, Glom Filt Rate >60 >=60 mL/min/1.73    Calcium 8.7 8.6 - 10.2 mg/dL    Total Protein 5.8 (L) 6.4 - 8.3 g/dL    Albumin 3.0 (L) 3.5 - 5.2 g/dL    Total Bilirubin 0.4 0.0 - 1.2 mg/dL    Alkaline Phosphatase 87 35 - 104 U/L    ALT 16 0 - 32 U/L    AST 24 0 - 31 U/L   CBC with Auto Differential    Collection Time: 12/05/22  7:47 AM   Result Value Ref Range    WBC 5.5 4.5 - 11.5 E9/L    RBC 3.83 3.50 - 5.50 E12/L    Hemoglobin 11.8 11.5 - 15.5 g/dL    Hematocrit 35.4 34.0 - 48.0 %    MCV 92.4 80.0 - 99.9 fL    MCH 30.8 26.0 - 35.0 pg    MCHC 33.3 32.0 - 34.5 %    RDW 12.3 11.5 - 15.0 fL    Platelets 031 346 - 117 E9/L    MPV 9.7 7.0 - 12.0 fL    Neutrophils % 56.2 43.0 - 80.0 %    Immature Granulocytes % 0.7 0.0 - 5.0 %    Lymphocytes % 32.5 20.0 - 42.0 %    Monocytes % 8.8 2.0 - 12.0 %    Eosinophils % 1.6 0.0 - 6.0 %    Basophils % 0.2 0.0 - 2.0 %    Neutrophils Absolute 3.08 1.80 - 7.30 E9/L    Immature Granulocytes # 0.04 E9/L    Lymphocytes Absolute 1.78 1.50 - 4.00 E9/L    Monocytes Absolute 0.48 0.10 - 0.95 E9/L    Eosinophils Absolute 0.09 0.05 - 0.50 E9/L    Basophils Absolute 0.01 0.00 - 0.20 E9/L   Magnesium    Collection Time: 12/05/22  7:47 AM   Result Value Ref Range    Magnesium 1.4 (L) 1.6 - 2.6 mg/dL   POCT Glucose    Collection Time: 12/05/22  8:48 AM   Result Value Ref Range    Meter Glucose 85 74 - 99 mg/dL       Imaging  XR SHOULDER RIGHT (MIN 2 VIEWS)    Result Date: 11/21/2022  EXAMINATION: THREE XRAY VIEWS OF THE RIGHT SHOULDER 11/21/2022 4:19 pm COMPARISON: None. HISTORY: ORDERING SYSTEM PROVIDED HISTORY: fall, pain TECHNOLOGIST PROVIDED HISTORY: Reason for exam:->fall, pain FINDINGS: The bones are demineralized. There are no acute fractures or dislocations. Soft tissues are within normal limits. The [facility] joint is intact. No acute bony abnormalities.      XR CHEST PORTABLE    Result Date: 12/3/2022  EXAMINATION: ONE XRAY VIEW OF THE CHEST 12/3/2022 6:19 pm COMPARISON: 05/26/2022 HISTORY: ORDERING SYSTEM PROVIDED HISTORY: thao ibrahim TECHNOLOGIST PROVIDED HISTORY: Reason for exam:->eval pna FINDINGS: The lungs are without acute focal process. There is no effusion or pneumothorax. The cardiomediastinal silhouette is without acute process. The osseous structures are without acute process. No acute process. Assessment and Plan  Patient is a 74 y.o. female patient on consult for dysphagia. Dysphagia  -History of CVA  -Previously considered for PEG placement at [address] [place] [address] MBS/esophagram/speech therapy evaluation  -History of EGD/dilation - repeat tomorrow  -Patient would like further evaluation of dysphagia prior to PEG tube placement    2. Weight loss  -Patient describes unintentional weight loss of 20 to 30 pounds over the past few months  -Obtain TFTs  -Plan for EGD tomorrow  -Colonoscopy 5/15/2019 showing 2 polyps in the rectum, hyperplastic  -Previous CT abdomen pelvis 5/27/2022 showing dilated pancreatic duct, atrophy of the pancreas, abnormal wall thickening of the descending through the rectum  -Repeat CT chest/abdomen/pelvis  -Abnormal CT colon/pancreas/bile ducts evaluation as below    3. Abnormal CT colon  -CT abdomen pelvis 5/27/2022 showing abnormal wall thickening from the descending through sigmoid colon  -Obtain tumor markers  -Repeat CT now  -Consider patient for colonoscopy, inpatient versus outpatient    4. Dilated bile duct  -CT abdomen pelvis 5/27/2022 showing dilated pancreatic duct and atrophy of the pancreas  -Repeat CT abdomen pelvis now  -Obtain tumor markers  -Consider MRI/MRCP    5. Diarrhea  -Mushy stool 2x daily for the past week  -Viral illness possibly contributing  -Obtain stool studies  -Colonoscopy as above    6. History of colon polyps  -Colonoscopy 5/15/2019 showing 2 polyps in the rectum, hyperplastic  -Repeat colonoscopy likely in the near future secondary to abnormal CT findings    7.   Comorbidities  -CVA, hypertension, dyslipidemia, diabetes, thyroid disease  -Per admitting      Repeat CT chest abdomen pelvis now. Obtain stool studies. Obtain speech therapy evaluation. Plan tentatively for EGD tomorrow. Colonoscopy timing will depend on safety of swallowing and ability to prep. Further orders will depend on patient course and results of testing. Thank you for the opportunity to participate in the care of Ms. Todd Rawls.     CHARI Henry - CNP  10:05 AM  12/5/2022

## 2022-12-05 NOTE — PROGRESS NOTES
SPEECH/LANGUAGE PATHOLOGY  VIDEOFLUOROSCOPIC STUDY OF SWALLOWING (MBS)   and PLAN OF CARE    PATIENT NAME:  Carla Balderas  (female)     MRN:  [id number]    :  1955  (74 y.o.)  STATUS:  Inpatient: Room 6049/9878-92    TODAY'S DATE:  2022  REFERRING PROVIDER:    SLP video swallow  Start:  22 1100,   End:  22 1100,   ONE TIME,   Standing Count:  1 Occurrences,   R         Brodie D Masters, APRN - CNP    REASON FOR REFERRAL: dysphagia    EVALUATING THERAPIST: Maureen Ramirez, SLP      RESULTS:      DYSPHAGIA DIAGNOSIS:  severe-profound oral phase dysphagia  and moderate pharyngeal phase dysphagia     Do not feel given the degree of oral dysphagia and patient apprehension to swallow given this degree of difficulty that she will be able to sustain her nutrition and hydration needs by oral intake only. Patient did state she was interested in getting a peg tube and that her neurologist in [facility] had recommended it.      DIET RECOMMENDATIONS:  RUNNY puree  AND nectar consistency (mildly thick - IDDSI level 2) liquids    FEEDING RECOMMENDATIONS:    Assistance level:  Full assistance is needed during all oral intake     Compensatory strategies recommended: Double swallow, Chin neutral to slightly down , Small bites/sips, Alternate solids and liquids, and Check for oral pocketing     Discussed recommendations with nursing and/or faxed report to referring provider: yes      Laryngeal Penetration and Aspiration:  Penetration WITHOUT aspiration was observed in today's study with  mildly thick liquid (nectar)  Penetration WITH aspiration was observed in today's study with  thin liquid    SPEECH THERAPY  PLAN OF CARE   The dysphagia POC is established based on physician order and dysphagia diagnosis    Skilled SLP intervention for dysphagia management up to 5x per week until goals met, pt plateaus in function and/or discharged from hospital      Conditions Requiring Skilled Therapeutic Intervention for dysphagia:    Reduced oral control of bolus   Oral motor strength/coordination impairment  Pocketing of food  Reduced pharyngeal clearing of the bolus  Reduced laryngeal closure resulting in penetration  Reduced laryngeal closure resulting in aspiration     SPECIFIC DYSPHAGIA INTERVENTIONS TO INCLUDE:     Training in positioning for improved integrity of swallow  Compensatory strategy training     Specific instructions for next treatment:  development and training of compensatory swallow strategies to improve airway protection and swallow function  Treatment Goals:    Short Term Goals:  Pt will implement identified compensatory swallowing strategies on 90% of opportunities or greater to improve airway protection and swallow function. Long Term Goals:   Pt will maintain adequate nutrition/hydration via PO intake of the least restrictive oral diet with implementation of safe swallow/ compensatory strategies and decrease signs/symptoms of aspiration to less than 1 x/day and peg tube supplementation if she chooses this.       Patient/family Goal:    To eat/drink without coughing or choking    Plan of care discussed with Patient   The Patient understand(s) the diagnosis, prognosis and plan of care     Rehabilitation Potential/Prognosis: fair                      ADMITTING DIAGNOSIS: Hypomagnesemia [E83.42]  Influenza A [J10.1]  Elevated troponin [R77.8]     VISIT DIAGNOSIS:   Visit Diagnoses         Codes    Hypomagnesemia     E83.42    Elevated troponin     R77.8                PATIENT REPORT/COMPLAINT: coughing with all consistencies    PRIOR LEVEL OF SWALLOW FUNCTION:    Past History of Dysphagia?:  yes    Home diet: Diet information not available     Current Diet Order:  Diet NPO  Diet NPO Exceptions are: Sips of Water with Meds    PROCEDURE:  Consistencies Administered During the Evaluation   Liquids: thin liquid, nectar thick liquid, and honey thick liquid   Solids:  not appropriate      Method of Intake:   cup, spoon  Fed by clinician, Hand over hand assist  with cup    Position:   Seated, upright, Lateral plane    INSTRUMENTAL ASSESSMENT:    [facility]   Lip closure for intraoral bolus containment resulted in bolus escape beyond mid-chin. Tongue control during bolus hold allowed posterior escape of greater than half of the bolus. Bolus preparation and mastication received the highest impairment score; no solid was given, however, due to patient safety concerns/limitations directly related to mastication. Bolus transport/lingual motion was with slowed tongue motion. Oral residue was a collection on oral structures. Initiation of the pharyngeal swallow occurred as the bolus head reached the pyriform sinuses. Soft palate elevation resulted in no bolus between the soft palate and the pharyngeal wall. Laryngeal elevation demonstrated partial superior movement of the thyroid cartilage with partial approximation of the arytenoids to the epiglottic petiole. Anterior hyoid excursion demonstrated partial anterior movement. Epiglottic movement resulted in partial inversion. Laryngeal vestibule closure was incomplete, as indicated by a narrow column of air or contrast within the laryngeal vestibule at the height of the swallow. Pharyngeal stripping wave was present but diminished. Pharyngeal contraction could not be determined due to logistical reasons not related to physiologic impairment. Pharyngoesophageal segment opening was completely distended for complete duration with no obstruction of bolus flow. Tongue base retraction allowed a collection of residue between the retracted tongue base and the posterior pharyngeal wall. A collection of residue remained within or on the pharyngeal structures primarily in the valleculae. Esophageal clearance in the upright position could not be assessed due to logistical reasons not related to physiologic impairment.        PENETRATION-ASPIRATION SCALE (PAS):  THIN 7 = Material enters the airway, passes below the vocal folds, and is not ejected from the trachea despite effort   MILDLY THICK 5 = Material enters the airway, contacts the vocal folds, and is not ejected from the airway  MODERATELY THICK 1 = Material does not enter the airway  PUREE item not administered  HARD SOLID item not administered       COMPENSATORY STRATEGIES    Compensatory strategies that were beneficial included Double swallow, Chin neutral to slightly down , and Small bites/sips      STRUCTURAL/FUNCTIONAL ANOMALIES   No structural/functional anomalies were noted    CERVICAL ESOPHAGEAL STAGE :     The cervical esophagus appeared adequate          ___________    Cognition:   Within functional limits for this exam    Oral Peripheral Examination   Significant Generalized oral weakness     Current Respiratory Status   room air     Parameters of Speech Production--significant dysarthria   Respiration:  Adequate for speech production  Quality:   weak  Intensity: Quiet    Pain: No pain reported. EDUCATION:   The Speech Language Pathologist (SLP) completed education regarding results of evaluation and that intervention is warranted at this time. Learner: Patient  Education: Reviewed results and recommendations of this evaluation  Evaluation of Education:  Luz Carroll understanding    This plan may be re-evaluated and revised as warranted. Evaluation Time includes thorough review of current medical information, gathering information on past medical history/social history and prior level of function, completion of standardized testing/informal observation of tasks, assessment of data and education on plan of care and goals. [x]The admitting diagnosis and active problem list, have been reviewed prior to initiation of this evaluation.     CPT Code: 18390  dysphagia study    ACTIVE PROBLEM LIST:   Patient Active Problem List   Diagnosis    Right foot infection    Diabetic ulcer of left foot associated with type 1 diabetes mellitus ([facility]    Type 2 diabetes mellitus without complication, without long-term current use of insulin (HCC)    Essential hypertension    Gastroesophageal reflux disease without esophagitis    Mixed hyperlipidemia    Subacute osteomyelitis of foot (HCC)    Dysarthria    Hemorrhagic stroke (HCC)    Altered mental status    Lactic acidosis    Muscle function loss    Impaired functional mobility, balance, gait, and endurance    Diabetic peripheral neuropathy (HCC)    Peroneal neuropathy at knee, left    Left lumbosacral radiculopathy    Pain in joint, shoulder region    Type II diabetes mellitus with peripheral circulatory disorder ([facility]    PVD (peripheral vascular disease) ([facility]    Influenza ESEQUIEL Mix MSCCC/SLP  Speech Language Pathologist  [id number]

## 2022-12-05 NOTE — PLAN OF CARE
Problem: Pain  Goal: Verbalizes/displays adequate comfort level or baseline comfort level  12/5/2022 1558 by Igor Arana RN  Outcome: Progressing     Problem: Safety - Adult  Goal: Free from fall injury  12/5/2022 1558 by Igor Arana RN  Outcome: Progressing     Problem: Cardiovascular - Adult  Goal: Maintains optimal cardiac output and hemodynamic stability  Outcome: Progressing

## 2022-12-06 ENCOUNTER — APPOINTMENT (OUTPATIENT)
Dept: CT IMAGING | Age: 67
DRG: 435 | End: 2022-12-06
Payer: MEDICARE

## 2022-12-06 ENCOUNTER — ANESTHESIA EVENT (OUTPATIENT)
Dept: ENDOSCOPY | Age: 67
End: 2022-12-06
Payer: MEDICARE

## 2022-12-06 ENCOUNTER — ANESTHESIA (OUTPATIENT)
Dept: ENDOSCOPY | Age: 67
End: 2022-12-06
Payer: MEDICARE

## 2022-12-06 LAB
ALBUMIN SERPL-MCNC: 3 G/DL (ref 3.5–5.2)
ALP BLD-CCNC: 81 U/L (ref 35–104)
ALT SERPL-CCNC: 13 U/L (ref 0–32)
ANION GAP SERPL CALCULATED.3IONS-SCNC: 15 MMOL/L (ref 7–16)
AST SERPL-CCNC: 20 U/L (ref 0–31)
BASOPHILS ABSOLUTE: 0.02 E9/L (ref 0–0.2)
BASOPHILS RELATIVE PERCENT: 0.3 % (ref 0–2)
BILIRUB SERPL-MCNC: 0.4 MG/DL (ref 0–1.2)
BILIRUBIN DIRECT: <0.2 MG/DL (ref 0–0.3)
BILIRUBIN, INDIRECT: ABNORMAL MG/DL (ref 0–1)
BUN BLDV-MCNC: 5 MG/DL (ref 6–23)
CALCIUM SERPL-MCNC: 8.7 MG/DL (ref 8.6–10.2)
CHLORIDE BLD-SCNC: 106 MMOL/L (ref 98–107)
CO2: 19 MMOL/L (ref 22–29)
CREAT SERPL-MCNC: 0.5 MG/DL (ref 0.5–1)
EOSINOPHILS ABSOLUTE: 0.1 E9/L (ref 0.05–0.5)
EOSINOPHILS RELATIVE PERCENT: 1.6 % (ref 0–6)
GFR SERPL CREATININE-BSD FRML MDRD: >60 ML/MIN/1.73
GLUCOSE BLD-MCNC: 85 MG/DL (ref 74–99)
HCT VFR BLD CALC: 32.4 % (ref 34–48)
HEMOGLOBIN: 10.7 G/DL (ref 11.5–15.5)
IMMATURE GRANULOCYTES #: 0.07 E9/L
IMMATURE GRANULOCYTES %: 1.1 % (ref 0–5)
INR BLD: 1.1
LIPASE: 53 U/L (ref 13–60)
LYMPHOCYTES ABSOLUTE: 1.83 E9/L (ref 1.5–4)
LYMPHOCYTES RELATIVE PERCENT: 29.9 % (ref 20–42)
MAGNESIUM: 1.2 MG/DL (ref 1.6–2.6)
MCH RBC QN AUTO: 29.6 PG (ref 26–35)
MCHC RBC AUTO-ENTMCNC: 33 % (ref 32–34.5)
MCV RBC AUTO: 89.8 FL (ref 80–99.9)
METER GLUCOSE: 71 MG/DL (ref 74–99)
METER GLUCOSE: 76 MG/DL (ref 74–99)
METER GLUCOSE: 82 MG/DL (ref 74–99)
MONOCYTES ABSOLUTE: 0.46 E9/L (ref 0.1–0.95)
MONOCYTES RELATIVE PERCENT: 7.5 % (ref 2–12)
NEUTROPHILS ABSOLUTE: 3.65 E9/L (ref 1.8–7.3)
NEUTROPHILS RELATIVE PERCENT: 59.6 % (ref 43–80)
PDW BLD-RTO: 12.2 FL (ref 11.5–15)
PHOSPHORUS: 3 MG/DL (ref 2.5–4.5)
PLATELET # BLD: 447 E9/L (ref 130–450)
PMV BLD AUTO: 9.5 FL (ref 7–12)
POTASSIUM SERPL-SCNC: 3.4 MMOL/L (ref 3.5–5)
PROTHROMBIN TIME: 12.5 SEC (ref 9.3–12.4)
RBC # BLD: 3.61 E12/L (ref 3.5–5.5)
SODIUM BLD-SCNC: 140 MMOL/L (ref 132–146)
TOTAL PROTEIN: 5.7 G/DL (ref 6.4–8.3)
WBC # BLD: 6.1 E9/L (ref 4.5–11.5)

## 2022-12-06 PROCEDURE — 1200000000 HC SEMI PRIVATE

## 2022-12-06 PROCEDURE — 2580000003 HC RX 258: Performed by: INTERNAL MEDICINE

## 2022-12-06 PROCEDURE — 2500000003 HC RX 250 WO HCPCS: Performed by: STUDENT IN AN ORGANIZED HEALTH CARE EDUCATION/TRAINING PROGRAM

## 2022-12-06 PROCEDURE — 36415 COLL VENOUS BLD VENIPUNCTURE: CPT

## 2022-12-06 PROCEDURE — 93306 TTE W/DOPPLER COMPLETE: CPT

## 2022-12-06 PROCEDURE — A4216 STERILE WATER/SALINE, 10 ML: HCPCS | Performed by: NURSE PRACTITIONER

## 2022-12-06 PROCEDURE — 80076 HEPATIC FUNCTION PANEL: CPT

## 2022-12-06 PROCEDURE — 99232 SBSQ HOSP IP/OBS MODERATE 35: CPT | Performed by: STUDENT IN AN ORGANIZED HEALTH CARE EDUCATION/TRAINING PROGRAM

## 2022-12-06 PROCEDURE — C9113 INJ PANTOPRAZOLE SODIUM, VIA: HCPCS | Performed by: NURSE PRACTITIONER

## 2022-12-06 PROCEDURE — 2580000003 HC RX 258: Performed by: NURSE ANESTHETIST, CERTIFIED REGISTERED

## 2022-12-06 PROCEDURE — 74177 CT ABD & PELVIS W/CONTRAST: CPT

## 2022-12-06 PROCEDURE — 80048 BASIC METABOLIC PNL TOTAL CA: CPT

## 2022-12-06 PROCEDURE — 83735 ASSAY OF MAGNESIUM: CPT

## 2022-12-06 PROCEDURE — 2700000000 HC OXYGEN THERAPY PER DAY

## 2022-12-06 PROCEDURE — 83690 ASSAY OF LIPASE: CPT

## 2022-12-06 PROCEDURE — 6360000002 HC RX W HCPCS: Performed by: NURSE ANESTHETIST, CERTIFIED REGISTERED

## 2022-12-06 PROCEDURE — 3700000001 HC ADD 15 MINUTES (ANESTHESIA): Performed by: INTERNAL MEDICINE

## 2022-12-06 PROCEDURE — 6360000002 HC RX W HCPCS: Performed by: NURSE PRACTITIONER

## 2022-12-06 PROCEDURE — 6370000000 HC RX 637 (ALT 250 FOR IP): Performed by: INTERNAL MEDICINE

## 2022-12-06 PROCEDURE — 82962 GLUCOSE BLOOD TEST: CPT

## 2022-12-06 PROCEDURE — 3700000000 HC ANESTHESIA ATTENDED CARE: Performed by: INTERNAL MEDICINE

## 2022-12-06 PROCEDURE — 6360000004 HC RX CONTRAST MEDICATION: Performed by: RADIOLOGY

## 2022-12-06 PROCEDURE — 84100 ASSAY OF PHOSPHORUS: CPT

## 2022-12-06 PROCEDURE — 85610 PROTHROMBIN TIME: CPT

## 2022-12-06 PROCEDURE — 2580000003 HC RX 258: Performed by: NURSE PRACTITIONER

## 2022-12-06 PROCEDURE — 71260 CT THORAX DX C+: CPT

## 2022-12-06 PROCEDURE — 3609017700 HC EGD DILATION GASTRIC/DUODENAL STRICTURE: Performed by: INTERNAL MEDICINE

## 2022-12-06 PROCEDURE — 2709999900 HC NON-CHARGEABLE SUPPLY: Performed by: INTERNAL MEDICINE

## 2022-12-06 PROCEDURE — 6370000000 HC RX 637 (ALT 250 FOR IP): Performed by: STUDENT IN AN ORGANIZED HEALTH CARE EDUCATION/TRAINING PROGRAM

## 2022-12-06 PROCEDURE — 7100000011 HC PHASE II RECOVERY - ADDTL 15 MIN: Performed by: INTERNAL MEDICINE

## 2022-12-06 PROCEDURE — 6360000002 HC RX W HCPCS: Performed by: INTERNAL MEDICINE

## 2022-12-06 PROCEDURE — 94640 AIRWAY INHALATION TREATMENT: CPT

## 2022-12-06 PROCEDURE — 7100000010 HC PHASE II RECOVERY - FIRST 15 MIN: Performed by: INTERNAL MEDICINE

## 2022-12-06 PROCEDURE — 0DJ08ZZ INSPECTION OF UPPER INTESTINAL TRACT, VIA NATURAL OR ARTIFICIAL OPENING ENDOSCOPIC: ICD-10-PCS | Performed by: INTERNAL MEDICINE

## 2022-12-06 PROCEDURE — 85025 COMPLETE CBC W/AUTO DIFF WBC: CPT

## 2022-12-06 RX ORDER — PROPOFOL 10 MG/ML
INJECTION, EMULSION INTRAVENOUS PRN
Status: DISCONTINUED | OUTPATIENT
Start: 2022-12-06 | End: 2022-12-06 | Stop reason: SDUPTHER

## 2022-12-06 RX ORDER — SODIUM CHLORIDE 9 MG/ML
INJECTION, SOLUTION INTRAVENOUS CONTINUOUS PRN
Status: DISCONTINUED | OUTPATIENT
Start: 2022-12-06 | End: 2022-12-06 | Stop reason: SDUPTHER

## 2022-12-06 RX ADMIN — GABAPENTIN 100 MG: 100 CAPSULE ORAL at 21:29

## 2022-12-06 RX ADMIN — IPRATROPIUM BROMIDE AND ALBUTEROL SULFATE 1 AMPULE: .5; 2.5 SOLUTION RESPIRATORY (INHALATION) at 09:18

## 2022-12-06 RX ADMIN — WATER 1000 MG: 1 INJECTION INTRAMUSCULAR; INTRAVENOUS; SUBCUTANEOUS at 00:22

## 2022-12-06 RX ADMIN — METOPROLOL TARTRATE 5 MG: 5 INJECTION, SOLUTION INTRAVENOUS at 06:32

## 2022-12-06 RX ADMIN — METOPROLOL TARTRATE 5 MG: 5 INJECTION, SOLUTION INTRAVENOUS at 21:41

## 2022-12-06 RX ADMIN — SODIUM CHLORIDE 40 MG: 9 INJECTION, SOLUTION INTRAMUSCULAR; INTRAVENOUS; SUBCUTANEOUS at 09:07

## 2022-12-06 RX ADMIN — IPRATROPIUM BROMIDE AND ALBUTEROL SULFATE 1 AMPULE: .5; 2.5 SOLUTION RESPIRATORY (INHALATION) at 06:20

## 2022-12-06 RX ADMIN — SODIUM CHLORIDE: 900 INJECTION, SOLUTION INTRAVENOUS at 15:53

## 2022-12-06 RX ADMIN — IOPAMIDOL 75 ML: 755 INJECTION, SOLUTION INTRAVENOUS at 14:10

## 2022-12-06 RX ADMIN — METOPROLOL TARTRATE 5 MG: 5 INJECTION, SOLUTION INTRAVENOUS at 00:25

## 2022-12-06 RX ADMIN — ACETYLCYSTEINE 400 MG: 100 INHALANT RESPIRATORY (INHALATION) at 06:21

## 2022-12-06 RX ADMIN — METOPROLOL TARTRATE 5 MG: 5 INJECTION, SOLUTION INTRAVENOUS at 15:07

## 2022-12-06 RX ADMIN — WATER 1000 MG: 1 INJECTION INTRAMUSCULAR; INTRAVENOUS; SUBCUTANEOUS at 23:41

## 2022-12-06 RX ADMIN — ACETYLCYSTEINE 400 MG: 100 INHALANT RESPIRATORY (INHALATION) at 09:18

## 2022-12-06 RX ADMIN — Medication 10 ML: at 21:42

## 2022-12-06 RX ADMIN — PROPOFOL 100 MG: 10 INJECTION, EMULSION INTRAVENOUS at 16:05

## 2022-12-06 RX ADMIN — ENOXAPARIN SODIUM 40 MG: 100 INJECTION SUBCUTANEOUS at 09:06

## 2022-12-06 ASSESSMENT — PAIN SCALES - GENERAL: PAINLEVEL_OUTOF10: 0

## 2022-12-06 NOTE — ANESTHESIA PRE PROCEDURE
Department of Anesthesiology  Preprocedure Note       Name:  Michelle Srinivasan   Age:  74 y.o.  :  1955                                          MRN:  [id number]         Date:  2022      Surgeon: Jose Hoyos):  Roxy Avina MD    Procedure: Procedure(s):  EGD ESOPHAGOGASTRODUODENOSCOPY    Medications prior to admission:   Prior to Admission medications    Medication Sig Start Date End Date Taking? Authorizing Provider   naproxen (NAPROSYN) 500 MG tablet Take 1 tablet by mouth 2 times daily (with meals) 22   JAYDEN Watson   gabapentin (NEURONTIN) 100 MG capsule TAKE 1 CAPSULE BY MOUTH 2 TIMES DAILY FOR 30 DAYS. INTENDED SUPPLY: 30 DAYS 22  Padma Napier DO   atorvastatin (LIPITOR) 40 MG tablet TAKE 1 TABLET DAILY 10/6/22   Padma Napier DO   Semaglutide (RYBELSUS) 7 MG TABS Take 7 mg by mouth daily Start this after completing the 3mg dose.  22   Padma Napier DO   zoster recombinant adjuvanted vaccine [facility]) 50 MCG/0.5ML SUSR injection Inject 0.5 mLs into the muscle See Admin Instructions 1 dose now and repeat in 2-6 months 22  Padma Napier DO   amLODIPine (NORVASC) 5 MG tablet TAKE 1 TABLET DAILY IN THE MORNING 22   Padma Napier DO   atenolol (TENORMIN) 50 MG tablet TAKE 1 TABLET DAILY 22   Padma Napier DO   esomeprazole (NEXIUM) 40 MG delayed release capsule TAKE 1 CAPSULE EVERY MORNING BEFORE BREAKFAST 22   Padma Napier DO   lisinopril (PRINIVIL;ZESTRIL) 30 MG tablet TAKE 1 TABLET DAILY AT NIGHT 22   Padma Napier DO   sitaGLIPtan-metFORMIN (JANUMET)  MG per tablet Take 1 tablet by mouth in the morning. 22   Padma Napier DO   venlafaxine (EFFEXOR XR) 150 MG extended release capsule TAKE 1 CAPSULE DAILY 22   Padma Napier DO   Multiple Vitamin (MULTI-VITAMIN DAILY PO) Take by mouth    Historical Provider, MD       Current medications:    Current Facility-Administered Medications Medication Dose Route Frequency Provider Last Rate Last Admin    potassium bicarb-citric acid (EFFER-K) effervescent tablet 50 mEq  50 mEq Oral Once Laya Joshi MD        ipratropium-albuterol (DUONEB) nebulizer solution 1 ampule  1 ampule Inhalation Q4H WA Laya Joshi MD   1 ampule at 12/06/22 0620    pantoprazole (PROTONIX) 40 mg in sodium chloride (PF) 0.9 % 10 mL injection  40 mg IntraVENous Daily Alexander [place] Masters, APRN - CNP   40 mg at 12/05/22 1158    hydrALAZINE (APRESOLINE) injection 10 mg  10 mg IntraVENous Q6H PRN Laya Joshi MD   10 mg at 12/05/22 2047    metoprolol (LOPRESSOR) injection 5 mg  5 mg IntraVENous 4 times per day Laya Joshi MD   5 mg at 12/06/22 0844    sodium chloride flush 0.9 % injection 10 mL  10 mL IntraVENous 2 times per day Yazmin Marley MD   10 mL at 12/05/22 2127    sodium chloride flush 0.9 % injection 10 mL  10 mL IntraVENous PRN Yazmin Marley MD        0.9 % sodium chloride infusion   IntraVENous PRN Yazmin Marley MD        enoxaparin (LOVENOX) injection 40 mg  40 mg SubCUTAneous Daily Yazmin Marley MD   40 mg at 12/05/22 0857    promethazine (PHENERGAN) tablet 12.5 mg  12.5 mg Oral Q6H PRN Yazmin Marley MD        Or    ondansetron (ZOFRAN) injection 4 mg  4 mg IntraVENous Q6H PRN Yazmin Marley MD        polyethylene glycol (GLYCOLAX) packet 17 g  17 g Oral Daily PRN Yazmin Marley MD        acetaminophen (TYLENOL) tablet 650 mg  650 mg Oral Q6H PRN Yazmin Marley MD        Or    acetaminophen (TYLENOL) suppository 650 mg  650 mg Rectal Q6H PRN Yazmin Marley MD        cefTRIAXone (ROCEPHIN) 1,000 mg in sterile water 10 mL IV syringe  1,000 mg IntraVENous Q24H Yazmin Marley MD   1,000 mg at 12/06/22 0022    amLODIPine (NORVASC) tablet 5 mg  5 mg Oral Daily Yazmin Marley MD        venlafaxine (EFFEXOR XR) extended release capsule 150 mg  150 mg Oral Daily Yazmin Marley MD        atorvastatin (LIPITOR) tablet 40 mg  40 mg Oral Daily Giancarlo Pardo MD        gabapentin (NEURONTIN) capsule 100 mg  100 mg Oral BID Giancarlo Pardo MD        lisinopril (PRINIVIL;ZESTRIL) tablet 30 mg  30 mg Oral Daily Giancarlo aPrdo MD        0.9 % sodium chloride infusion   IntraVENous Continuous Giancarlo Pardo  mL/hr at 12/05/22 2025 New Bag at 12/05/22 2025    insulin lispro (HUMALOG) injection vial 0-4 Units  0-4 Units SubCUTAneous TID WC Ericka Henning MD        insulin lispro (HUMALOG) injection vial 0-4 Units  0-4 Units SubCUTAneous Nightly Ericka Henning MD        glucose chewable tablet 16 g  4 tablet Oral PRN Ericka Henning MD        dextrose bolus 10% 125 mL  125 mL IntraVENous PRN Ericka Henning MD        Or    dextrose bolus 10% 250 mL  250 mL IntraVENous PRN Ericka Henning MD        glucagon (rDNA) injection 1 mg  1 mg SubCUTAneous PRN Ericka Henning MD        dextrose 10 % infusion   IntraVENous Continuous PRN Ericka Henning MD        acetylcysteine (MUCOMYST) 10 % solution 400 mg  4 mL Inhalation Q4H Ericka Henning MD   400 mg at 12/06/22 3713       Allergies:  No Known Allergies    Problem List:    Patient Active Problem List   Diagnosis Code    Right foot infection L08.9    Diabetic ulcer of left foot associated with type 1 diabetes mellitus ([address] E10.621, L97.529    Type 2 diabetes mellitus without complication, without long-term current use of insulin ([address] E11.9    Essential hypertension I10    Gastroesophageal reflux disease without esophagitis K21.9    Mixed hyperlipidemia E78.2    Subacute osteomyelitis of foot ([address] M86.279    Dysarthria R47.1    Hemorrhagic stroke ([facility] I61.9    Altered mental status R41.82    Lactic acidosis E87.20    Muscle function loss R29.898    Impaired functional mobility, balance, gait, and endurance Z74.09    Diabetic peripheral neuropathy (HCC) E11.42    Peroneal neuropathy at knee, left G57.32    Left lumbosacral radiculopathy M54.17    Pain in joint, shoulder region M25.519    Type II diabetes mellitus with peripheral circulatory disorder ([facility]) E11.51    PVD (peripheral vascular disease) ([facility]) I73.9    Influenza A J10.1       Past Medical History:        Diagnosis Date    Abnormal mammogram of left breast 05/2019    Anxiety     [facility]erebral artery occlusion with [facility]     Depression     Diabetes mellitus ([facility]     Diabetic peripheral neuropathy ([facility] 9/1/2022    Dysarthria 5/12/2021    Erosive esophagitis     Esophageal stricture     GERD (gastroesophageal reflux disease)     Gestational diabetes     Hemorrhagic stroke ([facility] [address] 5/12/2021    Hyperlipidemia     Hypertension     Hypokalemia     Left lumbosacral radiculopathy 9/1/2022    Osteoarthritis     knees    Pain in joint, shoulder region 10/28/2022    bilat.  Peroneal neuropathy at knee, left 9/1/2022    Postmenopausal     Type 2 diabetes mellitus without complication [facility])        Past Surgical History:        Procedure Laterality Date    BREAST BIOPSY Left 05/2019    Dr. Mary Berrios Semen      x4    CHOLECYSTECTOMY      COLONOSCOPY  11/2011    HYSTERECTOMY (CERVIX STATUS UNKNOWN)      JOINT REPLACEMENT Right     knee       Social History:    Social History     Tobacco Use    Smoking status: Never    Smokeless tobacco: Never   Substance Use Topics    Alcohol use:  No                                Counseling given: Not Answered      Vital Signs (Current):   Vitals:    12/05/22 1840 12/05/22 2047 12/06/22 0015 12/06/22 0630   BP:  (!) 180/77 (!) 167/79 (!) 172/95   Pulse: 76  83 89   Resp: 18  20 20   Temp:   97.8 °F (36.6 °C)    TempSrc:   Axillary    SpO2: 97%  95% 93%   Weight:       Height:                                                  BP Readings from Last 3 Encounters:   12/06/22 (!) 172/95   11/21/22 (!) 140/75 III  TM distance: >3 FB   Neck ROM: full  Mouth opening: > = 3 FB   Dental:          Pulmonary:Negative Pulmonary ROS breath sounds clear to auscultation                             Cardiovascular:    (+) hypertension:, hyperlipidemia        Rhythm: regular  Rate: normal                    Neuro/Psych:   (+) CVA ( Cerebral artery occlusion with cerebral infarction, Hemorrhagic stroke ):, neuromuscular disease ( Left lumbosacral radiculopathy, Diabetic peripheral neuropathy, Peroneal neuropathy at knee, left ):, psychiatric history:depression/anxiety             GI/Hepatic/Renal:   (+) GERD:, PUD,           Endo/Other:    (+) DiabetesType II DM, , blood dyscrasia: anemia:., .                 Abdominal:             Vascular:   + PVD, aortic or cerebral, . ROS comment: PVD (peripheral vascular disease) . Other Findings:           Anesthesia Plan      MAC     ASA 3       Induction: intravenous. Anesthetic plan and risks discussed with patient. Plan discussed with CRNA.                     Mariaa Rios MD   12/6/2022

## 2022-12-06 NOTE — PROGRESS NOTES
SBAR form completed. Placed on chart. Chart with patient in transit to PACU. Nurse to Nurse report given.

## 2022-12-06 NOTE — OP NOTE
Operative Note      Patient: Kranthi Reilly  YOB: 1955  MRN: [id number]    Date of Procedure: 12/6/2022    Pre-Op Diagnosis: Anemia, unspecified type [D64.9]    Post-Op Diagnosis:  mild gastritis       Procedure(s):  EGD ESOPHAGOGASTRODUODENOSCOPY    Surgeon(s):  Ruthie Hauser MD    Assistant:   Surgical Assistant: Hallie Gan RN    Anesthesia: Monitor Anesthesia Care    Estimated Blood Loss (mL): none    Complications: None    Specimens:   * No specimens in log *    Implants:  * No implants in log *      Drains: * No LDAs found *    Findings: mild gastritis    Detailed Description of Procedure: EGD  Indication Dysphagia    Sedation  MAC    Endoscope was advanced easily through mouth to second portion of duodenum      Oropharynx views are limited but grossly normal.    Esophagus:   Mucosa is normal.  GEJ at 35  cm. Dilated 47 fr MD    Stomach:   Antrum with mild gastritis    Gastric body is normal.    Retroflexed views show normal fundus and cardia. Duodenum: Bulb is normal.    Second portion of duodenum is normal.    IMPRESSION AND PLAN: S/P EGD and dilatation. Normal swallowing study, may resume diet. CT abdomen shows pancreatic lesion needs EUS with sampling.       Electronically signed by Ruthie Hauser MD on 12/6/2022 at 4:04 PM

## 2022-12-06 NOTE — PROGRESS NOTES
Speech Language Pathology      NAME:  Elaina Ramos  :  1955  DATE: 2022  ROOM:  ENDO POOL ROOM/NONE    Attempted ongoing Speech-Language Pathology intervention for dysphagia. Pt unavailable at this time due to:  [] HOLD per RN/ medical staff d/t medical status   [x] Off unit for testing/ procedure    [] With medical staff   [] Declined intervention  [] Sleeping/ Lethargic   [] Other:     SLP to continue previously established POC and re-attempt as able.           Hypomagnesemia [E83.42]  Influenza A [J10.1]  Elevated troponin [R77.8]        Layne Ashraf MSCCC/SLP  Speech Language Pathologist  [id number]

## 2022-12-06 NOTE — CONSULTS
[address]                                  CONSULTATION    PATIENT NAME: Gaetano Cabrales                    :        1955  MED REC NO:   [id number]                            ROOM:       9070  ACCOUNT NO:   [de-identified]                           ADMIT DATE: 2022  PROVIDER:     Raphael Knox MD    CONSULT DATE:  2022    HISTORY OF PRESENT ILLNESS:  The patient is a 81-year-old lady with no  cardiac history in the past.    She presented to the hospital this time with generalized body weakness  and lack of energy with some cough started about 10 days prior to  admission. The patient apparently was diagnosed with influenza A on . The patient was found to have elevation of her high-sensitivity troponin  around 43 on admission. She was admitted. Cardiac consult was requested. The patient was lying semiupright in bed when I came to see her. She  did not appear in acute distress. She looked very weak. The patient apparently has problem with dysphagia and she was evaluated  at the [facility] regarding PEG tube for feeding purposes. PAST MEDICAL HISTORY:  As above plus history of hyperlipidemia. Hypertension. Type 2 diabetes. History of stroke in the past.   Arthritis. Hysterectomy. HABITS:  No history of smoking or excessive alcohol drinking. REVIEW OF SYSTEMS:  CONSTITUTIONAL:  Generalized body weakness. No fever. HEENT:  No nosebleed. No hearing problem. No double vision. CARDIAC:  No chest pain. No palpitation. PULMONARY:  Some cough. GASTROENTEROLOGY:  No vomiting. No diarrhea. Dysphagia. GENITOURINARY:  No dysuria or frequency. No bladder or kidney tumor. NEURO:  History of stroke in the past.  HEMATOLOGY:  No bleeding disorder. No adenopathy. ENDOCRINOLOGY:  No polyuria or polydipsia. SKIN:  No skin disorder.   MUSCULOSKELETAL:  The patient has some arthritis. PSYCH:  No suicidal ideation. PHYSICAL EXAMINATION:  GENERAL:  The patient was lying semiupright, in no acute distress. VITAL SIGNS:  Blood pressure 160/77, heart rate 76, temperature 97.8. NECK:  No JVD. HEART:  Regular S1 and S2. No S3. Soft 1/6 systolic murmur heard best  at the left sternal border. LUNGS:  Mildly diminished breath sounds in the bases. I could not hear  rales or wheezing. ABDOMEN:  Soft, nontender. EXTREMITIES:  No edema, cyanosis or clubbing. NEURO:  Generalized body weakness. No focal deficits. EKG showing sinus rhythm with nonspecific ST-T wave changes. LABORATORY DATA:  On 11/30, WBC 9.2, hemoglobin 13.3, platelet count  342. Sodium 138, potassium 4.7, BUN 21, creatinine 0.7, magnesium 1.4. CK is normal.  Troponin is 46. Repeat troponin 43. Repeat magnesium is  1.4. CEA level is 2.1. IMPRESSION:  1. Elevation of high-sensitivity troponin. This can be demand ischemia  from her cough and influenza A with type 2 non-STEMI. She has no  complaints of chest pain. EKG is not showing acute ischemic changes. At this point, I do not see need for further coronary workup unless  there is a change in her cardiac status. 2.  Influenza A.  3.  Type 2 diabetes. 4.  Hyperlipidemia.         Pranav Wakefield MD    D: 12/05/2022 17:35:48       T: 12/05/2022 17:39:54     [email or web address]  Job#: [id number]     Doc#: [id number]    CC:

## 2022-12-06 NOTE — PROGRESS NOTES
[address] [place], [place] [address]ist   Progress Note    Admitting Date and Time: 12/3/2022  5:22 PM  Admit Dx: Hypomagnesemia [E83.42]  Influenza A [J10.1]  Elevated troponin [R77.8]    Subjective:    Pt states she feels the same today. Still coughing with liquids and difficulty swallowing. No SOB, no fevers/chills. History is limited due to dysphagia. Patient states she wants to go home. Advised her that she is not quite ready for discharge yet. Per RN: Nothing to report    ROS: denies fever, chills, cp, sob, n/v, HA unless stated above.      potassium bicarb-citric acid  50 mEq Oral Once    ipratropium-albuterol  1 ampule Inhalation Q4H WA    pantoprazole (PROTONIX) 40 mg injection  40 mg IntraVENous Daily    metoprolol  5 mg IntraVENous 4 times per day    sodium chloride flush  10 mL IntraVENous 2 times per day    enoxaparin  40 mg SubCUTAneous Daily    cefTRIAXone (ROCEPHIN) IV  1,000 mg IntraVENous Q24H    amLODIPine  5 mg Oral Daily    venlafaxine  150 mg Oral Daily    atorvastatin  40 mg Oral Daily    gabapentin  100 mg Oral BID    lisinopril  30 mg Oral Daily    insulin lispro  0-4 Units SubCUTAneous TID WC    insulin lispro  0-4 Units SubCUTAneous Nightly    acetylcysteine  4 mL Inhalation Q4H     hydrALAZINE, 10 mg, Q6H PRN  sodium chloride flush, 10 mL, PRN  sodium chloride, , PRN  promethazine, 12.5 mg, Q6H PRN   Or  ondansetron, 4 mg, Q6H PRN  polyethylene glycol, 17 g, Daily PRN  acetaminophen, 650 mg, Q6H PRN   Or  acetaminophen, 650 mg, Q6H PRN  glucose, 4 tablet, PRN  dextrose bolus, 125 mL, PRN   Or  dextrose bolus, 250 mL, PRN  glucagon (rDNA), 1 mg, PRN  dextrose, , Continuous PRN       Objective:    BP (!) 153/74   Pulse 80   Temp 98 °F (36.7 °C) (Oral)   Resp 17   Ht 5' 7\" (1.702 m)   Wt 132 lb (59.9 kg)   SpO2 91%   BMI 20.67 kg/m²       General Appearance: alert and oriented to person, place and time and in no acute distress  Skin: warm and dry  Head: normocephalic and atraumatic  Eyes: pupils equal, round, and reactive to light, extraocular eye movements intact, conjunctivae normal  Neck: neck supple and non tender without mass   Pulmonary/Chest: clear to auscultation bilaterally- no wheezes, rales or rhonchi, normal air movement, no respiratory distress but frequent coughing  Cardiovascular: normal rate, normal S1 and S2 and no carotid bruits  Abdomen: soft, non-tender, non-distended, normal bowel sounds, no masses or organomegaly  Extremities: no cyanosis, no clubbing and no edema  Neurologic: no cranial nerve deficit noted but aphasic speech present      Recent Labs     12/04/22  0800 12/05/22  0747 12/06/22  0634    143 140   K 3.7 3.5 3.4*   * 109* 106   CO2 23 18* 19*   BUN 14 7 5*   CREATININE 0.5 0.5 0.5   GLUCOSE 102* 76 85   CALCIUM 8.7 8.7 8.7       Recent Labs     12/03/22  1822 12/05/22  0747 12/06/22  0634   ALKPHOS 109* 87 81   PROT 6.9 5.8* 5.7*   LABALBU 3.6 3.0* 3.0*   BILITOT 0.5 0.4 0.4   AST 46* 24 20   ALT 27 16 13       Recent Labs     12/04/22  0800 12/05/22  0747 12/06/22  0634   WBC 6.8 5.5 6.1   RBC 3.54 3.83 3.61   HGB 10.8* 11.8 10.7*   HCT 32.1* 35.4 32.4*   MCV 90.7 92.4 89.8   MCH 30.5 30.8 29.6   MCHC 33.6 33.3 33.0   RDW 12.2 12.3 12.2    393 447   MPV 10.1 9.7 9.5       Radiology:   Fluoroscopy modified barium swallow with video   Final Result   Swallowing mechanism grossly within normal limits without evidence of   aspiration. Please see separate speech pathology report for full discussion of findings   and recommendations. XR CHEST PORTABLE   Final Result   No acute process. CT CHEST W CONTRAST    (Results Pending)   CT ABDOMEN PELVIS W IV CONTRAST Additional Contrast? Oral    (Results Pending)       Assessment:  Principal Problem:    Influenza A  Resolved Problems:    * No resolved hospital problems.  *      Plan:  UTI  -UA with LE and rare bacteria  -Urine culture growing mixed gram positive results so far - FU final result  -Was already started on ceftriaxone, today day 3  -Patient afebrile and no leukocytosis, continue to monitor    Influenza A  -Positive on 11/30 from [facility]  -Supportive care, not in range for tamiflu    Acute on chronic dysphagia  -Has had chronic dysphagia but has acutely worsened  -Not able to take in any PO  -Consult speech/swallow and GI for evaluation, GI planning on EGD today - FU results  -Patient may need PEG tube, but have evaluation first  -Keep on IVF for now while NPO    Unintentional weight loss  -Patient reports 20-30 lb weight loss over past few months  -May be related to dysphagia but also has some abnormal CT findings of colon in May that need to be further investigated  -GI on board, repeating CT and getting tumor markers  -FU results and further recommendations    Colonic wall thickening   -Seen on CT in May, GI to repeat CT today and also recommending colonoscopy at some point  -Tumor markers ordered per GI    Dilated pancreatic bile duct  -Seen on CT in May, being repeated  -Depending on results of CT, consider MRI/MRCP per GI    Stroke with residual aphasia  -As above    DM  -Home meds janumet and semaglutide, holding both  -Continue ISS with hypoglycemic protocol    HTN  -Continue home amlodipine and lisinopril   -BP elevated as is not taking PO meds, added on IV metoprolol 5mg QID  -Today is still somewhat elevated but improved from yesterday     HLD  -Continue home statin      NOTE: This report was transcribed using voice recognition software. Every effort was made to ensure accuracy; however, inadvertent computerized transcription errors may be present.      Electronically signed by Edenilson Fink MD on 12/6/2022 at 1:17 PM

## 2022-12-06 NOTE — PLAN OF CARE
Problem: Pain  Goal: Verbalizes/displays adequate comfort level or baseline comfort level  12/6/2022 0701 by Lyubov Lopez RN  Outcome: Progressing  12/5/2022 1827 by Mirta Severs, RN  Outcome: Progressing     Problem: Safety - Adult  Goal: Free from fall injury  12/6/2022 0701 by Lyubov Lopez RN  Outcome: Progressing  12/5/2022 1827 by Mirta Severs, RN  Outcome: Progressing     Problem: Cardiovascular - Adult  Goal: Maintains optimal cardiac output and hemodynamic stability  12/6/2022 0701 by Lyubov Lopez RN  Outcome: Progressing  12/5/2022 1827 by Mirta Severs, RN  Outcome: Progressing  Goal: Absence of cardiac dysrhythmias or at baseline  12/5/2022 1827 by Mirta Severs, RN  Outcome: Progressing

## 2022-12-06 NOTE — PROGRESS NOTES
Cardiology  Progress Note      SUBJECTIVE: Patient was getting echocardiogram when I came to see her. She did not appear in acute distress.       Current Inpatient Medications  Current Facility-Administered Medications: potassium bicarb-citric acid (EFFER-K) effervescent tablet 50 mEq, 50 mEq, Oral, Once  ipratropium-albuterol (DUONEB) nebulizer solution 1 ampule, 1 ampule, Inhalation, Q4H WA  pantoprazole (PROTONIX) 40 mg in sodium chloride (PF) 0.9 % 10 mL injection, 40 mg, IntraVENous, Daily  hydrALAZINE (APRESOLINE) injection 10 mg, 10 mg, IntraVENous, Q6H PRN  metoprolol (LOPRESSOR) injection 5 mg, 5 mg, IntraVENous, 4 times per day  sodium chloride flush 0.9 % injection 10 mL, 10 mL, IntraVENous, 2 times per day  sodium chloride flush 0.9 % injection 10 mL, 10 mL, IntraVENous, PRN  0.9 % sodium chloride infusion, , IntraVENous, PRN  enoxaparin (LOVENOX) injection 40 mg, 40 mg, SubCUTAneous, Daily  promethazine (PHENERGAN) tablet 12.5 mg, 12.5 mg, Oral, Q6H PRN **OR** ondansetron (ZOFRAN) injection 4 mg, 4 mg, IntraVENous, Q6H PRN  polyethylene glycol (GLYCOLAX) packet 17 g, 17 g, Oral, Daily PRN  acetaminophen (TYLENOL) tablet 650 mg, 650 mg, Oral, Q6H PRN **OR** acetaminophen (TYLENOL) suppository 650 mg, 650 mg, Rectal, Q6H PRN  cefTRIAXone (ROCEPHIN) 1,000 mg in sterile water 10 mL IV syringe, 1,000 mg, IntraVENous, Q24H  amLODIPine (NORVASC) tablet 5 mg, 5 mg, Oral, Daily  venlafaxine (EFFEXOR XR) extended release capsule 150 mg, 150 mg, Oral, Daily  atorvastatin (LIPITOR) tablet 40 mg, 40 mg, Oral, Daily  gabapentin (NEURONTIN) capsule 100 mg, 100 mg, Oral, BID  lisinopril (PRINIVIL;ZESTRIL) tablet 30 mg, 30 mg, Oral, Daily  0.9 % sodium chloride infusion, , IntraVENous, Continuous  insulin lispro (HUMALOG) injection vial 0-4 Units, 0-4 Units, SubCUTAneous, TID WC  insulin lispro (HUMALOG) injection vial 0-4 Units, 0-4 Units, SubCUTAneous, Nightly  glucose chewable tablet 16 g, 4 tablet, Oral, PRN  dextrose bolus 10% 125 mL, 125 mL, IntraVENous, PRN **OR** dextrose bolus 10% 250 mL, 250 mL, IntraVENous, PRN  glucagon (rDNA) injection 1 mg, 1 mg, SubCUTAneous, PRN  dextrose 10 % infusion, , IntraVENous, Continuous PRN  acetylcysteine (MUCOMYST) 10 % solution 400 mg, 4 mL, Inhalation, Q4H      Physical  VITALS:  BP (!) 172/95   Pulse 89   Temp 97.8 °F (36.6 °C) (Axillary)   Resp 20   Ht 5' 7\" (1.702 m)   Wt 132 lb (59.9 kg)   SpO2 93%   BMI 20.67 kg/m²   CURRENT TEMPERATURE:  Temp: 97.8 °F (36.6 °C)  CONSTITUTIONAL: No acute distress. EYES: Vision is intact. ENT: No sore throat. No ear drainage. NECK: No JVD. BACK: Symmetric. LUNGS:  diminished breath sounds left base  CARDIOVASCULAR:  normal S1 and S2, no S3, and no S4  ABDOMEN:  non-tender  NEUROLOGIC: No focal deficits in upper or lower extremities. Expressive aphasia. EXTREMITIES: No edema cyanosis or clubbing. DATA:      ECG:  I have reviewed EKG with the following interpretation:  normal sinus rhythm    Cardiology Labs:  BMP:    Lab Results   Component Value Date/Time     12/06/2022 06:34 AM    K 3.4 12/06/2022 06:34 AM    K 3.5 12/05/2022 07:47 AM     12/06/2022 06:34 AM    CO2 19 12/06/2022 06:34 AM    BUN 5 12/06/2022 06:34 AM     CBC:    Lab Results   Component Value Date/Time    WBC 6.1 12/06/2022 06:34 AM    RBC 3.61 12/06/2022 06:34 AM    HGB 10.7 12/06/2022 06:34 AM    HCT 32.4 12/06/2022 06:34 AM    MCV 89.8 12/06/2022 06:34 AM    RDW 12.2 12/06/2022 06:34 AM     12/06/2022 06:34 AM     PT/INR:  No results found for: PTINR  TROPONIN:  No components found for: TROP    ASSESSMENT    1. Elevation of high-sensitivity troponin. This can be demand ischemia from her cough and influenza A with type 2 non-STEMI. She has no complaints of chest pain. EKG is not showing acute ischemic changes. I took a glance at the echocardiogram and it is showing normal left ventricular systolic function.   At this point, I do not see need for further coronary workup unless there is a change in her cardiac status. We may consider coronary work-up down the road if we feel it is needed. 2.  Influenza A.    3.  Type 2 diabetes. 4.  Hyperlipidemia.

## 2022-12-07 ENCOUNTER — ANESTHESIA EVENT (OUTPATIENT)
Dept: OPERATING ROOM | Age: 67
End: 2022-12-07
Payer: MEDICARE

## 2022-12-07 LAB
AFP-TUMOR MARKER: 5 NG/ML (ref 0–9)
CA 19-9: 212 U/ML
METER GLUCOSE: 140 MG/DL (ref 74–99)
METER GLUCOSE: 149 MG/DL (ref 74–99)
METER GLUCOSE: 73 MG/DL (ref 74–99)
METER GLUCOSE: 75 MG/DL (ref 74–99)
URINE CULTURE, ROUTINE: NORMAL

## 2022-12-07 PROCEDURE — 6370000000 HC RX 637 (ALT 250 FOR IP): Performed by: INTERNAL MEDICINE

## 2022-12-07 PROCEDURE — 2700000000 HC OXYGEN THERAPY PER DAY

## 2022-12-07 PROCEDURE — C9113 INJ PANTOPRAZOLE SODIUM, VIA: HCPCS | Performed by: NURSE PRACTITIONER

## 2022-12-07 PROCEDURE — 6360000002 HC RX W HCPCS: Performed by: INTERNAL MEDICINE

## 2022-12-07 PROCEDURE — 82962 GLUCOSE BLOOD TEST: CPT

## 2022-12-07 PROCEDURE — 94640 AIRWAY INHALATION TREATMENT: CPT

## 2022-12-07 PROCEDURE — 1200000000 HC SEMI PRIVATE

## 2022-12-07 PROCEDURE — 2580000003 HC RX 258: Performed by: INTERNAL MEDICINE

## 2022-12-07 PROCEDURE — 6370000000 HC RX 637 (ALT 250 FOR IP): Performed by: STUDENT IN AN ORGANIZED HEALTH CARE EDUCATION/TRAINING PROGRAM

## 2022-12-07 PROCEDURE — 2580000003 HC RX 258: Performed by: STUDENT IN AN ORGANIZED HEALTH CARE EDUCATION/TRAINING PROGRAM

## 2022-12-07 PROCEDURE — 99233 SBSQ HOSP IP/OBS HIGH 50: CPT | Performed by: STUDENT IN AN ORGANIZED HEALTH CARE EDUCATION/TRAINING PROGRAM

## 2022-12-07 PROCEDURE — 2500000003 HC RX 250 WO HCPCS: Performed by: STUDENT IN AN ORGANIZED HEALTH CARE EDUCATION/TRAINING PROGRAM

## 2022-12-07 PROCEDURE — 86316 IMMUNOASSAY TUMOR OTHER: CPT

## 2022-12-07 PROCEDURE — 6360000002 HC RX W HCPCS: Performed by: NURSE PRACTITIONER

## 2022-12-07 PROCEDURE — 2580000003 HC RX 258: Performed by: NURSE PRACTITIONER

## 2022-12-07 PROCEDURE — A4216 STERILE WATER/SALINE, 10 ML: HCPCS | Performed by: NURSE PRACTITIONER

## 2022-12-07 PROCEDURE — 92526 ORAL FUNCTION THERAPY: CPT | Performed by: SPEECH-LANGUAGE PATHOLOGIST

## 2022-12-07 RX ORDER — DEXTROSE AND SODIUM CHLORIDE 5; .45 G/100ML; G/100ML
INJECTION, SOLUTION INTRAVENOUS CONTINUOUS
Status: DISCONTINUED | OUTPATIENT
Start: 2022-12-07 | End: 2022-12-08

## 2022-12-07 RX ADMIN — LISINOPRIL 30 MG: 20 TABLET ORAL at 09:45

## 2022-12-07 RX ADMIN — ACETAMINOPHEN 650 MG: 325 TABLET ORAL at 13:42

## 2022-12-07 RX ADMIN — GABAPENTIN 100 MG: 100 CAPSULE ORAL at 09:45

## 2022-12-07 RX ADMIN — Medication 10 ML: at 09:48

## 2022-12-07 RX ADMIN — ENOXAPARIN SODIUM 40 MG: 100 INJECTION SUBCUTANEOUS at 09:33

## 2022-12-07 RX ADMIN — GABAPENTIN 100 MG: 100 CAPSULE ORAL at 23:00

## 2022-12-07 RX ADMIN — ATORVASTATIN CALCIUM 40 MG: 40 TABLET, FILM COATED ORAL at 09:45

## 2022-12-07 RX ADMIN — IPRATROPIUM BROMIDE AND ALBUTEROL SULFATE 1 AMPULE: .5; 2.5 SOLUTION RESPIRATORY (INHALATION) at 09:57

## 2022-12-07 RX ADMIN — METOPROLOL TARTRATE 5 MG: 5 INJECTION, SOLUTION INTRAVENOUS at 09:32

## 2022-12-07 RX ADMIN — METOPROLOL TARTRATE 5 MG: 5 INJECTION, SOLUTION INTRAVENOUS at 23:09

## 2022-12-07 RX ADMIN — METOPROLOL TARTRATE 5 MG: 5 INJECTION, SOLUTION INTRAVENOUS at 03:30

## 2022-12-07 RX ADMIN — ACETYLCYSTEINE 400 MG: 100 INHALANT RESPIRATORY (INHALATION) at 09:57

## 2022-12-07 RX ADMIN — DEXTROSE AND SODIUM CHLORIDE: 5; 450 INJECTION, SOLUTION INTRAVENOUS at 12:21

## 2022-12-07 RX ADMIN — METOPROLOL TARTRATE 5 MG: 5 INJECTION, SOLUTION INTRAVENOUS at 16:54

## 2022-12-07 RX ADMIN — AMLODIPINE BESYLATE 5 MG: 5 TABLET ORAL at 09:45

## 2022-12-07 RX ADMIN — Medication 10 ML: at 23:07

## 2022-12-07 RX ADMIN — DEXTROSE AND SODIUM CHLORIDE: 5; 450 INJECTION, SOLUTION INTRAVENOUS at 23:33

## 2022-12-07 RX ADMIN — SODIUM CHLORIDE 40 MG: 9 INJECTION, SOLUTION INTRAMUSCULAR; INTRAVENOUS; SUBCUTANEOUS at 09:35

## 2022-12-07 RX ADMIN — VENLAFAXINE HYDROCHLORIDE 150 MG: 150 CAPSULE, EXTENDED RELEASE ORAL at 09:45

## 2022-12-07 ASSESSMENT — PAIN - FUNCTIONAL ASSESSMENT: PAIN_FUNCTIONAL_ASSESSMENT: PREVENTS OR INTERFERES SOME ACTIVE ACTIVITIES AND ADLS

## 2022-12-07 ASSESSMENT — PAIN SCALES - GENERAL
PAINLEVEL_OUTOF10: 7
PAINLEVEL_OUTOF10: 5
PAINLEVEL_OUTOF10: 0

## 2022-12-07 ASSESSMENT — PAIN DESCRIPTION - DESCRIPTORS
DESCRIPTORS: ACHING;DISCOMFORT;DULL
DESCRIPTORS: ACHING;DISCOMFORT;DULL

## 2022-12-07 ASSESSMENT — PAIN DESCRIPTION - ORIENTATION
ORIENTATION: LOWER
ORIENTATION: MID

## 2022-12-07 ASSESSMENT — PAIN DESCRIPTION - LOCATION
LOCATION: BACK
LOCATION: HEAD

## 2022-12-07 NOTE — PROGRESS NOTES
Cardiology  Progress Note      SUBJECTIVE:  No chest pain. No dyspnea. No acute distress.     Current Inpatient Medications  Current Facility-Administered Medications: dextrose 5 % and 0.45 % sodium chloride infusion, , IntraVENous, Continuous  [START ON 12/8/2022] ceFAZolin (ANCEF) 2,000 mg in sterile water 20 mL IV syringe, 2,000 mg, IntraVENous, On Call to OR  potassium bicarb-citric acid (EFFER-K) effervescent tablet 50 mEq, 50 mEq, Oral, Once  ipratropium-albuterol (DUONEB) nebulizer solution 1 ampule, 1 ampule, Inhalation, Q4H WA  pantoprazole (PROTONIX) 40 mg in sodium chloride (PF) 0.9 % 10 mL injection, 40 mg, IntraVENous, Daily  hydrALAZINE (APRESOLINE) injection 10 mg, 10 mg, IntraVENous, Q6H PRN  metoprolol (LOPRESSOR) injection 5 mg, 5 mg, IntraVENous, 4 times per day  sodium chloride flush 0.9 % injection 10 mL, 10 mL, IntraVENous, 2 times per day  sodium chloride flush 0.9 % injection 10 mL, 10 mL, IntraVENous, PRN  0.9 % sodium chloride infusion, , IntraVENous, PRN  enoxaparin (LOVENOX) injection 40 mg, 40 mg, SubCUTAneous, Daily  promethazine (PHENERGAN) tablet 12.5 mg, 12.5 mg, Oral, Q6H PRN **OR** ondansetron (ZOFRAN) injection 4 mg, 4 mg, IntraVENous, Q6H PRN  polyethylene glycol (GLYCOLAX) packet 17 g, 17 g, Oral, Daily PRN  acetaminophen (TYLENOL) tablet 650 mg, 650 mg, Oral, Q6H PRN **OR** acetaminophen (TYLENOL) suppository 650 mg, 650 mg, Rectal, Q6H PRN  amLODIPine (NORVASC) tablet 5 mg, 5 mg, Oral, Daily  venlafaxine (EFFEXOR XR) extended release capsule 150 mg, 150 mg, Oral, Daily  atorvastatin (LIPITOR) tablet 40 mg, 40 mg, Oral, Daily  gabapentin (NEURONTIN) capsule 100 mg, 100 mg, Oral, BID  lisinopril (PRINIVIL;ZESTRIL) tablet 30 mg, 30 mg, Oral, Daily  insulin lispro (HUMALOG) injection vial 0-4 Units, 0-4 Units, SubCUTAneous, TID WC  insulin lispro (HUMALOG) injection vial 0-4 Units, 0-4 Units, SubCUTAneous, Nightly  glucose chewable tablet 16 g, 4 tablet, Oral, PRN  dextrose bolus 10% 125 mL, 125 mL, IntraVENous, PRN **OR** dextrose bolus 10% 250 mL, 250 mL, IntraVENous, PRN  glucagon (rDNA) injection 1 mg, 1 mg, SubCUTAneous, PRN  dextrose 10 % infusion, , IntraVENous, Continuous PRN  acetylcysteine (MUCOMYST) 10 % solution 400 mg, 4 mL, Inhalation, Q4H      Physical  VITALS:  BP (!) 164/85   Pulse 85   Temp 98 °F (36.7 °C)   Resp 16   Ht 5' 7\" (1.702 m)   Wt 132 lb (59.9 kg)   SpO2 96%   BMI 20.67 kg/m²   CURRENT TEMPERATURE:  Temp: 98 °F (36.7 °C)  CONSTITUTIONAL: No acute distress. EYES: Vision is intact. ENT: No sore throat. No ear drainage. NECK: No JVD. BACK: Symmetric. LUNGS:  clear to auscultation  CARDIOVASCULAR:  normal S1 and S2, no S3, and no S4  ABDOMEN:  non-tender  NEUROLOGIC: No focal deficits in upper or lower extremities. Expressive aphasia  EXTREMITIES: No edema cyanosis or clubbing. DATA:      ECG:  I have reviewed EKG with the following interpretation:  normal sinus rhythm    Cardiology Labs:  BMP:    Lab Results   Component Value Date/Time     12/06/2022 06:34 AM    K 3.4 12/06/2022 06:34 AM    K 3.5 12/05/2022 07:47 AM     12/06/2022 06:34 AM    CO2 19 12/06/2022 06:34 AM    BUN 5 12/06/2022 06:34 AM     CBC:    Lab Results   Component Value Date/Time    WBC 6.1 12/06/2022 06:34 AM    RBC 3.61 12/06/2022 06:34 AM    HGB 10.7 12/06/2022 06:34 AM    HCT 32.4 12/06/2022 06:34 AM    MCV 89.8 12/06/2022 06:34 AM    RDW 12.2 12/06/2022 06:34 AM     12/06/2022 06:34 AM     PT/INR:  No results found for: PTINR  TROPONIN:  No components found for: TROP    ASSESSMENT    1. Elevation of high-sensitivity troponin. This can be demand ischemia from her cough and influenza A with type 2 non-STEMI. She has no complaints of chest pain. EKG is not showing acute ischemic changes. I took a glance at the echocardiogram and it is showing normal left ventricular systolic function.   At this point, I do not see need for further coronary workup unless there is a change in her cardiac status. We may consider coronary work-up down the road if we feel it is needed. 2.  Influenza A.    3.  Type 2 diabetes. 4.  Hyperlipidemia.

## 2022-12-07 NOTE — PROGRESS NOTES
PROGRESS NOTE  By Sierra Elaine M.D. [facility]  Dr. Giacomo Harper M.D.,  Dr. Benna Skiff, M.D.,   Dr. Bianka Carey D.O.,  Dr. Amber Sanchez M.D.,  Dr. Sadi Shannon D.O.,          Elaina Ramos  74 y.o.  female    SUBJECTIVE:  Denies abdominal pain    OBJECTIVE:    BP (!) 188/84   Pulse 83   Temp 98 °F (36.7 °C)   Resp 16   Ht 5' 7\" (1.702 m)   Wt 132 lb (59.9 kg)   SpO2 96%   BMI 20.67 kg/m²     General: NAD/ female. AAOx3  HEENT: Anicteric sclera/moist oral mucosa  Neck: Trachea midline/no JVD  Chest: Symmetric excursion/nonlabored respirations  Cor:RRR/S1S2  Abd.:Soft/NT/ND  Extr.:  Creased muscle tone and bulk throughout, consistent with age and condition  Skin: Warm and dry. Anicteric  Other: Dysarthria      DATA:    Monitor data reviewed -sinus rhythm noted.     Stool (measured) : 0 mL  Lab Results   Component Value Date/Time    WBC 6.1 12/06/2022 06:34 AM    RBC 3.61 12/06/2022 06:34 AM    HGB 10.7 12/06/2022 06:34 AM    HCT 32.4 12/06/2022 06:34 AM    MCV 89.8 12/06/2022 06:34 AM    MCH 29.6 12/06/2022 06:34 AM    MCHC 33.0 12/06/2022 06:34 AM    RDW 12.2 12/06/2022 06:34 AM     12/06/2022 06:34 AM    MPV 9.5 12/06/2022 06:34 AM     Lab Results   Component Value Date/Time     12/06/2022 06:34 AM    K 3.4 12/06/2022 06:34 AM    K 3.5 12/05/2022 07:47 AM     12/06/2022 06:34 AM    CO2 19 12/06/2022 06:34 AM    BUN 5 12/06/2022 06:34 AM    CREATININE 0.5 12/06/2022 06:34 AM    CREATININE 0.9 05/03/2019 12:00 AM    CALCIUM 8.7 12/06/2022 06:34 AM    PROT 5.7 12/06/2022 06:34 AM    LABALBU 3.0 12/06/2022 06:34 AM    BILITOT 0.4 12/06/2022 06:34 AM    ALKPHOS 81 12/06/2022 06:34 AM    AST 20 12/06/2022 06:34 AM    ALT 13 12/06/2022 06:34 AM     Lab Results   Component Value Date/Time    LIPASE 53 12/06/2022 06:34 AM     No results found for: AMYLASE      ASSESSMENT/PLAN:  Patient Active Problem List   Diagnosis    Right foot infection    Diabetic ulcer of left foot associated with type 1 diabetes mellitus ([facility]    Type 2 diabetes mellitus without complication, without long-term current use of insulin (HCC)    Essential hypertension    Gastroesophageal reflux disease without esophagitis    Mixed hyperlipidemia    Subacute osteomyelitis of foot (HCC)    Dysarthria    Hemorrhagic stroke (HCC)    Altered mental status    Lactic acidosis    Muscle function loss    Impaired functional mobility, balance, gait, and endurance    Diabetic peripheral neuropathy (HCC)    Peroneal neuropathy at knee, left    Left lumbosacral radiculopathy    Pain in joint, shoulder region    Type II diabetes mellitus with peripheral circulatory disorder (HCC)    PVD (peripheral vascular disease) ([facility]    Influenza A     Dysphagia  -History of CVA  -Previously considered for PEG placement at [facility]  -Discussed with speech therapy  -Unremarkable EGD yesterday with dilatation.  -Recent diagnosis of ALS -see discussion below       2. Weight loss  -Patient describes unintentional weight loss of 20 to 30 pounds over the past few months  -EGD as above  -Colonoscopy 5/15/2019 showing 2 polyps in the rectum, hyperplastic  -Previous CT abdomen pelvis 5/27/2022 showing dilated pancreatic duct, atrophy of the pancreas, abnormal wall thickening of the descending through the rectum  -Repeat CT chest/abdomen/pelvis consistent with pancreatic mass  -Abnormal CT colon/pancreas/bile ducts evaluation as below     3. Abnormal CT colon/dilated bile ducts  -CT abdomen pelvis 5/27/2022 showing abnormal wall thickening from the descending through sigmoid colon  -Obtain consultation with Dr. Ellie Campbell for EUS/FNA    4. Diarrhea  -Stool studies thus far negative  -Plan for outpatient colonoscopy after establishing a reliable route for prep     5. History of colon polyps  -Colonoscopy 5/15/2019 showing 2 polyps in the rectum, hyperplastic  -Colonoscopy as above7.   Comorbidities  -CVA, hypertension, dyslipidemia, diabetes, thyroid disease  -Per admitting     Above has been discussed with the patient. I have explained findings of pancreatic mass with need for further work-up with endoscopic ultrasound/FNA. I also explained poor oral intake and recommendation for PEG tube placement. Patient verbalized understanding. She states that her neurologist has mentioned need for PEG tube placement. At this time,will obtain the above described consultation with Dr. Royden Boast for evaluation of pancreatic mass as well as for PEG tube placement. Please, see orders for plan of care. Bridger Last MD  12/7/2022  10:17 AM    NOTE:  This report was transcribed using voice recognition software. Every effort was made to ensure accuracy; however, inadvertent computerized transcription errors may be present.

## 2022-12-07 NOTE — CONSULTS
HEPATOBILIARY AND PANCREATIC SURGERY  CONSULT NOTE  12/7/2022    Physician Consulted: Dr. Jorge Jones  Reason for Consult: Pancreatic mass  Referring Physician: Dr. Edenilson MARTINEZ  Elaina Ramos is a 74 y.o. female who was admitted on 12/3 for flulike symptoms with progressive dysphagia and generalized weakness. She has a past medical history of CVA with left her with right-sided weakness along with diabetes and a previous esophageal stricture. She had a CT scan 12/6 which showed distal pancreatic lesions when talking to her she does states she has been having about 30 pound weight loss over the last few months and does endorse chronic back pain. She denies any personal history of pancreatic cancer or family history of pancreatic cancer. Denies smoking cigarettes, or drink alcohol. Past Medical History:   Diagnosis Date    Abnormal mammogram of left breast 05/2019    Anxiety     Cerebral artery occlusion with cerebral infarction [facility])     Depression     Diabetes mellitus ([facility]     Diabetic peripheral neuropathy ([facility] 9/1/2022    Dysarthria 5/12/2021    Erosive esophagitis     Esophageal stricture     GERD (gastroesophageal reflux disease)     Gestational diabetes     Hemorrhagic stroke ([facility] 5/12/2021    Hyperlipidemia     Hypertension     Hypokalemia     Left lumbosacral radiculopathy 9/1/2022    Osteoarthritis     knees    Pain in joint, shoulder region 10/28/2022    bilat.     Peroneal neuropathy at knee, left 9/1/2022    Postmenopausal     Type 2 diabetes mellitus without complication [facility])        Past Surgical History:   Procedure Laterality Date    BREAST BIOPSY Left 05/2019    Dr. Raina Bradley      x4    CHOLECYSTECTOMY      COLONOSCOPY  11/2011    HYSTERECTOMY (CERVIX STATUS UNKNOWN)      JOINT REPLACEMENT Right     knee    UPPER GASTROINTESTINAL ENDOSCOPY N/A 12/6/2022    EGD ESOPHAGOGASTRODUODENOSCOPY DILATATION performed by Hina Pike MD at [place] [id number]       Medications Prior to Admission    Prior to Admission medications    Medication Sig Start Date End Date Taking? Authorizing Provider   naproxen (NAPROSYN) 500 MG tablet Take 1 tablet by mouth 2 times daily (with meals) 11/21/22   JAYDEN Gonzales   gabapentin (NEURONTIN) 100 MG capsule TAKE 1 CAPSULE BY MOUTH 2 TIMES DAILY FOR 30 DAYS. INTENDED SUPPLY: 30 DAYS 11/4/22 12/4/22  Padmini Banegas DO   atorvastatin (LIPITOR) 40 MG tablet TAKE 1 TABLET DAILY 10/6/22   Padmini Banegas DO   Semaglutide (RYBELSUS) 7 MG TABS Take 7 mg by mouth daily Start this after completing the 3mg dose.  11/6/22   Padmini Banegas DO   zoster recombinant adjuvanted vaccine [facility]) 50 MCG/0.5ML SUSR injection Inject 0.5 mLs into the muscle See Admin Instructions 1 dose now and repeat in 2-6 months 7/28/22 1/24/23  Padmini Banegas DO   amLODIPine (NORVASC) 5 MG tablet TAKE 1 TABLET DAILY IN THE MORNING 7/28/22   Padmini Banegas DO   atenolol (TENORMIN) 50 MG tablet TAKE 1 TABLET DAILY 7/28/22   Padmini Banegas DO   esomeprazole (NEXIUM) 40 MG delayed release capsule TAKE 1 CAPSULE EVERY MORNING BEFORE BREAKFAST 7/28/22   Padmini Banegas DO   lisinopril (PRINIVIL;ZESTRIL) 30 MG tablet TAKE 1 TABLET DAILY AT NIGHT 7/28/22   Padmini Banegas DO   sitaGLIPtan-metFORMIN (JANUMET)  MG per tablet Take 1 tablet by mouth in the morning. 7/28/22   Padmini Banegas DO   venlafaxine (EFFEXOR XR) 150 MG extended release capsule TAKE 1 CAPSULE DAILY 7/28/22   Padmini Banegas DO   Multiple Vitamin (MULTI-VITAMIN DAILY PO) Take by mouth    Historical Provider, MD       No Known Allergies    Family History   Problem Relation Age of Onset    Diabetes Mother         complication     Coronary Art Dis Father     Rheum Arthritis Sister     Mult Sclerosis Brother     Diabetes Brother        Social History     Tobacco Use    Smoking status: Never Smokeless tobacco: Never   Substance Use Topics    Alcohol use: No    Drug use: No         Review of Systems: She complains of generalized weakness, dysphagia 30 pound weight loss, back pain  She denies fevers, chills, chest pain, shortness of breath      PHYSICAL EXAM:    BP (!) 181/87   Pulse 71   Temp 98 °F (36.7 °C) (Oral)   Resp 16   Ht 5' 7\" (1.702 m)   Wt 132 lb (59.9 kg)   SpO2 94%   BMI 20.67 kg/m²       GENERAL: Awake follows all commands, oriented  HEAD:  Normocephalic. Atraumatic. EYES:   No scleral icterus.    LUNGS:  No increased work of breathing   CARDIOVASCULAR: RR  ABDOMEN: Lower midline scar, soft, nontender, nondistended  EXTREMITIES:   Right upper extremity weakness secondary to previous stroke  SKIN:  Warm and dry  NEUROLOGIC:  GCS 15      ASSESSMENT/PLAN:  74 y.o. female with a 3cm pancreatic body mass with encasement of the splenic artery concerning for pancreatic adenocarcinoma    Diet as tolerated  N.p.o. after midnight for PEG/EUS  CT chest, abdomen pelvis reviewed  Will need Oncology consulted  Tumor markers pending    Electronically signed by Aye Villar MD on 12/8/2022 at 8:52 AM

## 2022-12-07 NOTE — CARE COORDINATION
ADDENDUM: 12/7/20222:41 PM pt for PEG TUBE placement tomorrow. Referral made to Toribio Gomez with [facility], she will check pts benefits for home tube feed, relays start of care is Saturday 12-10-22. Will need TUBE FEED ORDERS, (will need to know if peg tube is the new ENFIT) will need HHC orders. Pt resides with her spouse and her son, pt has dysphagia. SW will follow. KRYSTEN Kent    Ss note:12/7/20221:40 PM Met with pt and her spouse Semaj Chi present in room. Spouse relays their son Kaylin Yen  [phone] resides with them also, he is not working at this time. Home is one story. PTA pt independent, has a walker that spouse relays she does not need to use. Hx of CVA , pt has dysphasia. Pt for PEG TUBE placed on Thursday. NO hx of HHC or SNF. Discussed HHC choices and that some agencies not able to start right away. At this time receptive to [facility] or [facility], referral to [facility], will await response. Pt and family deny SNF placement, plan will be home and spouse and son willing to learn tube feeding. SW will follow.  KRYSTEN Kent

## 2022-12-07 NOTE — CONSULTS
GENERAL SURGERY  CONSULT NOTE  12/7/2022    Physician Consulted: Dr. Mikaela Siegel  Reason for Consult: EUS/PEG  Referring Physician: Dr. Nivia MARTINEZ  Vilma Green is a 74 y.o. female who was admitted on 12/3 for flulike symptoms with progressive dysphagia and generalized weakness. She has a past medical history of a stroke which left her with chronic aphasia and right-sided weakness along with diabetes and a previous esophageal stricture. She had an EGD on 12/6 which showed mild gastritis no stricture by Dr. Ivan Keating. She states that since her stroke she had been having difficulty swallowing however over the last few months it has progressed. She states that she has had 30 pound weight loss and is interested in a feeding tube placed. She also underwent a CT scan yesterday which showed distal pancreatic lesions. These were seen on her CT scan from May of this year as well however she states she has never had them worked up. She denies any personal history of breast cancer or pancreatic cancer. She denies any family history of pancreatic cancer. Past Medical History:   Diagnosis Date    Abnormal mammogram of left breast 05/2019    Anxiety     Cerebral artery occlusion with cerebral infarction [facility])     Depression     Diabetes mellitus ([facility] [address]     Diabetic peripheral neuropathy ([facility] [address] 9/1/2022    Dysarthria 5/12/2021    Erosive esophagitis     Esophageal stricture     GERD (gastroesophageal reflux disease)     Gestational diabetes     Hemorrhagic stroke ([facility] [address] 5/12/2021    Hyperlipidemia     Hypertension     Hypokalemia     Left lumbosacral radiculopathy 9/1/2022    Osteoarthritis     knees    Pain in joint, shoulder region 10/28/2022    bilat.     Peroneal neuropathy at knee, left 9/1/2022    Postmenopausal     Type 2 diabetes mellitus without complication [facility])        Past Surgical History:   Procedure Laterality Date    BREAST BIOPSY Left 05/2019    Dr. Natty Buchanan Dr[facility] [place]    CARPAL TUNNEL RELEASE Right      SECTION      x4    CHOLECYSTECTOMY      COLONOSCOPY  2011    HYSTERECTOMY (CERVIX STATUS UNKNOWN)      JOINT REPLACEMENT Right     knee    UPPER GASTROINTESTINAL ENDOSCOPY N/A 2022    EGD ESOPHAGOGASTRODUODENOSCOPY DILATATION performed by Kenneth Calvin MD at [facility] 23       Medications Prior to Admission    Prior to Admission medications    Medication Sig Start Date End Date Taking? Authorizing Provider   naproxen (NAPROSYN) 500 MG tablet Take 1 tablet by mouth 2 times daily (with meals) 22   JAYDEN Morales   gabapentin (NEURONTIN) 100 MG capsule TAKE 1 CAPSULE BY MOUTH 2 TIMES DAILY FOR 30 DAYS. INTENDED SUPPLY: 30 DAYS 22  Honey Pina DO   atorvastatin (LIPITOR) 40 MG tablet TAKE 1 TABLET DAILY 10/6/22   Honey Pina DO   Semaglutide (RYBELSUS) 7 MG TABS Take 7 mg by mouth daily Start this after completing the 3mg dose.  22   Honey Pina DO   zoster recombinant adjuvanted vaccine [facility]) 50 MCG/0.5ML SUSR injection Inject 0.5 mLs into the muscle See Admin Instructions 1 dose now and repeat in 2-6 months 22  Honey Pina,    amLODIPine (NORVASC) 5 MG tablet TAKE 1 TABLET DAILY IN THE MORNING 22   Honey Pina DO   atenolol (TENORMIN) 50 MG tablet TAKE 1 TABLET DAILY 22   Honey Pina DO   esomeprazole (NEXIUM) 40 MG delayed release capsule TAKE 1 CAPSULE EVERY MORNING BEFORE BREAKFAST 22   Honey Pina, DO   lisinopril (PRINIVIL;ZESTRIL) 30 MG tablet TAKE 1 TABLET DAILY AT NIGHT 22   Honey Pina, DO   sitaGLIPtan-metFORMIN (JANUMET)  MG per tablet Take 1 tablet by mouth in the morning. 22   Honey Pina DO   venlafaxine (EFFEXOR XR) 150 MG extended release capsule TAKE 1 CAPSULE DAILY 22   Honey Pina, DO   Multiple Vitamin (MULTI-VITAMIN DAILY PO) Take by mouth    Historical Provider, MD       No Known Allergies    Family History   Problem Relation Age of Onset    Diabetes Mother         complication     Coronary Art Dis Father     Rheum Arthritis Sister     Mult Sclerosis Brother     Diabetes Brother        Social History     Tobacco Use    Smoking status: Never    Smokeless tobacco: Never   Substance Use Topics    Alcohol use: No    Drug use: No         Review of Systems: pertinent ROS listed in HPI, all others negative       PHYSICAL EXAM:    Vitals:    12/07/22 1215   BP: (!) 164/85   Pulse: 85   Resp:    Temp:    SpO2:        GENERAL: Awake follows all commands, oriented  HEAD:  Normocephalic. Atraumatic. EYES:   No scleral icterus. LUNGS:  No increased work of breathing   CARDIOVASCULAR: RR  ABDOMEN: Lower midline scar, soft, nontender, nondistended  EXTREMITIES:   Right upper extremity weakness secondary to previous stroke  SKIN:  Warm and dry  NEUROLOGIC:  GCS 15      ASSESSMENT/PLAN:  74 y.o. female with progressive dysphagia, failure to thrive and incidental pancreatic tail lesions which appear stable from previous imaging    Ancef-on-call the OR  N.p.o. after midnight  We will plan for PEG tube placement for supplemental nutrition along with  EUS for evaluation of pancreatic tail lesions 12/8-risk first benefits were discussed with the patient and she agrees to proceed with procedure. Tumor markers Ca 19-9, CEA-2.1, Chromogranin A  We will also consult Dr. Aleida Gamble for evaluation of pancreatic lesions. Plan discussed with Dr. Renay Salgado.     Electronically signed by Mally Blackburn MD on 12/7/22 at 12:50 PM EST    General Surgery Progress Note  [facility]    Patient's Name/Date of Birth: Clearance Feliberto / 1955    Date: December 8, 2022     Surgeon: Renay Salgado MD    Chief Complaint: Dysphagia, pancreatic mass    Patient Active Problem List   Diagnosis    Right foot infection    Diabetic ulcer of left foot associated with type 1 diabetes mellitus ([facility] [address]    Type 2 diabetes mellitus without complication, without long-term current use of insulin (HCC)    Essential hypertension    Gastroesophageal reflux disease without esophagitis    Mixed hyperlipidemia    Subacute osteomyelitis of foot (HCC)    Dysarthria    Hemorrhagic stroke (HCC)    Altered mental status    Lactic acidosis    Muscle function loss    Impaired functional mobility, balance, gait, and endurance    Diabetic peripheral neuropathy (HCC)    Peroneal neuropathy at knee, left    Left lumbosacral radiculopathy    Pain in joint, shoulder region    Type II diabetes mellitus with peripheral circulatory disorder (HCC)    PVD (peripheral vascular disease) ([facility] [address]    Influenza A    Pancreatic adenocarcinoma (HCC)       Subjective: HPI as above.     Objective:  BP (!) 152/91   Pulse 77   Temp 97.8 °F (36.6 °C) (Infrared)   Resp 18   Ht 5' 7\" (1.702 m)   Wt 132 lb (59.9 kg)   SpO2 100%   BMI 20.67 kg/m²   Labs:  Recent Labs     12/06/22  0634 12/08/22  0536   WBC 6.1 6.1   HGB 10.7* 11.6   HCT 32.4* 33.9*     Lab Results   Component Value Date    CREATININE 0.4 (L) 12/08/2022    BUN <2 (L) 12/08/2022     12/08/2022    K 2.6 (LL) 12/08/2022     12/08/2022    CO2 22 12/08/2022     Recent Labs     12/06/22  0634   LIPASE 53     CBC with Differential:    Lab Results   Component Value Date/Time    WBC 6.1 12/08/2022 05:36 AM    RBC 3.87 12/08/2022 05:36 AM    HGB 11.6 12/08/2022 05:36 AM    HCT 33.9 12/08/2022 05:36 AM     12/08/2022 05:36 AM    MCV 87.6 12/08/2022 05:36 AM    MCH 30.0 12/08/2022 05:36 AM    MCHC 34.2 12/08/2022 05:36 AM    RDW 12.3 12/08/2022 05:36 AM    LYMPHOPCT 26.0 12/08/2022 05:36 AM    MONOPCT 9.2 12/08/2022 05:36 AM    MYELOPCT 0.9 12/03/2022 06:22 PM    BASOPCT 0.3 12/08/2022 05:36 AM    MONOSABS 0.56 12/08/2022 05:36 AM    LYMPHSABS 1.58 12/08/2022 05:36 AM    EOSABS 0.15 12/08/2022 05:36 AM    BASOSABS 0.02 12/08/2022 05:36 AM     CMP:    Lab Results   Component Value Date/Time     12/08/2022 05:36 AM    K 2.6 12/08/2022 05:36 AM    K 3.5 12/05/2022 07:47 AM     12/08/2022 05:36 AM    CO2 22 12/08/2022 05:36 AM    BUN <2 12/08/2022 05:36 AM    CREATININE 0.4 12/08/2022 05:36 AM    CREATININE 0.9 05/03/2019 12:00 AM    GFRAA >60 07/28/2022 12:00 PM    LABGLOM >60 12/08/2022 05:36 AM    GLUCOSE 156 12/08/2022 05:36 AM    PROT 5.7 12/06/2022 06:34 AM    LABALBU 3.0 12/06/2022 06:34 AM    CALCIUM 9.0 12/08/2022 05:36 AM    BILITOT 0.4 12/06/2022 06:34 AM    ALKPHOS 81 12/06/2022 06:34 AM    AST 20 12/06/2022 06:34 AM    ALT 13 12/06/2022 06:34 AM       General appearance:  NAD  Head: NCAT, PERRLA, EOMI, red conjunctiva  Neck: supple, no masses  Lungs: CTAB, equal chest rise bilateral  Heart: Reg rate  Abdomen: soft, nondistended, nontender  Skin; no lesions  Gu: no cva tenderness  Extremities: extremities normal, atraumatic, no cyanosis or edema      Assessment/Plan:  Jez Hernández is a 74 y.o. female with dysphagia, pancreatic mass    Proceed with EUS with biopsy along with EGD with PEG placement  -The procedure, risks, benefits and alternatives were discussed with patient. she   agrees to proceed.     Trevin Giordano MD  12/8/2022  4:28 PM

## 2022-12-07 NOTE — PROGRESS NOTES
[facility] [place] [address] Hospitalist   Progress Note    Admitting Date and Time: 12/3/2022  5:22 PM  Admit Dx: Hypomagnesemia [E83.42]  Influenza A [J10.1]  Elevated troponin [R77.8]    Subjective:    Pt states she feels the same today. Still coughing with liquids and difficulty swallowing. No SOB, no fevers/chills. History is limited due to aphasia. Per RN: Speech will be doing repeat swallow eval today    ROS: denies fever, chills, cp, sob, n/v, HA unless stated above.      potassium bicarb-citric acid  50 mEq Oral Once    ipratropium-albuterol  1 ampule Inhalation Q4H WA    pantoprazole (PROTONIX) 40 mg injection  40 mg IntraVENous Daily    metoprolol  5 mg IntraVENous 4 times per day    sodium chloride flush  10 mL IntraVENous 2 times per day    enoxaparin  40 mg SubCUTAneous Daily    cefTRIAXone (ROCEPHIN) IV  1,000 mg IntraVENous Q24H    amLODIPine  5 mg Oral Daily    venlafaxine  150 mg Oral Daily    atorvastatin  40 mg Oral Daily    gabapentin  100 mg Oral BID    lisinopril  30 mg Oral Daily    insulin lispro  0-4 Units SubCUTAneous TID WC    insulin lispro  0-4 Units SubCUTAneous Nightly    acetylcysteine  4 mL Inhalation Q4H     hydrALAZINE, 10 mg, Q6H PRN  sodium chloride flush, 10 mL, PRN  sodium chloride, , PRN  promethazine, 12.5 mg, Q6H PRN   Or  ondansetron, 4 mg, Q6H PRN  polyethylene glycol, 17 g, Daily PRN  acetaminophen, 650 mg, Q6H PRN   Or  acetaminophen, 650 mg, Q6H PRN  glucose, 4 tablet, PRN  dextrose bolus, 125 mL, PRN   Or  dextrose bolus, 250 mL, PRN  glucagon (rDNA), 1 mg, PRN  dextrose, , Continuous PRN       Objective:    BP (!) 188/84   Pulse 83   Temp 98 °F (36.7 °C)   Resp 16   Ht 5' 7\" (1.702 m)   Wt 132 lb (59.9 kg)   SpO2 96%   BMI 20.67 kg/m²       General Appearance: alert and oriented to person, place and time and in no acute distress  Skin: warm and dry  Head: normocephalic and atraumatic  Eyes: pupils equal, round, and reactive to light, extraocular eye movements intact, conjunctivae normal  Neck: neck supple and non tender without mass   Pulmonary/Chest: clear to auscultation bilaterally- no wheezes, rales or rhonchi, normal air movement, no respiratory distress but frequent coughing  Cardiovascular: normal rate, normal S1 and S2 and no carotid bruits  Abdomen: soft, non-tender, non-distended, normal bowel sounds, no masses or organomegaly  Extremities: no cyanosis, no clubbing and no edema  Neurologic: no cranial nerve deficit noted but aphasic speech present      Recent Labs     12/05/22  0747 12/06/22  0634    140   K 3.5 3.4*   * 106   CO2 18* 19*   BUN 7 5*   CREATININE 0.5 0.5   GLUCOSE 76 85   CALCIUM 8.7 8.7       Recent Labs     12/05/22  0747 12/06/22  0634   ALKPHOS 87 81   PROT 5.8* 5.7*   LABALBU 3.0* 3.0*   BILITOT 0.4 0.4   AST 24 20   ALT 16 13       Recent Labs     12/05/22  0747 12/06/22  0634   WBC 5.5 6.1   RBC 3.83 3.61   HGB 11.8 10.7*   HCT 35.4 32.4*   MCV 92.4 89.8   MCH 30.8 29.6   MCHC 33.3 33.0   RDW 12.3 12.2    447   MPV 9.7 9.5       Radiology:   CT CHEST W CONTRAST   Final Result   Abnormal wall thickening identified of the sigmoid colon to suggest a   colitis. Correlation to clinical symptoms is recommended. Consider   colonoscopy if clinically indicated. Abnormal low-attenuation focus seen in the distal body and tail the pancreas   with posterior pancreatic ductal dilatation. Findings concerning for an   underlying malignancy. No significant evidence of metastatic disease. Chronic changes seen within the lung fields bilaterally. There is bronchial   wall thickening identified the perihilar regions extending into the lower   lung fields suggesting atelectatic change or infiltrate. Clinical   correlation is needed. CT ABDOMEN PELVIS W IV CONTRAST Additional Contrast? None   Final Result   Abnormal wall thickening identified of the sigmoid colon to suggest a   colitis.   Correlation to EUS  -FU results and further recommendations    Colonic wall thickening   -Seen on CT in May, GI to repeat CT again shows sigmoid thickening and GI also recommending colonoscopy at some point  -Tumor markers ordered per GI    Dilated pancreatic bile duct  -Seen on CT in May, repeat CT also shows posterior pancreatic ductal dilatation  -General surgery consulted per GI for EUS    Stroke with residual aphasia  -As above    DM  -Home meds janumet and semaglutide, holding both  -Continue ISS with hypoglycemic protocol  -Patient has been slightly hypoglycemic due to not eating, will switch IVF to D5 1/2NS    HTN  -Continue home amlodipine and lisinopril   -BP elevated as is not taking PO meds, added on IV metoprolol 5mg QID  -Now taking PO meds again today, will see if can stop IV if BP good  -Today is still elevated but prior to meds being given - will have repeated    HLD  -Continue home statin      NOTE: This report was transcribed using voice recognition software. Every effort was made to ensure accuracy; however, inadvertent computerized transcription errors may be present.      Electronically signed by Tai Carreno MD on 12/7/2022 at 11:56 AM

## 2022-12-07 NOTE — PROGRESS NOTES
Speech Language Pathology      NAME:  Elaina Ramos  :  1955  DATE: 2022  ROOM:  [facility]/[id number]    Patient seen for dysphagia therapy 20 minutes. Patient doing better with oral initiation of the swallow. Was able to tolerate puree and swallow them timely. Patient tolerated nectar thick via cup sips well. Recommend diet be advanced to puree and thin however she continues with very poor intake and highly doubt she will be able to meet her nutrition or hydration needs. She did state she is interested in a peg tube.       Hypomagnesemia [E83.42]  Influenza A [J10.1]  Elevated troponin [R77.8]    [id number]  dysphagia tx    Layne Ashraf MSCCC/SLP  Speech Language Pathologist  [id number]

## 2022-12-08 ENCOUNTER — ANESTHESIA (OUTPATIENT)
Dept: OPERATING ROOM | Age: 67
End: 2022-12-08
Payer: MEDICARE

## 2022-12-08 LAB
ANION GAP SERPL CALCULATED.3IONS-SCNC: 13 MMOL/L (ref 7–16)
BASOPHILS ABSOLUTE: 0.02 E9/L (ref 0–0.2)
BASOPHILS RELATIVE PERCENT: 0.3 % (ref 0–2)
BUN BLDV-MCNC: <2 MG/DL (ref 6–23)
CALCIUM SERPL-MCNC: 9 MG/DL (ref 8.6–10.2)
CHLORIDE BLD-SCNC: 102 MMOL/L (ref 98–107)
CO2: 22 MMOL/L (ref 22–29)
CREAT SERPL-MCNC: 0.4 MG/DL (ref 0.5–1)
EOSINOPHILS ABSOLUTE: 0.15 E9/L (ref 0.05–0.5)
EOSINOPHILS RELATIVE PERCENT: 2.5 % (ref 0–6)
GFR SERPL CREATININE-BSD FRML MDRD: >60 ML/MIN/1.73
GLUCOSE BLD-MCNC: 156 MG/DL (ref 74–99)
HCT VFR BLD CALC: 33.9 % (ref 34–48)
HEMOGLOBIN: 11.6 G/DL (ref 11.5–15.5)
IMMATURE GRANULOCYTES #: 0.05 E9/L
IMMATURE GRANULOCYTES %: 0.8 % (ref 0–5)
LYMPHOCYTES ABSOLUTE: 1.58 E9/L (ref 1.5–4)
LYMPHOCYTES RELATIVE PERCENT: 26 % (ref 20–42)
MAGNESIUM: 1.1 MG/DL (ref 1.6–2.6)
MCH RBC QN AUTO: 30 PG (ref 26–35)
MCHC RBC AUTO-ENTMCNC: 34.2 % (ref 32–34.5)
MCV RBC AUTO: 87.6 FL (ref 80–99.9)
METER GLUCOSE: 127 MG/DL (ref 74–99)
METER GLUCOSE: 130 MG/DL (ref 74–99)
MONOCYTES ABSOLUTE: 0.56 E9/L (ref 0.1–0.95)
MONOCYTES RELATIVE PERCENT: 9.2 % (ref 2–12)
NEUTROPHILS ABSOLUTE: 3.72 E9/L (ref 1.8–7.3)
NEUTROPHILS RELATIVE PERCENT: 61.2 % (ref 43–80)
PDW BLD-RTO: 12.3 FL (ref 11.5–15)
PLATELET # BLD: 486 E9/L (ref 130–450)
PMV BLD AUTO: 9.5 FL (ref 7–12)
POTASSIUM SERPL-SCNC: 2.6 MMOL/L (ref 3.5–5)
RBC # BLD: 3.87 E12/L (ref 3.5–5.5)
SODIUM BLD-SCNC: 137 MMOL/L (ref 132–146)
WBC # BLD: 6.1 E9/L (ref 4.5–11.5)

## 2022-12-08 PROCEDURE — 3E0G76Z INTRODUCTION OF NUTRITIONAL SUBSTANCE INTO UPPER GI, VIA NATURAL OR ARTIFICIAL OPENING: ICD-10-PCS | Performed by: SURGERY

## 2022-12-08 PROCEDURE — 6360000002 HC RX W HCPCS: Performed by: INTERNAL MEDICINE

## 2022-12-08 PROCEDURE — 6370000000 HC RX 637 (ALT 250 FOR IP): Performed by: INTERNAL MEDICINE

## 2022-12-08 PROCEDURE — 7100000001 HC PACU RECOVERY - ADDTL 15 MIN: Performed by: SURGERY

## 2022-12-08 PROCEDURE — 7100000000 HC PACU RECOVERY - FIRST 15 MIN: Performed by: SURGERY

## 2022-12-08 PROCEDURE — 2580000003 HC RX 258: Performed by: SURGERY

## 2022-12-08 PROCEDURE — 94640 AIRWAY INHALATION TREATMENT: CPT

## 2022-12-08 PROCEDURE — 6360000002 HC RX W HCPCS: Performed by: SURGERY

## 2022-12-08 PROCEDURE — 3700000001 HC ADD 15 MINUTES (ANESTHESIA): Performed by: SURGERY

## 2022-12-08 PROCEDURE — 99223 1ST HOSP IP/OBS HIGH 75: CPT | Performed by: TRANSPLANT SURGERY

## 2022-12-08 PROCEDURE — 0F7D8ZZ DILATION OF PANCREATIC DUCT, VIA NATURAL OR ARTIFICIAL OPENING ENDOSCOPIC: ICD-10-PCS | Performed by: SURGERY

## 2022-12-08 PROCEDURE — 6370000000 HC RX 637 (ALT 250 FOR IP): Performed by: STUDENT IN AN ORGANIZED HEALTH CARE EDUCATION/TRAINING PROGRAM

## 2022-12-08 PROCEDURE — 36415 COLL VENOUS BLD VENIPUNCTURE: CPT

## 2022-12-08 PROCEDURE — 0DH63UZ INSERTION OF FEEDING DEVICE INTO STOMACH, PERCUTANEOUS APPROACH: ICD-10-PCS | Performed by: SURGERY

## 2022-12-08 PROCEDURE — 0FBG8ZX EXCISION OF PANCREAS, VIA NATURAL OR ARTIFICIAL OPENING ENDOSCOPIC, DIAGNOSTIC: ICD-10-PCS | Performed by: SURGERY

## 2022-12-08 PROCEDURE — 1200000000 HC SEMI PRIVATE

## 2022-12-08 PROCEDURE — 80048 BASIC METABOLIC PNL TOTAL CA: CPT

## 2022-12-08 PROCEDURE — 2500000003 HC RX 250 WO HCPCS: Performed by: STUDENT IN AN ORGANIZED HEALTH CARE EDUCATION/TRAINING PROGRAM

## 2022-12-08 PROCEDURE — 2580000003 HC RX 258: Performed by: INTERNAL MEDICINE

## 2022-12-08 PROCEDURE — 6360000002 HC RX W HCPCS

## 2022-12-08 PROCEDURE — 83735 ASSAY OF MAGNESIUM: CPT

## 2022-12-08 PROCEDURE — 88307 TISSUE EXAM BY PATHOLOGIST: CPT

## 2022-12-08 PROCEDURE — 88173 CYTOPATH EVAL FNA REPORT: CPT

## 2022-12-08 PROCEDURE — 2580000003 HC RX 258

## 2022-12-08 PROCEDURE — 3600007503: Performed by: SURGERY

## 2022-12-08 PROCEDURE — 85025 COMPLETE CBC W/AUTO DIFF WBC: CPT

## 2022-12-08 PROCEDURE — 2709999900 HC NON-CHARGEABLE SUPPLY: Performed by: SURGERY

## 2022-12-08 PROCEDURE — 3700000000 HC ANESTHESIA ATTENDED CARE: Performed by: SURGERY

## 2022-12-08 PROCEDURE — 82962 GLUCOSE BLOOD TEST: CPT

## 2022-12-08 PROCEDURE — 88305 TISSUE EXAM BY PATHOLOGIST: CPT

## 2022-12-08 PROCEDURE — 3600007513: Performed by: SURGERY

## 2022-12-08 RX ORDER — PROPOFOL 10 MG/ML
INJECTION, EMULSION INTRAVENOUS PRN
Status: DISCONTINUED | OUTPATIENT
Start: 2022-12-08 | End: 2022-12-08 | Stop reason: SDUPTHER

## 2022-12-08 RX ORDER — POTASSIUM CHLORIDE AND SODIUM CHLORIDE 900; 300 MG/100ML; MG/100ML
INJECTION, SOLUTION INTRAVENOUS CONTINUOUS
Status: DISCONTINUED | OUTPATIENT
Start: 2022-12-08 | End: 2022-12-10

## 2022-12-08 RX ORDER — POTASSIUM BICARBONATE 25 MEQ/1
50 TABLET, EFFERVESCENT ORAL EVERY 6 HOURS
Status: DISPENSED | OUTPATIENT
Start: 2022-12-08 | End: 2022-12-08

## 2022-12-08 RX ORDER — MORPHINE SULFATE 2 MG/ML
2 INJECTION, SOLUTION INTRAMUSCULAR; INTRAVENOUS ONCE
Status: COMPLETED | OUTPATIENT
Start: 2022-12-08 | End: 2022-12-08

## 2022-12-08 RX ORDER — ONDANSETRON 2 MG/ML
INJECTION INTRAMUSCULAR; INTRAVENOUS PRN
Status: DISCONTINUED | OUTPATIENT
Start: 2022-12-08 | End: 2022-12-08 | Stop reason: SDUPTHER

## 2022-12-08 RX ORDER — CEFAZOLIN SODIUM 1 G/3ML
INJECTION, POWDER, FOR SOLUTION INTRAMUSCULAR; INTRAVENOUS PRN
Status: DISCONTINUED | OUTPATIENT
Start: 2022-12-08 | End: 2022-12-08 | Stop reason: SDUPTHER

## 2022-12-08 RX ORDER — MAGNESIUM SULFATE IN WATER 40 MG/ML
4000 INJECTION, SOLUTION INTRAVENOUS ONCE
Status: COMPLETED | OUTPATIENT
Start: 2022-12-08 | End: 2022-12-09

## 2022-12-08 RX ORDER — LIDOCAINE HYDROCHLORIDE 20 MG/ML
INJECTION, SOLUTION INTRAVENOUS PRN
Status: DISCONTINUED | OUTPATIENT
Start: 2022-12-08 | End: 2022-12-08 | Stop reason: SDUPTHER

## 2022-12-08 RX ORDER — SODIUM CHLORIDE, SODIUM LACTATE, POTASSIUM CHLORIDE, CALCIUM CHLORIDE 600; 310; 30; 20 MG/100ML; MG/100ML; MG/100ML; MG/100ML
INJECTION, SOLUTION INTRAVENOUS CONTINUOUS PRN
Status: DISCONTINUED | OUTPATIENT
Start: 2022-12-08 | End: 2022-12-08 | Stop reason: SDUPTHER

## 2022-12-08 RX ORDER — METOPROLOL TARTRATE 5 MG/5ML
5 INJECTION INTRAVENOUS EVERY 6 HOURS SCHEDULED
Status: DISPENSED | OUTPATIENT
Start: 2022-12-08 | End: 2022-12-12

## 2022-12-08 RX ORDER — FENTANYL CITRATE 50 UG/ML
INJECTION, SOLUTION INTRAMUSCULAR; INTRAVENOUS PRN
Status: DISCONTINUED | OUTPATIENT
Start: 2022-12-08 | End: 2022-12-08 | Stop reason: SDUPTHER

## 2022-12-08 RX ORDER — MIDAZOLAM HYDROCHLORIDE 1 MG/ML
INJECTION INTRAMUSCULAR; INTRAVENOUS PRN
Status: DISCONTINUED | OUTPATIENT
Start: 2022-12-08 | End: 2022-12-08 | Stop reason: SDUPTHER

## 2022-12-08 RX ADMIN — ACETYLCYSTEINE 400 MG: 100 INHALANT RESPIRATORY (INHALATION) at 06:34

## 2022-12-08 RX ADMIN — PROPOFOL INJECTABLE EMULSION 75 MCG/KG/MIN: 10 INJECTION, EMULSION INTRAVENOUS at 14:59

## 2022-12-08 RX ADMIN — PROPOFOL INJECTABLE EMULSION 30 MG: 10 INJECTION, EMULSION INTRAVENOUS at 15:03

## 2022-12-08 RX ADMIN — GABAPENTIN 100 MG: 100 CAPSULE ORAL at 20:59

## 2022-12-08 RX ADMIN — FENTANYL CITRATE 25 MCG: 50 INJECTION, SOLUTION INTRAMUSCULAR; INTRAVENOUS at 15:03

## 2022-12-08 RX ADMIN — SODIUM CHLORIDE, POTASSIUM CHLORIDE, SODIUM LACTATE AND CALCIUM CHLORIDE: 600; 310; 30; 20 INJECTION, SOLUTION INTRAVENOUS at 14:37

## 2022-12-08 RX ADMIN — IPRATROPIUM BROMIDE AND ALBUTEROL SULFATE 1 AMPULE: .5; 2.5 SOLUTION RESPIRATORY (INHALATION) at 10:24

## 2022-12-08 RX ADMIN — PROPOFOL INJECTABLE EMULSION 30 MG: 10 INJECTION, EMULSION INTRAVENOUS at 14:49

## 2022-12-08 RX ADMIN — PROPOFOL INJECTABLE EMULSION 30 MG: 10 INJECTION, EMULSION INTRAVENOUS at 14:51

## 2022-12-08 RX ADMIN — LIDOCAINE HYDROCHLORIDE 60 MG: 20 INJECTION, SOLUTION INTRAVENOUS at 14:46

## 2022-12-08 RX ADMIN — MORPHINE SULFATE 2 MG: 2 INJECTION, SOLUTION INTRAMUSCULAR; INTRAVENOUS at 20:54

## 2022-12-08 RX ADMIN — ACETYLCYSTEINE 400 MG: 100 INHALANT RESPIRATORY (INHALATION) at 10:24

## 2022-12-08 RX ADMIN — Medication 10 ML: at 21:04

## 2022-12-08 RX ADMIN — PROPOFOL INJECTABLE EMULSION 30 MG: 10 INJECTION, EMULSION INTRAVENOUS at 14:46

## 2022-12-08 RX ADMIN — METOPROLOL TARTRATE 5 MG: 5 INJECTION, SOLUTION INTRAVENOUS at 17:37

## 2022-12-08 RX ADMIN — MIDAZOLAM 1 MG: 1 INJECTION INTRAMUSCULAR; INTRAVENOUS at 14:54

## 2022-12-08 RX ADMIN — FENTANYL CITRATE 25 MCG: 50 INJECTION, SOLUTION INTRAMUSCULAR; INTRAVENOUS at 14:59

## 2022-12-08 RX ADMIN — CEFAZOLIN 1 G: 1 INJECTION, POWDER, FOR SOLUTION INTRAMUSCULAR; INTRAVENOUS at 14:46

## 2022-12-08 RX ADMIN — ACETYLCYSTEINE 400 MG: 100 INHALANT RESPIRATORY (INHALATION) at 17:32

## 2022-12-08 RX ADMIN — POTASSIUM BICARBONATE 50 MEQ: 977.5 TABLET, EFFERVESCENT ORAL at 17:37

## 2022-12-08 RX ADMIN — IPRATROPIUM BROMIDE AND ALBUTEROL SULFATE 1 AMPULE: .5; 2.5 SOLUTION RESPIRATORY (INHALATION) at 06:34

## 2022-12-08 RX ADMIN — POTASSIUM CHLORIDE AND SODIUM CHLORIDE: 900; 300 INJECTION, SOLUTION INTRAVENOUS at 13:24

## 2022-12-08 RX ADMIN — METOPROLOL TARTRATE 5 MG: 5 INJECTION, SOLUTION INTRAVENOUS at 06:01

## 2022-12-08 RX ADMIN — MAGNESIUM SULFATE HEPTAHYDRATE 4000 MG: 40 INJECTION, SOLUTION INTRAVENOUS at 21:03

## 2022-12-08 RX ADMIN — ONDANSETRON 4 MG: 2 INJECTION INTRAMUSCULAR; INTRAVENOUS at 14:56

## 2022-12-08 RX ADMIN — MIDAZOLAM 1 MG: 1 INJECTION INTRAMUSCULAR; INTRAVENOUS at 14:46

## 2022-12-08 RX ADMIN — IPRATROPIUM BROMIDE AND ALBUTEROL SULFATE 1 AMPULE: .5; 2.5 SOLUTION RESPIRATORY (INHALATION) at 17:32

## 2022-12-08 ASSESSMENT — PAIN SCALES - GENERAL
PAINLEVEL_OUTOF10: 0
PAINLEVEL_OUTOF10: 0

## 2022-12-08 NOTE — PROGRESS NOTES
[address] [place], [place] [address]ist   Progress Note    Admitting Date and Time: 12/3/2022  5:22 PM  Admit Dx: Hypomagnesemia [E83.42]  Influenza A [J10.1]  Elevated troponin [R77.8]    Subjective:    12/7: Pt states she feels the same today. Still coughing with liquids and difficulty swallowing. No SOB, no fevers/chills. History is limited due to aphasia. Per RN, Speech will be doing repeat swallow eval today. 12/8:  Pt just returned from endoscopy lab and is on gurney.  at bedside. Per RN:  potassium low this morning.  Received Klyte and IVF changed to NS with 40 mEq KCl/L       metoprolol  5 mg IntraVENous 4 times per day    potassium bicarbonate  50 mEq Oral Q6H    ceFAZolin  2,000 mg IntraVENous On Call to OR    ipratropium-albuterol  1 ampule Inhalation Q4H WA    pantoprazole (PROTONIX) 40 mg injection  40 mg IntraVENous Daily    sodium chloride flush  10 mL IntraVENous 2 times per day    enoxaparin  40 mg SubCUTAneous Daily    amLODIPine  5 mg Oral Daily    venlafaxine  150 mg Oral Daily    atorvastatin  40 mg Oral Daily    gabapentin  100 mg Oral BID    lisinopril  30 mg Oral Daily    insulin lispro  0-4 Units SubCUTAneous TID WC    insulin lispro  0-4 Units SubCUTAneous Nightly    acetylcysteine  4 mL Inhalation Q4H     hydrALAZINE, 10 mg, Q6H PRN  sodium chloride flush, 10 mL, PRN  sodium chloride, , PRN  promethazine, 12.5 mg, Q6H PRN   Or  ondansetron, 4 mg, Q6H PRN  polyethylene glycol, 17 g, Daily PRN  acetaminophen, 650 mg, Q6H PRN   Or  acetaminophen, 650 mg, Q6H PRN  glucose, 4 tablet, PRN  dextrose bolus, 125 mL, PRN   Or  dextrose bolus, 250 mL, PRN  glucagon (rDNA), 1 mg, PRN  dextrose, , Continuous PRN       Objective:    BP (!) 172/98   Pulse 73   Temp 97.8 °F (36.6 °C) (Infrared)   Resp 15   Ht 5' 7\" (1.702 m)   Wt 132 lb (59.9 kg)   SpO2 100%   BMI 20.67 kg/m²   General Appearance: alert and oriented to person, place and time and in no acute distress  Skin: warm and dry  Head: normocephalic and atraumatic  Eyes: pupils equal, round, and reactive to light, extraocular eye movements intact, conjunctivae normal  Neck: neck supple and non tender without mass   Pulmonary/Chest: clear to auscultation bilaterally- no wheezes, rales or rhonchi, normal air movement, no respiratory distress but frequent coughing  Cardiovascular: normal rate, normal S1 and S2 and no carotid bruits  Abdomen: soft, non-tender, non-distended, normal bowel sounds, no masses or organomegaly  Extremities: no cyanosis, no clubbing and no edema  Neurologic: no cranial nerve deficit noted but aphasic speech present      Recent Labs     12/06/22  0634 12/08/22  0536    137   K 3.4* 2.6*    102   CO2 19* 22   BUN 5* <2*   CREATININE 0.5 0.4*   GLUCOSE 85 156*   CALCIUM 8.7 9.0         Recent Labs     12/06/22  0634   ALKPHOS 81   PROT 5.7*   LABALBU 3.0*   BILITOT 0.4   AST 20   ALT 13         Recent Labs     12/06/22  0634 12/08/22  0536   WBC 6.1 6.1   RBC 3.61 3.87   HGB 10.7* 11.6   HCT 32.4* 33.9*   MCV 89.8 87.6   MCH 29.6 30.0   MCHC 33.0 34.2   RDW 12.2 12.3    486*   MPV 9.5 9.5         Radiology:   CT CHEST W CONTRAST   Final Result   Abnormal wall thickening identified of the sigmoid colon to suggest a   colitis. Correlation to clinical symptoms is recommended. Consider   colonoscopy if clinically indicated. Abnormal low-attenuation focus seen in the distal body and tail the pancreas   with posterior pancreatic ductal dilatation. Findings concerning for an   underlying malignancy. No significant evidence of metastatic disease. Chronic changes seen within the lung fields bilaterally. There is bronchial   wall thickening identified the perihilar regions extending into the lower   lung fields suggesting atelectatic change or infiltrate. Clinical   correlation is needed.          CT ABDOMEN PELVIS W IV CONTRAST Additional Contrast? None   Final Result   Abnormal wall thickening identified of the sigmoid colon to suggest a   colitis. Correlation to clinical symptoms is recommended. Consider   colonoscopy if clinically indicated. Abnormal low-attenuation focus seen in the distal body and tail the pancreas   with posterior pancreatic ductal dilatation. Findings concerning for an   underlying malignancy. No significant evidence of metastatic disease. Chronic changes seen within the lung fields bilaterally. There is bronchial   wall thickening identified the perihilar regions extending into the lower   lung fields suggesting atelectatic change or infiltrate. Clinical   correlation is needed. Fluoroscopy modified barium swallow with video   Final Result   Swallowing mechanism grossly within normal limits without evidence of   aspiration. Please see separate speech pathology report for full discussion of findings   and recommendations. XR CHEST PORTABLE   Final Result   No acute process. Assessment:  Principal Problem:    Influenza A  Active Problems:    Pancreatic adenocarcinoma ([facility] [address]  Resolved Problems:    * No resolved hospital problems. *      Plan:      UTI  -UA with LE and rare bacteria  -Urine culture grew mixed gram positive organisms less than 10,000CFU  -Completed 3 days ceftriaxone on 12/6/  -Patient afebrile and no leukocytosis, continue to monitor      Influenza A  -Positive on 11/30 from [facility]  -Supportive care, not in time range for tamiflu      Acute on chronic dysphagia  -Has had chronic dysphagia but has acutely worsened  -Also recently diagnosed with ALS which is likely source of dysphagia along with stroke  -Patient ok to have runny pureed and nectar thick per SLP. Yesterday, after reevaluation by speech therapy today they recommended diet to be advanced to puréed and thin liquids  -PEG tube inserted today.       Unintentional weight loss with pancreatic mass on CT  -Patient reports 20-30 lb weight loss over past few months  -May be related to dysphagia but also has some abnormal CT findings of colon in May that need to be further investigated  -GI on board, repeat CT shows pancreatic mass - general surgery consulted for EUS and this was done today with biopsy   -FU results and further recommendations      Colonic wall thickening   -Seen on CT in May, GI to repeat CT again shows sigmoid thickening and GI also recommending colonoscopy at some point  -Tumor markers ordered per GI      Dilated pancreatic bile duct   -Seen on CT in May, repeat CT also shows posterior pancreatic ductal dilatation  -General surgery took for EUS today. Stroke with residual aphasia  -As above      Type II DM  -Home meds janumet and semaglutide, holding both  -Continue ISS with hypoglycemic protocol  -Patient has been slightly hypoglycemic due to not eating, will switch IVF to D5 1/2NS      HTN  -Continue home amlodipine and lisinopril   -BP elevated as is not taking PO meds, added on IV metoprolol 5mg QID  -Now taking PO meds again today, will see if can stop IV if BP good  -Today is still elevated but prior to meds being given - will have repeated      HLD  -Continue home statin    Case discussed with  at bedside. NOTE: This report was transcribed using voice recognition software. Every effort was made to ensure accuracy; however, inadvertent computerized transcription errors may be present.      Electronically signed by Fabien Oliveira MD on 12/8/2022 at 4:28 PM

## 2022-12-08 NOTE — CARE COORDINATION
LVM for  Brooks Martinez explaining IMM letter and requested a call back.  Electronically signed by Mica Mead on 12/8/2022 at 9:31 AM

## 2022-12-08 NOTE — ANESTHESIA POSTPROCEDURE EVALUATION
Department of Anesthesiology  Postprocedure Note    Patient: Dejon Quiroz  MRN: [id number]  YOB: 1955  Date of evaluation: 12/8/2022      Procedure Summary     Date: 12/06/22 Room / Location: [address] [place] [address] / [facility]    Anesthesia Start: 6234 Anesthesia Stop: 3806    Procedure: EGD ESOPHAGOGASTRODUODENOSCOPY DILATATION Diagnosis:       Anemia, unspecified type      (Anemia, unspecified type [D64.9])    Surgeons: Obed Carlos MD Responsible Provider: Ifrah Chowdary MD    Anesthesia Type: MAC ASA Status: 3          Anesthesia Type: No value filed.     Otilio Phase I:      Otilio Phase II: Otilio Score: 9      Anesthesia Post Evaluation    Patient location during evaluation: bedside  Patient participation: complete - patient participated  Level of consciousness: awake  Pain score: 0  Airway patency: patent  Nausea & Vomiting: no nausea and no vomiting  Complications: no  Cardiovascular status: hemodynamically stable  Respiratory status: acceptable  Hydration status: euvolemic

## 2022-12-08 NOTE — PROGRESS NOTES
Cardiology  Progress Note      SUBJECTIVE:  No chest pain.  No dyspnea at rest.     Current Inpatient Medications  Current Facility-Administered Medications: metoprolol (LOPRESSOR) injection 5 mg, 5 mg, IntraVENous, 4 times per day  0.9% NaCl with KCl 40 mEq infusion, , IntraVENous, Continuous  potassium bicarbonate (K-LYTE) disintegrating tablet 50 mEq, 50 mEq, Oral, Q6H  ceFAZolin (ANCEF) 2,000 mg in sterile water 20 mL IV syringe, 2,000 mg, IntraVENous, On Call to OR  ipratropium-albuterol (DUONEB) nebulizer solution 1 ampule, 1 ampule, Inhalation, Q4H WA  pantoprazole (PROTONIX) 40 mg in sodium chloride (PF) 0.9 % 10 mL injection, 40 mg, IntraVENous, Daily  hydrALAZINE (APRESOLINE) injection 10 mg, 10 mg, IntraVENous, Q6H PRN  sodium chloride flush 0.9 % injection 10 mL, 10 mL, IntraVENous, 2 times per day  sodium chloride flush 0.9 % injection 10 mL, 10 mL, IntraVENous, PRN  0.9 % sodium chloride infusion, , IntraVENous, PRN  enoxaparin (LOVENOX) injection 40 mg, 40 mg, SubCUTAneous, Daily  promethazine (PHENERGAN) tablet 12.5 mg, 12.5 mg, Oral, Q6H PRN **OR** ondansetron (ZOFRAN) injection 4 mg, 4 mg, IntraVENous, Q6H PRN  polyethylene glycol (GLYCOLAX) packet 17 g, 17 g, Oral, Daily PRN  acetaminophen (TYLENOL) tablet 650 mg, 650 mg, Oral, Q6H PRN **OR** acetaminophen (TYLENOL) suppository 650 mg, 650 mg, Rectal, Q6H PRN  amLODIPine (NORVASC) tablet 5 mg, 5 mg, Oral, Daily  venlafaxine (EFFEXOR XR) extended release capsule 150 mg, 150 mg, Oral, Daily  atorvastatin (LIPITOR) tablet 40 mg, 40 mg, Oral, Daily  gabapentin (NEURONTIN) capsule 100 mg, 100 mg, Oral, BID  lisinopril (PRINIVIL;ZESTRIL) tablet 30 mg, 30 mg, Oral, Daily  insulin lispro (HUMALOG) injection vial 0-4 Units, 0-4 Units, SubCUTAneous, TID WC  insulin lispro (HUMALOG) injection vial 0-4 Units, 0-4 Units, SubCUTAneous, Nightly  glucose chewable tablet 16 g, 4 tablet, Oral, PRN  dextrose bolus 10% 125 mL, 125 mL, IntraVENous, PRN **OR** dextrose bolus 10% 250 mL, 250 mL, IntraVENous, PRN  glucagon (rDNA) injection 1 mg, 1 mg, SubCUTAneous, PRN  dextrose 10 % infusion, , IntraVENous, Continuous PRN  acetylcysteine (MUCOMYST) 10 % solution 400 mg, 4 mL, Inhalation, Q4H      Physical  VITALS:  BP (!) 181/87   Pulse 71   Temp 98 °F (36.7 °C) (Oral)   Resp 16   Ht 5' 7\" (1.702 m)   Wt 132 lb (59.9 kg)   SpO2 94%   BMI 20.67 kg/m²   CURRENT TEMPERATURE:  Temp: 98 °F (36.7 °C)  CONSTITUTIONAL: No acute distress. EYES: Vision is intact. ENT: No sore throat. No ear drainage. NECK: No JVD. BACK: Symmetric. LUNGS:  diminished breath sounds right base and left base  CARDIOVASCULAR:  normal S1 and S2, no S3, and no S4  ABDOMEN:  non-tender  NEUROLOGIC: Expressive aphasia. EXTREMITIES: No edema cyanosis or clubbing. DATA:        Cardiology Labs:  BMP:    Lab Results   Component Value Date/Time     12/08/2022 05:36 AM    K 2.6 12/08/2022 05:36 AM    K 3.5 12/05/2022 07:47 AM     12/08/2022 05:36 AM    CO2 22 12/08/2022 05:36 AM    BUN <2 12/08/2022 05:36 AM     CBC:    Lab Results   Component Value Date/Time    WBC 6.1 12/08/2022 05:36 AM    RBC 3.87 12/08/2022 05:36 AM    HGB 11.6 12/08/2022 05:36 AM    HCT 33.9 12/08/2022 05:36 AM    MCV 87.6 12/08/2022 05:36 AM    RDW 12.3 12/08/2022 05:36 AM     12/08/2022 05:36 AM     PT/INR:  No results found for: PTINR  TROPONIN:  No components found for: TROP    ASSESSMENT      1. Elevation of high-sensitivity troponin. This can be demand ischemia from her cough and influenza A with type 2 non-STEMI. She has no complaints of chest pain. EKG is not showing acute ischemic changes. IEchocardiogram showing preserved left ventricle systolic function. At this point, I do not see need for further coronary workup unless there is a change in her cardiac status. We may consider coronary work-up down the road if we feel it is needed. 2.  Pancreatic mass.   Patient is being evaluated by the oncology team.    3.  Type 2 diabetes. 4.  Hyperlipidemia. 5.history of stroke last year with expressive aphasia. Her stroke at that time was felt to be more a T severe hypertension.

## 2022-12-08 NOTE — CARE COORDINATION
Patient off the floor during rounds. Will see later today or tomorrow -please, call with questions/concerns.   Thank you    Negrito Pompa MD

## 2022-12-08 NOTE — PLAN OF CARE
Problem: Pain  Goal: Verbalizes/displays adequate comfort level or baseline comfort level  12/8/2022 1035 by Kristen Jorgensen RN  Outcome: Progressing  12/7/2022 2334 by Ayad Patel RN  Outcome: Progressing

## 2022-12-08 NOTE — OP NOTE
Operative Note      Patient: Lisa Dao  YOB: 1955  MRN: [id number]    Date of Procedure: 12/8/2022    Pre-Op Diagnosis: Dysphasia [R47.02], Pancreatic mass    Post-Op Diagnosis: Same       Procedure(s):  EGD ESOPHAGOGASTRODUODENOSCOPY PEG TUBE INSERTION  EGD ESOPHAGOGASTRODUODENOSCOPY ULTRASOUND **ISOLATION**    Surgeon(s):  Manoj Hoyos MD    Assistant:   Surgical Assistant: Jacy Hills RN    Anesthesia: Monitor Anesthesia Care    Estimated Blood Loss (mL): less than 50     Complications: None    Specimens:   ID Type Source Tests Collected by Time Destination   A : Pancreatic tail mass FNA Tissue Fine Needle Aspirate CYTOLOGY, NON-GYN Manoj Hoyos MD 12/8/2022 1456    B : Pancreatic tail mass FNA Tissue Fine Needle Aspirate SURGICAL PATHOLOGY Manoj Hoyos MD 12/8/2022 1500        Implants:  * No implants in log *      Drains:   Gastrostomy/Enterostomy/Jejunostomy Tube LUQ (Active)   Site Description Clean, dry & intact 12/08/22 1520   G Port Status Clamped 12/08/22 1520   Surrounding Skin Clean, dry & intact 12/08/22 1520   Dressing Status New dressing applied 12/08/22 1520   Dressing Type Dry dressing;Split gauze 12/08/22 1520       Findings: See op note    Detailed Description of Procedure: The patient is a 74 y.o. female. The risks, benefits, complications, treatment options and expected outcomes were discussed with the patient. The possibilities of reaction to medication, pulmonary aspiration, perforation of the gastrointestinal tract, bleeding requiring transfusion or operation, respiratory failure requiring placement on a ventilator and failure to diagnose a condition were discussed with the patient who freely signed the consent. Description of Procedure: The patient was taken to the endoscopy suite, identified as Lisa Dao and the procedure verified as Endoscopic Ultrasound (EUS). A Time Out was held and the above information confirmed.  The patient was positioned in the left lateral position with an oral bite block and anesthesia was provided for sedation and comfort. The  echoendoscope was passed to the second portion of the duodenum. EGD/EUS findings:   Esophagus: normal   Stomach: normal   Duodenum: normal   Pancreas: 1.9 cm hypoechoic heterogeneous mass in the pancreatic body just proximal to the the pancreatic tail with proximal dilation of the pancreatic duct 8 mm. The mass was biopsied using a 19-gauge FNB needle making 2 passes with adequate tissue acquisition. There is no significant involvement of the adjacent splenic vein or artery or the celiac axis. Bile Duct: normal   Gallbladder: Not examined      Specimens:  FNB pancreatic body mass      A lubricated gastroscope was inserted into the oropharynx and advanced under direct vision to the esophagus and then the stomach. The stomach was insufflated. The anterior abdominal wall was transilluminated. The light source was easily visualized. Indentation was observed with palpation of the abdominal wall. This area was prepped and draped. Local anesthetic was injected. A #11 blade was used to make a transverse incision. A snare forceps was inserted through the gastroscope and deployed into the gastric lumen. An angiocatheter was inserted through the incision into the gastric lumen under direct vision. A wire was inserted through the anterior catheter and grasped   with the snare forceps. The gastroscope, snare, and wire were then pulled out through the patient's mouth. The gastrostomy tube was connected to the wire, and the wire was pulled through the stomach and out through the anterior abdominal wall. The gastroscope was reintroduced into the stomach. The PEG tube was seen in good position without evidence of bleeding. The gastroscope was removed. The PEG tube was cut to size and fit with a bumper and a feeding apparatus at 2.5 cm at the abdominal wall.  The patient tolerated the procedure well and went to the recovery room in a good condition. I was present for the entire procedure. All instrument counts, lap counts, and needle counts were correct at the end of the procedure.       Electronically signed by Matthew Rosario MD on 12/8/2022 at 4:01 PM

## 2022-12-08 NOTE — PROGRESS NOTES
Patient seen and examined, No events overnight. Plan for EUS and PEG today. All questions answered.     Electronically signed by Tiny Sellers MD on 12/8/22 at 10:21 AM EST

## 2022-12-08 NOTE — ANESTHESIA PRE PROCEDURE
Department of Anesthesiology  Preprocedure Note       Name:  Sherri Aragon   Age:  74 y.o.  :  1955                                          MRN:  [id number]         Date:  2022      Surgeon: Mariia Phillips):  Zoe Rothman MD    Procedure: Procedure(s):  EGD ESOPHAGOGASTRODUODENOSCOPY PEG TUBE INSERTION  EGD ESOPHAGOGASTRODUODENOSCOPY ULTRASOUND **ISOLATION**    Medications prior to admission:   Prior to Admission medications    Medication Sig Start Date End Date Taking? Authorizing Provider   naproxen (NAPROSYN) 500 MG tablet Take 1 tablet by mouth 2 times daily (with meals) 22   JAYDEN Samano   gabapentin (NEURONTIN) 100 MG capsule TAKE 1 CAPSULE BY MOUTH 2 TIMES DAILY FOR 30 DAYS. INTENDED SUPPLY: 30 DAYS 22  [place] Be,    atorvastatin (LIPITOR) 40 MG tablet TAKE 1 TABLET DAILY 10/6/22   [place] Be, DO   Semaglutide (RYBELSUS) 7 MG TABS Take 7 mg by mouth daily Start this after completing the 3mg dose.  22   [place] Be, DO   zoster recombinant adjuvanted vaccine [facility]) 50 MCG/0.5ML SUSR injection Inject 0.5 mLs into the muscle See Admin Instructions 1 dose now and repeat in 2-6 months 22  [place] Be, DO   amLODIPine (NORVASC) 5 MG tablet TAKE 1 TABLET DAILY IN THE MORNING 22   [place] Be, DO   atenolol (TENORMIN) 50 MG tablet TAKE 1 TABLET DAILY 22   [place] Be, DO   esomeprazole (NEXIUM) 40 MG delayed release capsule TAKE 1 CAPSULE EVERY MORNING BEFORE BREAKFAST 22   [place] Be, DO   lisinopril (PRINIVIL;ZESTRIL) 30 MG tablet TAKE 1 TABLET DAILY AT NIGHT 22   [place] Be, DO   sitaGLIPtan-metFORMIN (JANUMET)  MG per tablet Take 1 tablet by mouth in the morning. 22   [place] Be, DO   venlafaxine (EFFEXOR XR) 150 MG extended release capsule TAKE 1 CAPSULE DAILY 22   [place] Be, DO   Multiple Vitamin (MULTI-VITAMIN DAILY PO) Take by mouth    Historical Provider, MD       Current medications:    No current facility-administered medications for this visit. No current outpatient medications on file.      Facility-Administered Medications Ordered in Other Visits   Medication Dose Route Frequency Provider Last Rate Last Admin    metoprolol (LOPRESSOR) injection 5 mg  5 mg IntraVENous 4 times per day Charleen Rankin MD   5 mg at 12/08/22 0601    dextrose 5 % and 0.45 % sodium chloride infusion   IntraVENous Continuous Charleen Rankin MD 75 mL/hr at 12/07/22 2333 New Bag at 12/07/22 2333    ceFAZolin (ANCEF) 2,000 mg in sterile water 20 mL IV syringe  2,000 mg IntraVENous On Call to 1808 Adama Dumont MD        potassium bicarb-citric acid (EFFER-K) effervescent tablet 50 mEq  50 mEq Oral Once Charleen Rankin MD        ipratropium-albuterol (DUONEB) nebulizer solution 1 ampule  1 ampule Inhalation Q4H WA Charleen Rankin MD   1 ampule at 12/08/22 0634    pantoprazole (PROTONIX) 40 mg in sodium chloride (PF) 0.9 % 10 mL injection  40 mg IntraVENous Daily CHARI Freed - CNP   40 mg at 12/07/22 0935    hydrALAZINE (APRESOLINE) injection 10 mg  10 mg IntraVENous Q6H PRN Charleen Rankin MD   10 mg at 12/05/22 2047    sodium chloride flush 0.9 % injection 10 mL  10 mL IntraVENous 2 times per day Gudelia Schaefer MD   10 mL at 12/07/22 2307    sodium chloride flush 0.9 % injection 10 mL  10 mL IntraVENous PRN Gudelia Schaefer MD        0.9 % sodium chloride infusion   IntraVENous PRN Gudelia Schaefer MD        enoxaparin (LOVENOX) injection 40 mg  40 mg SubCUTAneous Daily Gudelia Schaefer MD   40 mg at 12/07/22 0933    promethazine (PHENERGAN) tablet 12.5 mg  12.5 mg Oral Q6H PRN Gudelia Schaefer MD        Or    ondansetron (ZOFRAN) injection 4 mg  4 mg IntraVENous Q6H PRN Gudelia Schaefer MD        polyethylene glycol (GLYCOLAX) packet 17 g  17 g Oral Daily PRN Gudelia Schaefer MD        acetaminophen (TYLENOL) tablet 650 mg  650 mg Oral Q6H PRN Yazmin Marley MD   650 mg at 12/07/22 1342    Or    acetaminophen (TYLENOL) suppository 650 mg  650 mg Rectal Q6H PRN Yazmin Marley MD        amLODIPine (NORVASC) tablet 5 mg  5 mg Oral Daily Yazmin Marley MD   5 mg at 12/07/22 0945    venlafaxine (EFFEXOR XR) extended release capsule 150 mg  150 mg Oral Daily Yazmin Marley MD   150 mg at 12/07/22 0945    atorvastatin (LIPITOR) tablet 40 mg  40 mg Oral Daily Yazmin Marley MD   40 mg at 12/07/22 0945    gabapentin (NEURONTIN) capsule 100 mg  100 mg Oral BID Yazmin Marley MD   100 mg at 12/07/22 2300    lisinopril (PRINIVIL;ZESTRIL) tablet 30 mg  30 mg Oral Daily Yazmin Marley MD   30 mg at 12/07/22 0945    insulin lispro (HUMALOG) injection vial 0-4 Units  0-4 Units SubCUTAneous TID WC Laya Joshi MD        insulin lispro (HUMALOG) injection vial 0-4 Units  0-4 Units SubCUTAneous Nightly Laya Joshi MD        glucose chewable tablet 16 g  4 tablet Oral PRN Laya Joshi MD        dextrose bolus 10% 125 mL  125 mL IntraVENous PRN Laya Joshi MD        Or    dextrose bolus 10% 250 mL  250 mL IntraVENous PRN Laya Joshi MD        glucagon (rDNA) injection 1 mg  1 mg SubCUTAneous PRN Laya Joshi MD        dextrose 10 % infusion   IntraVENous Continuous PRN Laya Joshi MD        acetylcysteine (MUCOMYST) 10 % solution 400 mg  4 mL Inhalation Q4H Laya Joshi MD   400 mg at 12/08/22 1187       Allergies:  No Known Allergies    Problem List:    Patient Active Problem List   Diagnosis Code    Right foot infection L08.9    Diabetic ulcer of left foot associated with type 1 diabetes mellitus ([facility] [address] E10.621, L97.529    Type 2 diabetes mellitus without complication, without long-term current use of insulin ([facility]) E11.9    Essential hypertension I10    Gastroesophageal reflux disease without esophagitis K21.9    Mixed hyperlipidemia E78.2    Subacute osteomyelitis of foot ([facility] [address] M86.490    Dysarthria R47.1    Hemorrhagic stroke ([facility]) I61.9    Altered mental status R41.82    Lactic acidosis E87.20    Muscle function loss R29.898    Impaired functional mobility, balance, gait, and endurance Z74.09    Diabetic peripheral neuropathy ([facility]) E11.42    Peroneal neuropathy at knee, left G57.32    Left lumbosacral radiculopathy M54.17    Pain in joint, shoulder region M25.519    Type II diabetes mellitus with peripheral circulatory disorder ([facility]) E11.51    PVD (peripheral vascular disease) ([facility]) I73.9    Influenza A J10.1       Past Medical History:        Diagnosis Date    Abnormal mammogram of left breast 05/2019    Anxiety     [facility]erebral artery occlusion with cerebral infarction ([facility] [address]     Depression     Diabetes mellitus ([facility] [address]     Diabetic peripheral neuropathy ([facility] [address] 9/1/2022    Dysarthria 5/12/2021    Erosive esophagitis     Esophageal stricture     GERD (gastroesophageal reflux disease)     Gestational diabetes     Hemorrhagic stroke ([facility] [address] 5/12/2021    Hyperlipidemia     Hypertension     Hypokalemia     Left lumbosacral radiculopathy 9/1/2022    Osteoarthritis     knees    Pain in joint, shoulder region 10/28/2022    bilat.     Peroneal neuropathy at knee, left 9/1/2022    Postmenopausal     Type 2 diabetes mellitus without complication [facility])        Past Surgical History:        Procedure Laterality Date    BREAST BIOPSY Left 05/2019    Dr. Betty Staples      x4    CHOLECYSTECTOMY      COLONOSCOPY  11/2011    HYSTERECTOMY (CERVIX STATUS UNKNOWN)      JOINT REPLACEMENT Right     knee    UPPER GASTROINTESTINAL ENDOSCOPY N/A 12/6/2022    EGD ESOPHAGOGASTRODUODENOSCOPY DILATATION performed by Arin Kruger MD at [address] History:    Social History     Tobacco Use    Smoking status: Never    Smokeless tobacco: Never   Substance Use Topics    Alcohol use: No                                Counseling given: Not Answered      Vital Signs (Current): There were no vitals filed for this visit. BP Readings from Last 3 Encounters:   12/08/22 (!) 181/87   11/21/22 (!) 140/75   10/28/22 (!) 130/90       NPO Status:                                                                                 BMI:   Wt Readings from Last 3 Encounters:   12/03/22 132 lb (59.9 kg)   11/21/22 140 lb (63.5 kg)   11/01/22 141 lb (64 kg)     There is no height or weight on file to calculate BMI.    CBC:   Lab Results   Component Value Date/Time    WBC 6.1 12/08/2022 05:36 AM    RBC 3.87 12/08/2022 05:36 AM    HGB 11.6 12/08/2022 05:36 AM    HCT 33.9 12/08/2022 05:36 AM    MCV 87.6 12/08/2022 05:36 AM    RDW 12.3 12/08/2022 05:36 AM     12/08/2022 05:36 AM       CMP:   Lab Results   Component Value Date/Time     12/08/2022 05:36 AM    K 2.6 12/08/2022 05:36 AM    K 3.5 12/05/2022 07:47 AM     12/08/2022 05:36 AM    CO2 22 12/08/2022 05:36 AM    BUN <2 12/08/2022 05:36 AM    CREATININE 0.4 12/08/2022 05:36 AM    CREATININE 0.9 05/03/2019 12:00 AM    GFRAA >60 07/28/2022 12:00 PM    LABGLOM >60 12/08/2022 05:36 AM    GLUCOSE 156 12/08/2022 05:36 AM    PROT 5.7 12/06/2022 06:34 AM    CALCIUM 9.0 12/08/2022 05:36 AM    BILITOT 0.4 12/06/2022 06:34 AM    ALKPHOS 81 12/06/2022 06:34 AM    AST 20 12/06/2022 06:34 AM    ALT 13 12/06/2022 06:34 AM       POC Tests: No results for input(s): POCGLU, POCNA, POCK, POCCL, POCBUN, POCHEMO, POCHCT in the last 72 hours.     Coags:   Lab Results   Component Value Date/Time    PROTIME 12.5 12/06/2022 06:34 AM    INR 1.1 12/06/2022 06:34 AM    APTT 27.4 05/26/2022 01:34 PM       HCG (If Applicable): No results found for: PREGTESTUR, PREGSERUM, HCG, HCGQUANT     ABGs: No results found for: PHART, PO2ART, KHN5UPC, YNH3IJH, BEART, A9MJDGOG     Type & Screen (If Applicable):  No results found for: LABABO, [address]    Drug/Infectious Status (If Applicable):  No results found for: HIV, HEPCAB    COVID-19 Screening (If Applicable):   Lab Results   Component Value Date/Time    COVID19 Not Detected 05/26/2022 03:38 PM           Anesthesia Evaluation  Patient summary reviewed  Airway: Mallampati: III  TM distance: >3 FB   Neck ROM: full  Mouth opening: > = 3 FB   Dental: normal exam         Pulmonary:Negative Pulmonary ROS breath sounds clear to auscultation                             Cardiovascular:    (+) hypertension:, past MI:, hyperlipidemia        Rhythm: regular  Rate: normal                 ROS comment: ECG:Normal sinus rhythm  Minimal voltage criteria for LVH, may be normal variant  Cannot rule out Anterior infarct , age undetermined  Abnormal ECG  No previous ECGs available  Confirmed by Pauline Rai ([id number]) on 12/4/2022 12:38:01 PM     Neuro/Psych:   (+) CVA ( Cerebral artery occlusion with cerebral infarction, Hemorrhagic stroke ):, neuromuscular disease ( Left lumbosacral radiculopathy, Diabetic peripheral neuropathy, Peroneal neuropathy at knee, left ):, psychiatric history:depression/anxiety             GI/Hepatic/Renal:   (+) GERD:, PUD,           Endo/Other:    (+) DiabetesType II DM, , blood dyscrasia: anemia:., malignancy/cancer ( Pancreatic adenocarcinoma ). Abdominal:             Vascular:   + PVD, aortic or cerebral, . ROS comment: PVD (peripheral vascular disease) . Other Findings:             Anesthesia Plan      MAC     ASA 4       Induction: intravenous. Anesthetic plan and risks discussed with patient. Plan discussed with CRNA.                     Mariaa Rios MD   12/8/2022      Yaima Norwood

## 2022-12-08 NOTE — ANESTHESIA POSTPROCEDURE EVALUATION
Department of Anesthesiology  Postprocedure Note    Patient: Derik Stark  MRN: [id number]  YOB: 1955  Date of evaluation: 12/8/2022      Procedure Summary     Date: 12/08/22 Room / Location: [address] [place] [address] 644  [address] [facility]    Anesthesia Start: 1879 Anesthesia Stop: 5695    Procedures:       EGD ESOPHAGOGASTRODUODENOSCOPY PEG TUBE INSERTION      EGD ESOPHAGOGASTRODUODENOSCOPY ULTRASOUND **ISOLATION** Diagnosis:       Dysphasia      (Dysphasia [R47.02])    Surgeons: Yuliana Dickens MD Responsible Provider: Ekaterina Kirby MD    Anesthesia Type: MAC ASA Status: 3          Anesthesia Type: No value filed.     Otilio Phase I: Otilio Score: 8    Otilio Phase II: Otilio Score: 9      Anesthesia Post Evaluation    Patient location during evaluation: bedside  Patient participation: complete - patient participated  Level of consciousness: awake  Pain score: 0  Airway patency: patent  Nausea & Vomiting: no nausea and no vomiting  Complications: no  Cardiovascular status: hemodynamically stable  Respiratory status: acceptable  Hydration status: euvolemic

## 2022-12-08 NOTE — PROGRESS NOTES
HEPATOBILIARY AND PANCREATIC SURGERY  DAILY PROGRESS NOTE  12/8/2022    Subjective:  No events overnight, No new complaints    Objective:  BP (!) 181/87   Pulse 71   Temp 98 °F (36.7 °C) (Oral)   Resp 16   Ht 5' 7\" (1.702 m)   Wt 132 lb (59.9 kg)   SpO2 94%   BMI 20.67 kg/m²     GENERAL: Awake follows all commands, oriented  HEAD:  Normocephalic. Atraumatic. EYES:   No scleral icterus.    LUNGS:  No increased work of breathing   CARDIOVASCULAR: RR  ABDOMEN: Lower midline scar, soft, nontender, nondistended  EXTREMITIES:   Right upper extremity weakness secondary to previous stroke  SKIN:  Warm and dry  NEUROLOGIC:  GCS 15        ASSESSMENT/PLAN:  74 y.o. female with a 3cm pancreatic body mass with encasement of the splenic artery concerning for pancreatic adenocarcinoma     Diet as tolerated  N.p.o. for PEG/EUS  Oncology consult once tissue obtained  Tumor markers pending  Discussed with Dr. Katina Mcnally    Electronically signed by Tiny Sellers MD on 12/8/22 at 10:20 AM EST

## 2022-12-08 NOTE — HOME CARE
Once on tube feeding Medicare is primary if meets Medicare guidelines payable at 80% after 233 deductible remaining 189.18, no oop per kanwal yr    2nd insurance [place]  $350 deductible remaining 76 family met deductible, payable at 90%, oop 700 met 700 per calendar year     RIRI Tubbs VIPUL     IF DENIED BY [facility] WE CAN BILL SECONDARY AND THEY WILL PAY AT 80% PATIENT WILL PAY 20% COPAY HOWEVER WE NEED TO KNOW EXACTLY WHAT TF IS ORDERED TO GET THE PRICING AMOUNT

## 2022-12-08 NOTE — PLAN OF CARE
Problem: Pain  Goal: Verbalizes/displays adequate comfort level or baseline comfort level  Outcome: Progressing     Problem: Safety - Adult  Goal: Free from fall injury  Outcome: Progressing     Problem: Cardiovascular - Adult  Goal: Maintains optimal cardiac output and hemodynamic stability  Outcome: Progressing     Problem: Cardiovascular - Adult  Goal: Absence of cardiac dysrhythmias or at baseline  Outcome: Progressing

## 2022-12-08 NOTE — PROGRESS NOTES
Upon calling report to [facility], inquiry was made as to whether the new PEG tube can be used this evening, as there are no orders. Perfect serve message sent to Dr. Luis Paul, as he is covering for Dr. Kristina Peabody. Post-peg procedure nursing communication orders have been  placed.      Matthew Rosario MD

## 2022-12-09 LAB
ANION GAP SERPL CALCULATED.3IONS-SCNC: 9 MMOL/L (ref 7–16)
BASOPHILS ABSOLUTE: 0.02 E9/L (ref 0–0.2)
BASOPHILS RELATIVE PERCENT: 0.2 % (ref 0–2)
BUN BLDV-MCNC: <2 MG/DL (ref 6–23)
CALCIUM SERPL-MCNC: 9 MG/DL (ref 8.6–10.2)
CHLORIDE BLD-SCNC: 105 MMOL/L (ref 98–107)
CO2: 25 MMOL/L (ref 22–29)
CREAT SERPL-MCNC: 0.5 MG/DL (ref 0.5–1)
EOSINOPHILS ABSOLUTE: 0.15 E9/L (ref 0.05–0.5)
EOSINOPHILS RELATIVE PERCENT: 1.8 % (ref 0–6)
GFR SERPL CREATININE-BSD FRML MDRD: >60 ML/MIN/1.73
GLUCOSE BLD-MCNC: 98 MG/DL (ref 74–99)
HCT VFR BLD CALC: 32.4 % (ref 34–48)
HEMOGLOBIN: 11.2 G/DL (ref 11.5–15.5)
IMMATURE GRANULOCYTES #: 0.04 E9/L
IMMATURE GRANULOCYTES %: 0.5 % (ref 0–5)
LYMPHOCYTES ABSOLUTE: 1.7 E9/L (ref 1.5–4)
LYMPHOCYTES RELATIVE PERCENT: 20.8 % (ref 20–42)
MCH RBC QN AUTO: 30.9 PG (ref 26–35)
MCHC RBC AUTO-ENTMCNC: 34.6 % (ref 32–34.5)
MCV RBC AUTO: 89.5 FL (ref 80–99.9)
METER GLUCOSE: 101 MG/DL (ref 74–99)
METER GLUCOSE: 147 MG/DL (ref 74–99)
MONOCYTES ABSOLUTE: 0.77 E9/L (ref 0.1–0.95)
MONOCYTES RELATIVE PERCENT: 9.4 % (ref 2–12)
NEUTROPHILS ABSOLUTE: 5.48 E9/L (ref 1.8–7.3)
NEUTROPHILS RELATIVE PERCENT: 67.3 % (ref 43–80)
PDW BLD-RTO: 12.7 FL (ref 11.5–15)
PLATELET # BLD: 485 E9/L (ref 130–450)
PMV BLD AUTO: 9.7 FL (ref 7–12)
POTASSIUM SERPL-SCNC: 3.9 MMOL/L (ref 3.5–5)
RBC # BLD: 3.62 E12/L (ref 3.5–5.5)
SODIUM BLD-SCNC: 139 MMOL/L (ref 132–146)
WBC # BLD: 8.2 E9/L (ref 4.5–11.5)

## 2022-12-09 PROCEDURE — 2580000003 HC RX 258: Performed by: INTERNAL MEDICINE

## 2022-12-09 PROCEDURE — 99232 SBSQ HOSP IP/OBS MODERATE 35: CPT | Performed by: TRANSPLANT SURGERY

## 2022-12-09 PROCEDURE — 82962 GLUCOSE BLOOD TEST: CPT

## 2022-12-09 PROCEDURE — 6370000000 HC RX 637 (ALT 250 FOR IP): Performed by: INTERNAL MEDICINE

## 2022-12-09 PROCEDURE — 6360000002 HC RX W HCPCS: Performed by: INTERNAL MEDICINE

## 2022-12-09 PROCEDURE — A4216 STERILE WATER/SALINE, 10 ML: HCPCS | Performed by: NURSE PRACTITIONER

## 2022-12-09 PROCEDURE — 6360000002 HC RX W HCPCS: Performed by: NURSE PRACTITIONER

## 2022-12-09 PROCEDURE — C9113 INJ PANTOPRAZOLE SODIUM, VIA: HCPCS | Performed by: NURSE PRACTITIONER

## 2022-12-09 PROCEDURE — 2580000003 HC RX 258: Performed by: NURSE PRACTITIONER

## 2022-12-09 PROCEDURE — 94640 AIRWAY INHALATION TREATMENT: CPT

## 2022-12-09 PROCEDURE — 1200000000 HC SEMI PRIVATE

## 2022-12-09 PROCEDURE — 36415 COLL VENOUS BLD VENIPUNCTURE: CPT

## 2022-12-09 PROCEDURE — 2500000003 HC RX 250 WO HCPCS: Performed by: STUDENT IN AN ORGANIZED HEALTH CARE EDUCATION/TRAINING PROGRAM

## 2022-12-09 PROCEDURE — 80048 BASIC METABOLIC PNL TOTAL CA: CPT

## 2022-12-09 PROCEDURE — 6370000000 HC RX 637 (ALT 250 FOR IP): Performed by: STUDENT IN AN ORGANIZED HEALTH CARE EDUCATION/TRAINING PROGRAM

## 2022-12-09 PROCEDURE — 85025 COMPLETE CBC W/AUTO DIFF WBC: CPT

## 2022-12-09 RX ADMIN — ACETYLCYSTEINE 400 MG: 100 INHALANT RESPIRATORY (INHALATION) at 06:53

## 2022-12-09 RX ADMIN — LISINOPRIL 30 MG: 20 TABLET ORAL at 08:52

## 2022-12-09 RX ADMIN — GABAPENTIN 100 MG: 100 CAPSULE ORAL at 08:52

## 2022-12-09 RX ADMIN — METOPROLOL TARTRATE 5 MG: 5 INJECTION, SOLUTION INTRAVENOUS at 22:32

## 2022-12-09 RX ADMIN — ATORVASTATIN CALCIUM 40 MG: 40 TABLET, FILM COATED ORAL at 08:53

## 2022-12-09 RX ADMIN — AMLODIPINE BESYLATE 5 MG: 5 TABLET ORAL at 08:52

## 2022-12-09 RX ADMIN — METOPROLOL TARTRATE 5 MG: 5 INJECTION, SOLUTION INTRAVENOUS at 06:10

## 2022-12-09 RX ADMIN — IPRATROPIUM BROMIDE AND ALBUTEROL SULFATE 1 AMPULE: .5; 2.5 SOLUTION RESPIRATORY (INHALATION) at 10:29

## 2022-12-09 RX ADMIN — ACETYLCYSTEINE 400 MG: 100 INHALANT RESPIRATORY (INHALATION) at 14:53

## 2022-12-09 RX ADMIN — POTASSIUM CHLORIDE AND SODIUM CHLORIDE: 900; 300 INJECTION, SOLUTION INTRAVENOUS at 00:25

## 2022-12-09 RX ADMIN — SODIUM CHLORIDE 40 MG: 9 INJECTION, SOLUTION INTRAMUSCULAR; INTRAVENOUS; SUBCUTANEOUS at 08:52

## 2022-12-09 RX ADMIN — ENOXAPARIN SODIUM 40 MG: 100 INJECTION SUBCUTANEOUS at 08:52

## 2022-12-09 RX ADMIN — Medication 10 ML: at 08:53

## 2022-12-09 RX ADMIN — ACETAMINOPHEN 650 MG: 325 TABLET ORAL at 03:06

## 2022-12-09 RX ADMIN — POTASSIUM CHLORIDE AND SODIUM CHLORIDE: 900; 300 INJECTION, SOLUTION INTRAVENOUS at 22:37

## 2022-12-09 RX ADMIN — GABAPENTIN 100 MG: 100 CAPSULE ORAL at 22:29

## 2022-12-09 RX ADMIN — VENLAFAXINE HYDROCHLORIDE 150 MG: 150 CAPSULE, EXTENDED RELEASE ORAL at 08:51

## 2022-12-09 RX ADMIN — IPRATROPIUM BROMIDE AND ALBUTEROL SULFATE 1 AMPULE: .5; 2.5 SOLUTION RESPIRATORY (INHALATION) at 14:53

## 2022-12-09 RX ADMIN — IPRATROPIUM BROMIDE AND ALBUTEROL SULFATE 1 AMPULE: .5; 2.5 SOLUTION RESPIRATORY (INHALATION) at 19:58

## 2022-12-09 RX ADMIN — METOPROLOL TARTRATE 5 MG: 5 INJECTION, SOLUTION INTRAVENOUS at 12:02

## 2022-12-09 RX ADMIN — METOPROLOL TARTRATE 5 MG: 5 INJECTION, SOLUTION INTRAVENOUS at 00:18

## 2022-12-09 RX ADMIN — METOPROLOL TARTRATE 5 MG: 5 INJECTION, SOLUTION INTRAVENOUS at 17:00

## 2022-12-09 RX ADMIN — ACETYLCYSTEINE 400 MG: 100 INHALANT RESPIRATORY (INHALATION) at 19:58

## 2022-12-09 RX ADMIN — IPRATROPIUM BROMIDE AND ALBUTEROL SULFATE 1 AMPULE: .5; 2.5 SOLUTION RESPIRATORY (INHALATION) at 06:53

## 2022-12-09 RX ADMIN — Medication 10 ML: at 22:32

## 2022-12-09 RX ADMIN — ACETYLCYSTEINE 400 MG: 100 INHALANT RESPIRATORY (INHALATION) at 10:29

## 2022-12-09 ASSESSMENT — PAIN SCALES - GENERAL: PAINLEVEL_OUTOF10: 8

## 2022-12-09 ASSESSMENT — PAIN DESCRIPTION - ONSET: ONSET: ON-GOING

## 2022-12-09 ASSESSMENT — PAIN DESCRIPTION - DESCRIPTORS: DESCRIPTORS: DISCOMFORT;SORE;ACHING

## 2022-12-09 ASSESSMENT — PAIN DESCRIPTION - PAIN TYPE: TYPE: SURGICAL PAIN

## 2022-12-09 ASSESSMENT — PAIN DESCRIPTION - ORIENTATION: ORIENTATION: UPPER;LEFT;MID

## 2022-12-09 ASSESSMENT — PAIN DESCRIPTION - LOCATION: LOCATION: ABDOMEN

## 2022-12-09 ASSESSMENT — PAIN DESCRIPTION - FREQUENCY: FREQUENCY: CONTINUOUS

## 2022-12-09 NOTE — CARE COORDINATION
Ss note: 12/9/2022  2:03 PM  Tube Feeding orders noted, (pt does NOT have ENFIT per charge nurse). Please see note from Olga Baker with [facility] dated today. WILL NEED HHC ORDERS  with liaison stating start of care is Sunday 12-11-22. CM/SW please keep liaison Db Martinez with [facility] updated over the weekend. Pt resides home with her spouse and son Tony Kraus. Pt has hx of CVA and dysphasia.  KRYSTEN Garza

## 2022-12-09 NOTE — CONSULTS
Comprehensive Nutrition Assessment    Type and Reason for Visit:  Initial, Consult (Tube feeding O&M / LOS day 6)    Nutrition Recommendations/Plan:   Continue Current Diet Order ( Pureed and Nectar Thick Liquids)   Start Enteral Nutrition (PEG)  Continue to monitor PO intake and adjust TF rate as needed. Recommend starting Glucerna 1.5 @ 60ml/h to provide 2160kcal,118g protein, 1092ml fluids & standard flush 30ml q4h while on IVFs (total fluids 1272ml from TF/Flushes). This regimen provides 98% calorie and protein needs ( also on PO diet ). Malnutrition Assessment:  Malnutrition Status:  Severe malnutrition (12/09/22 1148)    Context:  Chronic Illness     Findings of the 6 clinical characteristics of malnutrition:  Energy Intake:  75% or less estimated energy requirements for 1 month or longer  Weight Loss:  Greater than 10% over 6 months     Body Fat Loss:   (moderate body fat loss) Triceps, Orbital   Muscle Mass Loss:   (moderate muscle mass loss) Temples (temporalis), Clavicles (pectoralis & deltoids), Hand (interosseous)  Fluid Accumulation:  Unable to assess     Strength:  Not Performed    Nutrition Assessment:    Pt admit with influenza A, pancreatic adenocarcinoma, s/p new PEG tube, consult for TF O&M. Pt meets criteria for severe malnutrition in context of chronic (catabolic) illness. PMHx of HTN, HLD, GERD, DM, erosive esophagitis & stricture, stroke. per SLP pt has dysphagia, on regular pureed diet and nectar thick liquids, PO intake 0-25%. Will provide tube feeding recommendations and continue to monitor tolerance during admission. Nutrition Related Findings:    abd soft, PEG in ULQ, hypoactive BS x4,no n/v/d or abd pain, pt on BiPAP, able to nod appropriately, A&Ox4. muscle and fat loss noted. severe malnutrition. I/O's -3850. Wound Type: None       Current Nutrition Intake & Therapies:    Average Meal Intake: 0%  ADULT DIET;  Dysphagia - Pureed; Mildly Thick (Nectar)  ADULT TUBE FEEDING; PEG; Diabetic; Continuous; 20; Yes; 10; Q 6 hours; 60; 30; Q 4 hours    Anthropometric Measures:  Height: 5' 7\" (170.2 cm)  Ideal Body Weight (IBW): 135 lbs (61 kg)    Admission Body Weight: 132 lb (59.9 kg)  Current Body Weight: 132 lb (59.9 kg), 97.8 % IBW. Weight Source: Stated  Current BMI (kg/m2): 20.7  Usual Body Weight: 164 lb (74.4 kg) (5/27/22 actual)  % Weight Change (Calculated): -19.5  BMI Categories: Underweight (BMI less than 22) age over 72    Estimated Daily Nutrient Needs:  Energy Requirements Based On: Formula  Weight Used for Energy Requirements: Current  Energy (kcal/day): 2200-2400kcal/day (MSJ 1899 x 1.2-1.3)  Weight Used for Protein Requirements: Current  Protein (g/day): 100-120g/day (1.7-2g/kg/day for underweight for >64yo, pancreatic mass / cancer)     Fluid (ml/day): 1ml/kcal  or  2200-2440ml/day    Nutrition Diagnosis:   Unintended weight loss related to inadequate protein-energy intake as evidenced by poor intake prior to admission, swallow study results, weight loss greater than or equal to 10% in 6 months, moderate loss of subcutaneous fat, moderate muscle loss, GI abnormality (dysphagia)    Severe malnutrition, In context of chronic illness related to catabolic illness as evidenced by weight loss greater than or equal to 10% in 6 months, poor intake prior to admission, moderate muscle loss, moderate loss of subcutaneous fat      Nutrition Interventions:   Food and/or Nutrient Delivery: Continue Current Diet, Start Tube Feeding (Recommend starting Glucerna 1.5 @ 60ml/h to provide 2160kcal,118g protein, 1092ml fluids & standard flush 30ml q4h while on IVFs. this regimen provides 98% calorie and 100% protein needs ( also on PO diet ))  Nutrition Education/Counseling: No recommendation at this time  Coordination of Nutrition Care: Continue to monitor while inpatient    Goals:  Goals:  Tolerate nutrition support at goal rate, by next RD assessment      Nutrition Monitoring and Evaluation:   Behavioral-Environmental Outcomes: None Identified  Food/Nutrient Intake Outcomes: Food and Nutrient Intake, Enteral Nutrition Intake/Tolerance  Physical Signs/Symptoms Outcomes: Chewing or Swallowing, GI Status, Fluid Status or Edema, Hemodynamic Status, Nutrition Focused Physical Findings, Skin, Weight, Biochemical Data    Discharge Planning:     Too soon to determine     Adeel Jolly, [address] [place] [address]  Contact: [phone], [phone]

## 2022-12-09 NOTE — PROGRESS NOTES
PROGRESS NOTE  By Kristin Flores M.D. [facility]  Dr. Carin Verdugo M.D.,  Dr. Erwin Obrien M.D.,   Dr. Noris Gomez D.O.,  Dr. Guido Steinberg M.D.,  Dr. Bernadette Morrell D.O.,          Teresa Coles  74 y.o.  female    SUBJECTIVE:  Denies abdominal pain    OBJECTIVE:    BP (!) 166/85   Pulse 85   Temp 98.4 °F (36.9 °C)   Resp 18   Ht 5' 7\" (1.702 m)   Wt 132 lb (59.9 kg)   SpO2 92%   BMI 20.67 kg/m²     General: NAD/ female  HEENT: Anicteric sclera/moist oral mucosa  Neck: Supple/trachea midline  Chest: Symmetric excursion/nonlabored respirations  Cor: Regular  Abd.: Soft and nondistended. Interval new PEG tube  Extr.:  No peripheral edema  Skin: Warm and dry      DATA:    Monitor data reviewed -sinus rhythm noted.     Stool (measured) : 0 mL  Lab Results   Component Value Date/Time    WBC 8.2 12/09/2022 06:52 AM    RBC 3.62 12/09/2022 06:52 AM    HGB 11.2 12/09/2022 06:52 AM    HCT 32.4 12/09/2022 06:52 AM    MCV 89.5 12/09/2022 06:52 AM    MCH 30.9 12/09/2022 06:52 AM    MCHC 34.6 12/09/2022 06:52 AM    RDW 12.7 12/09/2022 06:52 AM     12/09/2022 06:52 AM    MPV 9.7 12/09/2022 06:52 AM     Lab Results   Component Value Date/Time     12/09/2022 06:52 AM    K 3.9 12/09/2022 06:52 AM    K 3.5 12/05/2022 07:47 AM     12/09/2022 06:52 AM    CO2 25 12/09/2022 06:52 AM    BUN <2 12/09/2022 06:52 AM    CREATININE 0.5 12/09/2022 06:52 AM    CREATININE 0.9 05/03/2019 12:00 AM    CALCIUM 9.0 12/09/2022 06:52 AM    PROT 5.7 12/06/2022 06:34 AM    LABALBU 3.0 12/06/2022 06:34 AM    BILITOT 0.4 12/06/2022 06:34 AM    ALKPHOS 81 12/06/2022 06:34 AM    AST 20 12/06/2022 06:34 AM    ALT 13 12/06/2022 06:34 AM     Lab Results   Component Value Date/Time    LIPASE 53 12/06/2022 06:34 AM     No results found for: AMYLASE      ASSESSMENT/PLAN:  Patient Active Problem List   Diagnosis    Right foot infection    Diabetic ulcer of left foot associated with type 1 diabetes mellitus ([facility]    Type 2 diabetes mellitus without complication, without long-term current use of insulin (HCC)    Essential hypertension    Gastroesophageal reflux disease without esophagitis    Mixed hyperlipidemia    Subacute osteomyelitis of foot (HCC)    Dysarthria    Hemorrhagic stroke (HCC)    Altered mental status    Lactic acidosis    Muscle function loss    Impaired functional mobility, balance, gait, and endurance    Diabetic peripheral neuropathy (HCC)    Peroneal neuropathy at knee, left    Left lumbosacral radiculopathy    Pain in joint, shoulder region    Type II diabetes mellitus with peripheral circulatory disorder (HCC)    PVD (peripheral vascular disease) ([facility] [facility]    Influenza A    Pancreatic adenocarcinoma (HCC)    Severe protein-calorie malnutrition ([facility] [address]     Dysphagia  -History of CVA  -Previously considered for PEG placement at [facility]  -PEG tube placed yesterday by general surgery  -Defer tube feedings to admitting      2. Weight loss  -Patient describes unintentional weight loss of 20 to 30 pounds over the past few months  -EGD as above  -Colonoscopy 5/15/2019 showing 2 polyps in the rectum, hyperplastic  -Previous CT abdomen pelvis 5/27/2022 showing dilated pancreatic duct, atrophy of the pancreas, abnormal wall thickening of the descending through the rectum  -Repeat CT chest/abdomen/pelvis consistent with pancreatic mass  -Abnormal CT colon/pancreas/bile ducts evaluation as below  -Consider further evaluation with nonemergent colonoscopy next week or as outpatient     3. Abnormal CT colon/dilated bile ducts  -CT abdomen pelvis 5/27/2022 showing abnormal wall thickening from the descending through sigmoid colon  -Dr. Henna Cedillo input appreciated with EUS/FNA of pancreatic body mass -await biopsies     4. Diarrhea  -Appears resolved  -Stool studies thus far negative  -Plan for outpatient colonoscopy after establishing a reliable route for prep -see above     5.   History of colon polyps  -Colonoscopy 5/15/2019 showing 2 polyps in the rectum, hyperplastic  -Colonoscopy as above    6. Comorbidities  -CVA, hypertension, dyslipidemia, diabetes, thyroid disease  -Per admitting      Pan Salas MD  12/9/2022  11:49 AM    NOTE:  This report was transcribed using voice recognition software. Every effort was made to ensure accuracy; however, inadvertent computerized transcription errors may be present.

## 2022-12-09 NOTE — PROGRESS NOTES
[address] [place], [place] [address]ist   Progress Note    Admitting Date and Time: 12/3/2022  5:22 PM  Admit Dx: Hypomagnesemia [E83.42]  Influenza A [J10.1]  Elevated troponin [R77.8]    Subjective:    12/7: Pt states she feels the same today. Still coughing with liquids and difficulty swallowing. No SOB, no fevers/chills. History is limited due to aphasia. Per RN, Speech will be doing repeat swallow eval today. 12/8:  Pt just returned from endoscopy lab and is on gurney.  at bedside. Per RN, potassium low this morning. Received Klyte and IVF changed to NS with 40 mEq KCl/L    12/9: Pt sitting up in bed. She is voice no complaints. Per RN:  patient tolerating tube feeds at 20 ml/hr but due to increase rate soon.        metoprolol  5 mg IntraVENous 4 times per day    ipratropium-albuterol  1 ampule Inhalation Q4H WA    pantoprazole (PROTONIX) 40 mg injection  40 mg IntraVENous Daily    sodium chloride flush  10 mL IntraVENous 2 times per day    enoxaparin  40 mg SubCUTAneous Daily    amLODIPine  5 mg Oral Daily    venlafaxine  150 mg Oral Daily    atorvastatin  40 mg Oral Daily    gabapentin  100 mg Oral BID    lisinopril  30 mg Oral Daily    insulin lispro  0-4 Units SubCUTAneous TID WC    insulin lispro  0-4 Units SubCUTAneous Nightly    acetylcysteine  4 mL Inhalation Q4H     hydrALAZINE, 10 mg, Q6H PRN  sodium chloride flush, 10 mL, PRN  sodium chloride, , PRN  promethazine, 12.5 mg, Q6H PRN   Or  ondansetron, 4 mg, Q6H PRN  polyethylene glycol, 17 g, Daily PRN  acetaminophen, 650 mg, Q6H PRN   Or  acetaminophen, 650 mg, Q6H PRN  glucose, 4 tablet, PRN  dextrose bolus, 125 mL, PRN   Or  dextrose bolus, 250 mL, PRN  glucagon (rDNA), 1 mg, PRN  dextrose, , Continuous PRN       Objective:    BP (!) 155/78   Pulse 94   Temp 98.9 °F (37.2 °C) (Oral)   Resp 20   Ht 5' 7\" (1.702 m)   Wt 132 lb (59.9 kg)   SpO2 93%   BMI 20.67 kg/m²   General Appearance: alert and oriented to person, place and time and in no acute distress  Skin: warm and dry  Head: normocephalic and atraumatic  Eyes: pupils equal, round, and reactive to light, extraocular eye movements intact, conjunctivae normal  Neck: neck supple and non tender without mass   Pulmonary/Chest: clear to auscultation bilaterally- no wheezes, rales or rhonchi, normal air movement, no respiratory distress but frequent coughing  Cardiovascular: normal rate, normal S1 and S2 and no carotid bruits  Abdomen: soft, non-tender, PEG tube in place  Extremities: no cyanosis, no clubbing and no edema  Neurologic: no cranial nerve deficit noted but aphasic speech present      Recent Labs     12/08/22  0536 12/09/22  0652    139   K 2.6* 3.9    105   CO2 22 25   BUN <2* <2*   CREATININE 0.4* 0.5   GLUCOSE 156* 98   CALCIUM 9.0 9.0         No results for input(s): ALKPHOS, PROT, LABALBU, BILITOT, AST, ALT in the last 72 hours. Recent Labs     12/08/22  0536 12/09/22  0652   WBC 6.1 8.2   RBC 3.87 3.62   HGB 11.6 11.2*   HCT 33.9* 32.4*   MCV 87.6 89.5   MCH 30.0 30.9   MCHC 34.2 34.6*   RDW 12.3 12.7   * 485*   MPV 9.5 9.7         Radiology:   CT CHEST W CONTRAST   Final Result   Abnormal wall thickening identified of the sigmoid colon to suggest a   colitis. Correlation to clinical symptoms is recommended. Consider   colonoscopy if clinically indicated. Abnormal low-attenuation focus seen in the distal body and tail the pancreas   with posterior pancreatic ductal dilatation. Findings concerning for an   underlying malignancy. No significant evidence of metastatic disease. Chronic changes seen within the lung fields bilaterally. There is bronchial   wall thickening identified the perihilar regions extending into the lower   lung fields suggesting atelectatic change or infiltrate. Clinical   correlation is needed.          CT ABDOMEN PELVIS W IV CONTRAST Additional Contrast? None   Final Result   Abnormal wall thickening identified of the sigmoid colon to suggest a   colitis. Correlation to clinical symptoms is recommended. Consider   colonoscopy if clinically indicated. Abnormal low-attenuation focus seen in the distal body and tail the pancreas   with posterior pancreatic ductal dilatation. Findings concerning for an   underlying malignancy. No significant evidence of metastatic disease. Chronic changes seen within the lung fields bilaterally. There is bronchial   wall thickening identified the perihilar regions extending into the lower   lung fields suggesting atelectatic change or infiltrate. Clinical   correlation is needed. Fluoroscopy modified barium swallow with video   Final Result   Swallowing mechanism grossly within normal limits without evidence of   aspiration. Please see separate speech pathology report for full discussion of findings   and recommendations. XR CHEST PORTABLE   Final Result   No acute process. Assessment:  Principal Problem:    Influenza A  Active Problems:    Pancreatic adenocarcinoma (HCC)    Severe protein-calorie malnutrition ([address]  Resolved Problems:    * No resolved hospital problems. *      Plan:      UTI  -UA with LE and rare bacteria  -Urine culture grew mixed gram positive organisms less than 10,000CFU  -Completed 3 days ceftriaxone on 12/6  -Patient afebrile and no leukocytosis, continue to monitor      Influenza A  -Positive on 11/30 from [facility]  -Supportive care, out of time range for tamiflu      Acute on chronic dysphagia  -Has had chronic dysphagia but has acutely worsened  -Also recently diagnosed with ALS which is likely source of dysphagia along with stroke  -Patient ok to have runny pureed and nectar thick per SLP. Yesterday, after reevaluation by speech therapy, they recommended diet to be advanced to puréed and thin liquids and this will be implemented this evening.   -PEG tube inserted yesterday 12/8      Unintentional weight loss

## 2022-12-09 NOTE — HOME CARE
Zahra FRAUSTO    Ordered therapy at time of quote: GLUCERNA 1.5 REED/ML @ 60 ML/HR  Coverage: MEDICARE PROVIDES 80% COVERAGE AND [facility] PAYS 90% COVERAGE IF CRITERIA ARE MET*  Total pt responsibility (after deductible met):$0.41/DAY IF MEDICARE CRITERIA ARE MET (SWITCH OVER TO BOLUS FEEDS AND ADJUST CALORIES)    *CURRENTLY THE PATIENT IS OVER THE MEDICARE CALORIE/DAY AMOUNT. MEDICARE REQUIRES 20-35 KCAL/KG/DAY. WITH 2160 REED/DAY; PATIENT IS AT ABOUT 36 KCAL/KG/DAY. PATIENT ALSO DOES NOT MEET CRITERIA FOR A FEEDING PUMP. *    Jim Upton LPN   [facility]

## 2022-12-09 NOTE — PROGRESS NOTES
SURGICAL ENDOSCOPY  DAILY PROGRESS NOTE  12/9/2022    Subjective:  No new complaints overnight feels well    Objective:  BP (!) 174/84   Pulse 85   Temp 98.4 °F (36.9 °C)   Resp 18   Ht 5' 7\" (1.702 m)   Wt 132 lb (59.9 kg)   SpO2 92%   BMI 20.67 kg/m²     GENERAL: Awake follows all commands, oriented  HEAD:  Normocephalic. Atraumatic. EYES:   No scleral icterus.    LUNGS:  No increased work of breathing   CARDIOVASCULAR: RR  ABDOMEN: Lower midline scar, soft, nontender, nondistended, PEG tube 3 cm at the skin  EXTREMITIES:   Right upper extremity weakness secondary to previous stroke  SKIN:  Warm and dry  NEUROLOGIC:  GCS 15    Assessment/Plan:  74 y.o. female dysphagia and a pancreatic mass s/p EUS with PEG tube placement    PEG 3cm at skin  Bumper rotates easily  OK for medications and TF via PEG  Abdominal binder for 4 weeks  Await FNB results  Consult oncology    Will discuss with Dr. Jud Ospina    Electronically signed by Brayden Patricia MD on 12/9/2022 at 10:20 AM

## 2022-12-09 NOTE — PLAN OF CARE
Problem: Safety - Adult  Goal: Free from fall injury  12/8/2022 2240 by Shanique Kelly RN  Outcome: Progressing     Problem: Skin/Tissue Integrity  Goal: Absence of new skin breakdown  Description: 1. Monitor for areas of redness and/or skin breakdown  2. Assess vascular access sites hourly  3. Every 4-6 hours minimum:  Change oxygen saturation probe site  4. Every 4-6 hours:  If on nasal continuous positive airway pressure, respiratory therapy assess nares and determine need for appliance change or resting period.   12/8/2022 2240 by Shanique Kelly RN  Outcome: Progressing

## 2022-12-09 NOTE — CARE COORDINATION
Ss note:12/9/202210:36 AM Pt is from home with spouse Yolande Mcwilliams and son Sumaya Oar also resides with them. Pt has dysphasia, hx of CVA. Per nursing pt had PEG tube placed (it is not the ENFIT). Dietician has been ordered, will need [address] for [facility]. Per liaison Mary Lopez with [facility] start of care would be Sunday 12-. Sw will follow.  KRYSTEN Chavira

## 2022-12-09 NOTE — CONSULTS
[facility]  Dr. Nidia Slaughter      Patient Name: Sussy Kumar  YOB: 1955  PCP: Sheryl Mata DO   Referring Provider:      Reason for Consultation:   Chief Complaint   Patient presents with    Dysphagia     Pt c/o weakness and difficulty swallowing that started a couple of weeks ago. Pt diagnosed with the flu a week ago. Hx CVA. History of Present Illness: This pt is a 73 yo female admitted earlier this month for flu A. She also noted slowly progressive weakness, fatigue and dysphagia. Also notes ~30 pound weight loss. She has previous diagnosis of esophageal stricture. CT scan 12/6 which showed distal pancreatic body/tail low attenuation lesion with distal duct dilation. No evidence of metastatic disease. EGD? EUS with Dr. Imani Easley 12/8/22 showing a 1.9 cm lesion just proximal to pancreatic tail, with duct dilation to 8 mm. She is s/p biopsy. S/p PEG placement. Diagnostic Data:     Past Medical History:   Diagnosis Date    Abnormal mammogram of left breast 05/2019    Anxiety     Cerebral artery occlusion with cerebral infarction [facility])     Depression     Diabetes mellitus ([address]     Diabetic peripheral neuropathy ([address] 9/1/2022    Dysarthria 5/12/2021    Erosive esophagitis     Esophageal stricture     GERD (gastroesophageal reflux disease)     Gestational diabetes     Hemorrhagic stroke ([address] 5/12/2021    Hyperlipidemia     Hypertension     Hypokalemia     Left lumbosacral radiculopathy 9/1/2022    Osteoarthritis     knees    Pain in joint, shoulder region 10/28/2022    bilat.     Peroneal neuropathy at knee, left 9/1/2022    Postmenopausal     Type 2 diabetes mellitus without complication [facility])        Patient Active Problem List    Diagnosis Date Noted    Severe protein-calorie malnutrition ([address] 12/09/2022    Pancreatic adenocarcinoma ([address] 12/08/2022    Influenza A 12/03/2022    Type II diabetes mellitus with peripheral circulatory disorder ([facility] 11/01/2022    PVD (peripheral vascular disease) ([facility] 11/01/2022    Diabetic peripheral neuropathy ([facility] 09/01/2022    Peroneal neuropathy at knee, left 09/01/2022    Left lumbosacral radiculopathy 09/01/2022    Muscle function loss 07/04/2022    Impaired functional mobility, balance, gait, and endurance 07/04/2022    Altered mental status 05/26/2022    Lactic acidosis 05/26/2022    Pain in joint, shoulder region 10/28/2022    Dysarthria 05/12/2021    Hemorrhagic stroke ([address] 05/12/2021    Type 2 diabetes mellitus without complication, without long-term current use of insulin ([address]     Essential hypertension     Gastroesophageal reflux disease without esophagitis     Mixed hyperlipidemia     Subacute osteomyelitis of foot ([facility]     Diabetic ulcer of left foot associated with type 1 diabetes mellitus ([facility]     Right foot infection 07/15/2016        Past Surgical History:   Procedure Laterality Date    BREAST BIOPSY Left 05/2019    Dr. Magui Mcnally      x4    CHOLECYSTECTOMY      COLONOSCOPY  11/2011    HYSTERECTOMY (CERVIX STATUS UNKNOWN)      JOINT REPLACEMENT Right     knee    UPPER GASTROINTESTINAL ENDOSCOPY N/A 12/6/2022    EGD ESOPHAGOGASTRODUODENOSCOPY DILATATION performed by Hina Pike MD at [facility] 12/8/2022    EGD ESOPHAGOGASTRODUODENOSCOPY PEG TUBE INSERTION performed by Sola Arcos MD at [address] [facility] N/A 12/8/2022    EGD W/EUS FNA performed by Sola Arcos MD at [place] [id number]. History  Family History   Problem Relation Age of Onset    Diabetes Mother         complication     Coronary Art Dis Father     Rheum Arthritis Sister     Mult Sclerosis Brother     Diabetes Brother        Social History    TOBACCO:   reports that she has never smoked.  She has never used smokeless tobacco.  ETOH:   reports no history of alcohol use. Home Medications  Prior to Admission medications    Medication Sig Start Date End Date Taking? Authorizing Provider   naproxen (NAPROSYN) 500 MG tablet Take 1 tablet by mouth 2 times daily (with meals) 11/21/22   JAYDEN Berg   gabapentin (NEURONTIN) 100 MG capsule TAKE 1 CAPSULE BY MOUTH 2 TIMES DAILY FOR 30 DAYS. INTENDED SUPPLY: 30 DAYS 11/4/22 12/4/22  Chaka Nicole DO   atorvastatin (LIPITOR) 40 MG tablet TAKE 1 TABLET DAILY 10/6/22   Chaka Nicole DO   Semaglutide (RYBELSUS) 7 MG TABS Take 7 mg by mouth daily Start this after completing the 3mg dose. 11/6/22   Chaka Nicole DO   zoster recombinant adjuvanted vaccine [facility]) 50 MCG/0.5ML SUSR injection Inject 0.5 mLs into the muscle See Admin Instructions 1 dose now and repeat in 2-6 months 7/28/22 1/24/23  Chaka Nicole DO   amLODIPine (NORVASC) 5 MG tablet TAKE 1 TABLET DAILY IN THE MORNING 7/28/22   Chaka Nicole DO   atenolol (TENORMIN) 50 MG tablet TAKE 1 TABLET DAILY 7/28/22   Chaka Nicole DO   esomeprazole (NEXIUM) 40 MG delayed release capsule TAKE 1 CAPSULE EVERY MORNING BEFORE BREAKFAST 7/28/22   Chaka Nicole DO   lisinopril (PRINIVIL;ZESTRIL) 30 MG tablet TAKE 1 TABLET DAILY AT NIGHT 7/28/22   Chaka Nicole DO   sitaGLIPtan-metFORMIN (JANUMET)  MG per tablet Take 1 tablet by mouth in the morning. 7/28/22   Chaka Nicole DO   venlafaxine (EFFEXOR XR) 150 MG extended release capsule TAKE 1 CAPSULE DAILY 7/28/22   Chaka Nicole DO   Multiple Vitamin (MULTI-VITAMIN DAILY PO) Take by mouth    Historical Provider, MD       Allergies  No Known Allergies    Review of Systems:    As in HPI      Objective  BP (!) 166/85   Pulse 88   Temp 98.9 °F (37.2 °C) (Oral)   Resp 20   Ht 5' 7\" (1.702 m)   Wt 132 lb (59.9 kg)   SpO2 93%   BMI 20.67 kg/m²     Physical Exam:   Performance Status:  General: AAO to person, place, time,  Head and neck : PERRLA, EOMI .  Sclera non icteric. Oropharynx : Clear  Neck: no JVD,  no adenopathy  LYMPHATICS : No LAD  Heart: Regular rate and regular rhythm, no murmur  Lungs: Clear to auscultation   Extremities: No edema  Abdomen: Soft, non-tender; PEG in place  Skin:  No rash  Neurologic:Cranial nerves grossly intact. No focal motor deficits    Recent Laboratory Data-   Lab Results   Component Value Date    WBC 8.2 12/09/2022    HGB 11.2 (L) 12/09/2022    HCT 32.4 (L) 12/09/2022    MCV 89.5 12/09/2022     (H) 12/09/2022    LYMPHOPCT 20.8 12/09/2022    RBC 3.62 12/09/2022    MCH 30.9 12/09/2022    MCHC 34.6 (H) 12/09/2022    RDW 12.7 12/09/2022    NEUTOPHILPCT 67.3 12/09/2022    MONOPCT 9.4 12/09/2022    BASOPCT 0.2 12/09/2022    NEUTROABS 5.48 12/09/2022    LYMPHSABS 1.70 12/09/2022    MONOSABS 0.77 12/09/2022    EOSABS 0.15 12/09/2022    BASOSABS 0.02 12/09/2022       Lab Results   Component Value Date     12/09/2022    K 3.9 12/09/2022     12/09/2022    CO2 25 12/09/2022    BUN <2 (L) 12/09/2022    CREATININE 0.5 12/09/2022    GLUCOSE 98 12/09/2022    CALCIUM 9.0 12/09/2022    PROT 5.7 (L) 12/06/2022    LABALBU 3.0 (L) 12/06/2022    BILITOT 0.4 12/06/2022    ALKPHOS 81 12/06/2022    AST 20 12/06/2022    ALT 13 12/06/2022    LABGLOM >60 12/09/2022    GFRAA >60 07/28/2022       No results found for: IRON, TIBC, FERRITIN        Radiology-    CT CHEST W CONTRAST   Final Result   Abnormal wall thickening identified of the sigmoid colon to suggest a   colitis. Correlation to clinical symptoms is recommended. Consider   colonoscopy if clinically indicated. Abnormal low-attenuation focus seen in the distal body and tail the pancreas   with posterior pancreatic ductal dilatation. Findings concerning for an   underlying malignancy. No significant evidence of metastatic disease. Chronic changes seen within the lung fields bilaterally.   There is bronchial   wall thickening identified the perihilar regions extending into the lower   lung fields suggesting atelectatic change or infiltrate. Clinical   correlation is needed. CT ABDOMEN PELVIS W IV CONTRAST Additional Contrast? None   Final Result   Abnormal wall thickening identified of the sigmoid colon to suggest a   colitis. Correlation to clinical symptoms is recommended. Consider   colonoscopy if clinically indicated. Abnormal low-attenuation focus seen in the distal body and tail the pancreas   with posterior pancreatic ductal dilatation. Findings concerning for an   underlying malignancy. No significant evidence of metastatic disease. Chronic changes seen within the lung fields bilaterally. There is bronchial   wall thickening identified the perihilar regions extending into the lower   lung fields suggesting atelectatic change or infiltrate. Clinical   correlation is needed. Fluoroscopy modified barium swallow with video   Final Result   Swallowing mechanism grossly within normal limits without evidence of   aspiration. Please see separate speech pathology report for full discussion of findings   and recommendations. XR CHEST PORTABLE   Final Result   No acute process. ASSESSMENT/PLAN :  75 yo female  Mass just proximal to distal pancreas with 8mm duct dilation  Esophageal stricture  Stroke previously    - S/p PEG placement  - S/p EGD/EUS with biopsy of aforementioned lesion  - Imaging with no evidence of distant mets. Splenic artery involvement. Size 2-3 cm on imaging  - CA [id number]  - Case discussed with Dr. Gabriel Do directly. We are in agreement for neoadjuvant chemotherapy, likely mFOLFIRINOX with GCSF support +/- subsequent CRT with xeloda 825 mg/m2 BID  - PORT outpatient  - Will follow final pathology results    Thank you for this consult.  Please call with further questions or concerns      Electronically signed by Mis Tai MD on 12/9/2022 at 1:02 PM

## 2022-12-09 NOTE — PROGRESS NOTES
HEPATOBILIARY AND PANCREATIC SURGERY  DAILY PROGRESS NOTE  12/9/2022    Subjective:  No new complaints overnight. Objective:  BP (!) 174/84   Pulse 85   Temp 98.4 °F (36.9 °C)   Resp 18   Ht 5' 7\" (1.702 m)   Wt 132 lb (59.9 kg)   SpO2 92%   BMI 20.67 kg/m²     GENERAL: Awake follows all commands, oriented  HEAD:  Normocephalic. Atraumatic. EYES:   No scleral icterus.    LUNGS:  No increased work of breathing   CARDIOVASCULAR: RR  ABDOMEN: Lower midline scar, soft, nontender, nondistended, PEG 3 cm at skin, clamped   EXTREMITIES:   Right upper extremity weakness secondary to previous stroke  SKIN:  Warm and dry  NEUROLOGIC:  GCS 15        ASSESSMENT/PLAN:  74 y.o. female with a 3cm pancreatic body mass with encasement of the splenic artery concerning for pancreatic adenocarcinoma     Diet as tolerated  F/u FNB results, Tumor markers pending   Oncology consult  No obvious mets seen on CT chest  Will be discussed with Dr. Learta Collet    Electronically signed by Kenneth Villarreal MD on 12/8/22 at 10:20 AM EST      Agree with above  CEA 2.1  CA 19-9 = 212 without jaundice  Discussed case with Dr. Sung Barrera  Patient will benefit from neoadjuvant chemotherapy    Electronically signed by Cr Don MD on 12/9/2022 at 4:22 PM

## 2022-12-10 LAB
ANION GAP SERPL CALCULATED.3IONS-SCNC: 9 MMOL/L (ref 7–16)
BASOPHILS ABSOLUTE: 0.02 E9/L (ref 0–0.2)
BASOPHILS RELATIVE PERCENT: 0.2 % (ref 0–2)
BUN BLDV-MCNC: 5 MG/DL (ref 6–23)
CALCIUM SERPL-MCNC: 9.3 MG/DL (ref 8.6–10.2)
CHLORIDE BLD-SCNC: 108 MMOL/L (ref 98–107)
CO2: 25 MMOL/L (ref 22–29)
CREAT SERPL-MCNC: 0.4 MG/DL (ref 0.5–1)
EOSINOPHILS ABSOLUTE: 0.12 E9/L (ref 0.05–0.5)
EOSINOPHILS RELATIVE PERCENT: 1.4 % (ref 0–6)
GFR SERPL CREATININE-BSD FRML MDRD: >60 ML/MIN/1.73
GLUCOSE BLD-MCNC: 140 MG/DL (ref 74–99)
HCT VFR BLD CALC: 32.8 % (ref 34–48)
HEMOGLOBIN: 11.2 G/DL (ref 11.5–15.5)
IMMATURE GRANULOCYTES #: 0.04 E9/L
IMMATURE GRANULOCYTES %: 0.5 % (ref 0–5)
LYMPHOCYTES ABSOLUTE: 1.51 E9/L (ref 1.5–4)
LYMPHOCYTES RELATIVE PERCENT: 17.1 % (ref 20–42)
MCH RBC QN AUTO: 30.7 PG (ref 26–35)
MCHC RBC AUTO-ENTMCNC: 34.1 % (ref 32–34.5)
MCV RBC AUTO: 89.9 FL (ref 80–99.9)
METER GLUCOSE: 121 MG/DL (ref 74–99)
METER GLUCOSE: 127 MG/DL (ref 74–99)
METER GLUCOSE: 152 MG/DL (ref 74–99)
METER GLUCOSE: 154 MG/DL (ref 74–99)
METER GLUCOSE: 168 MG/DL (ref 74–99)
MONOCYTES ABSOLUTE: 0.69 E9/L (ref 0.1–0.95)
MONOCYTES RELATIVE PERCENT: 7.8 % (ref 2–12)
NEUTROPHILS ABSOLUTE: 6.43 E9/L (ref 1.8–7.3)
NEUTROPHILS RELATIVE PERCENT: 73 % (ref 43–80)
PDW BLD-RTO: 13.2 FL (ref 11.5–15)
PLATELET # BLD: 424 E9/L (ref 130–450)
PMV BLD AUTO: 9.9 FL (ref 7–12)
POTASSIUM SERPL-SCNC: 4.5 MMOL/L (ref 3.5–5)
RBC # BLD: 3.65 E12/L (ref 3.5–5.5)
SODIUM BLD-SCNC: 142 MMOL/L (ref 132–146)
WBC # BLD: 8.8 E9/L (ref 4.5–11.5)

## 2022-12-10 PROCEDURE — 6360000002 HC RX W HCPCS: Performed by: INTERNAL MEDICINE

## 2022-12-10 PROCEDURE — 2500000003 HC RX 250 WO HCPCS: Performed by: STUDENT IN AN ORGANIZED HEALTH CARE EDUCATION/TRAINING PROGRAM

## 2022-12-10 PROCEDURE — 1200000000 HC SEMI PRIVATE

## 2022-12-10 PROCEDURE — 94640 AIRWAY INHALATION TREATMENT: CPT

## 2022-12-10 PROCEDURE — 2580000003 HC RX 258: Performed by: INTERNAL MEDICINE

## 2022-12-10 PROCEDURE — 85025 COMPLETE CBC W/AUTO DIFF WBC: CPT

## 2022-12-10 PROCEDURE — 6370000000 HC RX 637 (ALT 250 FOR IP): Performed by: STUDENT IN AN ORGANIZED HEALTH CARE EDUCATION/TRAINING PROGRAM

## 2022-12-10 PROCEDURE — 82962 GLUCOSE BLOOD TEST: CPT

## 2022-12-10 PROCEDURE — A4216 STERILE WATER/SALINE, 10 ML: HCPCS | Performed by: NURSE PRACTITIONER

## 2022-12-10 PROCEDURE — 2580000003 HC RX 258: Performed by: NURSE PRACTITIONER

## 2022-12-10 PROCEDURE — 80048 BASIC METABOLIC PNL TOTAL CA: CPT

## 2022-12-10 PROCEDURE — 99232 SBSQ HOSP IP/OBS MODERATE 35: CPT | Performed by: INTERNAL MEDICINE

## 2022-12-10 PROCEDURE — 36415 COLL VENOUS BLD VENIPUNCTURE: CPT

## 2022-12-10 PROCEDURE — 6360000002 HC RX W HCPCS: Performed by: NURSE PRACTITIONER

## 2022-12-10 PROCEDURE — 6370000000 HC RX 637 (ALT 250 FOR IP): Performed by: INTERNAL MEDICINE

## 2022-12-10 PROCEDURE — C9113 INJ PANTOPRAZOLE SODIUM, VIA: HCPCS | Performed by: NURSE PRACTITIONER

## 2022-12-10 RX ADMIN — METOPROLOL TARTRATE 5 MG: 5 INJECTION, SOLUTION INTRAVENOUS at 18:32

## 2022-12-10 RX ADMIN — SODIUM CHLORIDE 40 MG: 9 INJECTION, SOLUTION INTRAMUSCULAR; INTRAVENOUS; SUBCUTANEOUS at 10:36

## 2022-12-10 RX ADMIN — Medication 10 ML: at 20:27

## 2022-12-10 RX ADMIN — ACETAMINOPHEN 650 MG: 325 TABLET ORAL at 18:32

## 2022-12-10 RX ADMIN — ACETYLCYSTEINE 400 MG: 100 INHALANT RESPIRATORY (INHALATION) at 07:00

## 2022-12-10 RX ADMIN — IPRATROPIUM BROMIDE AND ALBUTEROL SULFATE 1 AMPULE: .5; 2.5 SOLUTION RESPIRATORY (INHALATION) at 07:00

## 2022-12-10 RX ADMIN — AMLODIPINE BESYLATE 5 MG: 5 TABLET ORAL at 10:35

## 2022-12-10 RX ADMIN — METOPROLOL TARTRATE 5 MG: 5 INJECTION, SOLUTION INTRAVENOUS at 06:37

## 2022-12-10 RX ADMIN — VENLAFAXINE HYDROCHLORIDE 150 MG: 150 CAPSULE, EXTENDED RELEASE ORAL at 10:36

## 2022-12-10 RX ADMIN — ENOXAPARIN SODIUM 40 MG: 100 INJECTION SUBCUTANEOUS at 10:45

## 2022-12-10 RX ADMIN — ATORVASTATIN CALCIUM 40 MG: 40 TABLET, FILM COATED ORAL at 10:36

## 2022-12-10 RX ADMIN — LISINOPRIL 30 MG: 20 TABLET ORAL at 10:35

## 2022-12-10 RX ADMIN — GABAPENTIN 100 MG: 100 CAPSULE ORAL at 20:17

## 2022-12-10 RX ADMIN — METOPROLOL TARTRATE 5 MG: 5 INJECTION, SOLUTION INTRAVENOUS at 10:45

## 2022-12-10 RX ADMIN — IPRATROPIUM BROMIDE AND ALBUTEROL SULFATE 1 AMPULE: .5; 2.5 SOLUTION RESPIRATORY (INHALATION) at 14:38

## 2022-12-10 RX ADMIN — Medication 10 ML: at 10:37

## 2022-12-10 RX ADMIN — GABAPENTIN 100 MG: 100 CAPSULE ORAL at 10:36

## 2022-12-10 RX ADMIN — IPRATROPIUM BROMIDE AND ALBUTEROL SULFATE 1 AMPULE: .5; 2.5 SOLUTION RESPIRATORY (INHALATION) at 17:37

## 2022-12-10 ASSESSMENT — PAIN DESCRIPTION - DESCRIPTORS: DESCRIPTORS: ACHING;DISCOMFORT;SORE

## 2022-12-10 ASSESSMENT — PAIN DESCRIPTION - ORIENTATION: ORIENTATION: MID

## 2022-12-10 ASSESSMENT — PAIN SCALES - GENERAL: PAINLEVEL_OUTOF10: 3

## 2022-12-10 ASSESSMENT — PAIN - FUNCTIONAL ASSESSMENT: PAIN_FUNCTIONAL_ASSESSMENT: ACTIVITIES ARE NOT PREVENTED

## 2022-12-10 ASSESSMENT — PAIN DESCRIPTION - PAIN TYPE: TYPE: ACUTE PAIN

## 2022-12-10 ASSESSMENT — PAIN DESCRIPTION - LOCATION: LOCATION: ABDOMEN

## 2022-12-10 ASSESSMENT — PAIN DESCRIPTION - ONSET: ONSET: ON-GOING

## 2022-12-10 ASSESSMENT — PAIN DESCRIPTION - FREQUENCY: FREQUENCY: INTERMITTENT

## 2022-12-10 NOTE — HOME CARE
[id number] [facility] TUBE FEED PRICING:    Anshu Villafuerte  Ordered therapy at time of quote: GLUCERNA 1.5 REED/ML 6 CARTONS/DAY VIA BOLUS  Deductible: $350  Coverage: 80%  Out-of-Pocket Max: $700  Total pt responsibility (after deductible met): PT DOES NOT MEET MEDICARE CRITERIA FOR TUBE FEEDING COVERAGE (STILL ORDERED ORAL DIET).  QUOTED PRICE VIA BOLUS ADMINISTRATION (NO DOCUMENTED NEED FOR PUMP)  = $4.98 PER DAY    WILL NEED PHARMACY AGREEMENT FORM COMPLETED AND FAXED -[phone]  PATIENT WILL NEED TO AGREE TO TUBE FEED PRICING PRIOR TO DISCHARGE    Belinda Jones LPN, [facility]

## 2022-12-10 NOTE — PROGRESS NOTES
Department of Internal Medicine  General Internal Medicine  Attending Progress Note  Chief Complaint   Patient presents with    Dysphagia     Pt c/o weakness and difficulty swallowing that started a couple of weeks ago. Pt diagnosed with the flu a week ago. Hx CVA. SUBJECTIVE:    Reports that she is feeling well. Expressed interest in being discharge. Aware that tube feeding has to be arranged at time of discharge.      OBJECTIVE      Medications    Current Facility-Administered Medications: metoprolol (LOPRESSOR) injection 5 mg, 5 mg, IntraVENous, 4 times per day  0.9% NaCl with KCl 40 mEq infusion, , IntraVENous, Continuous  ipratropium-albuterol (DUONEB) nebulizer solution 1 ampule, 1 ampule, Inhalation, Q4H WA  pantoprazole (PROTONIX) 40 mg in sodium chloride (PF) 0.9 % 10 mL injection, 40 mg, IntraVENous, Daily  hydrALAZINE (APRESOLINE) injection 10 mg, 10 mg, IntraVENous, Q6H PRN  sodium chloride flush 0.9 % injection 10 mL, 10 mL, IntraVENous, 2 times per day  sodium chloride flush 0.9 % injection 10 mL, 10 mL, IntraVENous, PRN  0.9 % sodium chloride infusion, , IntraVENous, PRN  enoxaparin (LOVENOX) injection 40 mg, 40 mg, SubCUTAneous, Daily  promethazine (PHENERGAN) tablet 12.5 mg, 12.5 mg, Oral, Q6H PRN **OR** ondansetron (ZOFRAN) injection 4 mg, 4 mg, IntraVENous, Q6H PRN  polyethylene glycol (GLYCOLAX) packet 17 g, 17 g, Oral, Daily PRN  acetaminophen (TYLENOL) tablet 650 mg, 650 mg, Oral, Q6H PRN **OR** acetaminophen (TYLENOL) suppository 650 mg, 650 mg, Rectal, Q6H PRN  amLODIPine (NORVASC) tablet 5 mg, 5 mg, Oral, Daily  venlafaxine (EFFEXOR XR) extended release capsule 150 mg, 150 mg, Oral, Daily  atorvastatin (LIPITOR) tablet 40 mg, 40 mg, Oral, Daily  gabapentin (NEURONTIN) capsule 100 mg, 100 mg, Oral, BID  lisinopril (PRINIVIL;ZESTRIL) tablet 30 mg, 30 mg, Oral, Daily  insulin lispro (HUMALOG) injection vial 0-4 Units, 0-4 Units, SubCUTAneous, TID WC  insulin lispro (HUMALOG) injection vial 0-4 Units, 0-4 Units, SubCUTAneous, Nightly  glucose chewable tablet 16 g, 4 tablet, Oral, PRN  dextrose bolus 10% 125 mL, 125 mL, IntraVENous, PRN **OR** dextrose bolus 10% 250 mL, 250 mL, IntraVENous, PRN  glucagon (rDNA) injection 1 mg, 1 mg, SubCUTAneous, PRN  dextrose 10 % infusion, , IntraVENous, Continuous PRN  Physical    VITALS:  BP (!) 161/82   Pulse 81   Temp 97.9 °F (36.6 °C) (Oral)   Resp 16   Ht 5' 7\" (1.702 m)   Wt 132 lb (59.9 kg)   SpO2 97%   BMI 20.67 kg/m²   CONSTITUTIONAL:  awake, cooperative, and no apparent distress  EYES:  extra-ocular muscles intact  ENT:  normocepalic, without obvious abnormality  NECK:  supple, symmetrical, trachea midline  LUNGS:  no increased work of breathing, no retractions, and clear to auscultation  CARDIOVASCULAR:  normal apical pulses and normal S1 and S2  ABDOMEN:  normal bowel sounds, non-distended, and non-tender  MUSCULOSKELETAL:  full range of motion noted  NEUROLOGIC:  Mental Status Exam:  Level of Alertness:   awake  Orientation:   person, place, time  SKIN:  no bruising or bleeding  Data    CBC:   Lab Results   Component Value Date/Time    WBC 8.8 12/10/2022 08:22 AM    RBC 3.65 12/10/2022 08:22 AM    HGB 11.2 12/10/2022 08:22 AM    HCT 32.8 12/10/2022 08:22 AM    MCV 89.9 12/10/2022 08:22 AM    MCH 30.7 12/10/2022 08:22 AM    MCHC 34.1 12/10/2022 08:22 AM    RDW 13.2 12/10/2022 08:22 AM     12/10/2022 08:22 AM    MPV 9.9 12/10/2022 08:22 AM     BMP:    Lab Results   Component Value Date/Time     12/10/2022 08:22 AM    K 4.5 12/10/2022 08:22 AM    K 3.5 12/05/2022 07:47 AM     12/10/2022 08:22 AM    CO2 25 12/10/2022 08:22 AM    BUN 5 12/10/2022 08:22 AM    LABALBU 3.0 12/06/2022 06:34 AM    CREATININE 0.4 12/10/2022 08:22 AM    CREATININE 0.9 05/03/2019 12:00 AM    CALCIUM 9.3 12/10/2022 08:22 AM    GFRAA >60 07/28/2022 12:00 PM    LABGLOM >60 12/10/2022 08:22 AM    GLUCOSE 140 12/10/2022 08:22 AM       ASSESSMENT AND PLAN Influenza A: much improved. Continue symptom management  Pancreatic adenocarcinoma: biopsy pending. Appreciate Heme/Onc recommendation. Severe protein-calorie malnutrition: on tube feeding. Continue to monitor. Tube feeding arrangement as ordered. UTI: completed abx  Hx of stroke wit residual dysphagia: continue P/OT. DM type 2: continue current regimen  Hypertension Essential: well controlled.   Continue amlodipine  Depression/anxiety: contiue Effexor

## 2022-12-10 NOTE — PLAN OF CARE
Problem: Safety - Adult  Goal: Free from fall injury  Outcome: Progressing     Problem: Cardiovascular - Adult  Goal: Maintains optimal cardiac output and hemodynamic stability  Outcome: Progressing  Goal: Absence of cardiac dysrhythmias or at baseline  Outcome: Progressing     Problem: Pain  Goal: Verbalizes/displays adequate comfort level or baseline comfort level  Outcome: Progressing     Problem: Chronic Conditions and Co-morbidities  Goal: Patient's chronic conditions and co-morbidity symptoms are monitored and maintained or improved  Outcome: Progressing     Problem: Skin/Tissue Integrity  Goal: Absence of new skin breakdown  Description: 1. Monitor for areas of redness and/or skin breakdown  2. Assess vascular access sites hourly  3. Every 4-6 hours minimum:  Change oxygen saturation probe site  4. Every 4-6 hours:  If on nasal continuous positive airway pressure, respiratory therapy assess nares and determine need for appliance change or resting period.   Outcome: Progressing

## 2022-12-10 NOTE — PROGRESS NOTES
Diabetic ulcer of left foot associated with type 1 diabetes mellitus (HCC)    Type 2 diabetes mellitus without complication, without long-term current use of insulin (HCC)    Essential hypertension    Gastroesophageal reflux disease without esophagitis    Mixed hyperlipidemia    Subacute osteomyelitis of foot (HCC)    Dysarthria    Hemorrhagic stroke (HCC)    Altered mental status    Lactic acidosis    Muscle function loss    Impaired functional mobility, balance, gait, and endurance    Diabetic peripheral neuropathy (HCC)    Peroneal neuropathy at knee, left    Left lumbosacral radiculopathy    Pain in joint, shoulder region    Type II diabetes mellitus with peripheral circulatory disorder (HCC)    PVD (peripheral vascular disease) ([facility]    Influenza A    Pancreatic adenocarcinoma (HCC)    Severe protein-calorie malnutrition ([facility]     Dysphagia  -History of CVA  -Previously considered for PEG [facility] at [facility]  -PEG tube placed  by general surgery -appears to be tolerating tube feedings  -Defer tube feedings to admitting      2. Weight loss  -Patient describes unintentional weight loss of 20 to 30 pounds over the past few months  -EGD as above  -Colonoscopy 5/15/2019 showing 2 polyps in the rectum, hyperplastic  -Previous CT abdomen pelvis 5/27/2022 showing dilated pancreatic duct, atrophy of the pancreas, abnormal wall thickening of the descending through the rectum  -Repeat CT chest/abdomen/pelvis consistent with pancreatic mass  -Abnormal CT colon/pancreas/bile ducts evaluation as below  -Consider further evaluation with nonemergent colonoscopy next week or as outpatient     3. Abnormal CT colon/dilated bile ducts  -CT abdomen pelvis 5/27/2022 showing abnormal wall thickening from the descending through sigmoid colon  -Dr. Ohara Dies input appreciated with EUS/FNA of pancreatic body mass -await biopsies     4.   Diarrhea  -Appears resolved  -Stool studies thus far negative  -Plan for outpatient colonoscopy after establishing a reliable route for prep -see above     5. History of colon polyps  -Colonoscopy 5/15/2019 showing 2 polyps in the rectum, hyperplastic  -Colonoscopy as above     6. Comorbidities  -CVA, hypertension, dyslipidemia, diabetes, thyroid disease  -Per admitting      Scottie Dick MD  12/10/2022  10:29 AM    NOTE:  This report was transcribed using voice recognition software. Every effort was made to ensure accuracy; however, inadvertent computerized transcription errors may be present.

## 2022-12-11 LAB
ANION GAP SERPL CALCULATED.3IONS-SCNC: 8 MMOL/L (ref 7–16)
BASOPHILS ABSOLUTE: 0.03 E9/L (ref 0–0.2)
BASOPHILS RELATIVE PERCENT: 0.2 % (ref 0–2)
BUN BLDV-MCNC: 9 MG/DL (ref 6–23)
CALCIUM SERPL-MCNC: 9.4 MG/DL (ref 8.6–10.2)
CHLORIDE BLD-SCNC: 102 MMOL/L (ref 98–107)
CHROMOGRANIN A: 290 NG/ML (ref 0–103)
CO2: 29 MMOL/L (ref 22–29)
CREAT SERPL-MCNC: 0.4 MG/DL (ref 0.5–1)
EOSINOPHILS ABSOLUTE: 0.13 E9/L (ref 0.05–0.5)
EOSINOPHILS RELATIVE PERCENT: 0.9 % (ref 0–6)
GFR SERPL CREATININE-BSD FRML MDRD: >60 ML/MIN/1.73
GLUCOSE BLD-MCNC: 172 MG/DL (ref 74–99)
HCT VFR BLD CALC: 35.1 % (ref 34–48)
HEMOGLOBIN: 11.7 G/DL (ref 11.5–15.5)
IMMATURE GRANULOCYTES #: 0.05 E9/L
IMMATURE GRANULOCYTES %: 0.3 % (ref 0–5)
LYMPHOCYTES ABSOLUTE: 1.11 E9/L (ref 1.5–4)
LYMPHOCYTES RELATIVE PERCENT: 7.7 % (ref 20–42)
MCH RBC QN AUTO: 30 PG (ref 26–35)
MCHC RBC AUTO-ENTMCNC: 33.3 % (ref 32–34.5)
MCV RBC AUTO: 90 FL (ref 80–99.9)
METER GLUCOSE: 137 MG/DL (ref 74–99)
METER GLUCOSE: 141 MG/DL (ref 74–99)
METER GLUCOSE: 149 MG/DL (ref 74–99)
METER GLUCOSE: 157 MG/DL (ref 74–99)
METER GLUCOSE: 183 MG/DL (ref 74–99)
MONOCYTES ABSOLUTE: 1.1 E9/L (ref 0.1–0.95)
MONOCYTES RELATIVE PERCENT: 7.7 % (ref 2–12)
NEUTROPHILS ABSOLUTE: 11.91 E9/L (ref 1.8–7.3)
NEUTROPHILS RELATIVE PERCENT: 83.2 % (ref 43–80)
PDW BLD-RTO: 13 FL (ref 11.5–15)
PLATELET # BLD: 411 E9/L (ref 130–450)
PMV BLD AUTO: 10 FL (ref 7–12)
POTASSIUM SERPL-SCNC: 4.2 MMOL/L (ref 3.5–5)
RBC # BLD: 3.9 E12/L (ref 3.5–5.5)
SODIUM BLD-SCNC: 139 MMOL/L (ref 132–146)
WBC # BLD: 14.3 E9/L (ref 4.5–11.5)

## 2022-12-11 PROCEDURE — 80048 BASIC METABOLIC PNL TOTAL CA: CPT

## 2022-12-11 PROCEDURE — 99231 SBSQ HOSP IP/OBS SF/LOW 25: CPT | Performed by: INTERNAL MEDICINE

## 2022-12-11 PROCEDURE — 36415 COLL VENOUS BLD VENIPUNCTURE: CPT

## 2022-12-11 PROCEDURE — 2500000003 HC RX 250 WO HCPCS: Performed by: STUDENT IN AN ORGANIZED HEALTH CARE EDUCATION/TRAINING PROGRAM

## 2022-12-11 PROCEDURE — 6370000000 HC RX 637 (ALT 250 FOR IP): Performed by: INTERNAL MEDICINE

## 2022-12-11 PROCEDURE — 2580000003 HC RX 258: Performed by: NURSE PRACTITIONER

## 2022-12-11 PROCEDURE — A4216 STERILE WATER/SALINE, 10 ML: HCPCS | Performed by: NURSE PRACTITIONER

## 2022-12-11 PROCEDURE — 2580000003 HC RX 258: Performed by: INTERNAL MEDICINE

## 2022-12-11 PROCEDURE — 82962 GLUCOSE BLOOD TEST: CPT

## 2022-12-11 PROCEDURE — 2500000003 HC RX 250 WO HCPCS: Performed by: INTERNAL MEDICINE

## 2022-12-11 PROCEDURE — C9113 INJ PANTOPRAZOLE SODIUM, VIA: HCPCS | Performed by: NURSE PRACTITIONER

## 2022-12-11 PROCEDURE — 6360000002 HC RX W HCPCS: Performed by: NURSE PRACTITIONER

## 2022-12-11 PROCEDURE — 6360000002 HC RX W HCPCS: Performed by: INTERNAL MEDICINE

## 2022-12-11 PROCEDURE — 85025 COMPLETE CBC W/AUTO DIFF WBC: CPT

## 2022-12-11 PROCEDURE — 1200000000 HC SEMI PRIVATE

## 2022-12-11 RX ORDER — IPRATROPIUM BROMIDE AND ALBUTEROL SULFATE 2.5; .5 MG/3ML; MG/3ML
1 SOLUTION RESPIRATORY (INHALATION) EVERY 4 HOURS PRN
Status: DISCONTINUED | OUTPATIENT
Start: 2022-12-11 | End: 2022-12-12 | Stop reason: HOSPADM

## 2022-12-11 RX ADMIN — METOPROLOL TARTRATE 5 MG: 5 INJECTION, SOLUTION INTRAVENOUS at 10:59

## 2022-12-11 RX ADMIN — ATORVASTATIN CALCIUM 40 MG: 40 TABLET, FILM COATED ORAL at 10:58

## 2022-12-11 RX ADMIN — ACETAMINOPHEN 650 MG: 325 TABLET ORAL at 19:48

## 2022-12-11 RX ADMIN — GABAPENTIN 100 MG: 100 CAPSULE ORAL at 10:57

## 2022-12-11 RX ADMIN — ENOXAPARIN SODIUM 40 MG: 100 INJECTION SUBCUTANEOUS at 10:58

## 2022-12-11 RX ADMIN — ACETAMINOPHEN 650 MG: 325 TABLET ORAL at 00:09

## 2022-12-11 RX ADMIN — AMLODIPINE BESYLATE 5 MG: 5 TABLET ORAL at 10:58

## 2022-12-11 RX ADMIN — METOPROLOL TARTRATE 5 MG: 5 INJECTION, SOLUTION INTRAVENOUS at 23:23

## 2022-12-11 RX ADMIN — SODIUM CHLORIDE 40 MG: 9 INJECTION, SOLUTION INTRAMUSCULAR; INTRAVENOUS; SUBCUTANEOUS at 10:58

## 2022-12-11 RX ADMIN — LISINOPRIL 30 MG: 20 TABLET ORAL at 10:58

## 2022-12-11 RX ADMIN — Medication 10 ML: at 19:50

## 2022-12-11 RX ADMIN — METOPROLOL TARTRATE 5 MG: 5 INJECTION, SOLUTION INTRAVENOUS at 17:29

## 2022-12-11 RX ADMIN — VENLAFAXINE HYDROCHLORIDE 150 MG: 150 CAPSULE, EXTENDED RELEASE ORAL at 10:57

## 2022-12-11 RX ADMIN — Medication 10 ML: at 10:59

## 2022-12-11 RX ADMIN — METOPROLOL TARTRATE 5 MG: 5 INJECTION, SOLUTION INTRAVENOUS at 05:53

## 2022-12-11 RX ADMIN — GABAPENTIN 100 MG: 100 CAPSULE ORAL at 19:48

## 2022-12-11 RX ADMIN — METOPROLOL TARTRATE 5 MG: 5 INJECTION, SOLUTION INTRAVENOUS at 00:09

## 2022-12-11 ASSESSMENT — PAIN DESCRIPTION - ORIENTATION
ORIENTATION: MID
ORIENTATION: MID

## 2022-12-11 ASSESSMENT — PAIN SCALES - GENERAL
PAINLEVEL_OUTOF10: 3
PAINLEVEL_OUTOF10: 3

## 2022-12-11 ASSESSMENT — PAIN DESCRIPTION - LOCATION
LOCATION: ABDOMEN
LOCATION: ABDOMEN

## 2022-12-11 ASSESSMENT — PAIN DESCRIPTION - DESCRIPTORS
DESCRIPTORS: ACHING;DISCOMFORT
DESCRIPTORS: ACHING;DISCOMFORT

## 2022-12-11 ASSESSMENT — PAIN DESCRIPTION - PAIN TYPE: TYPE: ACUTE PAIN

## 2022-12-11 ASSESSMENT — PAIN - FUNCTIONAL ASSESSMENT: PAIN_FUNCTIONAL_ASSESSMENT: ACTIVITIES ARE NOT PREVENTED

## 2022-12-11 NOTE — RT PROTOCOL NOTE
RT Nebulizer Bronchodilator Protocol Note    There is a bronchodilator order in the chart from a provider indicating to follow the RT Bronchodilator Protocol and there is an Initiate RT Bronchodilator Protocol order as well (see protocol at bottom of note). CXR Findings:  No results found. The findings from the last RT Protocol Assessment were as follows:  Smoking: None or smoker <15 pack years  Respiratory Pattern: Regular pattern and RR 12-20 bpm  Breath Sounds: Clear breath sounds  Cough: Weak, productive  Indication for Bronchodilator Therapy: None  Bronchodilator Assessment Score: 2    Aerosolized bronchodilator medication orders have been revised according to the RT Nebulizer Bronchodilator Protocol below. Respiratory Therapist to perform RT Therapy Protocol Assessment initially then follow the protocol. Repeat RT Therapy Protocol Assessment PRN for score 0-3 or on second treatment, BID, and PRN for scores above 3. No Indications - adjust the frequency to every 6 hours PRN wheezing or bronchospasm, if no treatments needed after 48 hours then discontinue using Per Protocol order mode. If indication present, adjust the RT bronchodilator orders based on the Bronchodilator Assessment Score as indicated below. If a patient is on this medication at home then do not decrease Frequency below that used at home. 0-3 - enter or revise RT bronchodilator order(s) to equivalent RT Bronchodilator order with Frequency of every 4 hours PRN for wheezing or increased work of breathing using Per Protocol order mode. 4-6 - enter or revise RT Bronchodilator order(s) to two equivalent RT bronchodilator orders with one order with BID Frequency and one order with Frequency of every 4 hours PRN wheezing or increased work of breathing using Per Protocol order mode.          7-10 - enter or revise RT Bronchodilator order(s) to two equivalent RT bronchodilator orders with one order with TID Frequency and one order with Frequency of every 4 hours PRN wheezing or increased work of breathing using Per Protocol order mode. 11-13 - enter or revise RT Bronchodilator order(s) to one equivalent RT bronchodilator order with QID Frequency and an Albuterol order with Frequency of every 4 hours PRN wheezing or increased work of breathing using Per Protocol order mode. Greater than 13 - enter or revise RT Bronchodilator order(s) to one equivalent RT bronchodilator order with every 4 hours Frequency and an Albuterol order with Frequency of every 2 hours PRN wheezing or increased work of breathing using Per Protocol order mode.          Electronically signed by Jonathan Covarrubias RCP on 12/11/2022 at 7:18 AM

## 2022-12-11 NOTE — PROGRESS NOTES
Progress  Note  Chief Complaint   Patient presents with    Dysphagia     Pt c/o weakness and difficulty swallowing that started a couple of weeks ago. Pt diagnosed with the flu a week ago. Hx CVA.      Historical Issues:  Current Facility-Administered Medications   Medication Dose Route Frequency Provider Last Rate Last Admin    ipratropium-albuterol (DUONEB) nebulizer solution 1 ampule  1 ampule Inhalation Q4H PRN Russell Araiza MD        metoprolol (LOPRESSOR) injection 5 mg  5 mg IntraVENous 4 times per day Lamar Bautista MD   5 mg at 12/11/22 1059    pantoprazole (PROTONIX) 40 mg in sodium chloride (PF) 0.9 % 10 mL injection  40 mg IntraVENous Daily Jamshid Gresham, APRMARÍA - CNP   40 mg at 12/11/22 1058    hydrALAZINE (APRESOLINE) injection 10 mg  10 mg IntraVENous Q6H PRN Lamar Bautista MD   10 mg at 12/05/22 2047    sodium chloride flush 0.9 % injection 10 mL  10 mL IntraVENous 2 times per day Minus MD Td   10 mL at 12/11/22 1059    sodium chloride flush 0.9 % injection 10 mL  10 mL IntraVENous PRN Minus MD Td        0.9 % sodium chloride infusion   IntraVENous PRN Minus MD Td        enoxaparin (LOVENOX) injection 40 mg  40 mg SubCUTAneous Daily Minus MD Td   40 mg at 12/11/22 1058    promethazine (PHENERGAN) tablet 12.5 mg  12.5 mg Oral Q6H PRN Minus MD Td        Or    ondansetron (ZOFRAN) injection 4 mg  4 mg IntraVENous Q6H PRN Minus MD Td        polyethylene glycol (GLYCOLAX) packet 17 g  17 g Oral Daily PRN Minus MD Td        acetaminophen (TYLENOL) tablet 650 mg  650 mg Oral Q6H PRN Minus MD Td   650 mg at 12/11/22 0009    Or    acetaminophen (TYLENOL) suppository 650 mg  650 mg Rectal Q6H PRN Minus MD Td        amLODIPine (NORVASC) tablet 5 mg  5 mg Oral Daily Minus MD Td   5 mg at 12/11/22 1058    venlafaxine (EFFEXOR XR) extended release capsule 150 mg  150 mg Oral Daily Minus MD Td   150 mg at 12/11/22 1057 atorvastatin (LIPITOR) tablet 40 mg  40 mg Oral Daily Shawn Coy MD   40 mg at 12/11/22 1058    gabapentin (NEURONTIN) capsule 100 mg  100 mg Oral BID Shawn Coy MD   100 mg at 12/11/22 1057    lisinopril (PRINIVIL;ZESTRIL) tablet 30 mg  30 mg Oral Daily Shawn Coy MD   30 mg at 12/11/22 1058    insulin lispro (HUMALOG) injection vial 0-4 Units  0-4 Units SubCUTAneous TID WC Dianelys Carlin MD        insulin lispro (HUMALOG) injection vial 0-4 Units  0-4 Units SubCUTAneous Nightly Dianelys Carlin MD        glucose chewable tablet 16 g  4 tablet Oral PRN Dianelys Carlin MD        dextrose bolus 10% 125 mL  125 mL IntraVENous PRN Dianelys Carlin MD        Or    dextrose bolus 10% 250 mL  250 mL IntraVENous PRN Dianelys Carlin MD        glucagon (rDNA) injection 1 mg  1 mg SubCUTAneous PRN Dianelys Carlin MD        dextrose 10 % infusion   IntraVENous Continuous PRN Dianelys Carlin MD         Recent Complaints:  Review of Systems   Unable to perform ROS: Other (dysarthria)   Vitals:    12/11/22 1056   BP: (!) 169/104   Pulse: 92   Resp: 18   Temp: 98.1 °F (36.7 °C)   SpO2: 96%     Physical Exam  Cardiovascular:      Rate and Rhythm: Normal rate and regular rhythm. Pulmonary:      Effort: Pulmonary effort is normal.      Breath sounds: No wheezing.    Neurological:      Comments: Profound dysarthria       Recent Labs     12/09/22  0652 12/10/22  0822 12/11/22 0912    142 139   K 3.9 4.5 4.2    108* 102   CO2 25 25 29   BUN <2* 5* 9   CREATININE 0.5 0.4* 0.4*   GLUCOSE 98 140* 172*   CALCIUM 9.0 9.3 9.4     Recent Labs     12/09/22  0652 12/10/22  0822 12/11/22 0912   WBC 8.2 8.8 14.3*   RBC 3.62 3.65 3.90   HGB 11.2* 11.2* 11.7   HCT 32.4* 32.8* 35.1   MCV 89.5 89.9 90.0   MCH 30.9 30.7 30.0   MCHC 34.6* 34.1 33.3   RDW 12.7 13.2 13.0   * 424 411   MPV 9.7 9.9 10.0           Principal Problem:    Influenza A  Active Problems:    Pancreatic adenocarcinoma ([facility] [address]    Severe protein-calorie malnutrition ([facility] [address]  Resolved Problems:    * No resolved hospital problems. *      Plan:  Continue current antiviral therapy and supportive care. Anticipate discharge to skilled nursing facility in the next 24 to 48 hours.     Electronically signed by Romelia Wheat MD on 12/11/2022 at 11:28 AM

## 2022-12-11 NOTE — PLAN OF CARE
Problem: Pain  Goal: Verbalizes/displays adequate comfort level or baseline comfort level  Outcome: Progressing     Problem: Safety - Adult  Goal: Free from fall injury  Outcome: Progressing     Problem: Cardiovascular - Adult  Goal: Maintains optimal cardiac output and hemodynamic stability  Outcome: Progressing  Goal: Absence of cardiac dysrhythmias or at baseline  Outcome: Progressing     Problem: Chronic Conditions and Co-morbidities  Goal: Patient's chronic conditions and co-morbidity symptoms are monitored and maintained or improved  Outcome: Progressing     Problem: Skin/Tissue Integrity  Goal: Absence of new skin breakdown  Description: 1. Monitor for areas of redness and/or skin breakdown  2. Assess vascular access sites hourly  3. Every 4-6 hours minimum:  Change oxygen saturation probe site  4. Every 4-6 hours:  If on nasal continuous positive airway pressure, respiratory therapy assess nares and determine need for appliance change or resting period.   Outcome: Progressing

## 2022-12-11 NOTE — PROGRESS NOTES
PROGRESS NOTE  By Adriana Rendon M.D. [facility]  Dr. Carol Huizar M.D.,  Dr. Deja Ko M.D.,   Dr. Carie Dickson D.O.,  Dr. Brandon Larson M.D.,  Dr. Madi Garcia D.O.,          Xuan Prasad  74 y.o.  female    SUBJECTIVE:  Complains of some abdominal discomfort    OBJECTIVE:    BP (!) 169/104   Pulse 92   Temp 98.1 °F (36.7 °C) (Oral)   Resp 18   Ht 5' 7\" (1.702 m)   Wt 132 lb (59.9 kg)   SpO2 96%   BMI 20.67 kg/m²     General: NAD/ female  HEENT: Anicteric sclera/moist oral mucosa  Neck: Supple with trachea midline  Chest: Symmetric excursion/nonlabored respirations  Cor: Regular  Abd.: Soft. PEG tube in place  Extr.:  No significant peripheral edema  Skin: Warm and dry      DATA:    Monitor data reviewed -sinus rhythm noted.     Stool (measured) : 0 mL  Lab Results   Component Value Date/Time    WBC 14.3 12/11/2022 09:12 AM    RBC 3.90 12/11/2022 09:12 AM    HGB 11.7 12/11/2022 09:12 AM    HCT 35.1 12/11/2022 09:12 AM    MCV 90.0 12/11/2022 09:12 AM    MCH 30.0 12/11/2022 09:12 AM    MCHC 33.3 12/11/2022 09:12 AM    RDW 13.0 12/11/2022 09:12 AM     12/11/2022 09:12 AM    MPV 10.0 12/11/2022 09:12 AM     Lab Results   Component Value Date/Time     12/11/2022 09:12 AM    K 4.2 12/11/2022 09:12 AM    K 3.5 12/05/2022 07:47 AM     12/11/2022 09:12 AM    CO2 29 12/11/2022 09:12 AM    BUN 9 12/11/2022 09:12 AM    CREATININE 0.4 12/11/2022 09:12 AM    CREATININE 0.9 05/03/2019 12:00 AM    CALCIUM 9.4 12/11/2022 09:12 AM    PROT 5.7 12/06/2022 06:34 AM    LABALBU 3.0 12/06/2022 06:34 AM    BILITOT 0.4 12/06/2022 06:34 AM    ALKPHOS 81 12/06/2022 06:34 AM    AST 20 12/06/2022 06:34 AM    ALT 13 12/06/2022 06:34 AM     Lab Results   Component Value Date/Time    LIPASE 53 12/06/2022 06:34 AM     No results found for: AMYLASE      ASSESSMENT/PLAN:  Patient Active Problem List   Diagnosis    Right foot infection    Diabetic ulcer of left foot associated with type 1 diabetes mellitus ([address]    Type 2 diabetes mellitus without complication, without long-term current use of insulin ([address]    Essential hypertension    Gastroesophageal reflux disease without esophagitis    Mixed hyperlipidemia    Subacute osteomyelitis of foot (HCC)    Dysarthria    Hemorrhagic stroke (HCC)    Altered mental status    Lactic acidosis    Muscle function loss    Impaired functional mobility, balance, gait, and endurance    Diabetic peripheral neuropathy (HCC)    Peroneal neuropathy at knee, left    Left lumbosacral radiculopathy    Pain in joint, shoulder region    Type II diabetes mellitus with peripheral circulatory disorder (HCC)    PVD (peripheral vascular disease) ([address]    Influenza A    Pancreatic adenocarcinoma (HCC)    Severe protein-calorie malnutrition ([address]     Dysphagia  -History of CVA  -Previously considered for PEG placement at [facility]  -PEG tube placed  by general surgery -appears to be tolerating tube feedings  -Defer tube feedings to admitting      2. Weight loss  -Patient describes unintentional weight loss of 20 to 30 pounds over the past few months  -EGD as above  -Colonoscopy 5/15/2019 showing 2 polyps in the rectum, hyperplastic  -Previous CT abdomen pelvis 5/27/2022 showing dilated pancreatic duct, atrophy of the pancreas, abnormal wall thickening of the descending through the rectum  -Repeat CT chest/abdomen/pelvis consistent with pancreatic mass  -Abnormal CT colon/pancreas/bile ducts evaluation as below  -Consider further evaluation with nonemergent colonoscopy next week or as outpatient     3. Abnormal CT colon/dilated bile ducts  -CT abdomen pelvis 5/27/2022 showing abnormal wall thickening from the descending through sigmoid colon  -Dr. Bailon Lazaro input appreciated with EUS/FNA of pancreatic body mass -await biopsies     4.   Diarrhea  -Appears resolved  -Stool studies thus far negative  -Plan for outpatient colonoscopy after establishing a reliable route for prep -see above 5.  History of colon polyps  -Colonoscopy 5/15/2019 showing 2 polyps in the rectum, hyperplastic  -Colonoscopy as above     6. Comorbidities  -CVA, hypertension, dyslipidemia, diabetes, thyroid disease  -Per admitting     Hector Cruz MD  12/11/2022  12:18 PM    NOTE:  This report was transcribed using voice recognition software. Every effort was made to ensure accuracy; however, inadvertent computerized transcription errors may be present.

## 2022-12-11 NOTE — DISCHARGE INSTRUCTIONS
OK for medications and TF via PEG  Abdominal binder for 4 weeks as needed  F/u With Dr. Swathi Lopez about EUS results and as needed    Follow-up with Dr. Michelle Doll for biopsy results and  pancreatic cancer discussion      Your information:  Name: Erica Laird  : 1955    Your instructions:    Jose R March. PLEASE MAKE AND KEEP YOUR FOLLOW UP APPOINTMENTS. What to do after you leave the hospital:    Recommended diet:  pureed diet and tube feeding orders    Recommended activity: activity as tolerated    IF YOU EXPERIENCE ANY OF THE FOLLOWING SYMPTOMS, CHEST PAIN, SHORTNESS OF BREATH, COUGHING UP BLOOD OR BLOODY SPUTUM, STOMACH PAIN OR CRAMPING, DARK, TARRY STOOLS, LOSS OF APPETITE, GENERAL NOT FEELING WELL, SIGNS AND SYMPTOMS OF INFECTION LIKE FEVER AND OR CHILLS, PLEASE CALL DR Ayse Mederos DO    OR RETURN TO THE EMERGENCY ROOM. The following personal items were collected during your admission and were returned to you:    Belongings  Dental Appliances: None  Vision - Corrective Lenses: Eyeglasses  Hearing Aid: None  Clothing: Pants, Shirt, Slippers, Socks, Undergarments, Footwear  Jewelry: None  Body Piercings Removed: N/A  Electronic Devices: Cell Phone  Weapons (Notify Protective Services/Security): None  Other Valuables: At home  Home Medications: None  Valuables Given To: Patient  Provide Name(s) of Who Valuable(s) Were Given To: n/a  Responsible person(s) in the waiting room: n/a  Patient approves for provider to speak to responsible person post operatively: Yes    Information obtained by:  By signing below, I understand that if any problems occur once I leave the hospital I am to contact    Ayse Mederos DO    Or go to emergency room . I understand and acknowledge receipt of the instructions indicated above.

## 2022-12-11 NOTE — PLAN OF CARE
Problem: Pain  Goal: Verbalizes/displays adequate comfort level or baseline comfort level  12/11/2022 0201 by Sylvia Henderson RN  Outcome: Progressing     Problem: Safety - Adult  Goal: Free from fall injury  12/10/2022 1728 by Shweta Glasgow RN  Outcome: Progressing     Problem: Cardiovascular - Adult  Goal: Maintains optimal cardiac output and hemodynamic stability  12/11/2022 0201 by Sylvia Henderson RN  Outcome: Progressing     Problem: Cardiovascular - Adult  Goal: Absence of cardiac dysrhythmias or at baseline  12/10/2022 1728 by Shweta Glasgow RN  Outcome: Progressing     Problem: Chronic Conditions and Co-morbidities  Goal: Patient's chronic conditions and co-morbidity symptoms are monitored and maintained or improved  12/10/2022 1728 by Shweta Glasgow RN  Outcome: Progressing

## 2022-12-12 VITALS
SYSTOLIC BLOOD PRESSURE: 169 MMHG | OXYGEN SATURATION: 92 % | RESPIRATION RATE: 20 BRPM | BODY MASS INDEX: 20.56 KG/M2 | TEMPERATURE: 97.9 F | HEIGHT: 67 IN | DIASTOLIC BLOOD PRESSURE: 88 MMHG | WEIGHT: 131 LBS | HEART RATE: 92 BPM

## 2022-12-12 LAB
METER GLUCOSE: 157 MG/DL (ref 74–99)
METER GLUCOSE: 163 MG/DL (ref 74–99)

## 2022-12-12 PROCEDURE — 2580000003 HC RX 258: Performed by: NURSE PRACTITIONER

## 2022-12-12 PROCEDURE — 6360000002 HC RX W HCPCS: Performed by: INTERNAL MEDICINE

## 2022-12-12 PROCEDURE — 82962 GLUCOSE BLOOD TEST: CPT

## 2022-12-12 PROCEDURE — C9113 INJ PANTOPRAZOLE SODIUM, VIA: HCPCS | Performed by: NURSE PRACTITIONER

## 2022-12-12 PROCEDURE — 2580000003 HC RX 258: Performed by: INTERNAL MEDICINE

## 2022-12-12 PROCEDURE — 6360000002 HC RX W HCPCS: Performed by: NURSE PRACTITIONER

## 2022-12-12 PROCEDURE — A4216 STERILE WATER/SALINE, 10 ML: HCPCS | Performed by: NURSE PRACTITIONER

## 2022-12-12 PROCEDURE — 6370000000 HC RX 637 (ALT 250 FOR IP): Performed by: INTERNAL MEDICINE

## 2022-12-12 PROCEDURE — 99239 HOSP IP/OBS DSCHRG MGMT >30: CPT | Performed by: INTERNAL MEDICINE

## 2022-12-12 RX ORDER — LANSOPRAZOLE
30 KIT
Status: DISCONTINUED | OUTPATIENT
Start: 2022-12-13 | End: 2022-12-12 | Stop reason: HOSPADM

## 2022-12-12 RX ADMIN — AMLODIPINE BESYLATE 5 MG: 5 TABLET ORAL at 09:17

## 2022-12-12 RX ADMIN — VENLAFAXINE HYDROCHLORIDE 150 MG: 150 CAPSULE, EXTENDED RELEASE ORAL at 09:17

## 2022-12-12 RX ADMIN — LISINOPRIL 30 MG: 20 TABLET ORAL at 09:17

## 2022-12-12 RX ADMIN — ENOXAPARIN SODIUM 40 MG: 100 INJECTION SUBCUTANEOUS at 09:18

## 2022-12-12 RX ADMIN — ATORVASTATIN CALCIUM 40 MG: 40 TABLET, FILM COATED ORAL at 09:19

## 2022-12-12 RX ADMIN — METOPROLOL TARTRATE 25 MG: 25 TABLET, FILM COATED ORAL at 09:17

## 2022-12-12 RX ADMIN — ACETAMINOPHEN 650 MG: 325 TABLET ORAL at 06:09

## 2022-12-12 RX ADMIN — SODIUM CHLORIDE 40 MG: 9 INJECTION, SOLUTION INTRAMUSCULAR; INTRAVENOUS; SUBCUTANEOUS at 09:17

## 2022-12-12 RX ADMIN — GABAPENTIN 100 MG: 100 CAPSULE ORAL at 09:19

## 2022-12-12 RX ADMIN — Medication 10 ML: at 09:19

## 2022-12-12 ASSESSMENT — PAIN SCALES - GENERAL: PAINLEVEL_OUTOF10: 3

## 2022-12-12 ASSESSMENT — PAIN - FUNCTIONAL ASSESSMENT: PAIN_FUNCTIONAL_ASSESSMENT: ACTIVITIES ARE NOT PREVENTED

## 2022-12-12 ASSESSMENT — PAIN DESCRIPTION - LOCATION: LOCATION: ABDOMEN

## 2022-12-12 ASSESSMENT — PAIN DESCRIPTION - ORIENTATION: ORIENTATION: LEFT

## 2022-12-12 ASSESSMENT — PAIN DESCRIPTION - DESCRIPTORS: DESCRIPTORS: ACHING;DISCOMFORT;SHARP

## 2022-12-12 NOTE — PROGRESS NOTES
Mary Cadena      Patient Name: Henna Zhang  YOB: 1955  PCP: Flora Walter DO   Referring Provider:      Reason for Consultation:   Chief Complaint   Patient presents with    Dysphagia     Pt c/o weakness and difficulty swallowing that started a couple of weeks ago. Pt diagnosed with the flu a week ago. Hx CVA. History of Present Illness: This pt is a 73 yo female admitted earlier this month for flu A. She also noted slowly progressive weakness, fatigue and dysphagia. Also notes ~30 pound weight loss. She has previous diagnosis of esophageal stricture. CT scan 12/6 which showed distal pancreatic body/tail low attenuation lesion with distal duct dilation. No evidence of metastatic disease. EGD? EUS with Dr. Negin Fink 12/8/22 showing a 1.9 cm lesion just proximal to pancreatic tail, with duct dilation to 8 mm. She is s/p biopsy. S/p PEG placement. Diagnostic Data:     Past Medical History:   Diagnosis Date    Abnormal mammogram of left breast 05/2019    Anxiety     Cerebral artery occlusion with cerebral infarction [facility])     Depression     Diabetes mellitus ([address]     Diabetic peripheral neuropathy ([facility] [address] 9/1/2022    Dysarthria 5/12/2021    Erosive esophagitis     Esophageal stricture     GERD (gastroesophageal reflux disease)     Gestational diabetes     Hemorrhagic stroke ([address] 5/12/2021    Hyperlipidemia     Hypertension     Hypokalemia     Left lumbosacral radiculopathy 9/1/2022    Osteoarthritis     knees    Pain in joint, shoulder region 10/28/2022    bilat.     Peroneal neuropathy at knee, left 9/1/2022    Postmenopausal     Type 2 diabetes mellitus without complication [facility])        Patient Active Problem List    Diagnosis Date Noted    Severe protein-calorie malnutrition ([address] 12/09/2022    Pancreatic adenocarcinoma ([address] 12/08/2022    Influenza A 12/03/2022    Type II diabetes mellitus with peripheral circulatory disorder ([facility] [address] 11/01/2022    PVD (peripheral vascular disease) ([facility] [address] 11/01/2022    Diabetic peripheral neuropathy ([facility] [address] 09/01/2022    Peroneal neuropathy at knee, left 09/01/2022    Left lumbosacral radiculopathy 09/01/2022    Muscle function loss 07/04/2022    Impaired functional mobility, balance, gait, and endurance 07/04/2022    Altered mental status 05/26/2022    Lactic acidosis 05/26/2022    Pain in joint, shoulder region 10/28/2022    Dysarthria 05/12/2021    Hemorrhagic stroke ([facility] [address] 05/12/2021    Type 2 diabetes mellitus without complication, without long-term current use of insulin ([facility] [address]     Essential hypertension     Gastroesophageal reflux disease without esophagitis     Mixed hyperlipidemia     Subacute osteomyelitis of foot ([facility] [address]     Diabetic ulcer of left foot associated with type 1 diabetes mellitus ([facility] [address]     Right foot infection 07/15/2016        Past Surgical History:   Procedure Laterality Date    BREAST BIOPSY Left 05/2019    Dr. Casanova Credit Dr. Poole Read      x4    CHOLECYSTECTOMY      COLONOSCOPY  11/2011    HYSTERECTOMY (CERVIX STATUS UNKNOWN)      JOINT REPLACEMENT Right     knee    UPPER GASTROINTESTINAL ENDOSCOPY N/A 12/6/2022    EGD ESOPHAGOGASTRODUODENOSCOPY DILATATION performed by Brooks Price MD at [place] [address] 12/8/2022    EGD ESOPHAGOGASTRODUODENOSCOPY PEG TUBE INSERTION performed by Gay Jacobson MD at [address] N/A 12/8/2022    EGD W/EUS FNA performed by Gay Jacobson MD at [place] [address]. History  Family History   Problem Relation Age of Onset    Diabetes Mother         complication     Coronary Art Dis Father     Rheum Arthritis Sister     Mult Sclerosis Brother     Diabetes Brother        Social History    TOBACCO:   reports that she has never smoked.  She has never used smokeless tobacco.  ETOH:   reports no history of alcohol use. Home Medications  Prior to Admission medications    Medication Sig Start Date End Date Taking? Authorizing Provider   metoprolol tartrate (LOPRESSOR) 25 MG tablet 1 tablet by Per G Tube route 2 times daily 12/12/22  Yes Valeria Suarez MD   naproxen (NAPROSYN) 500 MG tablet Take 1 tablet by mouth 2 times daily (with meals) 11/21/22   JAYDEN Gruber   gabapentin (NEURONTIN) 100 MG capsule TAKE 1 CAPSULE BY MOUTH 2 TIMES DAILY FOR 30 DAYS. INTENDED SUPPLY: 30 DAYS 11/4/22 12/4/22  [facility]   atorvastatin (LIPITOR) 40 MG tablet TAKE 1 TABLET DAILY 10/6/22   [facility] Manifold, DO   Semaglutide (RYBELSUS) 7 MG TABS Take 7 mg by mouth daily Start this after completing the 3mg dose.  11/6/22   [facility] Manifold, DO   zoster recombinant adjuvanted vaccine [facility]) 50 MCG/0.5ML SUSR injection Inject 0.5 mLs into the muscle See Admin Instructions 1 dose now and repeat in 2-6 months 7/28/22 1/24/23  [facility], DO   amLODIPine (NORVASC) 5 MG tablet TAKE 1 TABLET DAILY IN THE MORNING 7/28/22   [facility], DO   esomeprazole (NEXIUM) 40 MG delayed release capsule TAKE 1 CAPSULE EVERY MORNING BEFORE BREAKFAST 7/28/22   [facility], DO   lisinopril (PRINIVIL;ZESTRIL) 30 MG tablet TAKE 1 TABLET DAILY AT NIGHT 7/28/22   [facility], DO   sitaGLIPtan-metFORMIN (JANUMET)  MG per tablet Take 1 tablet by mouth in the morning. 7/28/22   [facility] Manifold, DO   venlafaxine (EFFEXOR XR) 150 MG extended release capsule TAKE 1 CAPSULE DAILY 7/28/22   [facility], DO   Multiple Vitamin (MULTI-VITAMIN DAILY PO) Take by mouth    Historical Provider, MD       Allergies  No Known Allergies    Review of Systems:    As in HPI      Objective  BP (!) 169/88   Pulse 92   Temp 97.9 °F (36.6 °C) (Axillary)   Resp 20   Ht 5' 7\" (1.702 m)   Wt 131 lb (59.4 kg)   SpO2 92%   BMI 20.52 kg/m²     Physical Exam:   Performance Status:  General: AAO to person, place, time,  Head and identified the perihilar regions extending into the lower   lung fields suggesting atelectatic change or infiltrate. Clinical   correlation is needed. CT ABDOMEN PELVIS W IV CONTRAST Additional Contrast? None   Final Result   Abnormal wall thickening identified of the sigmoid colon to suggest a   colitis. Correlation to clinical symptoms is recommended. Consider   colonoscopy if clinically indicated. Abnormal low-attenuation focus seen in the distal body and tail the pancreas   with posterior pancreatic ductal dilatation. Findings concerning for an   underlying malignancy. No significant evidence of metastatic disease. Chronic changes seen within the lung fields bilaterally. There is bronchial   wall thickening identified the perihilar regions extending into the lower   lung fields suggesting atelectatic change or infiltrate. Clinical   correlation is needed. Fluoroscopy modified barium swallow with video   Final Result   Swallowing mechanism grossly within normal limits without evidence of   aspiration. Please see separate speech pathology report for full discussion of findings   and recommendations. XR CHEST PORTABLE   Final Result   No acute process. ASSESSMENT/PLAN :  73 yo female  Mass just proximal to distal pancreas with 8mm duct dilation  Esophageal stricture  Stroke previously    - S/p PEG placement  - S/p EGD/EUS with biopsy of aforementioned lesion  - Imaging with no evidence of distant mets. Splenic artery involvement. Size 2-3 cm on imaging  - CA [id number]  - Case discussed with Dr. Fabio Li directly. We are in agreement for neoadjuvant chemotherapy, likely mFOLFIRINOX with GCSF support +/- subsequent CRT with xeloda 825 mg/m2 BID  - PORT outpatient  - Will follow final pathology results    Thank you for this consult. Please call with further questions or concerns    12/12/22  Remains weak.   Recovering from influenza A  Has history of CVA with residual expressive aphasia  Status post PEG tube placement  Will eventually require neoadjuvant chemotherapy likely mFOLFIRINOX with G-CSF support and she will follow with Dr. Fiona Lara as outpatient      Electronically signed by Leah Falcon MD on 12/12/2022 at 4:00 PM

## 2022-12-12 NOTE — CARE COORDINATION
LVM for  Erik Smith explaining IMM letter and requested a call back.  Electronically signed by Opal Aguilar on 12/12/2022 at 8:38 AM  '

## 2022-12-12 NOTE — DISCHARGE SUMMARY
Physician Discharge Summary     Patient ID:  Tereso Elizabeth  [id number]  74 y.o.  1955    Admit date: 12/3/2022    Discharge date and time: No discharge date for patient encounter. Admitting Physician: Soni Cardozo MD     Discharge Physician: Ana merrill    Admission Diagnoses: Hypomagnesemia [E83.42]  Influenza A [J10.1]  Elevated troponin [R77.8]    Discharge Diagnoses:   Influenza A viral infection  Dysphagia  Malnutrition   Peg tube placement  Elevated trop: demand ischemia  Pancreatic mass s/p biopsy  Dm type 2  Hyperlipidemia  UTI  Hx of stroke with dysphagia  Depression/Anxiety:  Weight loss with pancreatic mass. S/p biopsy    Admission Condition: fair    Discharged Condition: fair    Indication for Admission:     Hospital Course:   Patient was admitted with flu like illness, dysphagia and weakness. Hospitalization was complicated with additional findings. She had EGD with dilatation. She was found to have pancreatic mass. She had EUS with biopsy. Report still pending at the time of discharge. However, this was concerning for malignancy and she was seen by heme/onc. She had EGD placed and tube feeding was initiated which she tolerated well. She has was out of window for flu treatment. This was symptomatically controlled. Additional work up with colonoscopy as out patient. Oncology to follow patient as out patient    Time spent in discharge of patient is greater than 30 minutes    Consults: GI and hematology/oncology    Significant Diagnostic Studies: labs:     Treatments: peg tube placement    Discharge Exam:  BP (!) 169/88   Pulse 92   Temp 97.9 °F (36.6 °C) (Axillary)   Resp 20   Ht 5' 7\" (1.702 m)   Wt 131 lb (59.4 kg)   SpO2 92%   BMI 20.52 kg/m²   General appearance: alert, appears stated age, and cooperative  Head: Normocephalic, without obvious abnormality, atraumatic  Eyes: conjunctivae/corneas clear. PERRL, EOM's intact. Fundi benign.   Ears: normal TM's and external ear canals both ears  Throat: lips, mucosa, and tongue normal; teeth and gums normal  Lungs: clear to auscultation bilaterally  Heart: regular rate and rhythm, S1, S2 normal, no murmur, click, rub or gallop  Abdomen: soft, non-tender; bowel sounds normal; no masses,  no organomegaly  Extremities: extremities normal, atraumatic, no cyanosis or edema    Disposition: home    In process/preliminary results:  Outstanding Order Results       Date and Time Order Name Status Description    12/8/2022 12:00 AM Cytology, Non-Gyn In process     12/8/2022 12:00 AM Surgical Pathology In process             Patient Instructions:   Current Discharge Medication List        START taking these medications    Details   metoprolol tartrate (LOPRESSOR) 25 MG tablet 1 tablet by Per G Tube route 2 times daily  Qty: 60 tablet, Refills: 3           CONTINUE these medications which have NOT CHANGED    Details   naproxen (NAPROSYN) 500 MG tablet Take 1 tablet by mouth 2 times daily (with meals)  Qty: 180 tablet, Refills: 1      gabapentin (NEURONTIN) 100 MG capsule TAKE 1 CAPSULE BY MOUTH 2 TIMES DAILY FOR 30 DAYS. INTENDED SUPPLY: 30 DAYS  Qty: 60 capsule, Refills: 0    Associated Diagnoses: Diabetic peripheral neuropathy (HCC)      atorvastatin (LIPITOR) 40 MG tablet TAKE 1 TABLET DAILY  Qty: 90 tablet, Refills: 1    Associated Diagnoses: Hyperlipidemia, unspecified hyperlipidemia type      Semaglutide (RYBELSUS) 7 MG TABS Take 7 mg by mouth daily Start this after completing the 3mg dose.   Qty: 30 tablet, Refills: 5    Associated Diagnoses: Type 2 diabetes mellitus with hyperglycemia, without long-term current use of insulin (HCC)      zoster recombinant adjuvanted vaccine (SHINGRIX) 50 MCG/0.5ML SUSR injection Inject 0.5 mLs into the muscle See Admin Instructions 1 dose now and repeat in 2-6 months  Qty: 0.5 mL, Refills: 0    Associated Diagnoses: Need for shingles vaccine      amLODIPine (NORVASC) 5 MG tablet TAKE 1 TABLET DAILY IN THE MORNING  Qty: 14 tablet, Refills: 0    Associated Diagnoses: Essential hypertension      esomeprazole (NEXIUM) 40 MG delayed release capsule TAKE 1 CAPSULE EVERY MORNING BEFORE BREAKFAST  Qty: 14 capsule, Refills: 0    Associated Diagnoses: Gastroesophageal reflux disease without esophagitis      lisinopril (PRINIVIL;ZESTRIL) 30 MG tablet TAKE 1 TABLET DAILY AT NIGHT  Qty: 14 tablet, Refills: 0    Associated Diagnoses: Essential hypertension      sitaGLIPtan-metFORMIN (JANUMET)  MG per tablet Take 1 tablet by mouth in the morning. Qty: 90 tablet, Refills: 1    Associated Diagnoses: Type 2 diabetes mellitus without complication, without long-term current use of insulin ([facility])      venlafaxine (EFFEXOR XR) 150 MG extended release capsule TAKE 1 CAPSULE DAILY  Qty: 30 capsule, Refills: 5    Comments: YOUR PATIENT HAS REQUESTED A REFILL OF THIS MEDICATION, PREVIOUSLY AUTHORIZED BY ANOTHER PRESCRIBER.   Associated Diagnoses: Depression, unspecified depression type      Multiple Vitamin (MULTI-VITAMIN DAILY PO) Take by mouth           STOP taking these medications       potassium chloride (MICRO-K) 10 MEQ extended release capsule Comments:   Reason for Stopping:         atenolol (TENORMIN) 50 MG tablet Comments:   Reason for Stopping:             Activity: activity as tolerated  Diet: tube feeding  Wound Care: none needed    Follow-up with PCP/Oncology  2-3 weeks    Signed:  Shemar lynnwderick  12/12/2022  4:15 PM

## 2022-12-12 NOTE — CARE COORDINATION
Plan is home today with [facility]. Have confirmed with Ankush Camarena at [facility] that they are ready to start care today. [facility] No other needs currently noted.

## 2022-12-12 NOTE — PROGRESS NOTES
CBW.)  Nutrition Education/Counseling: Education initiated (Discussed home tube feeds with patient.)  Coordination of Nutrition Care: Continue to monitor while inpatient       Goals:  Previous Goal Met: Progressing toward Goal(s)  Goals: by next RD assessment, Tolerate nutrition support at goal rate       Nutrition Monitoring and Evaluation:   Behavioral-Environmental Outcomes: None Identified  Food/Nutrient Intake Outcomes: Enteral Nutrition Intake/Tolerance  Physical Signs/Symptoms Outcomes: Biochemical Data, Chewing or Swallowing, GI Status, Fluid Status or Edema, Nutrition Focused Physical Findings, Skin, Weight    Discharge Planning:    Recommend pursue outpatient nutrition counseling (TF management f/u within 1-2 weeks post d/c)     Jose Kauffman RD  Contact: [phone]

## 2022-12-12 NOTE — PROGRESS NOTES
Cardiology  Progress Note      SUBJECTIVE:  No chest pain.  No dyspnea at rest.    Current Inpatient Medications  Current Facility-Administered Medications: ipratropium-albuterol (DUONEB) nebulizer solution 1 ampule, 1 ampule, Inhalation, Q4H PRN  metoprolol tartrate (LOPRESSOR) tablet 25 mg, 25 mg, Per G Tube, BID  pantoprazole (PROTONIX) 40 mg in sodium chloride (PF) 0.9 % 10 mL injection, 40 mg, IntraVENous, Daily  hydrALAZINE (APRESOLINE) injection 10 mg, 10 mg, IntraVENous, Q6H PRN  sodium chloride flush 0.9 % injection 10 mL, 10 mL, IntraVENous, 2 times per day  sodium chloride flush 0.9 % injection 10 mL, 10 mL, IntraVENous, PRN  0.9 % sodium chloride infusion, , IntraVENous, PRN  enoxaparin (LOVENOX) injection 40 mg, 40 mg, SubCUTAneous, Daily  promethazine (PHENERGAN) tablet 12.5 mg, 12.5 mg, Oral, Q6H PRN **OR** ondansetron (ZOFRAN) injection 4 mg, 4 mg, IntraVENous, Q6H PRN  polyethylene glycol (GLYCOLAX) packet 17 g, 17 g, Oral, Daily PRN  acetaminophen (TYLENOL) tablet 650 mg, 650 mg, Oral, Q6H PRN **OR** acetaminophen (TYLENOL) suppository 650 mg, 650 mg, Rectal, Q6H PRN  amLODIPine (NORVASC) tablet 5 mg, 5 mg, Oral, Daily  venlafaxine (EFFEXOR XR) extended release capsule 150 mg, 150 mg, Oral, Daily  atorvastatin (LIPITOR) tablet 40 mg, 40 mg, Oral, Daily  gabapentin (NEURONTIN) capsule 100 mg, 100 mg, Oral, BID  lisinopril (PRINIVIL;ZESTRIL) tablet 30 mg, 30 mg, Oral, Daily  insulin lispro (HUMALOG) injection vial 0-4 Units, 0-4 Units, SubCUTAneous, TID WC  insulin lispro (HUMALOG) injection vial 0-4 Units, 0-4 Units, SubCUTAneous, Nightly  glucose chewable tablet 16 g, 4 tablet, Oral, PRN  dextrose bolus 10% 125 mL, 125 mL, IntraVENous, PRN **OR** dextrose bolus 10% 250 mL, 250 mL, IntraVENous, PRN  glucagon (rDNA) injection 1 mg, 1 mg, SubCUTAneous, PRN  dextrose 10 % infusion, , IntraVENous, Continuous PRN      Physical  VITALS:  BP (!) 166/97   Pulse 95   Temp 98.9 °F (37.2 °C) (Oral)   Resp 18 Ht 5' 7\" (1.702 m)   Wt 132 lb (59.9 kg)   SpO2 93%   BMI 20.67 kg/m²   CURRENT TEMPERATURE:  Temp: 98.9 °F (37.2 °C)  CONSTITUTIONAL: No acute distress. EYES: Vision is intact. ENT: No sore throat. No ear drainage. NECK: No JVD. BACK: Symmetric. LUNGS:  diminished breath sounds left base  CARDIOVASCULAR:  normal S1 and S2, no S3, and no S4  ABDOMEN:  non-tender. PEG tube in place. NEUROLOGIC: No focal deficits in the upper or lower extremities. Expressive aphasia  EXTREMITIES: No edema cyanosis or clubbing. DATA:      ECG:  I have reviewed EKG with the following interpretation:  normal sinus rhythm    Cardiology Labs:  BMP:    Lab Results   Component Value Date/Time     12/11/2022 09:12 AM    K 4.2 12/11/2022 09:12 AM    K 3.5 12/05/2022 07:47 AM     12/11/2022 09:12 AM    CO2 29 12/11/2022 09:12 AM    BUN 9 12/11/2022 09:12 AM     CBC:    Lab Results   Component Value Date/Time    WBC 14.3 12/11/2022 09:12 AM    RBC 3.90 12/11/2022 09:12 AM    HGB 11.7 12/11/2022 09:12 AM    HCT 35.1 12/11/2022 09:12 AM    MCV 90.0 12/11/2022 09:12 AM    RDW 13.0 12/11/2022 09:12 AM     12/11/2022 09:12 AM     PT/INR:  No results found for: PTINR  TROPONIN:  No components found for: TROP    ASSESSMENT    1. Elevation of high-sensitivity troponin. This can be demand ischemia from her cough and influenza A with type 2 non-STEMI. She has no complaints of chest pain. EKG is not showing acute ischemic changes. IEchocardiogram showing preserved left ventricle systolic function. At this point, I do not see need for further coronary workup unless there is a change in her cardiac status. We may consider coronary work-up down the road if we feel it is needed. 2.  Pancreatic mass. Patient is being evaluated for this. 3.  Type 2 diabetes. 4.  Hyperlipidemia. 5.history of stroke last year with expressive aphasia.   Her stroke at that time was felt to be more related to severe hypertension.

## 2022-12-12 NOTE — PROGRESS NOTES
Diagnosis    Right foot infection    Diabetic ulcer of left foot associated with type 1 diabetes mellitus (HCC)    Type 2 diabetes mellitus without complication, without long-term current use of insulin (HCC)    Essential hypertension    Gastroesophageal reflux disease without esophagitis    Mixed hyperlipidemia    Subacute osteomyelitis of foot (HCC)    Dysarthria    Hemorrhagic stroke (HCC)    Altered mental status    Lactic acidosis    Muscle function loss    Impaired functional mobility, balance, gait, and endurance    Diabetic peripheral neuropathy (HCC)    Peroneal neuropathy at knee, left    Left lumbosacral radiculopathy    Pain in joint, shoulder region    Type II diabetes mellitus with peripheral circulatory disorder (HCC)    PVD (peripheral vascular disease) ([facility] [address]    Influenza A    Pancreatic adenocarcinoma (HCC)    Severe protein-calorie malnutrition ([facility] [address]     Dysphagia  -History of CVA  -Previously considered for PEG placement at [facility]  -PEG tube placed  by general surgery -appears to be tolerating tube feedings  -Defer tube feedings to admitting      2. Weight loss  -Patient describes unintentional weight loss of 20 to 30 pounds over the past few months  -EGD as above  -Colonoscopy 5/15/2019 showing 2 polyps in the rectum, hyperplastic  -Previous CT abdomen pelvis 5/27/2022 showing dilated pancreatic duct, atrophy of the pancreas, abnormal wall thickening of the descending through the rectum  -Repeat CT chest/abdomen/pelvis consistent with pancreatic mass  -Abnormal CT colon/pancreas/bile ducts evaluation as below  -Consider further evaluation with nonemergent colonoscopy next week or as outpatient     3. Abnormal CT colon/dilated bile ducts  -CT abdomen pelvis 5/27/2022 showing abnormal wall thickening from the descending through sigmoid colon  -Dr. Ashish Green input appreciated with EUS/FNA of pancreatic body mass -await biopsies     4.   Diarrhea  -Appears resolved  -Stool studies thus far negative  -Plan for outpatient colonoscopy after establishing a reliable route for prep -see above     5. History of colon polyps  -Colonoscopy 5/15/2019 showing 2 polyps in the rectum, hyperplastic  -Colonoscopy as above     6. Comorbidities  -CVA, hypertension, dyslipidemia, diabetes, thyroid disease  -Per admitting  \  Stable H&H  No immediate plan for intervention from GI POV. Plan for outpatient colonoscopy. Okay to be discharged from GI POV with follow-up in the office in 2 weeks after discharge -patient/family to call for appointment and with questions/concerns at [phone]. Thank you for the opportunity to participate in the care of  Shaw Dudley MD  12/12/2022  11:49 AM    NOTE:  This report was transcribed using voice recognition software. Every effort was made to ensure accuracy; however, inadvertent computerized transcription errors may be present.

## 2022-12-12 NOTE — PLAN OF CARE
Problem: Pain  Goal: Verbalizes/displays adequate comfort level or baseline comfort level  12/12/2022 1155 by Brayan Currie RN  Outcome: Progressing  12/12/2022 0002 by Patrick Calderon RN  Outcome: Progressing     Problem: Safety - Adult  Goal: Free from fall injury  12/12/2022 1155 by Brayan Currie RN  Outcome: Progressing  12/12/2022 0002 by Patrick Calderon RN  Outcome: Progressing     Problem: Cardiovascular - Adult  Goal: Maintains optimal cardiac output and hemodynamic stability  12/12/2022 1155 by Brayan Currie RN  Outcome: Progressing  12/12/2022 0002 by Patrick Calderon RN  Outcome: Progressing  Goal: Absence of cardiac dysrhythmias or at baseline  12/12/2022 1155 by Brayan Currie RN  Outcome: Progressing  12/12/2022 0002 by Patrick Calderon RN  Outcome: Progressing     Problem: Chronic Conditions and Co-morbidities  Goal: Patient's chronic conditions and co-morbidity symptoms are monitored and maintained or improved  12/12/2022 1155 by Brayan Currie RN  Outcome: Progressing  12/12/2022 0002 by Patrick Calderon RN  Outcome: Progressing

## 2022-12-12 NOTE — PLAN OF CARE
Problem: Pain  Goal: Verbalizes/displays adequate comfort level or baseline comfort level  12/11/2022 1826 by Ifeoma Reed RN  Outcome: Progressing     Problem: Safety - Adult  Goal: Free from fall injury  12/11/2022 1826 by Ifeoma Reed RN  Outcome: Progressing     Problem: Cardiovascular - Adult  Goal: Maintains optimal cardiac output and hemodynamic stability  12/12/2022 0002 by Girish Santana RN  Outcome: Progressing     Problem: Cardiovascular - Adult  Goal: Absence of cardiac dysrhythmias or at baseline  12/12/2022 0002 by Girish Santana RN  Outcome: Progressing     Problem: Chronic Conditions and Co-morbidities  Goal: Patient's chronic conditions and co-morbidity symptoms are monitored and maintained or improved  12/12/2022 0002 by Girish Santana RN  Outcome: Progressing     Problem: Skin/Tissue Integrity  Goal: Absence of new skin breakdown  Description: 1. Monitor for areas of redness and/or skin breakdown  2. Assess vascular access sites hourly  3. Every 4-6 hours minimum:  Change oxygen saturation probe site  4. Every 4-6 hours:  If on nasal continuous positive airway pressure, respiratory therapy assess nares and determine need for appliance change or resting period.   12/11/2022 1826 by Ifeoma Reed RN  Outcome: Progressing     Problem: Discharge Planning  Goal: Discharge to home or other facility with appropriate resources  12/12/2022 0002 by Girish Santana RN  Outcome: Progressing     Problem: Nutrition Deficit:  Goal: Optimize nutritional status  12/11/2022 1826 by Ifeoma Reed RN  Outcome: Progressing

## 2022-12-13 ENCOUNTER — TELEPHONE (OUTPATIENT)
Dept: FAMILY MEDICINE CLINIC | Age: 67
End: 2022-12-13

## 2022-12-13 NOTE — TELEPHONE ENCOUNTER
Care Transitions Initial Follow Up Call    Outreach made within 2 business days of discharge: Yes    Patient: Samantha Gomez Patient : 1955   MRN: [id number]  Reason for Admission: There are no discharge diagnoses documented for the most recent discharge. Discharge Date: 22       Spoke with: Ed,     Discharge department/facility: [facility]    [facility] Interactive Patient Contact:  Was patient able to fill all prescriptions: Yes  Was patient instructed to bring all medications to the follow-up visit: Yes  Is patient taking all medications as directed in the discharge summary? Yes  Does patient understand their discharge instructions: Yes  Does patient have questions or concerns that need addressed prior to 7-14 day follow up office visit: no    Scheduled appointment with PCP within 7-14 days--scheduled appt 22.     Follow Up  Future Appointments   Date Time Provider Lila Mabry   2023  1:00  LAURA Quiles

## 2022-12-14 NOTE — PROGRESS NOTES
CLINICAL PHARMACY NOTE: MEDS TO BEDS    Total # of Prescriptions Filled: 1   The following medications were delivered to the patient:  Metoprolol tartrate 25 mg    Additional Documentation:

## 2022-12-18 DIAGNOSIS — E11.42 DIABETIC PERIPHERAL NEUROPATHY (HCC): ICD-10-CM

## 2022-12-19 RX ORDER — GABAPENTIN 100 MG/1
100 CAPSULE ORAL 2 TIMES DAILY
Qty: 60 CAPSULE | Refills: 0 | Status: SHIPPED
Start: 2022-12-19 | End: 2022-12-22 | Stop reason: SDUPTHER

## 2022-12-20 DIAGNOSIS — C25.9 PANCREATIC ADENOCARCINOMA (HCC): Primary | ICD-10-CM

## 2022-12-22 ENCOUNTER — TELEPHONE (OUTPATIENT)
Dept: FAMILY MEDICINE CLINIC | Age: 67
End: 2022-12-22

## 2022-12-22 ENCOUNTER — OFFICE VISIT (OUTPATIENT)
Dept: FAMILY MEDICINE CLINIC | Age: 67
End: 2022-12-22

## 2022-12-22 VITALS
DIASTOLIC BLOOD PRESSURE: 68 MMHG | BODY MASS INDEX: 19.48 KG/M2 | RESPIRATION RATE: 16 BRPM | OXYGEN SATURATION: 95 % | WEIGHT: 124.1 LBS | HEART RATE: 60 BPM | HEIGHT: 67 IN | TEMPERATURE: 97.5 F | SYSTOLIC BLOOD PRESSURE: 120 MMHG

## 2022-12-22 DIAGNOSIS — Z91.81 AT HIGH RISK FOR INJURY RELATED TO FALL: ICD-10-CM

## 2022-12-22 DIAGNOSIS — E43 SEVERE PROTEIN-CALORIE MALNUTRITION (HCC): ICD-10-CM

## 2022-12-22 DIAGNOSIS — E11.42 DIABETIC PERIPHERAL NEUROPATHY (HCC): ICD-10-CM

## 2022-12-22 DIAGNOSIS — K21.9 GASTROESOPHAGEAL REFLUX DISEASE WITHOUT ESOPHAGITIS: ICD-10-CM

## 2022-12-22 DIAGNOSIS — Z91.81 AT HIGH RISK FOR FALLS: ICD-10-CM

## 2022-12-22 DIAGNOSIS — I10 PRIMARY HYPERTENSION: ICD-10-CM

## 2022-12-22 DIAGNOSIS — F32.A DEPRESSION, UNSPECIFIED DEPRESSION TYPE: ICD-10-CM

## 2022-12-22 DIAGNOSIS — Z09 HOSPITAL DISCHARGE FOLLOW-UP: Primary | ICD-10-CM

## 2022-12-22 DIAGNOSIS — E08.8 DIABETES MELLITUS DUE TO UNDERLYING CONDITION WITH COMPLICATION (HCC): ICD-10-CM

## 2022-12-22 DIAGNOSIS — D84.9 IMMUNOCOMPROMISED (HCC): ICD-10-CM

## 2022-12-22 DIAGNOSIS — I10 ESSENTIAL HYPERTENSION: ICD-10-CM

## 2022-12-22 DIAGNOSIS — R13.10 DYSPHAGIA, UNSPECIFIED TYPE: ICD-10-CM

## 2022-12-22 DIAGNOSIS — E11.9 TYPE 2 DIABETES MELLITUS WITHOUT COMPLICATION, WITHOUT LONG-TERM CURRENT USE OF INSULIN (HCC): ICD-10-CM

## 2022-12-22 DIAGNOSIS — E78.5 HYPERLIPIDEMIA, UNSPECIFIED HYPERLIPIDEMIA TYPE: ICD-10-CM

## 2022-12-22 DIAGNOSIS — Z23 NEED FOR COVID-19 VACCINE: ICD-10-CM

## 2022-12-22 DIAGNOSIS — R53.1 ASTHENIA: ICD-10-CM

## 2022-12-22 DIAGNOSIS — R53.81 DEBILITY: ICD-10-CM

## 2022-12-22 DIAGNOSIS — C25.9 PANCREATIC ADENOCARCINOMA (HCC): ICD-10-CM

## 2022-12-22 DIAGNOSIS — R47.1 DYSARTHRIA: ICD-10-CM

## 2022-12-22 DIAGNOSIS — E11.51 TYPE II DIABETES MELLITUS WITH PERIPHERAL CIRCULATORY DISORDER (HCC): ICD-10-CM

## 2022-12-22 DIAGNOSIS — Z23 NEEDS FLU SHOT: ICD-10-CM

## 2022-12-22 RX ORDER — GABAPENTIN 100 MG/1
100 CAPSULE ORAL 2 TIMES DAILY
Qty: 60 CAPSULE | Refills: 0 | Status: SHIPPED | OUTPATIENT
Start: 2022-12-22 | End: 2023-01-21

## 2022-12-22 RX ORDER — INFLUENZA A VIRUS A/MICHIGAN/45/2015 X-275 (H1N1) ANTIGEN (FORMALDEHYDE INACTIVATED), INFLUENZA A VIRUS A/SINGAPORE/INFIMH-16-0019/2016 IVR-186 (H3N2) ANTIGEN (FORMALDEHYDE INACTIVATED), INFLUENZA B VIRUS B/PHUKET/3073/2013 ANTIGEN (FORMALDEHYDE INACTIVATED), AND INFLUENZA B VIRUS B/MARYLAND/15/2016 BX-69A ANTIGEN (FORMALDEHYDE INACTIVATED) 60; 60; 60; 60 UG/.7ML; UG/.7ML; UG/.7ML; UG/.7ML
0.7 INJECTION, SUSPENSION INTRAMUSCULAR ONCE
Qty: 0.7 ML | Refills: 0 | Status: SHIPPED | OUTPATIENT
Start: 2022-12-22 | End: 2022-12-22

## 2022-12-22 RX ORDER — LISINOPRIL 30 MG/1
TABLET ORAL
Qty: 14 TABLET | Refills: 0 | Status: SHIPPED | OUTPATIENT
Start: 2022-12-22

## 2022-12-22 RX ORDER — ESOMEPRAZOLE MAGNESIUM 40 MG/1
CAPSULE, DELAYED RELEASE ORAL
Qty: 14 CAPSULE | Refills: 0 | Status: SHIPPED | OUTPATIENT
Start: 2022-12-22

## 2022-12-22 RX ORDER — AMLODIPINE BESYLATE 5 MG/1
TABLET ORAL
Qty: 14 TABLET | Refills: 0 | Status: SHIPPED | OUTPATIENT
Start: 2022-12-22

## 2022-12-22 RX ORDER — VENLAFAXINE HYDROCHLORIDE 150 MG/1
CAPSULE, EXTENDED RELEASE ORAL
Qty: 30 CAPSULE | Refills: 5 | Status: SHIPPED | OUTPATIENT
Start: 2022-12-22

## 2022-12-22 RX ORDER — ATENOLOL 50 MG/1
TABLET ORAL
COMMUNITY
Start: 2022-12-18 | End: 2022-12-22 | Stop reason: ALTCHOICE

## 2022-12-22 RX ORDER — ATORVASTATIN CALCIUM 40 MG/1
TABLET, FILM COATED ORAL
Qty: 90 TABLET | Refills: 1 | Status: SHIPPED | OUTPATIENT
Start: 2022-12-22

## 2022-12-22 NOTE — PATIENT INSTRUCTIONS
Keep track of fasting glucose, 15 minutes before each meal, and bed time levels. Call in 2 weeks with logs. Getting rid of janumet - replacing with plain metformin  Getting rid of rybelsus - will make determination of replacement based on above blood glucose logs and consultation with endocrinology    Call the home health company and see about the status of the home nurse and speech therapy. Call to see if Dr. Karla Mckeon will have follow up with you and when    Don't forget to keep apt with Neurology at [facility] (January 15) and remind us when you have the MRI so we can look at the results. We are getting you a dietician and nutritionist.    Follow the packet of information regarding advanced care directives.

## 2022-12-22 NOTE — TELEPHONE ENCOUNTER
[facility] needs a new order for speech therapy, to begin after Jan. 1, 2023. The therapist is out on PTO.   Last seen 10/6/2022  Next appt 12/22/2022

## 2022-12-22 NOTE — PROGRESS NOTES
Post-Discharge Transitional Care  Follow Up      Val Sauer   YOB: 1955    Date of Office Visit:  12/22/2022  Date of Hospital Admission: 12/3/22  Date of Hospital Discharge: 12/12/22  Risk of hospital readmission (high >=14%. Medium >=10%) :Readmission Risk Score: 12.6      Care management risk score Rising risk (score 2-5) and Complex Care (Scores >=6): No Risk Score On File     Non face to face  following discharge, date last encounter closed (first attempt may have been earlier): 12/13/2022    Call initiated 2 business days of discharge: Yes    ASSESSMENT/PLAN:   Hospital discharge follow-up  -     ND DISCHARGE MEDS RECONCILED W/ CURRENT OUTPATIENT MED LIST  Type 2 diabetes mellitus without complication, without long-term current use of insulin (HCC)  -     metFORMIN (GLUCOPHAGE) 1000 MG tablet; Take 1 tablet by mouth 2 times daily (with meals), Disp-60 tablet, R-5Normal  -     [address] [facility]  Diabetic peripheral neuropathy ([facility]  -     metFORMIN (GLUCOPHAGE) 1000 MG tablet; Take 1 tablet by mouth 2 times daily (with meals), Disp-60 tablet, R-5Normal  -     gabapentin (NEURONTIN) 100 MG capsule; Take 1 capsule by mouth 2 times daily for 30 days. Intended supply: 30 days, Disp-60 capsule, R-0Normal  Pancreatic adenocarcinoma (HCC)  -     metFORMIN (GLUCOPHAGE) 1000 MG tablet; Take 1 tablet by mouth 2 times daily (with meals), Disp-60 tablet, R-5Normal  -     [address] [facility]  -     [address] [place] [address]  At high risk for falls  Type II diabetes mellitus with peripheral circulatory disorder (HCC)  Severe protein-calorie malnutrition ([facility]  -     [address] [place] [address]  Need for COVID-19 vaccine  -     Influenza Vac High-Dose Quad (FLUZONE HIGH-DOSE QUADRIVALENT) 0.7 ML CORRINE injection;  Inject 0.7 mLs into the muscle once for 1 dose, Disp-0.7 mL, R-0Print  -     cOVID-19mRNA bival vac moderna (MODERNA COVID-19 BIVAL BOOSTER) 50 MCG/0.5ML SUSP injection; Inject 0.5 mLs into the muscle once for 1 dose, Disp-0.5 mL, R-0Print  Needs flu shot  -     Influenza Vac High-Dose Quad (FLUZONE HIGH-DOSE QUADRIVALENT) 0.7 ML CORRINE injection; Inject 0.7 mLs into the muscle once for 1 dose, Disp-0.7 mL, R-0Print  -     cOVID-19mRNA bival vac moderna (MODERNA COVID-19 BIVAL BOOSTER) 50 MCG/0.5ML SUSP injection; Inject 0.5 mLs into the muscle once for 1 dose, Disp-0.5 mL, R-0Print  Immunocompromised (HCC)  -     Influenza Vac High-Dose Quad (FLUZONE HIGH-DOSE QUADRIVALENT) 0.7 ML CORRINE injection; Inject 0.7 mLs into the muscle once for 1 dose, Disp-0.7 mL, R-0Print  -     cOVID-19mRNA bival vac moderna (MODERNA COVID-19 BIVAL BOOSTER) 50 MCG/0.5ML SUSP injection; Inject 0.5 mLs into the muscle once for 1 dose, Disp-0.5 mL, R-0Print  Depression, unspecified depression type  -     venlafaxine (EFFEXOR XR) 150 MG extended release capsule; TAKE 1 CAPSULE DAILY, Disp-30 capsule, R-5YOUR PATIENT HAS REQUESTED A REFILL OF THIS MEDICATION, PREVIOUSLY AUTHORIZED BY ANOTHER PRESCRIBER. Normal  Essential hypertension  -     lisinopril (PRINIVIL;ZESTRIL) 30 MG tablet; TAKE 1 TABLET DAILY AT NIGHT, Disp-14 tablet, R-0Normal  -     amLODIPine (NORVASC) 5 MG tablet; TAKE 1 TABLET DAILY IN THE MORNING, Disp-14 tablet, R-0Normal  Gastroesophageal reflux disease without esophagitis  -     esomeprazole (NEXIUM) 40 MG delayed release capsule; TAKE 1 CAPSULE EVERY MORNING BEFORE BREAKFAST, Disp-14 capsule, R-0Normal  Hyperlipidemia, unspecified hyperlipidemia type  -     atorvastatin (LIPITOR) 40 MG tablet; TAKE 1 TABLET DAILY, Disp-90 tablet, R-1Normal  Primary hypertension  -     metoprolol tartrate (LOPRESSOR) 25 MG tablet; 1 tablet by Per G Tube route 2 times daily, Disp-60 tablet, R-3Normal  At high risk for injury related to fall  -     [facility] [address] [address],[address]  Diabetes mellitus due to underlying condition with complication [facility])  -     [facility]  Dysphagia, unspecified type  -     [facility] Decision Making: high complexity  Return in 1 month (on 1/22/2023) for 45 minutes . On this date 12/22/2022 I have spent 60 minutes reviewing previous notes, test results and face to face with the patient discussing the diagnosis and importance of compliance with the treatment plan as well as documenting on the day of the visit. Subjective:   HPI:  Follow up of Hospital problems/diagnosis(es):     Hospital Course:   Patient was admitted with flu like illness, dysphagia and weakness. Hospitalization was complicated with additional findings. She had EGD with dilatation. She was found to have pancreatic mass. She had EUS with biopsy. Report still pending at the time of discharge. However, this was concerning for malignancy and she was seen by heme/onc. She had EGD placed and tube feeding was initiated which she tolerated well. She has was out of window for flu treatment. This was symptomatically controlled. Additional work up with colonoscopy as out patient. Oncology to follow patient as out patient     Treatments: peg tube placement    Inpatient course: Discharge summary reviewed- see chart. Interval history/Current status:     Dysphagia/Wt Loss:  -149# to 141# in 3 months (July to October)  -141# to 124# in 2 months (October to December)  Weight 64.4 kg (142 lb) 12/01/2022 1:09 PM EST  So in reality, she dropped nearly 20# in less than a month.    -Saw SLP in 2021  -was with GI for EGD/stretching    -referred back to SLP  \"Patient was instructed to discuss her current issues with the decline in lingual coordination, speech intelligibility, and swallowing abilities with her neurologist at her appt tomorrow. Will continue. Roxy Alvarez.  Mechelle Lyons, MA/CCC-SLP\"    Reviewed august visit with neurology at our October office visit - referred her out to [facility] for second opinion wrt to recs from SLP (Had apt November 16)    [facility] Neuro recommendations 12/1/22  -MRI C Spine ordered, will add on MRI Brain WO   -CBC and differential, CMP, CK, ESR, CRP, PTH, serum immunofixation electrophoresis, HUYEN, RF, vitamin B12, methylmalonic acid, serum copper, ceruloplasmin  -Discussed the present options for nutritional access (ie a PEG tube), which will need to be done inpatient. Patient appeared very dispondent and quiet. I asked if she would like to think about it and she nodded yes. This was being coordinated and patient came down with influenza A.    -Speech is consulted again for her  Reviewed inpatient recommendations with her wrt to her diet      -PEG Tube:  Tube feedings: HOME BOLUS TUBE FEEDING RECOMMENDATION:   Goal Glucerna 1.5 (237mL carton) 6 cartons per day;  can administer via bolus four times per day                Ex: 8am: 2 cartons ; Noon: 1 carton ; 4pm: 2 cartons ; 8pm 1 carton. Provide additional 140 ml water x 6 flushes/d (total 840mL/d)   Peg tube care    Patient seen for dysphagia therapy 20 minutes. Patient doing better with oral initiation of the swallow. Was able to tolerate puree and swallow them timely. Patient tolerated nectar thick via cup sips well. Home health is consulted. They had one visit last week. Needs nutrition consult  Back to Dr. Luis Lopez in January    [facility] care needs a new order for speech therapy, to begin after Jan. 1, 2023. The therapist is out on PTO.      -Adenocarcinoma Pancreas  CT AB P  Abnormal low-attenuation focus seen in the distal body and tail the pancreas   with posterior pancreatic ductal dilatation. Findings concerning for an   underlying malignancy. No significant evidence of metastatic disease.      Bx  FNA PANCREAS, TAIL   POSITIVE FOR MALIGNANT CELLS     No evidence of metastatic disease  Splenic artery involvement. Size 2-3 cm on imaging  Plan is for neoadjuvant chemotherapy, likely mFOLFIRINOX with GCSF support +/- subsequent CRT with xeloda 825 mg/m2 BID  Dr. Casas Angry 1/6/2022    She does not have a port placed. Getting rid of janumet - replacing with metformin  Getting rid of rybelsus  Running towards type 3c dm in future, will get input of endocrinology   Current data argue against an increased risk of pancreatic cancer and thyroid cancer with semaglutide  Certainly there is risk with [place] and [place]. Keep detailed records fasting, AC, HS and report back in 2 weeks with logs. Call sooner if hypoglycemia or hyper glycemia. Want to know no matter what if fasting BG over 180. Neuro:  Going to [facility] for MRI of brain early January      Patient Active Problem List   Diagnosis    Right foot infection    Type 2 diabetes mellitus without complication, without long-term current use of insulin ([address]    Essential hypertension    Gastroesophageal reflux disease without esophagitis    Mixed hyperlipidemia    Subacute osteomyelitis of foot (HCC)    Dysarthria    Hemorrhagic stroke (HCC)    Altered mental status    Lactic acidosis    Muscle function loss    Impaired functional mobility, balance, gait, and endurance    Diabetic peripheral neuropathy (HCC)    Peroneal neuropathy at knee, left    Left lumbosacral radiculopathy    Pain in joint, shoulder region    Type II diabetes mellitus with peripheral circulatory disorder (HCC)    PVD (peripheral vascular disease) ([address]    Influenza A    Pancreatic adenocarcinoma (HCC)    Severe protein-calorie malnutrition ([address]    Protein calorie malnutrition       Medications listed as ordered at the time of discharge from hospital     Medication List            Accurate as of December 22, 2022 10:24 AM. If you have any questions, ask your nurse or doctor.                 START taking these medications      cOVID-19mRNA bival vac moderna 50 MCG/0.5ML Susp injection  Commonly known as: Moderna COVID-19 Bival Booster  Inject 0.5 mLs into the muscle once for 1 dose  Started by: Kirill Ruelas DO     Fluzone High-Dose Quadrivalent 0.7 ML Julia injection  Generic drug: Influenza Vac High-Dose Quad  Inject 0.7 mLs into the muscle once for 1 dose  Started by: Kirill Ruelas DO     metFORMIN 1000 MG tablet  Commonly known as: GLUCOPHAGE  Take 1 tablet by mouth 2 times daily (with meals)  Started by: Kirill Ruelas DO            CONTINUE taking these medications      amLODIPine 5 MG tablet  Commonly known as: NORVASC  TAKE 1 TABLET DAILY IN THE MORNING     atorvastatin 40 MG tablet  Commonly known as: LIPITOR  TAKE 1 TABLET DAILY     esomeprazole 40 MG delayed release capsule  Commonly known as: NEXIUM  TAKE 1 CAPSULE EVERY MORNING BEFORE BREAKFAST     gabapentin 100 MG capsule  Commonly known as: NEURONTIN  Take 1 capsule by mouth 2 times daily for 30 days.  Intended supply: 30 days     lisinopril 30 MG tablet  Commonly known as: PRINIVIL;ZESTRIL  TAKE 1 TABLET DAILY AT NIGHT     metoprolol tartrate 25 MG tablet  Commonly known as: LOPRESSOR  1 tablet by Per G Tube route 2 times daily     MULTI-VITAMIN DAILY PO     naproxen 500 MG tablet  Commonly known as: NAPROSYN  Take 1 tablet by mouth 2 times daily (with meals)     Shingrix 50 MCG/0.5ML Susr injection  Generic drug: zoster recombinant adjuvanted vaccine  Inject 0.5 mLs into the muscle See Admin Instructions 1 dose now and repeat in 2-6 months     venlafaxine 150 MG extended release capsule  Commonly known as: EFFEXOR XR  TAKE 1 CAPSULE DAILY            STOP taking these medications      atenolol 50 MG tablet  Commonly known as: TENORMIN  Stopped by: Kirill Ruelas DO               Where to Get Your Medications        These medications were sent to [id number]- [address] [place] [address] [address] [facility], [address] [place] [address]      Phone: [phone]   amLODIPine 5 MG tablet  atorvastatin 40 MG tablet  esomeprazole 40 MG delayed release capsule  gabapentin 100 MG capsule  lisinopril 30 MG tablet  metFORMIN 1000 MG tablet  metoprolol tartrate 25 MG tablet  venlafaxine 150 MG extended release capsule       You can get these medications from any pharmacy    Bring a paper prescription for each of these medications  cOVID-19mRNA bival vac moderna 50 MCG/0.5ML Susp injection  Fluzone High-Dose Quadrivalent 0.7 ML Julia injection           Medications marked \"taking\" at this time  Outpatient Medications Marked as Taking for the 12/22/22 encounter (Office Visit) with Larry Castellon, DO   Medication Sig Dispense Refill    metFORMIN (GLUCOPHAGE) 1000 MG tablet Take 1 tablet by mouth 2 times daily (with meals) 60 tablet 5    Influenza Vac High-Dose Quad (FLUZONE HIGH-DOSE QUADRIVALENT) 0.7 ML JULIA injection Inject 0.7 mLs into the muscle once for 1 dose 0.7 mL 0    cOVID-19mRNA bival vac moderna (MODERNA COVID-19 BIVAL BOOSTER) 50 MCG/0.5ML SUSP injection Inject 0.5 mLs into the muscle once for 1 dose 0.5 mL 0    venlafaxine (EFFEXOR XR) 150 MG extended release capsule TAKE 1 CAPSULE DAILY 30 capsule 5    lisinopril (PRINIVIL;ZESTRIL) 30 MG tablet TAKE 1 TABLET DAILY AT NIGHT 14 tablet 0    gabapentin (NEURONTIN) 100 MG capsule Take 1 capsule by mouth 2 times daily for 30 days.  Intended supply: 30 days 60 capsule 0    esomeprazole (NEXIUM) 40 MG delayed release capsule TAKE 1 CAPSULE EVERY MORNING BEFORE BREAKFAST 14 capsule 0    atorvastatin (LIPITOR) 40 MG tablet TAKE 1 TABLET DAILY 90 tablet 1    amLODIPine (NORVASC) 5 MG tablet TAKE 1 TABLET DAILY IN THE MORNING 14 tablet 0    metoprolol tartrate (LOPRESSOR) 25 MG tablet 1 tablet by Per G Tube route 2 times daily 60 tablet 3    naproxen (NAPROSYN) 500 MG tablet Take 1 tablet by mouth 2 times daily (with meals) 180 tablet 1    zoster recombinant adjuvanted vaccine (SHINGRIX) 50 MCG/0.5ML SUSR injection Inject 0.5 mLs into the muscle See Admin Instructions 1 dose now and repeat in 2-6 months 0.5 mL 0    Multiple Vitamin (MULTI-VITAMIN DAILY PO) Take by mouth          Medications patient taking as of now reconciled against medications ordered at time of hospital discharge: Yes    A comprehensive review of systems was negative except for what was noted in the HPI. Objective:    /68   Pulse 60   Temp 97.5 °F (36.4 °C) (Temporal)   Resp 16   Ht 5' 7\" (1.702 m)   Wt 124 lb 1.6 oz (56.3 kg)   SpO2 95%   BMI 19.44 kg/m²         An electronic signature was used to authenticate this note. --Ap Feeler, DO        On the basis of positive falls risk screening, assessment and plan is as follows: home safety tips provided, referral to physical therapy provided for strength and balance training.

## 2022-12-29 ENCOUNTER — TELEPHONE (OUTPATIENT)
Dept: PALLATIVE CARE | Age: 67
End: 2022-12-29

## 2022-12-29 ENCOUNTER — TELEPHONE (OUTPATIENT)
Dept: FAMILY MEDICINE CLINIC | Age: 67
End: 2022-12-29

## 2022-12-29 NOTE — TELEPHONE ENCOUNTER
Anca/ OT called to inform she did an eval for patient and will be seeing her 2x/wk for 3 wks which will begin next wk.     Last seen 12/22/2022  Next appt 1/23/2023

## 2022-12-29 NOTE — TELEPHONE ENCOUNTER
Called to [facility] and spoke wo her  Ed as she was sleeping. Ed states he does not think Marisol needs to meet with us as she will be seeing Dr. Delia Wagoner on 1/6/23. Explained Palliative Care service and offered an appointment. Ed states she is doing ok and will call if needed.

## 2023-01-06 ENCOUNTER — OFFICE VISIT (OUTPATIENT)
Dept: ONCOLOGY | Age: 68
End: 2023-01-06

## 2023-01-06 ENCOUNTER — HOSPITAL ENCOUNTER (OUTPATIENT)
Dept: INFUSION THERAPY | Age: 68
Discharge: HOME | End: 2023-01-06
Payer: MEDICARE

## 2023-01-06 ENCOUNTER — TELEPHONE (OUTPATIENT)
Dept: INFUSION THERAPY | Age: 68
End: 2023-01-06

## 2023-01-06 VITALS
OXYGEN SATURATION: 99 % | DIASTOLIC BLOOD PRESSURE: 101 MMHG | WEIGHT: 124.9 LBS | HEART RATE: 98 BPM | SYSTOLIC BLOOD PRESSURE: 120 MMHG | HEIGHT: 67 IN | BODY MASS INDEX: 19.6 KG/M2 | TEMPERATURE: 96.1 F

## 2023-01-06 DIAGNOSIS — C25.2 MALIGNANT NEOPLASM OF TAIL OF PANCREAS (HCC): ICD-10-CM

## 2023-01-06 DIAGNOSIS — C25.9 PANCREATIC ADENOCARCINOMA (HCC): ICD-10-CM

## 2023-01-06 DIAGNOSIS — C25.2 MALIGNANT NEOPLASM OF TAIL OF PANCREAS (HCC): Primary | ICD-10-CM

## 2023-01-06 DIAGNOSIS — E46 PROTEIN-CALORIE MALNUTRITION, UNSPECIFIED SEVERITY (HCC): ICD-10-CM

## 2023-01-06 LAB
ALBUMIN SERPL-MCNC: 4.1 G/DL (ref 3.5–5.2)
ALP BLD-CCNC: 117 U/L (ref 35–104)
ALT SERPL-CCNC: 12 U/L (ref 0–32)
ANION GAP SERPL CALCULATED.3IONS-SCNC: 13 MMOL/L (ref 7–16)
AST SERPL-CCNC: 19 U/L (ref 0–31)
BASOPHILS ABSOLUTE: 0.05 E9/L (ref 0–0.2)
BASOPHILS RELATIVE PERCENT: 0.5 % (ref 0–2)
BILIRUB SERPL-MCNC: 0.4 MG/DL (ref 0–1.2)
BUN BLDV-MCNC: 25 MG/DL (ref 6–23)
CALCIUM SERPL-MCNC: 10.2 MG/DL (ref 8.6–10.2)
CHLORIDE BLD-SCNC: 94 MMOL/L (ref 98–107)
CO2: 27 MMOL/L (ref 22–29)
CREAT SERPL-MCNC: 0.5 MG/DL (ref 0.5–1)
EOSINOPHILS ABSOLUTE: 0.19 E9/L (ref 0.05–0.5)
EOSINOPHILS RELATIVE PERCENT: 2.1 % (ref 0–6)
GFR SERPL CREATININE-BSD FRML MDRD: >60 ML/MIN/1.73
GLUCOSE BLD-MCNC: 183 MG/DL (ref 74–99)
HCT VFR BLD CALC: 39.7 % (ref 34–48)
HEMOGLOBIN: 12.7 G/DL (ref 11.5–15.5)
IMMATURE GRANULOCYTES #: 0.03 E9/L
IMMATURE GRANULOCYTES %: 0.3 % (ref 0–5)
LYMPHOCYTES ABSOLUTE: 2.08 E9/L (ref 1.5–4)
LYMPHOCYTES RELATIVE PERCENT: 22.7 % (ref 20–42)
MCH RBC QN AUTO: 29.7 PG (ref 26–35)
MCHC RBC AUTO-ENTMCNC: 32 % (ref 32–34.5)
MCV RBC AUTO: 92.8 FL (ref 80–99.9)
MONOCYTES ABSOLUTE: 0.52 E9/L (ref 0.1–0.95)
MONOCYTES RELATIVE PERCENT: 5.7 % (ref 2–12)
NEUTROPHILS ABSOLUTE: 6.29 E9/L (ref 1.8–7.3)
NEUTROPHILS RELATIVE PERCENT: 68.7 % (ref 43–80)
PDW BLD-RTO: 12.6 FL (ref 11.5–15)
PLATELET # BLD: 562 E9/L (ref 130–450)
PMV BLD AUTO: 11 FL (ref 7–12)
POTASSIUM SERPL-SCNC: 4.7 MMOL/L (ref 3.5–5)
RBC # BLD: 4.28 E12/L (ref 3.5–5.5)
SODIUM BLD-SCNC: 134 MMOL/L (ref 132–146)
TOTAL PROTEIN: 7.2 G/DL (ref 6.4–8.3)
WBC # BLD: 9.2 E9/L (ref 4.5–11.5)

## 2023-01-06 PROCEDURE — 80053 COMPREHEN METABOLIC PANEL: CPT

## 2023-01-06 PROCEDURE — 85025 COMPLETE CBC W/AUTO DIFF WBC: CPT

## 2023-01-06 PROCEDURE — 36415 COLL VENOUS BLD VENIPUNCTURE: CPT

## 2023-01-06 PROCEDURE — 86301 IMMUNOASSAY TUMOR CA 19-9: CPT

## 2023-01-06 RX ORDER — ASPIRIN 81 MG/1
81 TABLET ORAL DAILY
COMMUNITY

## 2023-01-06 NOTE — PROGRESS NOTES
Josephine Hayes      Patient Name: Makenzie Pham  YOB: 1955  PCP: Rhina Hansen DO   Referring Provider:      Reason for Consultation:   Chief Complaint   Patient presents with    New Patient        Subjective:  Feeling better post hospitalization. No acute complaints today. Chronic expressive aphasia is unchanged    History of Present Illness: This pt is a 73 yo female admitted earlier this month for flu A. She also noted slowly progressive weakness, fatigue and dysphagia. Also notes ~30 pound weight loss. She has previous diagnosis of esophageal stricture. CT scan 12/6 which showed distal pancreatic body/tail low attenuation lesion with distal duct dilation. No evidence of metastatic disease. EGD? EUS with Dr. Rebecca Brown 12/8/22 showing a 1.9 cm lesion just proximal to pancreatic tail, with duct dilation to 8 mm. She is s/p biopsy. S/p PEG placement. Diagnostic Data:     Past Medical History:   Diagnosis Date    Abnormal mammogram of left breast 05/2019    Anxiety     Cerebral artery occlusion with cerebral infarction [facility])     Depression     Diabetes mellitus ([address]     Diabetic peripheral neuropathy ([address] 9/1/2022    Dysarthria 5/12/2021    Erosive esophagitis     Esophageal stricture     GERD (gastroesophageal reflux disease)     Gestational diabetes     Hemorrhagic stroke ([address] 5/12/2021    Hyperlipidemia     Hypertension     Hypokalemia     Left lumbosacral radiculopathy 9/1/2022    Osteoarthritis     knees    Pain in joint, shoulder region 10/28/2022    bilat.     Peroneal neuropathy at knee, left 9/1/2022    Postmenopausal     Type 2 diabetes mellitus without complication [facility])        Patient Active Problem List    Diagnosis Date Noted    Protein calorie malnutrition 12/22/2022    Severe protein-calorie malnutrition ([address] 12/09/2022    Pancreatic adenocarcinoma ([address] 12/08/2022    Type II diabetes mellitus with peripheral circulatory disorder ([facility] 11/01/2022    PVD (peripheral vascular disease) ([facility] 11/01/2022    Diabetic peripheral neuropathy ([facility] 09/01/2022    Peroneal neuropathy at knee, left 09/01/2022    Left lumbosacral radiculopathy 09/01/2022    Muscle function loss 07/04/2022    Impaired functional mobility, balance, gait, and endurance 07/04/2022    Altered mental status 05/26/2022    Lactic acidosis 05/26/2022    Pain in joint, shoulder region 10/28/2022    Dysarthria 05/12/2021    Hemorrhagic stroke ([facility] 05/12/2021    Type 2 diabetes mellitus without complication, without long-term current use of insulin ([facility]     Essential hypertension     Gastroesophageal reflux disease without esophagitis     Mixed hyperlipidemia     Subacute osteomyelitis of foot ([facility]     Right foot infection 07/15/2016        Past Surgical History:   Procedure Laterality Date    BREAST BIOPSY Left 05/2019    Dr. Sharona Person      x4    CHOLECYSTECTOMY      COLONOSCOPY  11/2011    HYSTERECTOMY (CERVIX STATUS UNKNOWN)      JOINT REPLACEMENT Right     knee    UPPER GASTROINTESTINAL ENDOSCOPY N/A 12/6/2022    EGD ESOPHAGOGASTRODUODENOSCOPY DILATATION performed by Ava De La Torre MD at [facility] 12/8/2022    EGD ESOPHAGOGASTRODUODENOSCOPY PEG TUBE INSERTION performed by Kary Gonzalez MD at [address] [place] [address] N/A 12/8/2022    EGD W/EUS FNA performed by Kary Gonzalez MD at [place] [address]. History  Family History   Problem Relation Age of Onset    Diabetes Mother         complication     Coronary Art Dis Father     Rheum Arthritis Sister     Mult Sclerosis Brother     Diabetes Brother        Social History    TOBACCO:   reports that she has never smoked. She has never used smokeless tobacco.  ETOH:   reports no history of alcohol use.     Home Medications  Prior to Admission medications    Medication Sig Start Date End Date Taking? Authorizing Provider   aspirin 81 MG EC tablet Take 81 mg by mouth daily   Yes Historical Provider, MD   metFORMIN (GLUCOPHAGE) 1000 MG tablet Take 1 tablet by mouth 2 times daily (with meals) 12/22/22  Yes Yumiko Kelly DO   venlafaxine (EFFEXOR XR) 150 MG extended release capsule TAKE 1 CAPSULE DAILY 12/22/22  Yes Yumiko Kelly DO   lisinopril (PRINIVIL;ZESTRIL) 30 MG tablet TAKE 1 TABLET DAILY AT NIGHT 12/22/22  Yes Yumiko Kelly, DO   gabapentin (NEURONTIN) 100 MG capsule Take 1 capsule by mouth 2 times daily for 30 days. Intended supply: 30 days 12/22/22 1/21/23 Yes Yumiko Kelly DO   esomeprazole (NEXIUM) 40 MG delayed release capsule TAKE 1 CAPSULE EVERY MORNING BEFORE BREAKFAST 12/22/22  Yes Yumiko Kelly, DO   atorvastatin (LIPITOR) 40 MG tablet TAKE 1 TABLET DAILY 12/22/22  Yes Yumiko Kelly DO   amLODIPine (NORVASC) 5 MG tablet TAKE 1 TABLET DAILY IN THE MORNING 12/22/22  Yes Yumiko Kelly DO   metoprolol tartrate (LOPRESSOR) 25 MG tablet 1 tablet by Per G Tube route 2 times daily 12/22/22  Yes Yumiko Kelly DO   naproxen (NAPROSYN) 500 MG tablet Take 1 tablet by mouth 2 times daily (with meals) 11/21/22  Yes JAYDEN Alicea   zoster recombinant adjuvanted vaccine [facility]) 50 MCG/0.5ML SUSR injection Inject 0.5 mLs into the muscle See Admin Instructions 1 dose now and repeat in 2-6 months 7/28/22 1/24/23 Yes Yumiko Kelly DO   Multiple Vitamin (MULTI-VITAMIN DAILY PO) Take by mouth   Yes Historical Provider, MD       Allergies  No Known Allergies    Review of Systems:    As in HPI      Objective  BP (!) 120/101   Pulse 98   Temp (!) 96.1 °F (35.6 °C)   Ht 5' 7\" (1.702 m)   Wt 124 lb 14.4 oz (56.7 kg)   SpO2 99%   BMI 19.56 kg/m²     Physical Exam:   Performance Status: 1  General: AAO to person, place, time,  Head and neck : PERRLA, EOMI . Sclera non icteric.   Oropharynx : Clear  Neck: no JVD,  no adenopathy  LYMPHATICS : No LAD  Heart: Regular rate and regular rhythm, no murmur  Lungs: Clear to auscultation   Extremities: No edema  Abdomen: Soft, non-tender; PEG in place  Skin:  No rash  Neurologic:Cranial nerves grossly intact. No focal motor deficits    Recent Laboratory Data-   Lab Results   Component Value Date    WBC 14.3 (H) 12/11/2022    HGB 11.7 12/11/2022    HCT 35.1 12/11/2022    MCV 90.0 12/11/2022     12/11/2022    LYMPHOPCT 7.7 (L) 12/11/2022    RBC 3.90 12/11/2022    MCH 30.0 12/11/2022    MCHC 33.3 12/11/2022    RDW 13.0 12/11/2022    NEUTOPHILPCT 83.2 (H) 12/11/2022    MONOPCT 7.7 12/11/2022    BASOPCT 0.2 12/11/2022    NEUTROABS 11.91 (H) 12/11/2022    LYMPHSABS 1.11 (L) 12/11/2022    MONOSABS 1.10 (H) 12/11/2022    EOSABS 0.13 12/11/2022    BASOSABS 0.03 12/11/2022       Lab Results   Component Value Date     12/11/2022    K 4.2 12/11/2022     12/11/2022    CO2 29 12/11/2022    BUN 9 12/11/2022    CREATININE 0.4 (L) 12/11/2022    GLUCOSE 172 (H) 12/11/2022    CALCIUM 9.4 12/11/2022    PROT 5.7 (L) 12/06/2022    LABALBU 3.0 (L) 12/06/2022    BILITOT 0.4 12/06/2022    ALKPHOS 81 12/06/2022    AST 20 12/06/2022    ALT 13 12/06/2022    LABGLOM >60 12/11/2022    GFRAA >60 07/28/2022       No results found for: IRON, TIBC, FERRITIN        Radiology-    No orders to display         ASSESSMENT/PLAN :  66 yo female  Mass just proximal to distal pancreas with 8mm duct dilation  Esophageal stricture  Stroke previously    - S/p PEG placement  - S/p EGD/EUS with biopsy of aforementioned lesion  - Imaging with no evidence of distant mets. Splenic artery involvement. Size 2-3 cm on imaging  - CA [id number]  - Case discussed with Dr. Peoples directly. We are in agreement for neoadjuvant chemotherapy, likely mFOLFIRINOX with GCSF support +/- subsequent CRT with xeloda 825 mg/m2 BID  - PORT outpatient  - Will follow final pathology results    Thank you for this consult. Please call with  further questions or concerns    12/12/22  Remains weak. Recovering from influenza A  Has history of CVA with residual expressive aphasia  Status post PEG tube placement  Will eventually require neoadjuvant chemotherapy likely mFOLFIRINOX with G-CSF support and she will follow with Dr. Kristie Smalls as outpatient    1/6/23  - Pancreatic mass with encasement of the splenic artery  - Pathology confirmed invasive moderately differentiated pancreatic adenocarcinoma  - Imaging in hospital with no evidence of metastatic disease  - S/p PEG placement  - Refer to Dr. Moiz Hayes for PORT  - Recommend neoadjuvant mFOLFIRINOX with GCSF support +/- subsequent CRT with xeloda 825 mg/m2 BID  - R/b/a reviewed and pt and  expressed understanding and wish to proceed.  All questions answered  - RTC 1/17/23 to begin therapy      Electronically signed by Luke Bailey MD on 1/6/2023 at 1:24 PM

## 2023-01-06 NOTE — PROGRESS NOTES
Marisol Sanchez  1955 67 y.o.      Referring Physician:     PCP: El Singh, DO    Vitals:    23 1312   BP: (!) 120/101   Pulse: 98   Temp: (!) 96.1 °F (35.6 °C)   SpO2: 99%        Wt Readings from Last 3 Encounters:   23 124 lb 14.4 oz (56.7 kg)   22 124 lb 1.6 oz (56.3 kg)   22 131 lb (59.4 kg)        Body mass index is 19.56 kg/m².          Chief Complaint:   Chief Complaint   Patient presents with    New Patient         Cancer Staging  No matching staging information was found for the patient.    Prior Radiation Therapy? NO    Concurrent Chemo/radiation? NO    Prior Chemotherapy? NO    Prior Hormonal Therapy? NO    Head and Neck Cancer? No, patient does NOT have HN cancer.      LMP: hysterectomy     Age at first Menses:  16 yo    : 4    Para: 4          Current Outpatient Medications:     aspirin 81 MG EC tablet, Take 81 mg by mouth daily, Disp: , Rfl:     metFORMIN (GLUCOPHAGE) 1000 MG tablet, Take 1 tablet by mouth 2 times daily (with meals), Disp: 60 tablet, Rfl: 5    venlafaxine (EFFEXOR XR) 150 MG extended release capsule, TAKE 1 CAPSULE DAILY, Disp: 30 capsule, Rfl: 5    lisinopril (PRINIVIL;ZESTRIL) 30 MG tablet, TAKE 1 TABLET DAILY AT NIGHT, Disp: 14 tablet, Rfl: 0    gabapentin (NEURONTIN) 100 MG capsule, Take 1 capsule by mouth 2 times daily for 30 days. Intended supply: 30 days, Disp: 60 capsule, Rfl: 0    esomeprazole (NEXIUM) 40 MG delayed release capsule, TAKE 1 CAPSULE EVERY MORNING BEFORE BREAKFAST, Disp: 14 capsule, Rfl: 0    atorvastatin (LIPITOR) 40 MG tablet, TAKE 1 TABLET DAILY, Disp: 90 tablet, Rfl: 1    amLODIPine (NORVASC) 5 MG tablet, TAKE 1 TABLET DAILY IN THE MORNING, Disp: 14 tablet, Rfl: 0    metoprolol tartrate (LOPRESSOR) 25 MG tablet, 1 tablet by Per G Tube route 2 times daily, Disp: 60 tablet, Rfl: 3    naproxen (NAPROSYN) 500 MG tablet, Take 1 tablet by mouth 2 times daily (with meals), Disp: 180 tablet, Rfl: 1    zoster recombinant  adjuvanted vaccine [facility]) 50 MCG/0.5ML SUSR injection, Inject 0.5 mLs into the muscle See Admin Instructions 1 dose now and repeat in 2-6 months, Disp: 0.5 mL, Rfl: 0    Multiple Vitamin (MULTI-VITAMIN DAILY PO), Take by mouth, Disp: , Rfl:        Past Medical History:   Diagnosis Date    Abnormal mammogram of left breast 05/2019    Anxiety     Cerebral artery occlusion with cerebral infarction [facility])     Depression     Diabetes mellitus ([facility]     [facility] 9/1/2022    Dysarthria 5/12/2021    Erosive esophagitis     Esophageal stricture     GERD (gastroesophageal reflux disease)     Gestational diabetes     Hemorrhagic stroke ([facility] 5/12/2021    Hyperlipidemia     Hypertension     Hypokalemia     Left lumbosacral radiculopathy 9/1/2022    Osteoarthritis     knees    Pain in joint, shoulder region 10/28/2022    bilat.     Peroneal neuropathy at knee, left 9/1/2022    Postmenopausal     Type 2 diabetes mellitus without complication [facility])        Past Surgical History:   Procedure Laterality Date    BREAST BIOPSY Left 05/2019    Dr. Conroy Mantle Dr. Khushi Sandoval      x4    CHOLECYSTECTOMY      COLONOSCOPY  11/2011    HYSTERECTOMY (CERVIX STATUS UNKNOWN)      JOINT REPLACEMENT Right     knee    UPPER GASTROINTESTINAL ENDOSCOPY N/A 12/6/2022    EGD ESOPHAGOGASTRODUODENOSCOPY DILATATION performed by Martin Day MD at [address] N/A 12/8/2022    EGD ESOPHAGOGASTRODUODENOSCOPY PEG TUBE INSERTION performed by Reanna Galarza MD at [address] N/A 12/8/2022    EGD W/EUS FNA performed by Reanna Galarza MD at [facility]. History   Problem Relation Age of Onset    Diabetes Mother         complication     Coronary Art Dis Father     Rheum Arthritis Sister     Mult Sclerosis Brother     Diabetes Brother        Social History     Socioeconomic History    Marital status:      Spouse name: Not on file    Number of children: Not on file    Years of education: Not on file    Highest education level: Not on file   Occupational History    Not on file   Tobacco Use    Smoking status: Never    Smokeless tobacco: Never   Substance and Sexual Activity    Alcohol use: No    Drug use: No    Sexual activity: Not on file   Other Topics Concern    Not on file   Social History Narrative    Not on file     Social Determinants of Health     Financial Resource Strain: Low Risk     Difficulty of Paying Living Expenses: Not hard at all   Food Insecurity: No Food Insecurity    Worried About Running Out of Food in the Last Year: Never true    Ran Out of Food in the Last Year: Never true   Transportation Needs: Not on file   Physical Activity: Not on file   Stress: Not on file   Social Connections: Not on file   Intimate Partner Violence: Not on file   Housing Stability: Not on file           Occupation: automobile assembly line  Retired:  YES: Patient is retired from [employer].          REVIEW OF SYSTEMS:     Pacemaker/Defibulator/ICD:  No    Mediport: No           FALLS RISK SCREENING ASSESSMENT    Instructions:  Assess the patient and [place] the appropriate indicators that are present for fall risk identification.   Total the numbers circled and assign a fall risk score from Table 2.  Reassess patient at a minimum every 12 weeks or with status change.    Assessment   Date  1/6/2023     1.  Mental Ability: confusion/cognitively impaired No - 0       2.  Elimination Issues: incontinence, frequency No - 0       3.  Ambulatory: use of assistive devices (walker, cane, off-loading devices), attached to equipment (IV pole, oxygen) No - 0     4.  Sensory Limitations: dizziness, vertigo, impaired vision No - 0       5.  Age 65 years or greater - 1       6.  Medication: diuretics, strong analgesics, hypnotics, sedatives, antihypertensive agents   Yes - 3  7.  Falls:  recent history of falls within the last 3 months (not to include slipping or tripping)   Yes - 7   TOTAL 11    If score of 4 or greater was education given? Yes       TABLE 2   Risk Score Risk Level Plan of Care   0-3 Little or  No Risk 1. Provide assistance as indicated for ambulation activities  2. Reorient confused/cognitively impaired patient  3. Call-light/bell within patient's reach  4. Chair/bed in low position, stretcher/bed with siderails up except when performing patient care activities  5. Educate patient/family/caregiver on falls prevention  6.  Reassess in 12 weeks or with any noted change in patient condition which places them at a risk for a fall   4-6 Moderate Risk 1. Provide assistance as indicated for ambulation activities  2. Reorient confused/cognitively impaired patient  3. Call-light/bell within patient's reach  4. Chair/bed in low position, stretcher/bed with siderails up except when performing patient care activities  5. Educate patient/family/caregiver on falls prevention  6. Falls risk precaution (Yellow sticker Level II) placed on patient chart   7 or   Higher High Risk 1. Place patient in easily observable treatment room  2. Patient attended at all times by family member or staff  3. Provide assistance as indicated for ambulation activities  4. Reorient confused/cognitively impaired patient  5. Call-light/bell within patient's reach  6. Chair/bed in low position, stretcher/bed with siderails up except when performing patient care activities  7. Educate patient/family/caregiver on falls prevention  8. Falls risk precaution (Yellow sticker Level III) placed on patient chart           MALNUTRITION RISK SCREENING ASSESSMENT    Instructions:  Assess the patient and enter the appropriate indicators that are present for nutrition risk identification. Total the numbers entered and assign a risk score. Follow the appropriate action for total score listed below. Assessment   Date  1/6/2023     Have you lost weight without trying? 4- Yes, >15 kg     Have you been eating poorly because of a decreased appetite? 0- No   3. Do you have a diagnosis of head and neck cancer? 0- No                                                                                    TOTAL 4-patient has a PEG tube and does home feedings,6 boxes/day+ water flushes. Score of 0-1: No action  Score 2 or greater: For Non-Diabetic Patient: Recommend adding Ensure Enlive 2 x daily and provide patient with Ensure wellness bag with coupons  For Diabetic Patient: Recommend adding Glucerna Shake 2 x daily and provide patient with Glucerna Wellness bag with coupons  Route to the dietitian via [address]    Are you having  difficulty performing daily routine tasks  due to fatigue or weakness (ie: bathing/showering, dressing, housework, meal prep, work, child Sherre Canner): Yes     Do you have any arm flexibility/ROM restrictions, swelling or pain that limit activity: Yes     Any changes in memory, attention/focus that impact daily activities: No     Do you avoid participation in leisure/social activity due weakness, fatigue or pain: No     ARE ANY OF THE ABOVE ARE ANSWERED YES: Yes - but NO OT referral request sent due to patient already being seen by OT. PT ASSESSMENT FOR REFERRAL    Have you had any recent falls in past 2 months: Yes     Do you have difficulty  going up/down stairs: Yes     Are you having difficulty walking: Yes     Do you often hold onto furniture/environmental supports or feel off balance when you are walking: Yes     Do you need to take rest breaks when you are walking: Yes     Any pain on scale of 1-10 that limits your mobility: No 0/10    ARE ANY OF THE ABOVE ARE ANSWERED YES: Yes - but NO PT referral request sent due to patient already being seen by PT.       PELVIC FLOOR DYSFUNCTION SCREENING ASSESSMENT    - Do you have any pelvic pain? No     - Do you have any fecal constipation or fecal incontinence? Yes     - Do you have any urinary incontinence or difficulty starting to urinate? No     ARE ANY OF THE ABOVE ARE ANSWERED YES: Yes - but NO PT referral request sent due to patient already being seen by PT. [facility] REFERRAL    - Is patient planned to receive Cisplatin? No. This patient is not planned to start Cisplatin. - Is patient complaining of new onset hearing loss? No. Patient is not complaining of new onset hearing loss.         Ammy Piña RN

## 2023-01-06 NOTE — TELEPHONE ENCOUNTER
Sanchez Amel  1/6/2023  Wt Readings from Last 10 Encounters:   01/06/23 124 lb 14.4 oz (56.7 kg)   12/22/22 124 lb 1.6 oz (56.3 kg)   12/12/22 131 lb (59.4 kg)   11/21/22 140 lb (63.5 kg)   11/01/22 141 lb (64 kg)   10/28/22 141 lb (64 kg)   10/12/22 141 lb (64 kg)   10/06/22 141 lb 12.8 oz (64.3 kg)   09/01/22 149 lb (67.6 kg)   09/16/22 144 lb (65.3 kg)     Ht Readings from Last 1 Encounters:   01/06/23 5' 7\" (1.702 m)     BMI=19.56    Assessment: Visited with Mau Guerra and her  while in for new patient visit with Dr. Michael Stevens for pancreatic adenocarcinoma. Mau Guerra has had a significant weight loss x 3 and 6 months. She had a PEG tube placed on 12/8/22 and is being followed by [facility]. On discharge from hospital, she was recommended Glucerna 1.5 1 1/2 cartons 4 times per day with 140ml water 6 times per day.  reports he has been giving her Glucerna 1.5 1.5 cartons 3 times per day and 140ml water. No difficulty reported tolerating tube feeding (no N/V/D/C), no problems with TF administration or receiving EN formula and supplies. Since discharge from hospital Marisol's weight has remained stable, but without desired weight gain. Encouraged Marisol and her  to increase Glucerna 1.5 to 4 feedings per day of 1 1/2 cartons. They report that they sleep late in day and getting in 4 feedings is difficult because then it is too close to bedtime and she doesn't sleep well. Suggested giving 1 1/2 cartons and then an hour or 2 later give another 1/2 carton and repeat cycle til 6 cartons per day are infused. If 6 cartons of Glucerna 1.5 are used per day, Marisol would receive 2136kcals, 118gm protein, 1080ml free water from formula+840ml water from flush. Contact information provided and encouraged them to call with questions or concerns. They were appreciative of information.     Weight change: 4.66% weight loss x 1 month, 11.92% significant weight loss x 3 months, 16.29% significant weight loss x 6 months  Appetite: Poor appetite. Orally Marisol is eating pureed consistency foods such as pudding, jello, unthickened beverages. She is currently working with a SLP. Nutritional Side Effects: anorexia, dysphagia  Calculated Needs: 32-35kcal/kg/KYR=[id number], 1.5-1.8gm/kg/CBW=85-100gm protein, 1ml/kcal=1800-2000ml fluids  Malnutrition Status: Severe  Nutrition Diagnosis: Severe malnutrition r/t pancreatic adenocarcinoma AEB significant weight loss, muscle wasting. Recommendations: Glucerna 1.5 via PEG for a total of 6 cartons per day. These can be divided in 1 1/2 carton for 4 feedings per day, or 1 1/2 carton 3 feedings and 1/2 carton 3 feedings between the other feedings. Continue oral intake as managed directed by SLP.     Cindy Flynn RD

## 2023-01-09 LAB — CA 19-9: 227 U/ML

## (undated) DEVICE — GOWN,SIRUS,NONRNF,SETINSLV,XL,20/CS: Brand: MEDLINE

## (undated) DEVICE — GOWN ISOLATN REG YEL M WT MULTIPLY SIDETIE LEV 2

## (undated) DEVICE — FORCEPS BX L240CM JAW DIA2.8MM L CAP W/ NDL MIC MESH TOOTH

## (undated) DEVICE — MASK,FACE,MAXFLUIDPROTECT,SHIELD/ERLPS: Brand: MEDLINE

## (undated) DEVICE — KENDALL 450 SERIES MONITORING FOAM ELECTRODE - RECTANGULAR SHAPE ( 3/PK): Brand: KENDALL

## (undated) DEVICE — COVER,TABLE,44X90,STERILE: Brand: MEDLINE

## (undated) DEVICE — GLOVE ORANGE PI 7 1/2   MSG9075

## (undated) DEVICE — TUBING, SUCTION, 1/4" X 10', STRAIGHT: Brand: MEDLINE

## (undated) DEVICE — COVER,LIGHT HANDLE,FLX,1/PK: Brand: MEDLINE INDUSTRIES, INC.

## (undated) DEVICE — MARKER,SKIN,WI/RULER AND LABELS: Brand: MEDLINE

## (undated) DEVICE — GLOVE SURG SZ 65 THK91MIL LTX FREE SYN POLYISOPRENE

## (undated) DEVICE — Device: Brand: DEFENDO VALVE AND CONNECTOR KIT

## (undated) DEVICE — CLOTH SURG PREP PREOPERATIVE CHLORHEXIDINE GLUC 2% READYPREP

## (undated) DEVICE — 6 X 9  1.75MIL 4-WALL LABGUARD: Brand: MINIGRIP COMMERCIAL LLC

## (undated) DEVICE — ADAPTER CLEANING PORPOISE CLEANING

## (undated) DEVICE — CONTAINER SPEC COLL 960ML POLYPR TRIANG GRAD INTAKE/OUTPUT

## (undated) DEVICE — SPONGE GZ 4IN 4IN 4 PLY N WVN AVANT

## (undated) DEVICE — YANKAUER,BULB TIP,W/O VENT,RIGID,STERILE: Brand: MEDLINE

## (undated) DEVICE — KIT BEDSIDE REVITAL OX 500ML

## (undated) DEVICE — LUBRICANT SURG JELLY ST BACTER TUBE 4.25OZ

## (undated) DEVICE — TOWEL,OR,DSP,ST,BLUE,STD,6/PK,12PK/CS: Brand: MEDLINE

## (undated) DEVICE — BLOCK BITE CAREGUARD 60FR W/LF STRAP

## (undated) DEVICE — Device